# Patient Record
Sex: MALE | Race: WHITE | NOT HISPANIC OR LATINO | Employment: FULL TIME | URBAN - METROPOLITAN AREA
[De-identification: names, ages, dates, MRNs, and addresses within clinical notes are randomized per-mention and may not be internally consistent; named-entity substitution may affect disease eponyms.]

---

## 2017-09-10 ENCOUNTER — GENERIC CONVERSION - ENCOUNTER (OUTPATIENT)
Dept: OTHER | Facility: OTHER | Age: 57
End: 2017-09-10

## 2017-09-10 ENCOUNTER — LAB CONVERSION - ENCOUNTER (OUTPATIENT)
Dept: OTHER | Facility: OTHER | Age: 57
End: 2017-09-10

## 2017-09-10 LAB
A/G RATIO (HISTORICAL): 1.5 (CALC) (ref 1–2.5)
ALBUMIN SERPL BCP-MCNC: 4.1 G/DL (ref 3.6–5.1)
ALP SERPL-CCNC: 44 U/L (ref 40–115)
ALT SERPL W P-5'-P-CCNC: 17 U/L (ref 9–46)
AST SERPL W P-5'-P-CCNC: 18 U/L (ref 10–35)
BILIRUB SERPL-MCNC: 0.3 MG/DL (ref 0.2–1.2)
BUN SERPL-MCNC: 17 MG/DL (ref 7–25)
BUN/CREA RATIO (HISTORICAL): 11 (CALC) (ref 6–22)
CALCIUM (ADJUSTED FOR ALBUMIN) (HISTORICAL): 9.7 MG/DL (CALC) (ref 8.6–10.2)
CALCIUM SERPL-MCNC: 9.5 MG/DL (ref 8.6–10.3)
CHLORIDE SERPL-SCNC: 105 MMOL/L (ref 98–110)
CHOLEST SERPL-MCNC: 174 MG/DL
CHOLEST/HDLC SERPL: 3 (CALC)
CO2 SERPL-SCNC: 25 MMOL/L (ref 20–31)
CREAT SERPL-MCNC: 1.51 MG/DL (ref 0.7–1.33)
EGFR AFRICAN AMERICAN (HISTORICAL): 58 ML/MIN/1.73M2
EGFR-AMERICAN CALC (HISTORICAL): 50 ML/MIN/1.73M2
GAMMA GLOBULIN (HISTORICAL): 2.7 G/DL (CALC) (ref 1.9–3.7)
GLUCOSE (HISTORICAL): 98 MG/DL (ref 65–99)
HDLC SERPL-MCNC: 58 MG/DL
LDL CHOLESTEROL (HISTORICAL): 103 MG/DL (CALC)
NON-HDL-CHOL (CHOL-HDL) (HISTORICAL): 116 MG/DL (CALC)
POTASSIUM SERPL-SCNC: 4.3 MMOL/L (ref 3.5–5.3)
SODIUM SERPL-SCNC: 138 MMOL/L (ref 135–146)
TOTAL PROTEIN (HISTORICAL): 6.8 G/DL (ref 6.1–8.1)
TRIGL SERPL-MCNC: 50 MG/DL
TSH SERPL DL<=0.05 MIU/L-ACNC: 1.36 MIU/L (ref 0.4–4.5)

## 2017-09-26 ENCOUNTER — ALLSCRIPTS OFFICE VISIT (OUTPATIENT)
Dept: OTHER | Facility: OTHER | Age: 57
End: 2017-09-26

## 2017-10-08 ENCOUNTER — GENERIC CONVERSION - ENCOUNTER (OUTPATIENT)
Dept: OTHER | Facility: OTHER | Age: 57
End: 2017-10-08

## 2017-10-08 LAB
A/G RATIO (HISTORICAL): 1.7 (CALC) (ref 1–2.5)
ALBUMIN SERPL BCP-MCNC: 3.8 G/DL (ref 3.6–5.1)
ALP SERPL-CCNC: 44 U/L (ref 40–115)
ALT SERPL W P-5'-P-CCNC: 14 U/L (ref 9–46)
AST SERPL W P-5'-P-CCNC: 14 U/L (ref 10–35)
BILIRUB SERPL-MCNC: 0.6 MG/DL (ref 0.2–1.2)
BUN SERPL-MCNC: 14 MG/DL (ref 7–25)
BUN/CREA RATIO (HISTORICAL): NORMAL (CALC) (ref 6–22)
CALCIUM (ADJUSTED FOR ALBUMIN) (HISTORICAL): 9.5 MG/DL (CALC) (ref 8.6–10.2)
CALCIUM SERPL-MCNC: 9.1 MG/DL (ref 8.6–10.3)
CHLORIDE SERPL-SCNC: 107 MMOL/L (ref 98–110)
CO2 SERPL-SCNC: 28 MMOL/L (ref 20–31)
CREAT SERPL-MCNC: 1.31 MG/DL (ref 0.7–1.33)
EGFR AFRICAN AMERICAN (HISTORICAL): 69 ML/MIN/1.73M2
EGFR-AMERICAN CALC (HISTORICAL): 60 ML/MIN/1.73M2
GAMMA GLOBULIN (HISTORICAL): 2.3 G/DL (CALC) (ref 1.9–3.7)
GLUCOSE (HISTORICAL): 94 MG/DL (ref 65–99)
POTASSIUM SERPL-SCNC: 4.5 MMOL/L (ref 3.5–5.3)
SODIUM SERPL-SCNC: 140 MMOL/L (ref 135–146)
TOTAL PROTEIN (HISTORICAL): 6.1 G/DL (ref 6.1–8.1)

## 2018-01-09 NOTE — RESULT NOTES
Discussion/Summary   KIDNEY FUNCTION IS SHOWING DECREASE  AVOID ADVIL, ETC AND DRINK MORE WATER  WE WILL REPEAT THIS  WILL DISCUSS AT APPT  DR [facility]     Verified Results  (Q) LIPID PANEL WITH REFLEX TO DIRECT LDL [id number] 07:13AM Yes[facility] [employer]     Test Name Result Flag Reference   CHOLESTEROL, TOTAL 174 mg/dL  <200   HDL CHOLESTEROL 58 mg/dL  >51   TRIGLICERIDES 50 mg/dL  <479   LDL-CHOLESTEROL 103 mg/dL (calc) H    Reference range: <100     Desirable range <100 mg/dL for patients with CHD or  diabetes and <70 mg/dL for diabetic patients with  known heart disease  The [facility] calculation is a validated novel   method that provides better accuracy than the   Friedewald equation in the estimation of LDL-C,   particularly when TG levels are 150-400 mg/dL and   LDL-C levels are lower than 70 mg/dL  Reference:  Tiki Reveles al  Comparison of a Novel   Method vs the Friedewald Equation for Estimating   Low-Density Lipoprotein Cholesterol Levels From the   Standard Lipid Profile  JERAD  9220;225(10): 5763-9868  For additional information, please refer to  [email or web address]/LHW134  (This link is being provided for informational/  educational purposes only )   CHOL/HDLC RATIO 3 0 (calc)  <5 0   NON HDL CHOLESTEROL 116 mg/dL (calc)  <130   For patients with diabetes plus 1 major ASCVD risk   factor, treating to a non-HDL-C goal of <100 mg/dL   (LDL-C of <70 mg/dL) is considered a therapeutic   option  (Q) COMPREHENSIVE METABOLIC PNL W/ADJUSTED CALCIUM [id number] 07:13AM [employer]     Test Name Result Flag Reference   GLUCOSE 98 mg/dL  65-99   Fasting reference interval   UREA NITROGEN (BUN) 17 mg/dL  7-25   CREATININE 1 51 mg/dL H 0 70-1 33   For patients >52years of age, the reference limit  for Creatinine is approximately 13% higher for people  identified as -American  eGFR NON-AFR   AMERICAN 50 mL/min/1 73m2 L > OR = 60   eGFR  58 mL/min/1 73m2 L > OR = 60   BUN/CREATININE RATIO 11 (calc)  6-22   SODIUM 138 mmol/L  135-146   POTASSIUM 4 3 mmol/L  3 5-5 3   CHLORIDE 105 mmol/L     CARBON DIOXIDE 25 mmol/L  20-31   CALCIUM 9 5 mg/dL  8 6-10 3   CALCIUM (ADJUSTED FOR$ALBUMIN) 9 7 mg/dL (calc)  8 6-10 2   PROTEIN, TOTAL 6 8 g/dL  6 1-8 1   ALBUMIN 4 1 g/dL  3 6-5 1   GLOBULIN 2 7 g/dL (calc)  1 9-3 7   ALBUMIN/GLOBULIN RATIO 1 5 (calc)  1 0-2 5   BILIRUBIN, TOTAL 0 3 mg/dL  0 2-1 2   ALKALINE PHOSPHATASE 44 U/L     AST 18 U/L  10-35   ALT 17 U/L  9-46     (Q) TSH, 3RD GENERATION W/REFLEX TO FT4 [id number] 07:13AM Tashia Sifuentes   REPORT COMMENT:  FASTING:YES     Test Name Result Flag Reference   TSH W/REFLEX TO FT4 1 36 mIU/L  0 40-4 50

## 2018-01-10 NOTE — PROGRESS NOTES
Assessment    1  Encounter for preventive health examination (V70 0) (Z00 00)   2  Need for prophylactic vaccination and inoculation against influenza (V04 81) (Z23)   3  Mild renal insufficiency (593 9) (N28 9)   4  Special screening for malignant neoplasms, colon (V76 51) (Z12 11)   5  Family history of lung cancer (V16 1) (Z80 1) : Mother    Plan  Benign essential hypertension    · AmLODIPine Besylate 5 MG Oral Tablet; take 1 tablet by mouth once daily  Benign essential hypertension, Hyperlipidemia    · (Q) COMPREHENSIVE METABOLIC PNL W/ADJUSTED CALCIUM; Status:Active; Requested for:01Mar2018;    · (Q) LIPID PANEL WITH REFLEX TO DIRECT LDL; Status:Active; Requested  for:01Mar2018;    · (Q) TSH, 3RD GENERATION W/REFLEX TO FT4; Status:Active; Requested  IOE:[id number];   Erectile dysfunction of non-organic origin    · Viagra 50 MG Oral Tablet; TAKE 1 TABLET AS NEEDED  Health Maintenance    · Follow-up visit in 1 year Evaluation and Treatment  Follow-up  Status: Hold For -  Scheduling  Requested for: 26Sep2017   · Eat a low fat and low cholesterol diet ; Status:Complete;   Done: 07CJZ5643 05:39PM   · Call 911 if: You experience a new kind of chest pain (angina) or pressure ;  Status:Complete;   Done: 56VQQ7187 05:39PM  Mild renal insufficiency    · (Q) COMPREHENSIVE METABOLIC PNL W/ADJUSTED CALCIUM; Status:Active; Requested FTX:[id number];   Need for prophylactic vaccination and inoculation against influenza    · Fluzone Quadrivalent 0 5 ML Intramuscular Suspension Prefilled Syringe  Special screening for malignant neoplasms, colon    · (Q) FECAL GLOBIN BY IMMUNOCHEMISTRY; Status:Active; Requested LCP:[id number];     Discussion/Summary  health maintenance visit Currently, he eats a healthy diet and has an adequate exercise regimen  Testicular cancer screening: self testicular exam technique was taught  Colorectal cancer screening: colorectal cancer screening is current  Screening lab work includes lipid profile  The immunizations are up to date  He was advised to be evaluated by an ophthalmologist  Advice and education were given regarding aerobic exercise  Patient discussion: discussed with the patient  Chief Complaint  Patient here for annual wellness exam      History of Present Illness  HM, Adult Male: The patient is being seen for a health maintenance evaluation  General Health: The patient's health since the last visit is described as good  He has regular dental visits  He denies vision problems  He denies hearing loss  Immunizations status: not up to date  Lifestyle:  He consumes a diverse and healthy diet  He does not have any weight concerns  He exercises regularly  He does not use tobacco  He denies alcohol use  He denies drug use  Screening: cancer screening reviewed and updated  Prostate cancer screening includes prostate-specific antigen testing last year  Testicular cancer screening includes monthly self testicular examinations  Colorectal cancer screening includes no previous screening  Metabolic screening includes lipid profile performed within the past five years, glucose screening performed last year and thyroid function test performed last year  Review of Systems    Constitutional: No fever or chills, feels well, no tiredness, no recent weight gain or weight loss  Eyes: No complaints of eye pain, no red eyes, no discharge from eyes, no itchy eyes  ENT: no complaints of earache, no hearing loss, no nosebleeds, no nasal discharge, no sore throat, no hoarseness  Cardiovascular: No complaints of slow heart rate, no fast heart rate, no chest pain, no palpitations, no leg claudication, no lower extremity  Respiratory: No complaints of shortness of breath, no wheezing, no cough, no SOB on exertion, no orthopnea or PND  Gastrointestinal: No complaints of abdominal pain, no constipation, no nausea or vomiting, no diarrhea or bloody stools     Genitourinary: No complaints of dysuria, no incontinence, no hesitancy, no nocturia, no genital lesion, no testicular pain  Musculoskeletal: No complaints of arthralgia, no myalgias, no joint swelling or stiffness, no limb pain or swelling  Integumentary: No complaints of skin rash or skin lesions, no itching, no skin wound, no dry skin  Neurological: No compliants of headache, no confusion, no convulsions, no numbness or tingling, no dizziness or fainting, no limb weakness, no difficulty walking  Psychiatric: Is not suicidal, no sleep disturbances, no anxiety or depression, no change in personality, no emotional problems  Endocrine: No complaints of proptosis, no hot flashes, no muscle weakness, no erectile dysfunction, no deepening of the voice, no feelings of weakness  Hematologic/Lymphatic: No complaints of swollen glands, no swollen glands in the neck, does not bleed easily, no easy bruising  Active Problems    1  Benign essential hypertension (401 1) (I10)   2  Erectile dysfunction of non-organic origin (302 72) (F52 21)   3  Hyperlipidemia (272 4) (E78 5)   4  Need for prophylactic vaccination and inoculation against influenza (V04 81) (Z23)   5  Special screening examination for neoplasm of prostate (V76 44) (Z12 5)   6  Special screening for malignant neoplasms, colon (V76 51) (Z12 11)   7   Well adult on routine health check (V70 0) (Z00 00)    Surgical History    · History of Hernia Repair    Family History  Mother    · Family history of Family Health Status Of Mother - Alive   · Family history of Family Health Status Of Mother - Good   · Family history of lung cancer (V16 1) (Z80 1)  Father    · Family history of Acute Myocardial Infarction (V17 3)   · Family history of malignant neoplasm of prostate (V16 42) (Z80 45)   · Family history of Pure Hypercholesterolemia  Sister    · Family history of Family Health Status Of Sister - Alive   · Family history of Family Health Status Of Sister - Good  Maternal Aunt    · Family history of Acute Lymphoma    Social History    · Alcohol Use (History)   · 3-4 BEERS/WEEK   · Denied: History of Drug Use   · Never A Smoker    Current Meds   1  AmLODIPine Besylate 5 MG Oral Tablet; take 1 tablet by mouth once daily; Therapy: 46BJG9813 to (Evaluate:97Gtc5293)  Requested for: [birth year]YOB[birth year]07; Last   Rx:[id number] Ordered   2  Viagra 50 MG Oral Tablet; TAKE 1 TABLET AS NEEDED; Therapy: 42PHF7330 to (Evaluate:25Hnl9088)  Requested for: 11ZOZ9666; Last   COY:47MBD2887 Ordered    Allergies    1  Diovan TABS   2  Lisinopril TABS    3  No Known Environmental Allergies   4  No Known Food Allergies    Vitals   Recorded: 02QYK0218 05:13PM   Heart Rate 64   Respiration 18   Systolic 921   Diastolic 90   Height 5 ft 11 4 in   Weight 193 lb 8 oz   BMI Calculated 26 69   BSA Calculated 2 09     Physical Exam    Constitutional   General appearance: No acute distress, well appearing and well nourished  Head and Face   Head and face: Normal     Eyes   Conjunctiva and lids: No erythema, swelling or discharge  Pupils and irises: Equal, round, reactive to light  Ears, Nose, Mouth, and Throat   External inspection of ears and nose: Normal     Otoscopic examination: Tympanic membranes translucent with normal light reflex  Canals patent without erythema  Hearing: Normal     Nasal mucosa, septum, and turbinates: Normal without edema or erythema  Lips, teeth, and gums: Normal, good dentition  Oropharynx: Normal with no erythema, edema, exudate or lesions  Neck   Neck: Supple, symmetric, trachea midline, no masses  Thyroid: Normal, no thyromegaly  Pulmonary   Respiratory effort: No increased work of breathing or signs of respiratory distress  Auscultation of lungs: Clear to auscultation  Cardiovascular   Auscultation of heart: Normal rate and rhythm, normal S1 and S2, no murmurs  Examination of extremities for edema and/or varicosities: Normal     Abdomen   Abdomen: Non-tender, no masses      Liver and spleen: No hepatomegaly or splenomegaly  Lymphatic   Palpation of lymph nodes in neck: No lymphadenopathy  Palpation of lymph nodes in axillae: No lymphadenopathy  Musculoskeletal   Gait and station: Normal     Inspection/palpation of digits and nails: Normal without clubbing or cyanosis  Inspection/palpation of joints, bones, and muscles: Normal     Range of motion: Normal     Stability: Normal     Muscle strength/tone: Normal     Skin   Skin and subcutaneous tissue: Normal without rashes or lesions  Palpation of skin and subcutaneous tissue: Normal turgor  Neurologic   Cranial nerves: Cranial nerves 2-12 intact  Cortical function: Normal mental status  Reflexes: 2+ and symmetric  Sensation: No sensory loss  Coordination: Normal finger to nose and heel to shin  Psychiatric   Judgment and insight: Normal     Orientation to person, place and time: Normal     Recent and remote memory: Intact      Mood and affect: Normal        Signatures   Electronically signed by : Charan Woodard DO; Sep 26 2017  5:39PM EST                       (Author)

## 2018-01-11 ENCOUNTER — GENERIC CONVERSION - ENCOUNTER (OUTPATIENT)
Dept: OTHER | Facility: OTHER | Age: 58
End: 2018-01-11

## 2018-01-11 LAB — FECAL GLOBIN BY IMMUNOCHEM (HISTORICAL): NORMAL

## 2018-01-12 VITALS
HEIGHT: 71 IN | SYSTOLIC BLOOD PRESSURE: 140 MMHG | WEIGHT: 193.5 LBS | HEART RATE: 64 BPM | DIASTOLIC BLOOD PRESSURE: 90 MMHG | RESPIRATION RATE: 18 BRPM | BODY MASS INDEX: 27.09 KG/M2

## 2018-01-15 NOTE — RESULT NOTES
Discussion/Summary   COMPLETELY NORMAL KIDNEY FUNCTION     [facility]     Verified Results  (Q) COMPREHENSIVE METABOLIC PNL W/ADJUSTED CALCIUM 07Oct2017 07:40AM Thai Hard   REPORT COMMENT:  FASTING:YES     Test Name Result Flag Reference   GLUCOSE 94 mg/dL  65-99   Fasting reference interval   UREA NITROGEN (BUN) 14 mg/dL  7-25   CREATININE 1 31 mg/dL  0 70-1 33   For patients >52years of age, the reference limit  for Creatinine is approximately 13% higher for people  identified as -American  eGFR NON-AFR   AMERICAN 60 mL/min/1 73m2  > OR = 60   eGFR AFRICAN AMERICAN 69 mL/min/1 73m2  > OR = 60   BUN/CREATININE RATIO   1-68   NOT APPLICABLE (calc)   SODIUM 140 mmol/L  135-146   POTASSIUM 4 5 mmol/L  3 5-5 3   CHLORIDE 107 mmol/L     CARBON DIOXIDE 28 mmol/L  20-31   CALCIUM 9 1 mg/dL  8 6-10 3   CALCIUM (ADJUSTED FOR$ALBUMIN) 9 5 mg/dL (calc)  8 6-10 2   PROTEIN, TOTAL 6 1 g/dL  6 1-8 1   ALBUMIN 3 8 g/dL  3 6-5 1   GLOBULIN 2 3 g/dL (calc)  1 9-3 7   ALBUMIN/GLOBULIN RATIO 1 7 (calc)  1 0-2 5   BILIRUBIN, TOTAL 0 6 mg/dL  0 2-1 2   ALKALINE PHOSPHATASE 44 U/L     AST 14 U/L  10-35   ALT 14 U/L  9-46

## 2018-01-15 NOTE — RESULT NOTES
Verified Results  (Q) LIPID PANEL WITH REFLEX TO DIRECT LDL [id number] 07:57AM [facility] [address] [employer]     Test Name Result Flag Reference   CHOLESTEROL, TOTAL 224 mg/dL H 125-200   HDL CHOLESTEROL 51 mg/dL  > OR = 40   TRIGLICERIDES 78 mg/dL  <409   LDL-CHOLESTEROL 157 mg/dL (calc) H <130   Desirable range <100 mg/dL for patients with CHD or  diabetes and <70 mg/dL for diabetic patients with  known heart disease  CHOL/HDLC RATIO 4 4 (calc)  < OR = 5 0   NON HDL CHOLESTEROL 173 mg/dL (calc) H    Target for non-HDL cholesterol is 30 mg/dL higher than   LDL cholesterol target  (1) COMPREHENSIVE METABOLIC PANEL [id number] 63:06IQ [address] [address] [employer]     Test Name Result Flag Reference   GLUCOSE 96 mg/dL  65-99   Fasting reference interval   UREA NITROGEN (BUN) 22 mg/dL  7-25   CREATININE 1 25 mg/dL  0 70-1 33   For patients >52years of age, the reference limit  for Creatinine is approximately 13% higher for people  identified as -American  eGFR NON-AFR   AMERICAN 64 mL/min/1 73m2  > OR = 60   eGFR AFRICAN AMERICAN 74 mL/min/1 73m2  > OR = 60   BUN/CREATININE RATIO   1-33   NOT APPLICABLE (calc)   SODIUM 137 mmol/L  135-146   POTASSIUM 4 2 mmol/L  3 5-5 3   CHLORIDE 105 mmol/L     CARBON DIOXIDE 22 mmol/L  19-30   CALCIUM 9 2 mg/dL  8 6-10 3   PROTEIN, TOTAL 6 7 g/dL  6 1-8 1   ALBUMIN 4 1 g/dL  3 6-5 1   GLOBULIN 2 6 g/dL (calc)  1 9-3 7   ALBUMIN/GLOBULIN RATIO 1 6 (calc)  1 0-2 5   BILIRUBIN, TOTAL 0 5 mg/dL  0 2-1 2   ALKALINE PHOSPHATASE 49 U/L     AST 16 U/L  10-35   ALT 19 U/L  9-46     (1) URINALYSIS w URINE C/S REFLEX (will reflex a microscopy if leukocytes, occult blood, or nitrites are not within normal limits) [id number] 07:57AM [facility] [address] [facility]     Test Name Result Flag Reference   COLOR YELLOW  YELLOW   APPEARANCE CLEAR  CLEAR   SPECIFIC GRAVITY 1 026  1 001-1 035   PH 5 5  5 0-8 0   GLUCOSE NEGATIVE  NEGATIVE   BILIRUBIN NEGATIVE  NEGATIVE   KETONES NEGATIVE  NEGATIVE   OCCULT BLOOD NEGATIVE NEGATIVE   PROTEIN NEGATIVE  NEGATIVE   NITRITE NEGATIVE  NEGATIVE   LEUKOCYTE ESTERASE NEGATIVE  NEGATIVE   WBC NONE SEEN /HPF  < OR = 5   RBC NONE SEEN /HPF  < OR = 2   SQUAMOUS EPITHELIAL CELLS NONE SEEN /HPF  < OR = 5   BACTERIA NONE SEEN /HPF  NONE SEEN   HYALINE CAST NONE SEEN /LPF  NONE SEEN   REFLEXIVE URINE CULTURE NO CULTURE INDICATED       (Q) TSH, 3RD GENERATION W/REFLEX TO FT4 [id number] 07:57AM Kyra Méndez   REPORT COMMENT:  FASTING:YES     Test Name Result Flag Reference   TSH W/REFLEX TO FT4 1 80 mIU/L  0 40-4 50       Discussion/Summary   CHOLESTEROL IS MUCH HIGHER  WILL DISCUSS AT NEXT VISIT     Sommer Mistry

## 2018-02-26 NOTE — RESULT NOTES
Discussion/Summary   NORMAL STOOL TEST   DR Melinda Vyas     Verified Results  (Q) FECAL GLOBIN BY IMMUNOCHEMISTRY [id number] 12:00AM Rachel Nugent     Test Name Result Flag Reference   FECAL GLOBIN BY$IMMUNOCHEMISTRY      FECAL GLOBIN BY IMMUNOCHEMISTRY       MICRO NUMBER:   [id number]   TEST STATUS:    FINAL   SPECIMEN SOURCE:  INSURE (TM) FOBT TEST CARD   SPECIMEN QUALITY: ADEQUATE   RESULT:      Not Detected

## 2018-04-01 PROBLEM — N28.9 MILD RENAL INSUFFICIENCY: Status: ACTIVE | Noted: 2017-09-26

## 2018-04-01 RX ORDER — AMLODIPINE BESYLATE 5 MG/1
1 TABLET ORAL DAILY
COMMUNITY
Start: 2015-11-17 | End: 2018-06-26 | Stop reason: SDUPTHER

## 2018-04-01 RX ORDER — SILDENAFIL 50 MG/1
1 TABLET, FILM COATED ORAL
COMMUNITY
Start: 2012-07-31 | End: 2018-06-26 | Stop reason: SDUPTHER

## 2018-04-03 ENCOUNTER — OFFICE VISIT (OUTPATIENT)
Dept: FAMILY MEDICINE CLINIC | Facility: CLINIC | Age: 58
End: 2018-04-03
Payer: COMMERCIAL

## 2018-04-03 VITALS
BODY MASS INDEX: 27.29 KG/M2 | DIASTOLIC BLOOD PRESSURE: 80 MMHG | HEIGHT: 70 IN | SYSTOLIC BLOOD PRESSURE: 126 MMHG | RESPIRATION RATE: 16 BRPM | WEIGHT: 190.6 LBS | HEART RATE: 56 BPM

## 2018-04-03 DIAGNOSIS — I10 BENIGN ESSENTIAL HYPERTENSION: Primary | ICD-10-CM

## 2018-04-03 DIAGNOSIS — G44.219 EPISODIC TENSION-TYPE HEADACHE, NOT INTRACTABLE: ICD-10-CM

## 2018-04-03 DIAGNOSIS — E55.9 VITAMIN D DEFICIENCY: ICD-10-CM

## 2018-04-03 DIAGNOSIS — E78.5 HYPERLIPIDEMIA, UNSPECIFIED HYPERLIPIDEMIA TYPE: ICD-10-CM

## 2018-04-03 DIAGNOSIS — F52.21 ERECTILE DYSFUNCTION OF NON-ORGANIC ORIGIN: ICD-10-CM

## 2018-04-03 DIAGNOSIS — Z12.5 SCREENING FOR PROSTATE CANCER: ICD-10-CM

## 2018-04-03 DIAGNOSIS — N28.9 MILD RENAL INSUFFICIENCY: ICD-10-CM

## 2018-04-03 PROCEDURE — 3074F SYST BP LT 130 MM HG: CPT | Performed by: FAMILY MEDICINE

## 2018-04-03 PROCEDURE — 3008F BODY MASS INDEX DOCD: CPT | Performed by: FAMILY MEDICINE

## 2018-04-03 PROCEDURE — 99214 OFFICE O/P EST MOD 30 MIN: CPT | Performed by: FAMILY MEDICINE

## 2018-04-03 PROCEDURE — 3079F DIAST BP 80-89 MM HG: CPT | Performed by: FAMILY MEDICINE

## 2018-04-03 NOTE — PROGRESS NOTES
Assessment/Plan:    Erectile dysfunction of non-organic origin  Stable on viagra as needed    Benign essential hypertension  Stable on amlodipine    Hyperlipidemia  Diet controlled    Episodic tension-type headache, not intractable  Will check vitamin d  Possible eye strain since seems to be only at works    Vitamin D deficiency  Will check levels and see if contributing to headaches         Problem List Items Addressed This Visit     Benign essential hypertension - Primary     Stable on amlodipine         Relevant Medications    amLODIPine (NORVASC) 5 mg tablet    Erectile dysfunction of non-organic origin     Stable on viagra as needed         Hyperlipidemia     Diet controlled         Mild renal insufficiency    Episodic tension-type headache, not intractable     Will check vitamin d  Possible eye strain since seems to be only at works         Relevant Medications    amLODIPine (NORVASC) 5 mg tablet    Screening for prostate cancer            Subjective:      Patient ID: Gerardo Saucedo is a 61 y o  male  Here for follow up  Feeling pretty good  No cp with exercise      Hypertension   This is a chronic problem  The current episode started more than 1 year ago  The problem is unchanged  The problem is controlled  Associated symptoms include headaches (worse at work)  Pertinent negatives include no anxiety, blurred vision, chest pain, malaise/fatigue, neck pain, orthopnea, palpitations, peripheral edema, PND, shortness of breath or sweats  There are no associated agents to hypertension  Risk factors for coronary artery disease include dyslipidemia  Past treatments include angiotensin blockers  Hyperlipidemia   This is a chronic problem  The current episode started more than 1 year ago  The problem is controlled  Recent lipid tests were reviewed and are normal  There are no known factors aggravating his hyperlipidemia  Pertinent negatives include no chest pain or shortness of breath   He is currently on no antihyperlipidemic treatment  The current treatment provides significant improvement of lipids  There are no compliance problems  Headache    This is a new problem  The current episode started 1 to 4 weeks ago  The problem occurs intermittently  The problem has been unchanged  The pain is located in the retro-orbital region  The pain quality is not similar to prior headaches  The quality of the pain is described as squeezing  The pain is at a severity of 4/10  Pertinent negatives include no abdominal pain, abnormal behavior, anorexia, back pain, blurred vision, coughing, facial sweating, fever, hearing loss, insomnia, loss of balance, muscle aches, neck pain, scalp tenderness, seizures, sinus pressure, sore throat, swollen glands, tingling or visual change  The symptoms are aggravated by work  He has tried NSAIDs for the symptoms  The treatment provided mild relief  The following portions of the patient's history were reviewed and updated as appropriate: allergies, current medications, past family history, past medical history, past social history, past surgical history and problem list     Review of Systems   Constitutional: Negative  Negative for fever and malaise/fatigue  HENT: Negative  Negative for hearing loss, sinus pressure and sore throat  Eyes: Negative  Negative for blurred vision  Respiratory: Negative for cough and shortness of breath  Cardiovascular: Negative for chest pain, palpitations, orthopnea and PND  Gastrointestinal: Negative  Negative for abdominal pain and anorexia  Endocrine: Negative  Genitourinary: Negative  Musculoskeletal: Negative for back pain and neck pain  Allergic/Immunologic: Negative  Neurological: Positive for headaches (worse at work)  Negative for tingling, seizures and loss of balance  Hematological: Negative  Psychiatric/Behavioral: Negative  The patient does not have insomnia            Objective:      /80   Pulse 56   Resp 16   Ht 5' 10" (1 778 m)   Wt 86 5 kg (190 lb 9 6 oz)   BMI 27 35 kg/m²          Physical Exam   Constitutional: He appears well-developed and well-nourished  HENT:   Head: Normocephalic and atraumatic  Eyes: EOM are normal  Pupils are equal, round, and reactive to light  Neck: Normal range of motion  Neck supple  Cardiovascular: Normal rate, regular rhythm, normal heart sounds and intact distal pulses  Pulmonary/Chest: Effort normal and breath sounds normal    Abdominal: Soft  Bowel sounds are normal    Musculoskeletal: Normal range of motion  Neurological: He is alert  Skin: Skin is warm and dry  Psychiatric: He has a normal mood and affect  His behavior is normal  Judgment and thought content normal    Nursing note and vitals reviewed

## 2018-06-26 DIAGNOSIS — I10 ESSENTIAL HYPERTENSION: ICD-10-CM

## 2018-06-26 DIAGNOSIS — N52.01 ERECTILE DYSFUNCTION DUE TO ARTERIAL INSUFFICIENCY: ICD-10-CM

## 2018-06-26 DIAGNOSIS — I10 ESSENTIAL HYPERTENSION: Primary | ICD-10-CM

## 2018-06-26 DIAGNOSIS — N52.1 ERECTILE DISORDER DUE TO MEDICAL CONDITION IN MALE: Primary | ICD-10-CM

## 2018-06-26 RX ORDER — AMLODIPINE BESYLATE 5 MG/1
5 TABLET ORAL DAILY
Qty: 90 TABLET | Refills: 3 | Status: SHIPPED | OUTPATIENT
Start: 2018-06-26 | End: 2019-01-21 | Stop reason: ALTCHOICE

## 2018-06-26 RX ORDER — SILDENAFIL 50 MG/1
50 TABLET, FILM COATED ORAL AS NEEDED
Qty: 90 TABLET | Refills: 0 | Status: CANCELLED | OUTPATIENT
Start: 2018-06-26

## 2018-06-26 RX ORDER — AMLODIPINE BESYLATE 5 MG/1
5 TABLET ORAL DAILY
Qty: 90 TABLET | Refills: 3 | Status: CANCELLED | OUTPATIENT
Start: 2018-06-26

## 2018-06-26 RX ORDER — SILDENAFIL 50 MG/1
50 TABLET, FILM COATED ORAL AS NEEDED
Qty: 30 TABLET | Refills: 3 | Status: SHIPPED | OUTPATIENT
Start: 2018-06-26 | End: 2020-11-05 | Stop reason: CLARIF

## 2018-10-22 LAB
25(OH)D3 SERPL-MCNC: 25 NG/ML (ref 30–100)
ALBUMIN SERPL-MCNC: 4.1 G/DL (ref 3.6–5.1)
ALBUMIN/GLOB SERPL: 1.5 (CALC) (ref 1–2.5)
ALP SERPL-CCNC: 50 U/L (ref 40–115)
ALT SERPL-CCNC: 16 U/L (ref 9–46)
AST SERPL-CCNC: 16 U/L (ref 10–35)
BILIRUB SERPL-MCNC: 0.7 MG/DL (ref 0.2–1.2)
BUN SERPL-MCNC: 16 MG/DL (ref 7–25)
BUN/CREAT SERPL: 11 (CALC) (ref 6–22)
CALCIUM SERPL-MCNC: 9.4 MG/DL (ref 8.6–10.3)
CHLORIDE SERPL-SCNC: 103 MMOL/L (ref 98–110)
CHOLEST SERPL-MCNC: 221 MG/DL
CHOLEST/HDLC SERPL: 4.6 (CALC)
CO2 SERPL-SCNC: 28 MMOL/L (ref 20–32)
CREAT SERPL-MCNC: 1.47 MG/DL (ref 0.7–1.33)
ERYTHROCYTE [DISTWIDTH] IN BLOOD BY AUTOMATED COUNT: 12.6 % (ref 11–15)
GLOBULIN SER CALC-MCNC: 2.7 G/DL (CALC) (ref 1.9–3.7)
GLUCOSE SERPL-MCNC: 89 MG/DL (ref 65–99)
HCT VFR BLD AUTO: 44 % (ref 38.5–50)
HDLC SERPL-MCNC: 48 MG/DL
HGB BLD-MCNC: 14.7 G/DL (ref 13.2–17.1)
LDLC SERPL CALC-MCNC: 150 MG/DL (CALC)
MCH RBC QN AUTO: 31.3 PG (ref 27–33)
MCHC RBC AUTO-ENTMCNC: 33.4 G/DL (ref 32–36)
MCV RBC AUTO: 93.8 FL (ref 80–100)
NONHDLC SERPL-MCNC: 173 MG/DL (CALC)
PLATELET # BLD AUTO: 232 THOUSAND/UL (ref 140–400)
PMV BLD REES-ECKER: 11.4 FL (ref 7.5–12.5)
POTASSIUM SERPL-SCNC: 4.5 MMOL/L (ref 3.5–5.3)
PROT SERPL-MCNC: 6.8 G/DL (ref 6.1–8.1)
PSA SERPL-MCNC: 1.9 NG/ML
RBC # BLD AUTO: 4.69 MILLION/UL (ref 4.2–5.8)
SL AMB EGFR AFRICAN AMERICAN: 60 ML/MIN/1.73M2
SL AMB EGFR NON AFRICAN AMERICAN: 51 ML/MIN/1.73M2
SODIUM SERPL-SCNC: 138 MMOL/L (ref 135–146)
TRIGL SERPL-MCNC: 117 MG/DL
TSH SERPL-ACNC: 1.97 MIU/L (ref 0.4–4.5)
WBC # BLD AUTO: 5.4 THOUSAND/UL (ref 3.8–10.8)

## 2018-10-30 ENCOUNTER — OFFICE VISIT (OUTPATIENT)
Dept: FAMILY MEDICINE CLINIC | Facility: CLINIC | Age: 58
End: 2018-10-30
Payer: COMMERCIAL

## 2018-10-30 VITALS
HEIGHT: 71 IN | OXYGEN SATURATION: 99 % | TEMPERATURE: 97.1 F | RESPIRATION RATE: 16 BRPM | SYSTOLIC BLOOD PRESSURE: 140 MMHG | DIASTOLIC BLOOD PRESSURE: 90 MMHG | BODY MASS INDEX: 27.24 KG/M2 | WEIGHT: 194.6 LBS | HEART RATE: 71 BPM

## 2018-10-30 DIAGNOSIS — E78.5 HYPERLIPIDEMIA, UNSPECIFIED HYPERLIPIDEMIA TYPE: ICD-10-CM

## 2018-10-30 DIAGNOSIS — Z23 NEED FOR INFLUENZA VACCINATION: ICD-10-CM

## 2018-10-30 DIAGNOSIS — Z12.11 SCREENING FOR COLON CANCER: ICD-10-CM

## 2018-10-30 DIAGNOSIS — E55.9 VITAMIN D DEFICIENCY: ICD-10-CM

## 2018-10-30 DIAGNOSIS — N28.9 MILD RENAL INSUFFICIENCY: ICD-10-CM

## 2018-10-30 DIAGNOSIS — Z00.00 WELL ADULT EXAM: Primary | ICD-10-CM

## 2018-10-30 PROCEDURE — 90471 IMMUNIZATION ADMIN: CPT

## 2018-10-30 PROCEDURE — 99396 PREV VISIT EST AGE 40-64: CPT | Performed by: FAMILY MEDICINE

## 2018-10-30 PROCEDURE — 90682 RIV4 VACC RECOMBINANT DNA IM: CPT

## 2018-10-30 NOTE — PROGRESS NOTES
FAMILY PRACTICE HEALTH MAINTENANCE OFFICE VISIT  [facility] - Annabelle Haddad [facility]    NAME: Charleen Camacho  AGE: 60 y o  SEX: male  : 1960     DATE: 10/30/2018    Assessment and Plan     Problem List Items Addressed This Visit     Hyperlipidemia    Relevant Orders    Comprehensive metabolic panel    Lipid Panel with Direct LDL reflex    TSH, 3rd generation with Free T4 reflex    Mild renal insufficiency     Likely due to htn or dehydration  Will recheck in 6 month         Vitamin D deficiency    Relevant Orders    Vitamin D 25 hydroxy    Well adult exam - Primary     Flu shot today  Colonoscopy ordered         Need for influenza vaccination    Relevant Orders    influenza vaccine, [id number], quadrivalent, recombinant, PF, 0 5 mL, for patients 18 yr+ (FLUBLOK)    Screening for colon cancer    Relevant Orders    Ambulatory referral to Gastroenterology            · Patient Counseling:   · Nutrition: Stressed importance of a well balanced diet, moderation of sodium/saturated fat, caloric balance and sufficient intake of fiber  · Exercise: Stressed the importance of regular exercise with a goal of 150 minutes per week  · Dental Health: Discussed daily flossing and brushing and regular dental visits   · Alcohol Use:  Recommended moderation of alcohol intake  · Injury Prevention: Discussed Safety Belts, Safety Helmets, and Smoke Detectors    · Immunizations reviewed flu shot today  · Discussed benefits of screening up to date  · Discussed the patient's BMI with him  The BMI is in the acceptable range     Return in 6 months (on 2019) for Recheck          Chief Complaint     Chief Complaint   Patient presents with    Physical Exam     Patient here for annual wellness exam        History of Present Illness     Here for wellness   Labs reviewed          Well Adult Physical   Patient here for a comprehensive physical exam       Diet and Physical Activity  Diet: well balanced diet  Weight concerns: Patient is overweight (BMI 25 0-29  9)  Exercise: intermittently      Depression Screen  PHQ-9 Depression Screening    PHQ-9:    Frequency of the following problems over the past two weeks:               General Health  Hearing: Normal:  bilateral  Vision: no vision problems and most recent eye exam <1 year  Dental: regular dental visits and brushes teeth twice daily    Reproductive Health  Up to date      The following portions of the patient's history were reviewed and updated as appropriate: allergies, current medications, past family history, past medical history, past social history, past surgical history and problem list     Review of Systems     Review of Systems   Constitutional: Negative  HENT: Negative  Eyes: Negative  Respiratory: Negative  Cardiovascular: Negative  Gastrointestinal: Negative  Endocrine: Negative  Genitourinary: Negative  Musculoskeletal: Negative  Skin: Negative  Allergic/Immunologic: Negative  Neurological: Negative  Hematological: Negative  Psychiatric/Behavioral: Negative  Past Medical History     History reviewed  No pertinent past medical history      Past Surgical History     Past Surgical History:   Procedure Laterality Date    HIATAL HERNIA REPAIR         Social History     Social History     Social History    Marital status:      Spouse name: N/A    Number of children: N/A    Years of education: N/A     Social History Main Topics    Smoking status: Never Smoker    Smokeless tobacco: Never Used    Alcohol use Yes      Comment: 3-4 BEERS / WEEK     Drug use: No    Sexual activity: Yes     Partners: Female     Other Topics Concern    None     Social History Narrative    None       Family History     Family History   Problem Relation Age of Onset    Lung cancer Mother     Heart attack Father 76    Prostate cancer Father     Hyperlipidemia Father     No Known Problems Sister     Lymphoma Maternal Aunt Current Medications       Current Outpatient Prescriptions:     amLODIPine (NORVASC) 5 mg tablet, Take 1 tablet (5 mg total) by mouth daily, Disp: 90 tablet, Rfl: 3    sildenafil (VIAGRA) 50 MG tablet, Take 1 tablet (50 mg total) by mouth as needed for erectile dysfunction, Disp: 30 tablet, Rfl: 3     Allergies     Allergies   Allergen Reactions    Lisinopril Cough    Valsartan        Objective     /90   Pulse 71   Temp (!) 97 1 °F (36 2 °C)   Resp 16   Ht 5' 11 1" (1 806 m)   Wt 88 3 kg (194 lb 9 6 oz)   SpO2 99%   BMI 27 06 kg/m²      Physical Exam   Constitutional: He is oriented to person, place, and time  Vital signs are normal  He appears well-developed and well-nourished  HENT:   Head: Normocephalic and atraumatic  Right Ear: External ear normal    Left Ear: External ear normal    Nose: Nose normal    Mouth/Throat: Oropharynx is clear and moist    Eyes: Pupils are equal, round, and reactive to light  Conjunctivae, EOM and lids are normal    Neck: Normal range of motion  Neck supple  Cardiovascular: Normal rate, regular rhythm, S1 normal, S2 normal, normal heart sounds, intact distal pulses and normal pulses  Pulmonary/Chest: Effort normal and breath sounds normal    Abdominal: Soft  Normal appearance and bowel sounds are normal    Musculoskeletal: Normal range of motion  Neurological: He is alert and oriented to person, place, and time  He has normal strength and normal reflexes  Skin: Skin is warm and dry  Psychiatric: He has a normal mood and affect  His speech is normal and behavior is normal  Judgment and thought content normal  Cognition and memory are normal    Nursing note and vitals reviewed          No exam data present    Health Maintenance     Health Maintenance   Topic Date Due    CRC Screening: Colonoscopy  1960    INFLUENZA VACCINE  07/01/2018    Depression Screening PHQ  04/03/2019    DTaP,Tdap,and Td Vaccines (2 - Td) 07/17/2022     Immunization History   Administered Date(s) Administered    Influenza 11/17/2015, 09/26/2017    Influenza Quadrivalent Preservative Free 3 years and older IM 11/17/2015, 09/26/2017    Influenza Quadrivalent, 6-35 Months IM 10/28/2014    Tdap 07/17/2012       DO [facility]

## 2019-01-21 ENCOUNTER — OFFICE VISIT (OUTPATIENT)
Dept: FAMILY MEDICINE CLINIC | Facility: CLINIC | Age: 59
End: 2019-01-21
Payer: COMMERCIAL

## 2019-01-21 VITALS
WEIGHT: 192.6 LBS | DIASTOLIC BLOOD PRESSURE: 100 MMHG | HEART RATE: 65 BPM | BODY MASS INDEX: 26.96 KG/M2 | TEMPERATURE: 99 F | HEIGHT: 71 IN | RESPIRATION RATE: 16 BRPM | SYSTOLIC BLOOD PRESSURE: 180 MMHG | OXYGEN SATURATION: 99 %

## 2019-01-21 DIAGNOSIS — I10 BENIGN ESSENTIAL HYPERTENSION: Primary | Chronic | ICD-10-CM

## 2019-01-21 PROCEDURE — 99213 OFFICE O/P EST LOW 20 MIN: CPT | Performed by: FAMILY MEDICINE

## 2019-01-21 RX ORDER — HYDROCHLOROTHIAZIDE 25 MG/1
25 TABLET ORAL DAILY
Qty: 30 TABLET | Refills: 2 | Status: SHIPPED | OUTPATIENT
Start: 2019-01-21 | End: 2019-03-05 | Stop reason: HOSPADM

## 2019-01-21 NOTE — PROGRESS NOTES
Assessment/Plan:    No problem-specific Assessment & Plan notes found for this encounter  Diagnoses and all orders for this visit:    Benign essential hypertension  Comments:  Pt is stable on exam  Has not tolerated Lisinopril/Valsartan/Amlodipine  Will try HCTZ at this time  Continue a low carb/salt diet, & regular exercise  F/u  Orders:  -     hydrochlorothiazide (HYDRODIURIL) 25 mg tablet; Take 1 tablet (25 mg total) by mouth daily          Subjective:      Patient ID: Kalyn Carrillo is a 60 y o  male  Riddhi Valle had a possible reaction to Lisinopril / Valsartan in the past, and then noting dizziness, and elevated HR on Amlodipine -> resolved off the med  He has improved his diet since, and is trying to exercise more at home  States Bps at home in range of 130-140s/70s-80  The following portions of the patient's history were reviewed and updated as appropriate: allergies, current medications, past family history, past social history, past surgical history and problem list     No past medical history on file  Current Outpatient Prescriptions:     hydrochlorothiazide (HYDRODIURIL) 25 mg tablet, Take 1 tablet (25 mg total) by mouth daily, Disp: 30 tablet, Rfl: 2    sildenafil (VIAGRA) 50 MG tablet, Take 1 tablet (50 mg total) by mouth as needed for erectile dysfunction, Disp: 30 tablet, Rfl: 3    Allergies   Allergen Reactions    Lisinopril Cough    Valsartan          Review of Systems   Constitutional: Negative for activity change  Respiratory: Negative for shortness of breath  Cardiovascular: Negative for chest pain  Neurological: Negative for headaches  Objective:      BP (!) 180/100 (BP Location: Left arm, Patient Position: Sitting, Cuff Size: Standard)   Pulse 65   Temp 99 °F (37 2 °C) (Tympanic)   Resp 16   Ht 5' 11" (1 803 m)   Wt 87 4 kg (192 lb 9 6 oz)   SpO2 99%   BMI 26 86 kg/m²   Repeat BP = 154/94 LA seated         Physical Exam   Constitutional: He is oriented to person, place, and time  He appears well-developed and well-nourished  No distress  HENT:   Head: Normocephalic and atraumatic  Mouth/Throat: Oropharynx is clear and moist  No oropharyngeal exudate  Eyes: Conjunctivae are normal    Neck: Normal range of motion  Neck supple  No thyromegaly present  Cardiovascular: Normal rate, regular rhythm and normal heart sounds  Exam reveals no gallop and no friction rub  No murmur heard  Pulmonary/Chest: Effort normal and breath sounds normal  No respiratory distress  He has no wheezes  He has no rales  Lymphadenopathy:     He has no cervical adenopathy  Neurological: He is alert and oriented to person, place, and time  Skin: He is not diaphoretic  Psychiatric: His speech is normal and behavior is normal  Judgment and thought content normal  His mood appears anxious  Nursing note and vitals reviewed

## 2019-03-05 ENCOUNTER — OFFICE VISIT (OUTPATIENT)
Dept: FAMILY MEDICINE CLINIC | Facility: CLINIC | Age: 59
End: 2019-03-05
Payer: COMMERCIAL

## 2019-03-05 VITALS
BODY MASS INDEX: 27.02 KG/M2 | DIASTOLIC BLOOD PRESSURE: 100 MMHG | WEIGHT: 193 LBS | OXYGEN SATURATION: 98 % | RESPIRATION RATE: 14 BRPM | HEART RATE: 66 BPM | HEIGHT: 71 IN | SYSTOLIC BLOOD PRESSURE: 150 MMHG | TEMPERATURE: 98.8 F

## 2019-03-05 DIAGNOSIS — F52.21 ERECTILE DYSFUNCTION OF NON-ORGANIC ORIGIN: ICD-10-CM

## 2019-03-05 DIAGNOSIS — I10 BENIGN ESSENTIAL HYPERTENSION: Primary | ICD-10-CM

## 2019-03-05 DIAGNOSIS — E78.2 MIXED HYPERLIPIDEMIA: ICD-10-CM

## 2019-03-05 DIAGNOSIS — N28.9 MILD RENAL INSUFFICIENCY: ICD-10-CM

## 2019-03-05 PROCEDURE — 1036F TOBACCO NON-USER: CPT | Performed by: FAMILY MEDICINE

## 2019-03-05 PROCEDURE — 3008F BODY MASS INDEX DOCD: CPT | Performed by: FAMILY MEDICINE

## 2019-03-05 PROCEDURE — 99214 OFFICE O/P EST MOD 30 MIN: CPT | Performed by: FAMILY MEDICINE

## 2019-03-05 RX ORDER — NEBIVOLOL 10 MG/1
10 TABLET ORAL DAILY
Qty: 30 TABLET | Refills: 1 | Status: SHIPPED | OUTPATIENT
Start: 2019-03-05 | End: 2019-04-12

## 2019-03-05 NOTE — ASSESSMENT & PLAN NOTE
Failed several other medications  Lisinopril, valsartan, aml;odipine  Old blockers can cause sexual dysfunction

## 2019-03-05 NOTE — PROGRESS NOTES
Assessment/Plan:    Problem List Items Addressed This Visit        Cardiovascular and Mediastinum    Benign essential hypertension - Primary     Failed several other medications  Lisinopril, valsartan, aml;odipine  Old blockers can cause sexual dysfunction         Relevant Medications    nebivolol (BYSTOLIC) 10 mg tablet    Other Relevant Orders    Ambulatory referral to Cardiology       Genitourinary    Mild renal insufficiency     Check cmp            Other    Erectile dysfunction of non-organic origin     Avoid traditional bblockers         Hyperlipidemia     Check lipids               BMI Counseling: Body mass index is 26 92 kg/m²  Discussed the patient's BMI with him  The BMI is above average  BMI counseling and education was provided to the patient  Nutrition recommendations include reducing portion sizes, 3-5 servings of fruits/vegetables daily and moderation in carbohydrate intake  Exercise recommendations include exercising 3-5 times per week  There are no Patient Instructions on file for this visit  No follow-ups on file  Subjective:      Patient ID: Karsten Watson is a 60 y o  male  Chief Complaint   Patient presents with    Follow-up     Patient here for 2 month follow up on Hypertension        Here for blood pressure follow up  Has been watching diet  And exercising  Watching salt intake  Unable to tolerate hctz due to urinating    Hypertension   This is a chronic problem  The current episode started more than 1 year ago  The problem is unchanged  The problem is uncontrolled  Pertinent negatives include no blurred vision, chest pain or headaches  There are no associated agents to hypertension  Past treatments include diuretics  The current treatment provides moderate improvement  There are no compliance problems          The following portions of the patient's history were reviewed and updated as appropriate: allergies, current medications, past family history, past medical history, past social history, past surgical history and problem list     Review of Systems   Constitutional: Negative  HENT: Negative  Eyes: Negative  Negative for blurred vision  Respiratory: Negative  Cardiovascular: Negative for chest pain  Gastrointestinal: Negative  Endocrine: Negative  Genitourinary: Negative  Musculoskeletal: Negative  Allergic/Immunologic: Negative  Neurological: Negative for headaches  Hematological: Negative  Psychiatric/Behavioral: Negative  Current Outpatient Medications   Medication Sig Dispense Refill    sildenafil (VIAGRA) 50 MG tablet Take 1 tablet (50 mg total) by mouth as needed for erectile dysfunction 30 tablet 3    nebivolol (BYSTOLIC) 10 mg tablet Take 1 tablet (10 mg total) by mouth daily 30 tablet 1     No current facility-administered medications for this visit  Objective:    /100   Pulse 66   Temp 98 8 °F (37 1 °C)   Resp 14   Ht 5' 11" (1 803 m)   Wt 87 5 kg (193 lb)   SpO2 98%   BMI 26 92 kg/m²        Physical Exam   Constitutional: He appears well-developed and well-nourished  HENT:   Head: Normocephalic and atraumatic  Eyes: Pupils are equal, round, and reactive to light  EOM are normal    Neck: Normal range of motion  Neck supple  Cardiovascular: Normal rate, regular rhythm, normal heart sounds and intact distal pulses  Pulmonary/Chest: Effort normal and breath sounds normal    Abdominal: Soft  Bowel sounds are normal    Musculoskeletal: Normal range of motion  Neurological: He is alert  Skin: Skin is warm  Nursing note and vitals reviewed               Stormy Stairs, DO

## 2019-03-25 ENCOUNTER — TELEPHONE (OUTPATIENT)
Dept: FAMILY MEDICINE CLINIC | Facility: CLINIC | Age: 59
End: 2019-03-25

## 2019-03-25 DIAGNOSIS — I10 BENIGN ESSENTIAL HYPERTENSION: Primary | ICD-10-CM

## 2019-03-25 RX ORDER — ATENOLOL 25 MG/1
25 TABLET ORAL DAILY
Qty: 30 TABLET | Refills: 0 | Status: SHIPPED | OUTPATIENT
Start: 2019-03-25 | End: 2019-04-18 | Stop reason: ALTCHOICE

## 2019-03-29 ENCOUNTER — TELEPHONE (OUTPATIENT)
Dept: FAMILY MEDICINE CLINIC | Facility: CLINIC | Age: 59
End: 2019-03-29

## 2019-04-01 ENCOUNTER — TELEPHONE (OUTPATIENT)
Dept: FAMILY MEDICINE CLINIC | Facility: CLINIC | Age: 59
End: 2019-04-01

## 2019-04-02 ENCOUNTER — TELEPHONE (OUTPATIENT)
Dept: FAMILY MEDICINE CLINIC | Facility: CLINIC | Age: 59
End: 2019-04-02

## 2019-04-12 ENCOUNTER — CLINICAL SUPPORT (OUTPATIENT)
Dept: FAMILY MEDICINE CLINIC | Facility: CLINIC | Age: 59
End: 2019-04-12

## 2019-04-12 ENCOUNTER — TELEPHONE (OUTPATIENT)
Dept: FAMILY MEDICINE CLINIC | Facility: CLINIC | Age: 59
End: 2019-04-12

## 2019-04-12 VITALS — DIASTOLIC BLOOD PRESSURE: 82 MMHG | HEART RATE: 54 BPM | SYSTOLIC BLOOD PRESSURE: 140 MMHG | OXYGEN SATURATION: 99 %

## 2019-04-12 DIAGNOSIS — I10 HYPERTENSION, UNSPECIFIED TYPE: Primary | ICD-10-CM

## 2019-04-12 DIAGNOSIS — I10 BENIGN ESSENTIAL HYPERTENSION: Primary | ICD-10-CM

## 2019-04-12 PROCEDURE — RECHECK

## 2019-04-12 RX ORDER — NEBIVOLOL 5 MG/1
5 TABLET ORAL DAILY
Status: CANCELLED | OUTPATIENT
Start: 2019-04-12

## 2019-04-12 RX ORDER — NEBIVOLOL 10 MG/1
10 TABLET ORAL DAILY
Start: 2019-04-12 | End: 2019-04-18 | Stop reason: HOSPADM

## 2019-04-12 RX ORDER — NEBIVOLOL 5 MG/1
5 TABLET ORAL DAILY
Qty: 30 TABLET | Refills: 3 | Status: CANCELLED | OUTPATIENT
Start: 2019-04-12

## 2019-04-18 ENCOUNTER — OFFICE VISIT (OUTPATIENT)
Dept: FAMILY MEDICINE CLINIC | Facility: CLINIC | Age: 59
End: 2019-04-18
Payer: COMMERCIAL

## 2019-04-18 VITALS
DIASTOLIC BLOOD PRESSURE: 90 MMHG | WEIGHT: 191 LBS | HEART RATE: 62 BPM | OXYGEN SATURATION: 98 % | RESPIRATION RATE: 13 BRPM | SYSTOLIC BLOOD PRESSURE: 150 MMHG | HEIGHT: 71 IN | BODY MASS INDEX: 26.74 KG/M2 | TEMPERATURE: 97.4 F

## 2019-04-18 DIAGNOSIS — I10 BENIGN ESSENTIAL HYPERTENSION: Primary | ICD-10-CM

## 2019-04-18 PROCEDURE — 99213 OFFICE O/P EST LOW 20 MIN: CPT | Performed by: FAMILY MEDICINE

## 2019-04-18 PROCEDURE — 3008F BODY MASS INDEX DOCD: CPT | Performed by: FAMILY MEDICINE

## 2019-04-18 RX ORDER — HYDROCHLOROTHIAZIDE 25 MG/1
25 TABLET ORAL DAILY
COMMUNITY
End: 2019-04-18 | Stop reason: SDUPTHER

## 2019-04-18 RX ORDER — HYDROCHLOROTHIAZIDE 25 MG/1
25 TABLET ORAL DAILY
Qty: 30 TABLET | Refills: 5 | Status: SHIPPED | OUTPATIENT
Start: 2019-04-18 | End: 2019-08-06 | Stop reason: ALTCHOICE

## 2019-04-21 LAB
25(OH)D3 SERPL-MCNC: 24 NG/ML (ref 30–100)
ALBUMIN SERPL-MCNC: 4.1 G/DL (ref 3.6–5.1)
ALBUMIN/GLOB SERPL: 1.6 (CALC) (ref 1–2.5)
ALP SERPL-CCNC: 53 U/L (ref 40–115)
ALT SERPL-CCNC: 15 U/L (ref 9–46)
AST SERPL-CCNC: 18 U/L (ref 10–35)
BILIRUB SERPL-MCNC: 0.8 MG/DL (ref 0.2–1.2)
BUN SERPL-MCNC: 18 MG/DL (ref 7–25)
BUN/CREAT SERPL: 13 (CALC) (ref 6–22)
CALCIUM SERPL-MCNC: 9.2 MG/DL (ref 8.6–10.3)
CHLORIDE SERPL-SCNC: 100 MMOL/L (ref 98–110)
CHOLEST SERPL-MCNC: 198 MG/DL
CHOLEST/HDLC SERPL: 4.4 (CALC)
CO2 SERPL-SCNC: 29 MMOL/L (ref 20–32)
CREAT SERPL-MCNC: 1.39 MG/DL (ref 0.7–1.33)
GLOBULIN SER CALC-MCNC: 2.6 G/DL (CALC) (ref 1.9–3.7)
GLUCOSE SERPL-MCNC: 107 MG/DL (ref 65–99)
HDLC SERPL-MCNC: 45 MG/DL
LDLC SERPL CALC-MCNC: 135 MG/DL (CALC)
NONHDLC SERPL-MCNC: 153 MG/DL (CALC)
POTASSIUM SERPL-SCNC: 3.7 MMOL/L (ref 3.5–5.3)
PROT SERPL-MCNC: 6.7 G/DL (ref 6.1–8.1)
SL AMB EGFR AFRICAN AMERICAN: 64 ML/MIN/1.73M2
SL AMB EGFR NON AFRICAN AMERICAN: 55 ML/MIN/1.73M2
SODIUM SERPL-SCNC: 135 MMOL/L (ref 135–146)
TRIGL SERPL-MCNC: 85 MG/DL
TSH SERPL-ACNC: 1.27 MIU/L (ref 0.4–4.5)

## 2019-05-16 PROBLEM — E78.5 DYSLIPIDEMIA: Status: ACTIVE | Noted: 2019-05-16

## 2019-05-17 ENCOUNTER — CONSULT (OUTPATIENT)
Dept: CARDIOLOGY CLINIC | Facility: CLINIC | Age: 59
End: 2019-05-17
Payer: COMMERCIAL

## 2019-05-17 VITALS
HEART RATE: 68 BPM | HEIGHT: 71 IN | BODY MASS INDEX: 26.74 KG/M2 | DIASTOLIC BLOOD PRESSURE: 82 MMHG | WEIGHT: 191 LBS | SYSTOLIC BLOOD PRESSURE: 132 MMHG

## 2019-05-17 DIAGNOSIS — E78.5 DYSLIPIDEMIA: ICD-10-CM

## 2019-05-17 DIAGNOSIS — N18.30 STAGE 3 CHRONIC KIDNEY DISEASE (HCC): ICD-10-CM

## 2019-05-17 DIAGNOSIS — I10 BENIGN ESSENTIAL HYPERTENSION: Primary | ICD-10-CM

## 2019-05-17 PROCEDURE — 93000 ELECTROCARDIOGRAM COMPLETE: CPT | Performed by: INTERNAL MEDICINE

## 2019-05-17 PROCEDURE — 99244 OFF/OP CNSLTJ NEW/EST MOD 40: CPT | Performed by: INTERNAL MEDICINE

## 2019-05-17 RX ORDER — LISINOPRIL 10 MG/1
10 TABLET ORAL DAILY
Qty: 30 TABLET | Refills: 3 | Status: SHIPPED | OUTPATIENT
Start: 2019-05-17 | End: 2019-08-06 | Stop reason: SDUPTHER

## 2019-06-02 LAB
BUN SERPL-MCNC: 17 MG/DL (ref 7–25)
BUN/CREAT SERPL: 13 (CALC) (ref 6–22)
CALCIUM SERPL-MCNC: 9 MG/DL (ref 8.6–10.3)
CHLORIDE SERPL-SCNC: 106 MMOL/L (ref 98–110)
CO2 SERPL-SCNC: 29 MMOL/L (ref 20–32)
CREAT SERPL-MCNC: 1.35 MG/DL (ref 0.7–1.33)
GLUCOSE SERPL-MCNC: 91 MG/DL (ref 65–99)
POTASSIUM SERPL-SCNC: 4.3 MMOL/L (ref 3.5–5.3)
SL AMB EGFR AFRICAN AMERICAN: 66 ML/MIN/1.73M2
SL AMB EGFR NON AFRICAN AMERICAN: 57 ML/MIN/1.73M2
SODIUM SERPL-SCNC: 139 MMOL/L (ref 135–146)

## 2019-07-02 ENCOUNTER — HOSPITAL ENCOUNTER (OUTPATIENT)
Dept: NON INVASIVE DIAGNOSTICS | Facility: CLINIC | Age: 59
Discharge: HOME | End: 2019-07-02
Payer: COMMERCIAL

## 2019-07-02 ENCOUNTER — HOSPITAL ENCOUNTER (OUTPATIENT)
Dept: ULTRASOUND IMAGING | Facility: HOSPITAL | Age: 59
Discharge: HOME | End: 2019-07-02
Attending: INTERNAL MEDICINE
Payer: COMMERCIAL

## 2019-07-02 DIAGNOSIS — I10 BENIGN ESSENTIAL HYPERTENSION: ICD-10-CM

## 2019-07-02 DIAGNOSIS — N18.30 STAGE 3 CHRONIC KIDNEY DISEASE (HCC): ICD-10-CM

## 2019-07-02 PROCEDURE — 76770 US EXAM ABDO BACK WALL COMP: CPT

## 2019-07-02 PROCEDURE — 93306 TTE W/DOPPLER COMPLETE: CPT

## 2019-07-02 PROCEDURE — 93306 TTE W/DOPPLER COMPLETE: CPT | Performed by: INTERNAL MEDICINE

## 2019-08-06 ENCOUNTER — OFFICE VISIT (OUTPATIENT)
Dept: FAMILY MEDICINE CLINIC | Facility: CLINIC | Age: 59
End: 2019-08-06
Payer: COMMERCIAL

## 2019-08-06 VITALS
HEART RATE: 68 BPM | WEIGHT: 188 LBS | TEMPERATURE: 98.6 F | HEIGHT: 71 IN | OXYGEN SATURATION: 98 % | DIASTOLIC BLOOD PRESSURE: 80 MMHG | RESPIRATION RATE: 14 BRPM | SYSTOLIC BLOOD PRESSURE: 124 MMHG | BODY MASS INDEX: 26.32 KG/M2

## 2019-08-06 DIAGNOSIS — I10 BENIGN ESSENTIAL HYPERTENSION: ICD-10-CM

## 2019-08-06 DIAGNOSIS — I12.9 BENIGN HYPERTENSIVE KIDNEY DISEASE WITH CHRONIC KIDNEY DISEASE STAGE I THROUGH STAGE IV, OR UNSPECIFIED: Primary | ICD-10-CM

## 2019-08-06 DIAGNOSIS — E78.5 DYSLIPIDEMIA: ICD-10-CM

## 2019-08-06 DIAGNOSIS — E55.9 VITAMIN D DEFICIENCY: ICD-10-CM

## 2019-08-06 DIAGNOSIS — Z12.11 SCREENING FOR COLON CANCER: ICD-10-CM

## 2019-08-06 DIAGNOSIS — Z11.59 ENCOUNTER FOR HEPATITIS C SCREENING TEST FOR LOW RISK PATIENT: ICD-10-CM

## 2019-08-06 DIAGNOSIS — F52.21 ERECTILE DYSFUNCTION OF NON-ORGANIC ORIGIN: ICD-10-CM

## 2019-08-06 DIAGNOSIS — Z12.5 SCREENING FOR PROSTATE CANCER: ICD-10-CM

## 2019-08-06 PROBLEM — N28.9 MILD RENAL INSUFFICIENCY: Status: RESOLVED | Noted: 2017-09-26 | Resolved: 2019-08-06

## 2019-08-06 PROCEDURE — 99214 OFFICE O/P EST MOD 30 MIN: CPT | Performed by: FAMILY MEDICINE

## 2019-08-06 NOTE — PROGRESS NOTES
Assessment/Plan:    Problem List Items Addressed This Visit        Genitourinary    Benign hypertensive CKD - Primary     Referred to nephrology  US normal  Avoid nsaids         Relevant Orders    Ambulatory referral to Nephrology    Comprehensive metabolic panel       Other    Erectile dysfunction of non-organic origin     viagra prn         Screening for prostate cancer    Relevant Orders    PSA, Total Screen    Vitamin D deficiency     Vitamin d slightly low         Relevant Orders    TSH, 3rd generation with Free T4 reflex    Screening for colon cancer    Relevant Orders    Ambulatory referral to Gastroenterology    Dyslipidemia     Stable with diet control         Relevant Orders    Lipid Panel with Direct LDL reflex    TSH, 3rd generation with Free T4 reflex      Other Visit Diagnoses     Encounter for hepatitis C screening test for low risk patient        Relevant Orders    Hepatitis C antibody          BMI Counseling: Body mass index is 26 22 kg/m²  Discussed the patient's BMI with him  The BMI is above average  BMI counseling and education was provided to the patient  Nutrition recommendations include reducing portion sizes and 3-5 servings of fruits/vegetables daily  Exercise recommendations include exercising 3-5 times per week  There are no Patient Instructions on file for this visit  No follow-ups on file  Subjective:      Patient ID: Douglas Khalil is a 60 y o  male  Chief Complaint   Patient presents with    Follow-up     Patient here for follow up on Hypertension        Here for bp follow up  Average 130/78  Seen by cardiologist  US kidney and echo  Tolerating lisinopril- mild tickle, cough but feels the best on this meds    Hypertension   This is a chronic problem  The current episode started more than 1 year ago  The problem is unchanged  The problem is controlled  There are no associated agents to hypertension  Past treatments include ACE inhibitors   The current treatment provides significant improvement  There are no compliance problems  The following portions of the patient's history were reviewed and updated as appropriate: allergies, current medications, past family history, past medical history, past social history, past surgical history and problem list     Review of Systems      Current Outpatient Medications   Medication Sig Dispense Refill    lisinopril (ZESTRIL) 10 mg tablet Take 1 tablet (10 mg total) by mouth daily 30 tablet 3    sildenafil (VIAGRA) 50 MG tablet Take 1 tablet (50 mg total) by mouth as needed for erectile dysfunction 30 tablet 3     No current facility-administered medications for this visit  Objective:    /80   Pulse 68   Temp 98 6 °F (37 °C)   Resp 14   Ht 5' 11" (1 803 m)   Wt 85 3 kg (188 lb)   SpO2 98%   BMI 26 22 kg/m²        Physical Exam   Constitutional: He appears well-developed and well-nourished  HENT:   Head: Normocephalic and atraumatic  Eyes: Pupils are equal, round, and reactive to light  Neck: Normal range of motion  Neck supple  Cardiovascular: Normal rate, regular rhythm, normal heart sounds and intact distal pulses  Pulmonary/Chest: Effort normal and breath sounds normal    Abdominal: Soft  Bowel sounds are normal    Musculoskeletal: Normal range of motion  Neurological: He is alert  Skin: Skin is warm  Nursing note and vitals reviewed               Carmina Escobar DO

## 2019-08-07 RX ORDER — LISINOPRIL 10 MG/1
10 TABLET ORAL DAILY
Qty: 90 TABLET | Refills: 3 | Status: SHIPPED | OUTPATIENT
Start: 2019-08-07 | End: 2019-08-26 | Stop reason: SDUPTHER

## 2019-08-25 PROBLEM — N18.30 STAGE 3 CHRONIC KIDNEY DISEASE (HCC): Status: ACTIVE | Noted: 2019-08-25

## 2019-08-25 PROBLEM — I10 BENIGN ESSENTIAL HYPERTENSION: Status: ACTIVE | Noted: 2019-08-25

## 2019-08-25 NOTE — PROGRESS NOTES
Cardiology Follow-up    Gerardo Saucedo  [id number]  1960  [address]      1  Benign essential hypertension  lisinopril (ZESTRIL) 20 mg tablet   2  Dyslipidemia     3  Stage 3 chronic kidney disease ([address] )     4  Dilated aortic root (HCC)         Discussion/Summary:  Mr Flaquito Torres is a pleasant 70-year-old gentleman who presents to the office today for follow-up  Since his last visit he re-started lisinopril  He is tolerating this well with a cough which is tolerable  He reports blood pressure readings at home in the high 130s over high 80s  I have suggested he increase his lisinopril dose to 20 mg daily  He does have chronic kidney disease and is due to see a nephrologist in the near future  I had him undergo a renal ultrasound which was unrevealing  Otherwise he did undergo an echocardiogram revealing mild valvular heart disease and mild dilatation of his ascending aorta  He will undergo a full CT scan of his chest after his next visit  I will see him back in the office six months for re-evaluation  History of Present Illness:  Mr Flaquito Torres is a pleasant 70-year-old gentleman who presents to the office for routine follow-up  He is active  He walks a few nights a week for 2 to 3 miles on flat ground  On the weekends he goes for longer walks with his girlfriend  He also has a total gym which he utilizes a few days a week  He does so without any chest pain or shortness of breath  He denies any signs or symptoms of congestive heart failure including increasing lower extremity edema, paroxysmal nocturnal dyspnea, orthopnea, acute weight gain or increasing abdominal girth  He denies syncope or presyncope  He has not had any palpitations since his last visit      He does check his blood pressure at home regularly with a home monitoring device and reports readings in the 130s over [de-identified]    Patient Active Problem List   Diagnosis    Benign hypertensive CKD    Erectile dysfunction of non-organic origin    Episodic tension-type headache, not intractable    Screening for prostate cancer    Vitamin D deficiency    Well adult exam    Need for influenza vaccination    Screening for colon cancer    Dyslipidemia    Benign essential hypertension    Stage 3 chronic kidney disease ([facility] )    Dilated aortic root ([facility] )     Past Medical History:   Diagnosis Date    Hyperlipidemia 6/28/2016    Mild renal insufficiency 9/26/2017     Social History     Socioeconomic History    Marital status:      Spouse name: Not on file    Number of children: Not on file    Years of education: Not on file    Highest education level: Not on file   Occupational History    Not on file   Social Needs    Financial resource strain: Not on file    Food insecurity:     Worry: Not on file     Inability: Not on file    Transportation needs:     Medical: Not on file     Non-medical: Not on file   Tobacco Use    Smoking status: Never Smoker    Smokeless tobacco: Never Used   Substance and Sexual Activity    Alcohol use: Yes     Comment: 3-4 BEERS / WEEK     Drug use: No    Sexual activity: Yes     Partners: Female   Lifestyle    Physical activity:     Days per week: Not on file     Minutes per session: Not on file    Stress: Not on file   Relationships    Social connections:     Talks on phone: Not on file     Gets together: Not on file     Attends Yazidism service: Not on file     Active member of club or organization: Not on file     Attends meetings of clubs or organizations: Not on file     Relationship status: Not on file    Intimate partner violence:     Fear of current or ex partner: Not on file     Emotionally abused: Not on file     Physically abused: Not on file     Forced sexual activity: Not on file   Other Topics Concern    Not on file   Social History Narrative    Not on file Family History   Problem Relation Age of Onset    Lung cancer Mother     Heart attack Father 76    Prostate cancer Father     Hyperlipidemia Father     No Known Problems Sister     Lymphoma Maternal Aunt     Fainting Maternal Grandfather      Past Surgical History:   Procedure Laterality Date    HIATAL HERNIA REPAIR         Current Outpatient Medications:     lisinopril (ZESTRIL) 20 mg tablet, Take 1 tablet (20 mg total) by mouth daily, Disp: 90 tablet, Rfl: 3    sildenafil (VIAGRA) 50 MG tablet, Take 1 tablet (50 mg total) by mouth as needed for erectile dysfunction, Disp: 30 tablet, Rfl: 3  Allergies   Allergen Reactions    Valsartan            Review of Systems:  Review of Systems   Respiratory: Negative for apnea, chest tightness and shortness of breath  Cardiovascular: Negative for chest pain, palpitations and leg swelling  All other systems reviewed and are negative          Vitals:    08/26/19 1530   BP: 138/90   BP Location: Right arm   Patient Position: Sitting   Cuff Size: Standard   Pulse: 60   Resp: 19   Weight: 85 7 kg (189 lb)   Height: 5' 11" (1 803 m)     Vitals:    08/26/19 1530   Weight: 85 7 kg (189 lb)     Height: 5' 11" (180 3 cm)     Physical Exam:  General:  Alert and cooperative, appears stated age  HEENT:  PERRLA, EOMI, no scleral icterus, no conjunctival pallor  Neck:  No lymphadenopathy, no thyromegaly, no carotid bruits, no elevated JVP  Heart:  Regular rate and rhythm, normal S1/S2, no S3/S4, no murmur  Lungs:  Clear to auscultation bilaterally   Abdomen:  Soft, non-tender, positive bowel sounds, no rebound or guarding,   no organomegaly   Extremities:  No clubbing, cyanosis or edema   Vascular:  2+ pedal pulses  Skin:  No rashes or lesions on exposed skin  Neurologic:  Cranial nerves II-XII grossly intact without focal deficits

## 2019-08-26 ENCOUNTER — OFFICE VISIT (OUTPATIENT)
Dept: CARDIOLOGY CLINIC | Facility: CLINIC | Age: 59
End: 2019-08-26
Payer: COMMERCIAL

## 2019-08-26 VITALS
BODY MASS INDEX: 26.46 KG/M2 | SYSTOLIC BLOOD PRESSURE: 138 MMHG | RESPIRATION RATE: 19 BRPM | HEIGHT: 71 IN | WEIGHT: 189 LBS | DIASTOLIC BLOOD PRESSURE: 90 MMHG | HEART RATE: 60 BPM

## 2019-08-26 DIAGNOSIS — N18.30 STAGE 3 CHRONIC KIDNEY DISEASE (HCC): ICD-10-CM

## 2019-08-26 DIAGNOSIS — I77.810 DILATED AORTIC ROOT (HCC): ICD-10-CM

## 2019-08-26 DIAGNOSIS — I10 BENIGN ESSENTIAL HYPERTENSION: Primary | ICD-10-CM

## 2019-08-26 DIAGNOSIS — E78.5 DYSLIPIDEMIA: ICD-10-CM

## 2019-08-26 PROCEDURE — 99214 OFFICE O/P EST MOD 30 MIN: CPT | Performed by: INTERNAL MEDICINE

## 2019-08-26 RX ORDER — LISINOPRIL 20 MG/1
20 TABLET ORAL DAILY
Qty: 90 TABLET | Refills: 3 | Status: SHIPPED | OUTPATIENT
Start: 2019-08-26 | End: 2019-09-18 | Stop reason: SINTOL

## 2019-09-18 ENCOUNTER — CONSULT (OUTPATIENT)
Dept: NEPHROLOGY | Facility: CLINIC | Age: 59
End: 2019-09-18
Payer: COMMERCIAL

## 2019-09-18 VITALS
HEART RATE: 66 BPM | HEIGHT: 71 IN | WEIGHT: 189.9 LBS | SYSTOLIC BLOOD PRESSURE: 128 MMHG | DIASTOLIC BLOOD PRESSURE: 82 MMHG | BODY MASS INDEX: 26.59 KG/M2

## 2019-09-18 DIAGNOSIS — E78.5 DYSLIPIDEMIA: ICD-10-CM

## 2019-09-18 DIAGNOSIS — I12.9 BENIGN HYPERTENSIVE KIDNEY DISEASE WITH CHRONIC KIDNEY DISEASE STAGE I THROUGH STAGE IV, OR UNSPECIFIED: ICD-10-CM

## 2019-09-18 DIAGNOSIS — I10 BENIGN ESSENTIAL HYPERTENSION: ICD-10-CM

## 2019-09-18 DIAGNOSIS — N18.30 STAGE 3 CHRONIC KIDNEY DISEASE (HCC): Primary | ICD-10-CM

## 2019-09-18 PROCEDURE — 99244 OFF/OP CNSLTJ NEW/EST MOD 40: CPT | Performed by: INTERNAL MEDICINE

## 2019-09-18 RX ORDER — CANDESARTAN 16 MG/1
16 TABLET ORAL DAILY
Qty: 30 TABLET | Refills: 4 | Status: SHIPPED | OUTPATIENT
Start: 2019-09-18 | End: 2019-11-19 | Stop reason: SDUPTHER

## 2019-09-18 NOTE — PROGRESS NOTES
Nephrology Initial Consultation  Fay Lauren 60 y o  male MRN: [id number]            REASON FOR CONSULTATION:  Fay Lauren is a 60 y o male who was referred by Elan Stockton DO for evaluation of Consult and Chronic Kidney Disease    ASSESSMENT / PLAN:   60 y o   male with pmh of multiple co-morbidities including  hyperlipidemia, hypertension (x 3yrs) and CKD stage 3 presents to the office for evaluation and management of abnormal renal parameters  1  CKD stage IIIA:  - Patient has a baseline creatinine of 1 2-1 5 mg/dL  Most recent labs show a Creatinine of 1 35 mg/dL  Renal function remains stable  - likely has underlying CKD secondary to hypertensive nephrosclerosis plus age-related nephron loss plus NSAID nephropathy  Will still rule out paraproteinemia  - renal ultrasound from July 2, 2019 shows bilateral kidneys 11 cm  With no hydronephrosis no masses  - Proteinuria - Will check UA, spot urine protein and creatinine    - Acid base and lytes stable  - Clinically the patient appears to be euvolemic  - Recommend to avoid use of NSAIDs, nephrotoxins  Caution advised with regards to exposure to IV contrast dye    - Discussed with the patient in depth his renal status, including the possible etiologies for CKD  - Advised the patient that when his GFR is close to 20mL/min then will start discussing about RRT(renal replacement therapy) options such as renal transplant, peritoneal dialysis and hemodialysis  - Informed the patient about the various options for Renal Replacement therapy  - Discussed with the patient how we need to work together to delay the progression of CKD with optimal BP control based on their age and co-morbidities and trying to reduce proteinuria by the use of anti-proteinuric agents  2  Hypertension:  - Patient is on lisinopril 20mg po Q24    - DC lisinopril due to cough start candesartan 16 mg p o   Q day  - check blood pressures routinely and call with blood pressure updates in 2 weeks get blood work in 2 weeks  - advised patient about appropriate blood pressure management technique  - Goal BP of <  140/90 based on age and comorbidities  - Instructed to follow low sodium (2gm)diet   - Advised to hold ACEI/ARBs if patient suffers from dehydration due to gastrointestinal losses due to risk of ANDREW secondary to failure to autoregulate  3  Hemoglobin:  - Goal Hb of 10-12 g/dL  - Most recent labs suggestive of 14 7 grams/deciliter  - no role for IV iron at this time    4  CKD-MBD(Mineral Bone Disease): - Based on patients CKD stage following is the goal of therapy  - Maintain calcium phosphorus product of < 55   - Stage 3 CKD - Goal Ca 8 5-10 mg/dL , goal Phos 2 7-4 6 mg/dL  , goal iPTH 30-70 pg/mL  - Patient is currently at goal   - Continue patient on no vitamin-D  - check vitamin-D and intact PTH next visit    5  Lipids:  - goal LDL less than 70  - Management as per PCP    6  Nutrition:  - Encouraged patient to follow a renal diet comprising of moderate potassium, low phosphorus and protein restriction to 0 8gm/kg  - Will check serum albumin with next blood work  7  Followup:  - Patient is to follow-up in 3 months, with lab work to be performed in 2 weeks and then again in a few days prior to the next visit  Advised patient to call me in 10 days to review the results if they do not hear back from me, as I may have not received the results  Luiza Real MD, [facility], 9/18/2019, 8:51 AM             HISTORY OF PRESENT ILLNESS: 60 y o  male with past medical history of hyperlipidemia, hypertension (x 3yrs) and CKD stage 3 presents to the office for evaluation and management of abnormal renal parameters  Review of records shows that patient has a baseline creatinine of 1 2-1 5 mg/dL  No h/o kidney stones or ANDREW needing RRT  Never seen a nephrologist  Was on lisinopril then had cough so was switched to norvasc then was having issues with palpitations   Now back on the lisinopril for about 3 months  Cough still there  In the past tried HCTZ and bystolic also , never tried ARB  Uses NSAIDs once a week approximately  Home SBP is about 130-145 range  Happy with all the care information that he has gotten today  Review of Systems   Constitutional: Negative for appetite change, fatigue and fever  HENT: Negative for congestion, rhinorrhea and sore throat  Respiratory: Positive for cough  Negative for shortness of breath and wheezing  Cardiovascular: Negative for chest pain, palpitations and leg swelling  Gastrointestinal: Negative for abdominal pain, constipation, diarrhea, nausea and vomiting  Genitourinary: Negative for difficulty urinating, dysuria and hematuria  Musculoskeletal: Negative for back pain  Skin: Negative for rash and wound  Neurological: Negative for dizziness, light-headedness and headaches  Psychiatric/Behavioral: Negative for confusion  All other systems reviewed and are negative  PAST MEDICAL HISTORY:  Past Medical History:   Diagnosis Date    Hyperlipidemia 6/28/2016    Hypertensive CKD (chronic kidney disease)     Mild renal insufficiency 9/26/2017       PROBLEM LIST    Patient Active Problem List   Diagnosis    Benign hypertensive CKD    Erectile dysfunction of non-organic origin    Episodic tension-type headache, not intractable    Screening for prostate cancer    Vitamin D deficiency    Well adult exam    Need for influenza vaccination    Screening for colon cancer    Dyslipidemia    Benign essential hypertension    Stage 3 chronic kidney disease ([address]    Dilated aortic root ([address] )       PAST SURGICAL HISTORY:  Past Surgical History:   Procedure Laterality Date    HIATAL HERNIA REPAIR         SOCIAL HISTORY :   reports that he has never smoked  He has never used smokeless tobacco  He reports that he drinks alcohol  He reports that he does not use drugs      FAMILY HISTORY:  Family History   Problem Relation Age of Onset    Lung cancer Mother     Heart attack Father 76    Prostate cancer Father     Hyperlipidemia Father     No Known Problems Sister     Lymphoma Maternal Aunt     Fainting Maternal Grandfather        ALLERGIES:  Allergies   Allergen Reactions    Valsartan            PHYSICAL EXAM:  Vitals:    09/18/19 0758 09/18/19 0821   BP: 146/82 128/82   BP Location: Left arm    Patient Position: Sitting    Cuff Size: Adult    Pulse: 66    Weight: 86 1 kg (189 lb 14 4 oz)    Height: 5' 11" (1 803 m)      Body mass index is 26 49 kg/m²  Physical Exam   Constitutional: He is oriented to person, place, and time  He appears well-developed and well-nourished  No distress  HENT:   Head: Normocephalic and atraumatic  Mouth/Throat: Oropharynx is clear and moist  No oropharyngeal exudate  Eyes: Conjunctivae are normal  No scleral icterus  Neck: Neck supple  No JVD present  No tracheal deviation present  Cardiovascular: Normal heart sounds  Exam reveals no friction rub  Pulmonary/Chest: Effort normal  He has no wheezes  He has no rales  Abdominal: Soft  He exhibits no mass  There is no tenderness  Musculoskeletal: He exhibits no edema  Neurological: He is alert and oriented to person, place, and time  Skin: Skin is warm  He is not diaphoretic  No pallor  Psychiatric: He has a normal mood and affect  His behavior is normal    Vitals reviewed          LABORATORY DATA:     Results from last 6 Months   Lab Units 06/01/19  0925 04/20/19  0855   POTASSIUM mmol/L 4 3 3 7   CHLORIDE mmol/L 106 100   CO2 mmol/L 29 29   BUN mg/dL 17 18   CREATININE mg/dL 1 35* 1 39*   SL AMB CALCIUM mg/dL 9 0 9 2        rest all reviewed    RADIOLOGY:  No orders to display     Rest all reviewed        MEDICATIONS:    Current Outpatient Medications:     candesartan (ATACAND) 16 mg tablet, Take 1 tablet (16 mg total) by mouth daily, Disp: 30 tablet, Rfl: 4    sildenafil (VIAGRA) 50 MG tablet, Take 1 tablet (50 mg total) by mouth as needed for erectile dysfunction (Patient not taking: Reported on 9/18/2019), Disp: 30 tablet, Rfl: 3        Portions of the record may have been created with voice recognition software  Occasional wrong word or "sound a like" substitutions may have occurred due to the inherent limitations of voice recognition software  Read the chart carefully and recognize, using context, where substitutions have occurred  If you have any questions, please contact the dictating provider

## 2019-09-18 NOTE — PATIENT INSTRUCTIONS
- stop the lisinopril  - start candesartan 16mg once a day  - check blood pressure routinely and call with blood pressure updates in 2 weeks  - get blood work done in 2 weeks    - Please call me in 10 days after having your blood work done to review the results if you do not hear back from me or my office, as I may have not received the results  - please remember to perform blood work prior to the next visit  - Please call if the blood pressure top number is greater than 150 or less than 110 consistently  - Please call if you are gaining more than 2lbs in 2 days for adjustment of water pills   ~ Please AVOID the following pain medications  LIST OF NSAIDS (NONSTEROIDAL ANTI-INFLAMMATORY DRUGS) AND CHAMBERS-2 INHIBITORS    DIFLUNISAL (DOLOBID)  IBUPROFEN (MOTRIN, ADVIL)  FLURBIPROFEN (ANSAID)  KETOPROFEN (ORUDIS, ORUVAIL)  FENOPROFEN (NALFON)  NABUMETONE (RELAFEN)  PIROXICAM (FELDENE)  NAPROXEN (ALEVE, NAPROSYN, NAPRELAN, ANAPROX)  DICLOFENAC (VOLTAREN, CATAFLAM)  INDOMETHACIN (INDOCIN)  SULINDAC (CLINORIL)  TOLMETIN (TOLETIN)  ETODOLAC (LODINE)  MELOXICAM (MOBIC)  KETOROLAC (TORADOL)  OXAPROZIN (DAYPRO)  CELECOXIB (CELEBREX)    Phosphorus diet  Follow a low phosphorus diet      Avoid these higher phosphorus foods: Choose these lower phosphorus foods:   Milk, pudding or yogurt (from animals and from many soy varieties) Rice milk (unfortified), nondairy creamer (if it doesn't have terms in the ingredients list that contain the letters "phos")   Hard cheeses, ricotta or cottage cheese, fat-free cream cheese Regular and low-fat cream cheese   Ice cream or frozen yogurt Sherbet or frozen fruit pops   Soups made with higher phosphorus ingredients (milk, dried peas, beans, lentils) Soups made with lower phosphorus ingredients (broth- or water-based with other lower phosphorus ingredients)   Whole grains, including whole-grain breads, crackers, cereal, rice and pasta Refined grains, including white bread, crackers, cereals, rice and pasta   Quick breads, biscuits, cornbread, muffins, pancakes or waffles Homemade refined (white) dinner rolls, bagels or English muffins   Dried peas (split, black-eyed), beans (black, garbanzo, lima, kidney, navy, romano) or lentils Green peas (canned, frozen), green beans or wax beans   Organ meats, walleye, pollock or sardines Lean beef, pork, lamb, poultry or other fish   Nuts and seeds Popcorn   Peanut butter and other nut butters Jam, jelly or honey   Chocolate, including chocolate drinks Carob (chocolate-flavored) candy, hard candy or gumdrops   Pippa and pepper-type sodas, flavored coronado, bottled teas (if a term in the ingredients list contains the letters "phos") Lemon-lime soda, ginger ale or root beer, plain water     Follow a moderate potassium diet

## 2019-11-05 ENCOUNTER — TELEPHONE (OUTPATIENT)
Dept: NEPHROLOGY | Facility: CLINIC | Age: 59
End: 2019-11-05

## 2019-11-19 DIAGNOSIS — N18.30 STAGE 3 CHRONIC KIDNEY DISEASE (HCC): ICD-10-CM

## 2019-11-19 DIAGNOSIS — I10 BENIGN ESSENTIAL HYPERTENSION: ICD-10-CM

## 2019-11-19 RX ORDER — CANDESARTAN 16 MG/1
16 TABLET ORAL DAILY
Qty: 30 TABLET | Refills: 4 | Status: SHIPPED | OUTPATIENT
Start: 2019-11-19 | End: 2020-01-07 | Stop reason: SDUPTHER

## 2019-12-15 LAB
ALBUMIN SERPL-MCNC: 3.7 G/DL (ref 3.6–5.1)
ALBUMIN/GLOB SERPL: 1.6 (CALC) (ref 1–2.5)
ALP SERPL-CCNC: 46 U/L (ref 40–115)
ALT SERPL-CCNC: 15 U/L (ref 9–46)
AST SERPL-CCNC: 13 U/L (ref 10–35)
BILIRUB SERPL-MCNC: 0.4 MG/DL (ref 0.2–1.2)
BUN SERPL-MCNC: 17 MG/DL (ref 7–25)
BUN/CREAT SERPL: ABNORMAL (CALC) (ref 6–22)
CALCIUM SERPL-MCNC: 8.9 MG/DL (ref 8.6–10.3)
CHLORIDE SERPL-SCNC: 104 MMOL/L (ref 98–110)
CHOLEST SERPL-MCNC: 175 MG/DL
CHOLEST/HDLC SERPL: 3.9 (CALC)
CO2 SERPL-SCNC: 28 MMOL/L (ref 20–32)
CREAT SERPL-MCNC: 1.23 MG/DL (ref 0.7–1.25)
GLOBULIN SER CALC-MCNC: 2.3 G/DL (CALC) (ref 1.9–3.7)
GLUCOSE SERPL-MCNC: 105 MG/DL (ref 65–99)
HDLC SERPL-MCNC: 45 MG/DL
LDLC SERPL CALC-MCNC: 114 MG/DL (CALC)
NONHDLC SERPL-MCNC: 130 MG/DL (CALC)
POTASSIUM SERPL-SCNC: 4.2 MMOL/L (ref 3.5–5.3)
PROT SERPL-MCNC: 6 G/DL (ref 6.1–8.1)
PSA SERPL-MCNC: 1.1 NG/ML
SL AMB EGFR AFRICAN AMERICAN: 73 ML/MIN/1.73M2
SL AMB EGFR NON AFRICAN AMERICAN: 63 ML/MIN/1.73M2
SODIUM SERPL-SCNC: 138 MMOL/L (ref 135–146)
TRIGL SERPL-MCNC: 65 MG/DL
TSH SERPL-ACNC: 1.77 MIU/L (ref 0.4–4.5)

## 2019-12-16 LAB
HCV AB S/CO SERPL IA: 0.11
HCV AB SERPL QL IA: NORMAL

## 2019-12-17 ENCOUNTER — TELEPHONE (OUTPATIENT)
Dept: NEPHROLOGY | Facility: CLINIC | Age: 59
End: 2019-12-17

## 2019-12-17 NOTE — TELEPHONE ENCOUNTER
----- Message from Ulysses Harpin, MD sent at 12/16/2019  1:55 PM EST -----  Please let the patient know that most recent lab work results are stable  Please advised him to start taking vitamin-D 2000 units p o  Q day  Rest of the blood work is still pending  Please advised him to make sure he comes to his next appointment  Will discuss further at the upcoming visit, let me know if they have any questions or concerns      Thanks

## 2019-12-17 NOTE — TELEPHONE ENCOUNTER
Called and spoke with the pt- informed him that his results are stable and to start on Vit D OTC 2000u  He does not have any questions/concerns at this time

## 2019-12-18 LAB
25(OH)D3 SERPL-MCNC: 27 NG/ML (ref 30–100)
ALBUMIN SERPL ELPH-MCNC: 3.9 G/DL (ref 3.8–4.8)
ALBUMIN/CREAT UR: 3 MCG/MG CREAT
ALPHA1 GLOB SERPL ELPH-MCNC: 0.3 G/DL (ref 0.2–0.3)
ALPHA2 GLOB SERPL ELPH-MCNC: 0.7 G/DL (ref 0.5–0.9)
APPEARANCE UR: CLEAR
BACTERIA UR QL AUTO: NORMAL /HPF
BETA1 GLOB SERPL ELPH-MCNC: 0.4 G/DL (ref 0.4–0.6)
BETA2 GLOB SERPL ELPH-MCNC: 0.3 G/DL (ref 0.2–0.5)
BILIRUB UR QL STRIP: NEGATIVE
CALCIUM SERPL-MCNC: 8.7 MG/DL (ref 8.6–10.3)
COLOR UR: YELLOW
CREAT UR-MCNC: 59 MG/DL (ref 20–320)
GAMMA GLOB SERPL ELPH-MCNC: 0.9 G/DL (ref 0.8–1.7)
GLUCOSE UR QL STRIP: NEGATIVE
HGB UR QL STRIP: NEGATIVE
HYALINE CASTS #/AREA URNS LPF: NORMAL /LPF
IGA SERPL-MCNC: 246 MG/DL (ref 47–310)
IGG SERPL-MCNC: 908 MG/DL (ref 600–1640)
IGM SERPL-MCNC: 146 MG/DL (ref 50–300)
KAPPA LC FREE SER-MCNC: 17.3 MG/L (ref 3.3–19.4)
KETONES UR QL STRIP: NEGATIVE
LEUKOCYTE ESTERASE UR QL STRIP: NEGATIVE
MAGNESIUM SERPL-MCNC: 2.2 MG/DL (ref 1.5–2.5)
MICROALBUMIN UR-MCNC: 0.2 MG/DL
NITRITE UR QL STRIP: NEGATIVE
PH UR STRIP: 7 [PH] (ref 5–8)
PROT SERPL-MCNC: 6.5 G/DL (ref 6.1–8.1)
PROT UR QL STRIP: NEGATIVE
PTH-INTACT SERPL-MCNC: 37 PG/ML (ref 14–64)
RBC #/AREA URNS HPF: NORMAL /HPF
SP GR UR STRIP: 1.01 (ref 1–1.03)
SQUAMOUS #/AREA URNS HPF: NORMAL /HPF
WBC #/AREA URNS HPF: NORMAL /HPF

## 2020-01-07 ENCOUNTER — OFFICE VISIT (OUTPATIENT)
Dept: FAMILY MEDICINE CLINIC | Facility: CLINIC | Age: 60
End: 2020-01-07
Payer: COMMERCIAL

## 2020-01-07 VITALS
HEART RATE: 57 BPM | SYSTOLIC BLOOD PRESSURE: 150 MMHG | WEIGHT: 181.4 LBS | TEMPERATURE: 98.5 F | BODY MASS INDEX: 25.4 KG/M2 | DIASTOLIC BLOOD PRESSURE: 90 MMHG | HEIGHT: 71 IN | OXYGEN SATURATION: 97 % | RESPIRATION RATE: 14 BRPM

## 2020-01-07 DIAGNOSIS — Z23 NEED FOR VACCINATION: ICD-10-CM

## 2020-01-07 DIAGNOSIS — I10 BENIGN ESSENTIAL HYPERTENSION: ICD-10-CM

## 2020-01-07 DIAGNOSIS — N18.30 STAGE 3 CHRONIC KIDNEY DISEASE (HCC): ICD-10-CM

## 2020-01-07 DIAGNOSIS — Z23 NEED FOR INFLUENZA VACCINATION: ICD-10-CM

## 2020-01-07 DIAGNOSIS — E55.9 VITAMIN D DEFICIENCY: ICD-10-CM

## 2020-01-07 DIAGNOSIS — I12.9 BENIGN HYPERTENSIVE KIDNEY DISEASE WITH CHRONIC KIDNEY DISEASE STAGE I THROUGH STAGE IV, OR UNSPECIFIED: Primary | ICD-10-CM

## 2020-01-07 PROCEDURE — 99213 OFFICE O/P EST LOW 20 MIN: CPT | Performed by: FAMILY MEDICINE

## 2020-01-07 PROCEDURE — 3008F BODY MASS INDEX DOCD: CPT | Performed by: FAMILY MEDICINE

## 2020-01-07 PROCEDURE — 90682 RIV4 VACC RECOMBINANT DNA IM: CPT

## 2020-01-07 PROCEDURE — 90471 IMMUNIZATION ADMIN: CPT

## 2020-01-07 RX ORDER — CANDESARTAN 32 MG/1
32 TABLET ORAL DAILY
Qty: 30 TABLET | Refills: 3 | Status: SHIPPED | OUTPATIENT
Start: 2020-01-07 | End: 2020-02-06

## 2020-01-07 NOTE — PROGRESS NOTES
Assessment/Plan:    1  Benign hypertensive kidney disease with chronic kidney disease stage I through stage IV, or unspecified  Assessment & Plan:  bp's variable- some still higher  No side effects      2  Benign essential hypertension  -     candesartan (ATACAND) 32 MG tablet; Take 1 tablet (32 mg total) by mouth daily    3  Stage 3 chronic kidney disease (HCC)  -     candesartan (ATACAND) 32 MG tablet; Take 1 tablet (32 mg total) by mouth daily    4  Need for influenza vaccination    5  Need for vaccination  -     influenza vaccine, 2266-3313, quadrivalent, recombinant, PF, 0 5 mL, for patients 18 yr+ (FLUBLOK)    6  Vitamin D deficiency  Assessment & Plan:  Taking vitamin d 2000iu            There are no Patient Instructions on file for this visit  No follow-ups on file  Subjective:      Patient ID: Carlie Epley is a 60 y o  male  Chief Complaint   Patient presents with    Follow-up     Patient here for 4 month follow up on Hypertension        Here for follow up  Overall tolerating meds  Seeing nephrologist  US and labs done    Hypertension   This is a chronic problem  The current episode started more than 1 year ago  The problem is unchanged  The problem is uncontrolled  Associated symptoms include palpitations  There are no associated agents to hypertension  There are no known risk factors for coronary artery disease  Past treatments include angiotensin blockers  The current treatment provides significant improvement  There are no compliance problems  The following portions of the patient's history were reviewed and updated as appropriate: allergies, current medications, past family history, past medical history, past social history, past surgical history and problem list     Review of Systems   Constitutional: Negative  HENT: Negative  Eyes: Negative  Respiratory: Negative  Cardiovascular: Positive for palpitations  Gastrointestinal: Negative  Endocrine: Negative  Genitourinary: Negative  Musculoskeletal: Negative  Skin: Negative  Allergic/Immunologic: Negative  Neurological: Negative  Hematological: Negative  Psychiatric/Behavioral: Negative  Current Outpatient Medications   Medication Sig Dispense Refill    candesartan (ATACAND) 32 MG tablet Take 1 tablet (32 mg total) by mouth daily 30 tablet 3    sildenafil (VIAGRA) 50 MG tablet Take 1 tablet (50 mg total) by mouth as needed for erectile dysfunction (Patient not taking: Reported on 9/18/2019) 30 tablet 3     No current facility-administered medications for this visit  Objective:    /90   Pulse 57   Temp 98 5 °F (36 9 °C)   Resp 14   Ht 5' 11" (1 803 m)   Wt 82 3 kg (181 lb 6 4 oz)   SpO2 97%   BMI 25 30 kg/m²        Physical Exam   Constitutional: He appears well-developed and well-nourished  HENT:   Head: Normocephalic and atraumatic  Eyes: Pupils are equal, round, and reactive to light  Neck: Normal range of motion  Neck supple  Cardiovascular: Normal rate, regular rhythm, normal heart sounds and intact distal pulses  Pulmonary/Chest: Effort normal and breath sounds normal    Abdominal: Soft  Bowel sounds are normal    Musculoskeletal: Normal range of motion  Neurological: He is alert  Skin: Skin is warm  Capillary refill takes less than 2 seconds  Psychiatric: He has a normal mood and affect  Nursing note and vitals reviewed               Karli Bucio DO

## 2020-01-26 LAB
ALBUMIN SERPL-MCNC: 4.1 G/DL (ref 3.6–5.1)
BUN SERPL-MCNC: 20 MG/DL (ref 7–25)
BUN/CREAT SERPL: NORMAL (CALC) (ref 6–22)
CALCIUM SERPL-MCNC: 9.1 MG/DL (ref 8.6–10.3)
CHLORIDE SERPL-SCNC: 106 MMOL/L (ref 98–110)
CO2 SERPL-SCNC: 30 MMOL/L (ref 20–32)
CREAT SERPL-MCNC: 1.14 MG/DL (ref 0.7–1.25)
GLUCOSE SERPL-MCNC: 88 MG/DL (ref 65–99)
MAGNESIUM SERPL-MCNC: 2.2 MG/DL (ref 1.5–2.5)
PHOSPHATE SERPL-MCNC: 2.8 MG/DL (ref 2.5–4.5)
POTASSIUM SERPL-SCNC: 4.2 MMOL/L (ref 3.5–5.3)
SL AMB EGFR AFRICAN AMERICAN: 81 ML/MIN/1.73M2
SL AMB EGFR NON AFRICAN AMERICAN: 70 ML/MIN/1.73M2
SODIUM SERPL-SCNC: 140 MMOL/L (ref 135–146)

## 2020-02-06 ENCOUNTER — OFFICE VISIT (OUTPATIENT)
Dept: NEPHROLOGY | Facility: CLINIC | Age: 60
End: 2020-02-06
Payer: COMMERCIAL

## 2020-02-06 VITALS
HEART RATE: 60 BPM | BODY MASS INDEX: 26.12 KG/M2 | DIASTOLIC BLOOD PRESSURE: 90 MMHG | WEIGHT: 186.6 LBS | HEIGHT: 71 IN | SYSTOLIC BLOOD PRESSURE: 160 MMHG

## 2020-02-06 DIAGNOSIS — E55.9 VITAMIN D DEFICIENCY: ICD-10-CM

## 2020-02-06 DIAGNOSIS — N18.30 STAGE 3 CHRONIC KIDNEY DISEASE (HCC): Primary | ICD-10-CM

## 2020-02-06 DIAGNOSIS — I12.9 BENIGN HYPERTENSIVE KIDNEY DISEASE WITH CHRONIC KIDNEY DISEASE STAGE I THROUGH STAGE IV, OR UNSPECIFIED: ICD-10-CM

## 2020-02-06 DIAGNOSIS — I10 BENIGN ESSENTIAL HYPERTENSION: ICD-10-CM

## 2020-02-06 PROCEDURE — 99213 OFFICE O/P EST LOW 20 MIN: CPT | Performed by: INTERNAL MEDICINE

## 2020-02-06 PROCEDURE — 3077F SYST BP >= 140 MM HG: CPT | Performed by: INTERNAL MEDICINE

## 2020-02-06 PROCEDURE — 1036F TOBACCO NON-USER: CPT | Performed by: INTERNAL MEDICINE

## 2020-02-06 PROCEDURE — 3080F DIAST BP >= 90 MM HG: CPT | Performed by: INTERNAL MEDICINE

## 2020-02-06 PROCEDURE — 3008F BODY MASS INDEX DOCD: CPT | Performed by: INTERNAL MEDICINE

## 2020-02-06 RX ORDER — AMLODIPINE BESYLATE 2.5 MG/1
2.5 TABLET ORAL
Qty: 30 TABLET | Refills: 4 | Status: SHIPPED | OUTPATIENT
Start: 2020-02-06 | End: 2020-08-04 | Stop reason: ALTCHOICE

## 2020-02-06 NOTE — PATIENT INSTRUCTIONS
- get blood work this week  - be seen by cardiology  - start norvasc 2 5mg at bedtime  - call with blood pressure in 2 weeks    - Please call me in 10 days after having your blood work done to review the results if you do not hear back from me or my office, as I may have not received the results  - please remember to perform blood work prior to the next visit  - Please call if the blood pressure top number is greater than 150 or less than 110 consistently  - Please call if you are gaining more than 2lbs in 2 days for adjustment of water pills   ~ Please AVOID the following pain medications  LIST OF NSAIDS (NONSTEROIDAL ANTI-INFLAMMATORY DRUGS) AND CHAMBERS-2 INHIBITORS    DIFLUNISAL (DOLOBID)  IBUPROFEN (MOTRIN, ADVIL)  FLURBIPROFEN (ANSAID)  KETOPROFEN (ORUDIS, ORUVAIL)  FENOPROFEN (NALFON)  NABUMETONE (RELAFEN)  PIROXICAM (FELDENE)  NAPROXEN (ALEVE, NAPROSYN, NAPRELAN, ANAPROX)  DICLOFENAC (VOLTAREN, CATAFLAM)  INDOMETHACIN (INDOCIN)  SULINDAC (CLINORIL)  TOLMETIN (TOLETIN)  ETODOLAC (LODINE)  MELOXICAM (MOBIC)  KETOROLAC (TORADOL)  OXAPROZIN (DAYPRO)  CELECOXIB (CELEBREX)    Things to do to reduce your blood pressure include working with all your physician to do the following:  ~ stop smoking if you smoke  ~ increase cardiovascular exercise like walking and swimming    ~ modify your diet to decrease fat and salt intake  ~ reduce your weight if you are overweight or obese   ~ increase the consumption of fruits, vegetables and whole grains  ~ decrease alcohol consumption if you consume alcohol    ~ try to minimize stress in your life with lifestyle modifications  ~ be compliant with your anti-hypertensive medications  ~ adjust your medications to help improve your vascular stiffness and decrease risks for heart attacks and strokes

## 2020-02-06 NOTE — PROGRESS NOTES
Nephrology Follow up Consultation  Courtenay Soulier 60 y o  male MRN: [id number]            BACKGROUND:  Courtenay Soulier is a 60 y o male who was referred by John Suazo DO for evaluation of Follow-up; Hypertension; and Chronic Kidney Disease    ASSESSMENT / PLAN:   60 y o   male with pmh of multiple co-morbidities including hyperlipidemia, hypertension (x 3yrs) and CKD stage 3 presents to the office for routine follow-up  1  CKD stage II/IIIA:  - Patient has a baseline creatinine of 1 2-1 5 mg/dL  Most recent labs show a Creatinine of 1 14 mg/dL  Renal function remains stable  - likely has underlying CKD secondary to hypertensive nephrosclerosis plus age-related nephron loss plus NSAID nephropathy  SPEP within normal limits  - renal ultrasound from July 2, 2019 shows bilateral kidneys 11 cm  With no hydronephrosis no masses  - Proteinuria - most recent microalbumin to creatinine ratio of 3 milligrams/deciliter, UA with no blood  - Acid base and lytes stable  - Clinically the patient appears to be euvolemic  - Recommend to avoid use of NSAIDs, nephrotoxins  Caution advised with regards to exposure to IV contrast dye    - Discussed with the patient in depth his renal status, including the possible etiologies for CKD  - Advised the patient that when his GFR is close to 20mL/min then will start discussing about RRT(renal replacement therapy) options such as renal transplant, peritoneal dialysis and hemodialysis  - Informed the patient about the various options for Renal Replacement therapy  - Discussed with the patient how we need to work together to delay the progression of CKD with optimal BP control based on their age and co-morbidities and trying to reduce proteinuria by the use of anti-proteinuric agents  2  Hypertension:  - Patient was on lisinopril 20mg po Q24 and was switched over to candesartan due to cough    Patient has had intolerability to multiple prior antihypertensive medications such as Bystolic  - patient himself had stopped candesartan 32 milligrams p o  Q day for the last few days due to worsening symptoms  - at this time I am concerned for AFib  Cardiology consult placed  Patient would benefit from Holter monitoring   - for now will place on Norvasc 2 5 milligram p o  Q h s  Have patient call me back with blood pressure updates in 2 weeks  - will rule out pheochromocytoma  Check plasma free metanephrines and renin aldosterone levels  - advised patient about appropriate blood pressure management technique  - Goal BP of <  140/90 based on age and comorbidities  - Instructed to follow low sodium (2gm)diet  3  Hemoglobin:  - Goal Hb of 10-12 g/dL  - Most recent labs suggestive of 14 7 grams/deciliter  - no role for IV iron at this time    4  CKD-MBD(Mineral Bone Disease): - Based on patients CKD stage following is the goal of therapy  - Maintain calcium phosphorus product of < 55   - Stage 3 CKD - Goal Ca 8 5-10 mg/dL , goal Phos 2 7-4 6 mg/dL  , goal iPTH 30-70 pg/mL  - Patient is currently not at goal   - check vitamin-D and intact PTH next visit  - most recent intact PTH of 37 and vitamin-D level of 27    - continue vitamin-D 2000 units p o  Q day    5  Lipids:  - goal LDL less than 70  - Management as per PCP    6  Concern for regular heart rhythm:  - refer to cardiology for possible Holter monitoring    7  Nutrition:  - Encouraged patient to follow a renal diet comprising of moderate potassium, low phosphorus and protein restriction to 0 8gm/kg  - Will check serum albumin with next blood work  8  Followup:  - Patient is to follow-up in 3 months, with lab work to be performed after the visit and then again in a few days prior to the next visit  Advised patient to call me in 10 days to review the results if they do not hear back from me, as I may have not received the results      Mary Aguirre MD, FASN, 2/6/2020, 3:02 PM             SUBJECTIVE: 60 y o  male presents to the office for routine follow-up  Not taking candesartan describes anxiety symptoms  Home sbp is about 130-140  Describes symptoms of fluttering in the heart intermittent no triggering events no chest pains no shortness of breath  Used to see Cardiology in the past but not seen anyone recently  Agreeable to go get Cardiology evaluation  Happy that his renal parameters are stable and improved  Is not regular with his vitamin-D supplementation  Happy with all the care information that he has gotten today  No NSAID use  Review of Systems   Constitutional: Negative for fatigue and fever  HENT: Negative for congestion, postnasal drip and rhinorrhea  Respiratory: Negative for cough, shortness of breath and wheezing  Cardiovascular: Positive for palpitations  Negative for chest pain and leg swelling  Gastrointestinal: Negative for abdominal pain, constipation, diarrhea, nausea and vomiting  Genitourinary: Negative for difficulty urinating and dysuria  Musculoskeletal: Negative for back pain  Skin: Negative for rash and wound  Neurological: Negative for dizziness and headaches  Psychiatric/Behavioral: Negative for confusion  All other systems reviewed and are negative        PAST MEDICAL HISTORY:  Past Medical History:   Diagnosis Date    CKD (chronic kidney disease)     [facility]     Erectile dysfunction     Hyperlipidemia 6/28/2016    Hypertensive CKD (chronic kidney disease)     Mild renal insufficiency 9/26/2017    Vitamin D deficiency        PROBLEM LIST    Patient Active Problem List   Diagnosis    Benign hypertensive CKD    Erectile dysfunction of non-organic origin    Episodic tension-type headache, not intractable    Screening for prostate cancer    Vitamin D deficiency    Well adult exam    Need for influenza vaccination    Screening for colon cancer    Dyslipidemia    Benign essential hypertension    Stage 3 chronic kidney disease [facility]    Dilated aortic root ([address] )       PAST SURGICAL HISTORY:  Past Surgical History:   Procedure Laterality Date    HIATAL HERNIA REPAIR         SOCIAL HISTORY :   reports that he has never smoked  He has never used smokeless tobacco  He reports that he drinks alcohol  He reports that he does not use drugs  FAMILY HISTORY:  Family History   Problem Relation Age of Onset    Lung cancer Mother     Heart attack Father 76    Prostate cancer Father     Hyperlipidemia Father     No Known Problems Sister     Lymphoma Maternal Aunt     Fainting Maternal Grandfather        ALLERGIES:  Allergies   Allergen Reactions    Valsartan            PHYSICAL EXAM:  Vitals:    02/06/20 1431   BP: 160/90   BP Location: Left arm   Patient Position: Sitting   Cuff Size: Adult   Pulse: 60   Weight: 84 6 kg (186 lb 9 6 oz)   Height: 5' 11" (1 803 m)     Body mass index is 26 03 kg/m²  Physical Exam   Constitutional: He is oriented to person, place, and time  He appears well-developed and well-nourished  No distress  HENT:   Head: Normocephalic and atraumatic  Mouth/Throat: Oropharynx is clear and moist  No oropharyngeal exudate  Eyes: Conjunctivae are normal  No scleral icterus  Neck: Neck supple  No JVD present  No tracheal deviation present  Cardiovascular: Exam reveals no friction rub  Irregular rhythm   Pulmonary/Chest: Effort normal  He has no wheezes  He has no rales  Abdominal: Soft  He exhibits no distension and no mass  There is no tenderness  Musculoskeletal: He exhibits no edema  Neurological: He is alert and oriented to person, place, and time  Skin: Skin is warm  He is not diaphoretic  No pallor  Vitals reviewed        LABORATORY DATA:     Results from last 6 Months   Lab Units 01/25/20  1103 12/14/19  0754 12/14/19  0748   POTASSIUM mmol/L 4 2  --  4 2   CHLORIDE mmol/L 106  --  104   CO2 mmol/L 30  --  28   BUN mg/dL 20  --  17   CREATININE mg/dL 1 14  --  1 23   SL AMB CALCIUM mg/dL 9 1 8 7 8 9 MAGNESIUM mg/dL 2 2 2 2  --         rest all reviewed    RADIOLOGY:  No orders to display     Rest all reviewed        MEDICATIONS:    Current Outpatient Medications:     sildenafil (VIAGRA) 50 MG tablet, Take 1 tablet (50 mg total) by mouth as needed for erectile dysfunction, Disp: 30 tablet, Rfl: 3    amLODIPine (NORVASC) 2 5 mg tablet, Take 1 tablet (2 5 mg total) by mouth daily at bedtime, Disp: 30 tablet, Rfl: 4          Portions of the record may have been created with voice recognition software  Occasional wrong word or "sound a like" substitutions may have occurred due to the inherent limitations of voice recognition software  Read the chart carefully and recognize, using context, where substitutions have occurred  If you have any questions, please contact the dictating provider

## 2020-02-27 LAB
ALDOST SERPL-MCNC: 8 NG/DL
ALDOST/RENIN PLAS-RTO: 11.9 RATIO (ref 0.9–28.9)
METANEPH FREE SERPL-MCNC: 36 PG/ML
METANEPHS SERPL-MCNC: 113 PG/ML
NORMETANEPH FREE SERPL-MCNC: 77 PG/ML
RENIN PLAS-CCNC: 0.67 NG/ML/H (ref 0.25–5.82)

## 2020-03-26 ENCOUNTER — TELEMEDICINE (OUTPATIENT)
Dept: CARDIOLOGY CLINIC | Facility: CLINIC | Age: 60
End: 2020-03-26
Payer: COMMERCIAL

## 2020-03-26 DIAGNOSIS — I10 BENIGN ESSENTIAL HYPERTENSION: Primary | ICD-10-CM

## 2020-03-26 DIAGNOSIS — I12.9 BENIGN HYPERTENSIVE KIDNEY DISEASE WITH CHRONIC KIDNEY DISEASE STAGE I THROUGH STAGE IV, OR UNSPECIFIED: ICD-10-CM

## 2020-03-26 DIAGNOSIS — I77.810 DILATED AORTIC ROOT (HCC): ICD-10-CM

## 2020-03-26 DIAGNOSIS — N18.30 STAGE 3 CHRONIC KIDNEY DISEASE (HCC): ICD-10-CM

## 2020-03-26 PROCEDURE — 1036F TOBACCO NON-USER: CPT | Performed by: INTERNAL MEDICINE

## 2020-03-26 PROCEDURE — 3080F DIAST BP >= 90 MM HG: CPT | Performed by: INTERNAL MEDICINE

## 2020-03-26 PROCEDURE — 3077F SYST BP >= 140 MM HG: CPT | Performed by: INTERNAL MEDICINE

## 2020-03-26 PROCEDURE — 99213 OFFICE O/P EST LOW 20 MIN: CPT | Performed by: INTERNAL MEDICINE

## 2020-03-26 RX ORDER — CHLORTHALIDONE 25 MG/1
25 TABLET ORAL DAILY
Qty: 30 TABLET | Refills: 5 | Status: SHIPPED | OUTPATIENT
Start: 2020-03-26 | End: 2020-08-08

## 2020-03-26 NOTE — PROGRESS NOTES
Virtual Regular Visit    Problem List Items Addressed This Visit        Cardiovascular and Mediastinum    Benign essential hypertension - Primary    Relevant Medications    chlorthalidone 25 mg tablet    Other Relevant Orders    Home Study    Basic metabolic panel    Mychart bp flowsheet    Dilated aortic root (HCC)       Genitourinary    Benign hypertensive CKD    Relevant Medications    chlorthalidone 25 mg tablet    Stage 3 chronic kidney disease ([facility] )    Relevant Medications    chlorthalidone 25 mg tablet        Reason for visit is routine follow-up via telephone due to COVID-19  Encounter provider Paul Moran DO    Provider located at [address]      Recent Visits  No visits were found meeting these conditions  Showing recent visits within past 7 days and meeting all other requirements     Today's Visits  Date Type Provider Dept   03/26/20 Telemedicine Paul SeatDO [facility] [address] today's visits and meeting all other requirements     Future Appointments  No visits were found meeting these conditions  Showing future appointments within next 150 days and meeting all other requirements        After connecting through telephone, the patient was identified by name and date of birth  Courtenay Soulier was informed that this is a telemedicine visit and that the visit is being conducted through telephone which may not be secure and therefore, might not be HIPAA-compliant  My office door was closed  No one else was in the room  He acknowledged consent and understanding of privacy and security of the telephone platform  The patient has agreed to participate and understands they can discontinue the visit at any time  Discussion/Summary:  Mr Stanley Lomas is a pleasant 70-year-old gentleman who was contacted via the telephone due to routine follow-up      Since his last visit with me his lisinopril was changed to candesartan which made him feel unwell  This was then transitioned to amlodipine which he stopped about two weeks ago again due to side effects  He has been intolerant of multiple medications  I one point he was on hydrochlorothiazide  He states this did not make him feel unwell although he was bothered by frequent urination  He is willing to re-attempt a diuretic  A prescription for chlorthalidone was given  I will reassess his renal function and electrolytes in a week or so  I also suggested that he undergo screening for sleep apnea with a home sleep study to which he is agreeable  Regarding his palpitations I am concerned that this may represent some form of atrial arrhythmia  It has not been occurring as frequently since he stopped the amlodipine  I did recommend that he undergo a Zio patch  He wishes to hold off on monitoring for now  I have asked that he keep a diary of his symptoms so that the length of monitoring can be determined based on how frequently he is symptomatic  Otherwise he was noted to have a dilated ascending aorta on echocardiogram   I would like him to have a CT of his chest which will be performed at a later date due to the current coronavirus outbreak  Follow-up will be scheduled in few months or sooner if deemed necessary  History of Present Illness:  Mr Wally Ramey is a pleasant 58-year-old gentleman who was contacted via telephone for routine follow-up  Since his last visit with me he saw a nephrologist due to his chronic kidney disease  He had become intolerant to lisinopril due to the cough  Therefore he was transitioned to candesartan  This made him feel unwell  Therefore he was transitioned to amlodipine by his nephrologist   He stopped this two weeks ago due to side effects  He states all of the blood pressure medications gave  him a similar side effect  He feels very fatigued and dizzy  He feels as if he may lose his balance    He has been on the following medications:    Amlodipine  Atenolol  Bystolic  Lisinopril   Candesartan    He reports he was on hydrochlorothiazide at one point  This was discontinued as he was bothered by frequent urination although this was tolerable  He continues to check his blood pressure at home with systolic readings in the high 130s to 990 mmHg and diastolic readings in the 84E to 80s  In the recent past he has also been having palpitations  He notes the sensation of his heart racing  When he feels this way he will check his pulse on his home blood pressure monitoring device with pulse readings in the 130s  This was happening more frequently when he was on the amlodipine  Since it was discontinued about two weeks ago he had one episode  He felt like he was having an "adrenaline attack"  His symptoms lasted 1/2 hour and went away gradually  He has been told he snores at night  There are no witnessed apneas      He is active  He walks a few nights a week for 2 to 3 miles on flat ground  On the weekends he goes for longer walks with his girlfriend  He also has a total gym which he utilizes a few days a week  He does so without any chest pain or shortness of breath  He denies any signs or symptoms of congestive heart failure including increasing lower extremity edema, paroxysmal nocturnal dyspnea, orthopnea, acute weight gain or increasing abdominal girth          Past Medical History:   Diagnosis Date    CKD (chronic kidney disease)     Dilated aortic root (HCC)     Erectile dysfunction     Hyperlipidemia 6/28/2016    Hypertensive CKD (chronic kidney disease)     Mild renal insufficiency 9/26/2017    Vitamin D deficiency        Past Surgical History:   Procedure Laterality Date    HIATAL HERNIA REPAIR         Current Outpatient Medications   Medication Sig Dispense Refill    amLODIPine (NORVASC) 2 5 mg tablet Take 1 tablet (2 5 mg total) by mouth daily at bedtime 30 tablet 4    chlorthalidone 25 mg tablet Take 1 tablet (25 mg total) by mouth daily 30 tablet 5    sildenafil (VIAGRA) 50 MG tablet Take 1 tablet (50 mg total) by mouth as needed for erectile dysfunction 30 tablet 3     No current facility-administered medications for this visit  Allergies   Allergen Reactions    Valsartan        Review of Systems   Constitutional: Positive for fatigue  Cardiovascular: Positive for palpitations  Neurological: Positive for dizziness  All other systems reviewed and are negative  I spent 20 minutes with the patient during this visit

## 2020-04-07 ENCOUNTER — TELEPHONE (OUTPATIENT)
Dept: SLEEP CENTER | Facility: CLINIC | Age: 60
End: 2020-04-07

## 2020-06-04 ENCOUNTER — HOSPITAL ENCOUNTER (OUTPATIENT)
Dept: SLEEP CENTER | Facility: CLINIC | Age: 60
Discharge: HOME | End: 2020-06-04
Payer: COMMERCIAL

## 2020-06-04 DIAGNOSIS — I10 BENIGN ESSENTIAL HYPERTENSION: ICD-10-CM

## 2020-06-04 PROCEDURE — G0399 HOME SLEEP TEST/TYPE 3 PORTA: HCPCS

## 2020-06-05 ENCOUNTER — TELEPHONE (OUTPATIENT)
Dept: SLEEP CENTER | Facility: CLINIC | Age: 60
End: 2020-06-05

## 2020-06-11 ENCOUNTER — TELEPHONE (OUTPATIENT)
Dept: SLEEP CENTER | Facility: CLINIC | Age: 60
End: 2020-06-11

## 2020-06-24 ENCOUNTER — OFFICE VISIT (OUTPATIENT)
Dept: SLEEP CENTER | Facility: CLINIC | Age: 60
End: 2020-06-24
Payer: COMMERCIAL

## 2020-06-24 VITALS
BODY MASS INDEX: 26.6 KG/M2 | WEIGHT: 190 LBS | DIASTOLIC BLOOD PRESSURE: 80 MMHG | HEART RATE: 78 BPM | SYSTOLIC BLOOD PRESSURE: 136 MMHG | HEIGHT: 71 IN

## 2020-06-24 DIAGNOSIS — G47.33 OSA (OBSTRUCTIVE SLEEP APNEA): Primary | ICD-10-CM

## 2020-06-24 PROCEDURE — 3079F DIAST BP 80-89 MM HG: CPT | Performed by: INTERNAL MEDICINE

## 2020-06-24 PROCEDURE — 99203 OFFICE O/P NEW LOW 30 MIN: CPT | Performed by: INTERNAL MEDICINE

## 2020-06-24 PROCEDURE — 3075F SYST BP GE 130 - 139MM HG: CPT | Performed by: INTERNAL MEDICINE

## 2020-06-25 ENCOUNTER — TELEPHONE (OUTPATIENT)
Dept: SLEEP CENTER | Facility: CLINIC | Age: 60
End: 2020-06-25

## 2020-08-04 ENCOUNTER — OFFICE VISIT (OUTPATIENT)
Dept: FAMILY MEDICINE CLINIC | Facility: CLINIC | Age: 60
End: 2020-08-04
Payer: COMMERCIAL

## 2020-08-04 VITALS
HEIGHT: 71 IN | BODY MASS INDEX: 26.49 KG/M2 | HEART RATE: 61 BPM | RESPIRATION RATE: 14 BRPM | OXYGEN SATURATION: 99 % | SYSTOLIC BLOOD PRESSURE: 140 MMHG | WEIGHT: 189.2 LBS | DIASTOLIC BLOOD PRESSURE: 90 MMHG | TEMPERATURE: 98.7 F

## 2020-08-04 DIAGNOSIS — N18.30 STAGE 3 CHRONIC KIDNEY DISEASE (HCC): ICD-10-CM

## 2020-08-04 DIAGNOSIS — Z00.00 ANNUAL PHYSICAL EXAM: Primary | ICD-10-CM

## 2020-08-04 DIAGNOSIS — I12.9 BENIGN HYPERTENSIVE KIDNEY DISEASE WITH CHRONIC KIDNEY DISEASE STAGE I THROUGH STAGE IV, OR UNSPECIFIED: ICD-10-CM

## 2020-08-04 DIAGNOSIS — Z12.11 SCREENING FOR COLON CANCER: ICD-10-CM

## 2020-08-04 DIAGNOSIS — G47.33 OSA (OBSTRUCTIVE SLEEP APNEA): ICD-10-CM

## 2020-08-04 DIAGNOSIS — E78.5 DYSLIPIDEMIA: ICD-10-CM

## 2020-08-04 PROCEDURE — 99396 PREV VISIT EST AGE 40-64: CPT | Performed by: FAMILY MEDICINE

## 2020-08-04 PROCEDURE — 3725F SCREEN DEPRESSION PERFORMED: CPT | Performed by: FAMILY MEDICINE

## 2020-08-04 PROCEDURE — 3008F BODY MASS INDEX DOCD: CPT | Performed by: FAMILY MEDICINE

## 2020-08-04 PROCEDURE — 1036F TOBACCO NON-USER: CPT | Performed by: FAMILY MEDICINE

## 2020-08-04 NOTE — PROGRESS NOTES
[address]    NAME: Fletcher Last  AGE: 60 y o  SEX: male  : 1960     DATE: 2020     Assessment and Plan:     Problem List Items Addressed This Visit        Respiratory    JODY (obstructive sleep apnea)     Mild, getting used to machine  Has had for 1 month            Genitourinary    Benign hypertensive CKD     Better bp's at home         Stage 3 chronic kidney disease [facility])     Seeing nephrology            Other    Screening for colon cancer    Relevant Orders    Ambulatory referral to Colorectal Surgery    Dyslipidemia    Relevant Orders    Lipid Panel with Direct LDL reflex    Annual physical exam - Primary          Immunizations and preventive care screenings were discussed with patient today  Appropriate education was printed on patient's after visit summary  Counseling:  Dental Health: discussed importance of regular tooth brushing, flossing, and dental visits  · Exercise: the importance of regular exercise/physical activity was discussed  Recommend exercise 3-5 times per week for at least 30 minutes  BMI Counseling: Body mass index is 26 4 kg/m²  The BMI is above normal  Nutrition recommendations include encouraging healthy choices of fruits and vegetables  Exercise recommendations include exercising 3-5 times per week  No pharmacotherapy was ordered  Return in 1 year (on 2021)  Chief Complaint:     Chief Complaint   Patient presents with    Annual Exam     Patient here for annual wellness exam       History of Present Illness:     Adult Annual Physical   Patient here for a comprehensive physical exam  The patient reports no problems  Diet and Physical Activity  · Diet/Nutrition: well balanced diet  · Exercise: walking        Depression Screening  PHQ-9 Depression Screening    PHQ-9:    Frequency of the following problems over the past two weeks:       Little interest or pleasure in doing things:  0 - not at all  Feeling down, depressed, or hopeless:  0 - not at all  PHQ-2 Score:  0       General Health  · Sleep: sleeps well  · Hearing: normal - bilateral   · Vision: no vision problems and most recent eye exam <1 year ago  · Dental: regular dental visits and brushes teeth twice daily          Health  · Symptoms include: erectile dysfunction     Review of Systems:     Review of Systems   Past Medical History:     Past Medical History:   Diagnosis Date    CKD (chronic kidney disease)     Dilated aortic root ([address] )     Erectile dysfunction     Hyperlipidemia 6/28/2016    Hypertensive CKD (chronic kidney disease)     Mild renal insufficiency 9/26/2017    Vitamin D deficiency       Past Surgical History:     Past Surgical History:   Procedure Laterality Date    HIATAL HERNIA REPAIR        Family History:     Family History   Problem Relation Age of Onset    Lung cancer Mother     Heart attack Father 76    Prostate cancer Father     Hyperlipidemia Father     No Known Problems Sister     Lymphoma Maternal Aunt     Fainting Maternal Grandfather       Social History:        Social History     Socioeconomic History    Marital status:      Spouse name: None    Number of children: None    Years of education: None    Highest education level: None   Occupational History    None   Social Needs    Financial resource strain: None    Food insecurity     Worry: None     Inability: None    Transportation needs     Medical: None     Non-medical: None   Tobacco Use    Smoking status: Never Smoker    Smokeless tobacco: Never Used   Substance and Sexual Activity    Alcohol use: Yes     Comment: 3-4 BEERS / WEEK     Drug use: No    Sexual activity: Yes     Partners: Female   Lifestyle    Physical activity     Days per week: None     Minutes per session: None    Stress: None   Relationships    Social connections     Talks on phone: None     Gets together: None     Attends [employer] service: None     Active member of club or organization: None     Attends meetings of clubs or organizations: None     Relationship status: None    Intimate partner violence     Fear of current or ex partner: None     Emotionally abused: None     Physically abused: None     Forced sexual activity: None   Other Topics Concern    None   Social History Narrative    None      Current Medications:     Current Outpatient Medications   Medication Sig Dispense Refill    chlorthalidone 25 mg tablet Take 1 tablet (25 mg total) by mouth daily 30 tablet 5    sildenafil (VIAGRA) 50 MG tablet Take 1 tablet (50 mg total) by mouth as needed for erectile dysfunction 30 tablet 3     No current facility-administered medications for this visit  Allergies: Allergies   Allergen Reactions    Valsartan       Physical Exam:     /90   Pulse 61   Temp 98 7 °F (37 1 °C)   Resp 14   Ht 5' 10 98"   Wt 85 8 kg (189 lb 3 2 oz)   SpO2 99%   BMI 26 40 kg/m²     Physical Exam   Constitutional: He is oriented to person, place, and time  He appears well-developed  HENT:   Head: Normocephalic and atraumatic  Right Ear: External ear normal    Left Ear: External ear normal    Nose: Nose normal    Eyes: Pupils are equal, round, and reactive to light  Conjunctivae and lids are normal    Neck: Normal range of motion  Neck supple  Cardiovascular: Normal rate, regular rhythm, S1 normal, S2 normal, normal heart sounds and normal pulses  Pulmonary/Chest: Effort normal and breath sounds normal    Abdominal: Soft  Normal appearance and bowel sounds are normal    Musculoskeletal: Normal range of motion  Neurological: He is alert and oriented to person, place, and time  He has normal reflexes  Skin: Skin is warm and dry  Psychiatric: His speech is normal and behavior is normal  Judgment and thought content normal    Nursing note and vitals reviewed        Stormy Márquez, DO  [address] [facility]

## 2020-08-04 NOTE — PATIENT INSTRUCTIONS

## 2020-08-07 DIAGNOSIS — I10 BENIGN ESSENTIAL HYPERTENSION: ICD-10-CM

## 2020-08-08 RX ORDER — CHLORTHALIDONE 25 MG/1
TABLET ORAL
Qty: 30 TABLET | Refills: 5 | Status: SHIPPED | OUTPATIENT
Start: 2020-08-08 | End: 2021-02-02

## 2020-09-09 ENCOUNTER — OFFICE VISIT (OUTPATIENT)
Dept: SLEEP CENTER | Facility: CLINIC | Age: 60
End: 2020-09-09
Payer: COMMERCIAL

## 2020-09-09 VITALS
WEIGHT: 189 LBS | SYSTOLIC BLOOD PRESSURE: 132 MMHG | HEIGHT: 70 IN | DIASTOLIC BLOOD PRESSURE: 90 MMHG | BODY MASS INDEX: 27.06 KG/M2 | HEART RATE: 61 BPM

## 2020-09-09 DIAGNOSIS — G47.33 OSA (OBSTRUCTIVE SLEEP APNEA): Primary | ICD-10-CM

## 2020-09-09 PROCEDURE — 99214 OFFICE O/P EST MOD 30 MIN: CPT | Performed by: INTERNAL MEDICINE

## 2020-09-09 PROCEDURE — 3080F DIAST BP >= 90 MM HG: CPT | Performed by: INTERNAL MEDICINE

## 2020-09-09 PROCEDURE — 1036F TOBACCO NON-USER: CPT | Performed by: INTERNAL MEDICINE

## 2020-09-09 NOTE — PROGRESS NOTES
Progress Note - [address] [facility] ESTRELLA:[birth year] MRN: [id number]      Reason for Visit:  63 y o male here for PAP compliance check    Assessment:  Doing well with new PAP device, except he continues to struggle with the masks  He will receive a new one today  Sleep quality is improved and the patient feels less drowsy  Insufficient compliance, but successful treatment when he uses the device  Compliance data show utilization for greater than or equal to 70% of nights, for greater than or equal to 4 hours per night  Plan:  Continue efforts to improve compliance  Follow up: One year    History of Present Illness:  History of JODY on PAP therapy  Does not meet adequate compliance  His machine was purchased for him by his insurance company        Review of Systems      Genitourinary need to urinate more than twice a night   Cardiology none   Gastrointestinal none   Neurology none   Constitutional none   Integumentary none   Psychiatry none   Musculoskeletal none   Pulmonary snoring   ENT none   Endocrine frequent urination   Hematological none           I have reviewed and updated the review of systems as necessary    Historical Information    Past Medical History:   Diagnosis Date    CKD (chronic kidney disease)     Dilated aortic root ([address] )     Erectile dysfunction     Hyperlipidemia 6/28/2016    Hypertensive CKD (chronic kidney disease)     Mild renal insufficiency 9/26/2017    Vitamin D deficiency          Past Surgical History:   Procedure Laterality Date    HIATAL HERNIA REPAIR           Social History     Socioeconomic History    Marital status:      Spouse name: None    Number of children: None    Years of education: None    Highest education level: None   Occupational History    None   Social Needs    Financial resource strain: None    Food insecurity     Worry: None     Inability: None    Transportation needs     Medical: None     Non-medical: None   Tobacco Use    Smoking status: Never Smoker    Smokeless tobacco: Never Used   Substance and Sexual Activity    Alcohol use: Yes     Comment: 3-4 BEERS / WEEK     Drug use: No    Sexual activity: Yes     Partners: Female   Lifestyle    Physical activity     Days per week: None     Minutes per session: None    Stress: None   Relationships    Social connections     Talks on phone: None     Gets together: None     Attends [employer] service: None     Active member of club or organization: None     Attends meetings of clubs or organizations: None     Relationship status: None    Intimate partner violence     Fear of current or ex partner: None     Emotionally abused: None     Physically abused: None     Forced sexual activity: None   Other Topics Concern    None   Social History Narrative    None         Family History   Problem Relation Age of Onset    Lung cancer Mother     Heart attack Father 76    Prostate cancer Father     Hyperlipidemia Father     No Known Problems Sister     Lymphoma Maternal Aunt     Fainting Maternal Grandfather        Medications/Allergies:      Current Outpatient Medications:     chlorthalidone 25 mg tablet, TAKE 1 TABLET BY MOUTH EVERY DAY, Disp: 30 tablet, Rfl: 5    sildenafil (VIAGRA) 50 MG tablet, Take 1 tablet (50 mg total) by mouth as needed for erectile dysfunction, Disp: 30 tablet, Rfl: 3      Objective    Vital Signs:   Vitals:    09/09/20 1438   BP: 132/90   Pulse: 61     Bynum Sleepiness Scale: Total score: 0        Physical Exam:    General: Alert, appropriate, cooperative, overweight    Head: NC/AT    Skin: Warm, dry    Neuro: No motor abnormalities, cranial nerves appear intact    Extremity: No clubbing, cyanosis    PAP setting:   APAP 4 to 20 cm  DME Provider: [employer]  Test results:  KATHRINE = 11 5    Counseling / Coordination of Care  Total clinic time spent today 15 minutes  A description of the counseling / coordination of care: Maintain compliance  Discussed equipment  JENNYFER Spring    Board Certified Sleep Specialist

## 2020-11-05 ENCOUNTER — OFFICE VISIT (OUTPATIENT)
Dept: CARDIOLOGY CLINIC | Facility: CLINIC | Age: 60
End: 2020-11-05
Payer: COMMERCIAL

## 2020-11-05 VITALS
BODY MASS INDEX: 27.7 KG/M2 | WEIGHT: 193.5 LBS | SYSTOLIC BLOOD PRESSURE: 128 MMHG | TEMPERATURE: 97 F | OXYGEN SATURATION: 97 % | HEART RATE: 159 BPM | HEIGHT: 70 IN | DIASTOLIC BLOOD PRESSURE: 70 MMHG

## 2020-11-05 DIAGNOSIS — E78.5 DYSLIPIDEMIA: ICD-10-CM

## 2020-11-05 DIAGNOSIS — G47.33 OSA (OBSTRUCTIVE SLEEP APNEA): ICD-10-CM

## 2020-11-05 DIAGNOSIS — I77.810 DILATED AORTIC ROOT (HCC): ICD-10-CM

## 2020-11-05 DIAGNOSIS — I10 BENIGN ESSENTIAL HYPERTENSION: ICD-10-CM

## 2020-11-05 DIAGNOSIS — I48.0 PAROXYSMAL ATRIAL FIBRILLATION (HCC): ICD-10-CM

## 2020-11-05 PROCEDURE — 1036F TOBACCO NON-USER: CPT | Performed by: INTERNAL MEDICINE

## 2020-11-05 PROCEDURE — 93000 ELECTROCARDIOGRAM COMPLETE: CPT | Performed by: INTERNAL MEDICINE

## 2020-11-05 PROCEDURE — 3074F SYST BP LT 130 MM HG: CPT | Performed by: INTERNAL MEDICINE

## 2020-11-05 PROCEDURE — 99214 OFFICE O/P EST MOD 30 MIN: CPT | Performed by: INTERNAL MEDICINE

## 2020-11-05 PROCEDURE — 3008F BODY MASS INDEX DOCD: CPT | Performed by: INTERNAL MEDICINE

## 2020-11-05 PROCEDURE — 3078F DIAST BP <80 MM HG: CPT | Performed by: INTERNAL MEDICINE

## 2020-11-05 RX ORDER — MELATONIN
1000 DAILY
COMMUNITY

## 2020-11-05 RX ORDER — DILTIAZEM HYDROCHLORIDE 120 MG/1
120 CAPSULE, COATED, EXTENDED RELEASE ORAL DAILY
Qty: 30 CAPSULE | Refills: 11 | Status: SHIPPED | OUTPATIENT
Start: 2020-11-05 | End: 2021-02-25 | Stop reason: SDUPTHER

## 2020-11-05 RX ORDER — ASCORBIC ACID 500 MG
500 TABLET ORAL DAILY
COMMUNITY
End: 2021-02-05 | Stop reason: ALTCHOICE

## 2020-11-05 RX ORDER — DILTIAZEM HYDROCHLORIDE 120 MG/1
120 CAPSULE, EXTENDED RELEASE ORAL DAILY
Qty: 30 CAPSULE | Refills: 0 | Status: SHIPPED | OUTPATIENT
Start: 2020-11-05 | End: 2020-11-27 | Stop reason: SDUPTHER

## 2020-11-10 ENCOUNTER — TELEPHONE (OUTPATIENT)
Dept: CARDIOLOGY CLINIC | Facility: CLINIC | Age: 60
End: 2020-11-10

## 2020-11-14 LAB
CHOLEST SERPL-MCNC: 198 MG/DL
CHOLEST/HDLC SERPL: 3.8 (CALC)
HDLC SERPL-MCNC: 52 MG/DL
LDLC SERPL CALC-MCNC: 121 MG/DL (CALC)
NONHDLC SERPL-MCNC: 146 MG/DL (CALC)
TRIGL SERPL-MCNC: 133 MG/DL

## 2020-11-15 LAB — TSH SERPL-ACNC: 2.28 MIU/L (ref 0.4–4.5)

## 2020-11-27 DIAGNOSIS — I48.0 PAROXYSMAL ATRIAL FIBRILLATION (HCC): ICD-10-CM

## 2020-11-27 RX ORDER — DILTIAZEM HYDROCHLORIDE 120 MG/1
CAPSULE, COATED, EXTENDED RELEASE ORAL
Qty: 30 CAPSULE | Refills: 11 | Status: SHIPPED | OUTPATIENT
Start: 2020-11-27 | End: 2021-02-05 | Stop reason: SDUPTHER

## 2020-11-30 ENCOUNTER — TELEPHONE (OUTPATIENT)
Dept: CARDIOLOGY CLINIC | Facility: CLINIC | Age: 60
End: 2020-11-30

## 2020-11-30 DIAGNOSIS — I48.0 PAROXYSMAL ATRIAL FIBRILLATION (HCC): Primary | ICD-10-CM

## 2020-12-09 ENCOUNTER — VBI (OUTPATIENT)
Dept: ADMINISTRATIVE | Facility: OTHER | Age: 60
End: 2020-12-09

## 2021-02-02 ENCOUNTER — TELEMEDICINE (OUTPATIENT)
Dept: FAMILY MEDICINE CLINIC | Facility: CLINIC | Age: 61
End: 2021-02-02
Payer: COMMERCIAL

## 2021-02-02 VITALS — DIASTOLIC BLOOD PRESSURE: 87 MMHG | SYSTOLIC BLOOD PRESSURE: 140 MMHG

## 2021-02-02 DIAGNOSIS — I12.9 BENIGN HYPERTENSIVE KIDNEY DISEASE WITH CHRONIC KIDNEY DISEASE STAGE I THROUGH STAGE IV, OR UNSPECIFIED: Primary | ICD-10-CM

## 2021-02-02 DIAGNOSIS — I77.810 DILATED AORTIC ROOT (HCC): ICD-10-CM

## 2021-02-02 DIAGNOSIS — E78.5 DYSLIPIDEMIA: ICD-10-CM

## 2021-02-02 PROCEDURE — 99214 OFFICE O/P EST MOD 30 MIN: CPT | Performed by: FAMILY MEDICINE

## 2021-02-02 RX ORDER — HYDROCHLOROTHIAZIDE 12.5 MG/1
12.5 TABLET ORAL DAILY
Qty: 30 TABLET | Refills: 5 | Status: SHIPPED | OUTPATIENT
Start: 2021-02-02 | End: 2021-06-25

## 2021-02-02 NOTE — PROGRESS NOTES
Virtual Regular Visit      Assessment/Plan:    Problem List Items Addressed This Visit        Cardiovascular and Mediastinum    Dilated aortic root ([address] )     Followed by cardiology            Genitourinary    Benign hypertensive CKD - Primary     Lab Results   Component Value Date    CREATININE 1 14 01/25/2020    CREATININE 1 23 12/14/2019    CREATININE 1 35 (H) 06/01/2019   stable on current meds, but intolerant of water pills due to gerd symptoms  Will switch to hctz  checm cmp  Lipids reviewed  Follow up after         Relevant Medications    hydrochlorothiazide (HYDRODIURIL) 12 5 mg tablet    Other Relevant Orders    Comprehensive metabolic panel       Other    Dyslipidemia     Stable on current lipids                    Reason for visit is   Chief Complaint   Patient presents with    Virtual Regular Visit        Encounter provider Kristofer García DO    Provider located at [address] [address]      Recent Visits  No visits were found meeting these conditions  Showing recent visits within past 7 days and meeting all other requirements     Today's Visits  Date Type Provider Dept   02/02/21 Telemedicine Kristofer García DO [facility]   Showing today's visits and meeting all other requirements     Future Appointments  No visits were found meeting these conditions  Showing future appointments within next 150 days and meeting all other requirements        The patient was identified by name and date of birth  Nani Marshall was informed that this is a telemedicine visit and that the visit is being conducted through [facility] and patient was informed that this is a secure, HIPAA-compliant platform  He agrees to proceed     My office door was closed  No one else was in the room  He acknowledged consent and understanding of privacy and security of the video platform   The patient has agreed to participate and understands they can discontinue the visit at any time  Patient is aware this is a billable service  Subjective  Alva Almonte is a 63 y o  male virtual visit for hypertension follow up   Virtual visit fr follow up  Had episode around thanksgiving with arrthymia  Started eliiquis and diltiazem  chlothiadiaone back down to 1/2 due to side effects of gerd with higher dose    Hypertension  This is a chronic problem  The current episode started more than 1 year ago  The problem is unchanged  The problem is controlled  There are no associated agents to hypertension  Past treatments include calcium channel blockers and diuretics  The current treatment provides moderate improvement  There are no compliance problems  Past Medical History:   Diagnosis Date    CKD (chronic kidney disease)     Dilated aortic root (HCC)     Erectile dysfunction     Hyperlipidemia 6/28/2016    Hypertensive CKD (chronic kidney disease)     Mild renal insufficiency 9/26/2017    Vitamin D deficiency        Past Surgical History:   Procedure Laterality Date    HIATAL HERNIA REPAIR         Current Outpatient Medications   Medication Sig Dispense Refill    apixaban (ELIQUIS) 5 mg Take 1 tablet (5 mg total) by mouth 2 (two) times a day 60 tablet 11    ascorbic acid (VITAMIN C) 500 mg tablet Take 500 mg by mouth daily      cholecalciferol (VITAMIN D3) 1,000 units tablet Take 1,000 Units by mouth daily      diltiazem (CARDIZEM CD) 120 mg 24 hr capsule Take 1 capsule (120 mg total) by mouth daily 30 capsule 11    diltiazem (CARDIZEM CD) 120 mg 24 hr capsule TAKE 1 CAPSULE BY MOUTH EVERY DAY 30 capsule 11    hydrochlorothiazide (HYDRODIURIL) 12 5 mg tablet Take 1 tablet (12 5 mg total) by mouth daily 30 tablet 5     No current facility-administered medications for this visit  Allergies   Allergen Reactions    Valsartan        Review of Systems   Constitutional: Negative  HENT: Negative  Eyes: Negative  Respiratory: Negative  Cardiovascular: Negative  Gastrointestinal: Negative  Endocrine: Negative  Genitourinary: Negative  Musculoskeletal: Negative  Skin: Negative  Allergic/Immunologic: Negative  Neurological: Negative  Hematological: Negative  Psychiatric/Behavioral: Negative  Video Exam    Vitals:    02/02/21 1705   BP: 140/87       Physical Exam  Vitals signs reviewed  Constitutional:       Appearance: Normal appearance  HENT:      Head: Normocephalic and atraumatic  Eyes:      Extraocular Movements: Extraocular movements intact  Pupils: Pupils are equal, round, and reactive to light  Pulmonary:      Effort: Pulmonary effort is normal  No respiratory distress  Breath sounds: No wheezing  Neurological:      General: No focal deficit present  Mental Status: He is alert and oriented to person, place, and time  Psychiatric:         Mood and Affect: Mood normal          Behavior: Behavior normal          Thought Content: Thought content normal          Judgment: Judgment normal           I spent 15 minutes directly with the patient during this visit      VIRTUAL VISIT DISCLAIMER    Yao Hitchcock acknowledges that he has consented to an online visit or consultation  He understands that the online visit is based solely on information provided by him, and that, in the absence of a face-to-face physical evaluation by the physician, the diagnosis he receives is both limited and provisional in terms of accuracy and completeness  This is not intended to replace a full medical face-to-face evaluation by the physician  Yao Hitchcock understands and accepts these terms

## 2021-02-05 ENCOUNTER — ANESTHESIA (OUTPATIENT)
Dept: GASTROENTEROLOGY | Facility: HOSPITAL | Age: 61
End: 2021-02-05

## 2021-02-05 ENCOUNTER — HOSPITAL ENCOUNTER (OUTPATIENT)
Dept: GASTROENTEROLOGY | Facility: HOSPITAL | Age: 61
Setting detail: OUTPATIENT SURGERY
Discharge: HOME | End: 2021-02-05
Attending: COLON & RECTAL SURGERY | Admitting: COLON & RECTAL SURGERY
Payer: COMMERCIAL

## 2021-02-05 ENCOUNTER — ANESTHESIA EVENT (OUTPATIENT)
Dept: GASTROENTEROLOGY | Facility: HOSPITAL | Age: 61
End: 2021-02-05

## 2021-02-05 VITALS
DIASTOLIC BLOOD PRESSURE: 82 MMHG | HEIGHT: 71 IN | TEMPERATURE: 97.5 F | SYSTOLIC BLOOD PRESSURE: 127 MMHG | HEART RATE: 58 BPM | RESPIRATION RATE: 18 BRPM | WEIGHT: 188 LBS | BODY MASS INDEX: 26.32 KG/M2 | OXYGEN SATURATION: 98 %

## 2021-02-05 VITALS — HEART RATE: 55 BPM

## 2021-02-05 DIAGNOSIS — Z80.0 FAMILY HISTORY OF COLON CANCER: ICD-10-CM

## 2021-02-05 DIAGNOSIS — Z12.11 SCREENING FOR COLON CANCER: ICD-10-CM

## 2021-02-05 PROCEDURE — 45380 COLONOSCOPY AND BIOPSY: CPT | Performed by: COLON & RECTAL SURGERY

## 2021-02-05 PROCEDURE — 88364 INSITU HYBRIDIZATION (FISH): CPT | Performed by: PATHOLOGY

## 2021-02-05 PROCEDURE — 45385 COLONOSCOPY W/LESION REMOVAL: CPT | Performed by: COLON & RECTAL SURGERY

## 2021-02-05 PROCEDURE — 88365 INSITU HYBRIDIZATION (FISH): CPT | Performed by: PATHOLOGY

## 2021-02-05 PROCEDURE — 88341 IMHCHEM/IMCYTCHM EA ADD ANTB: CPT | Performed by: PATHOLOGY

## 2021-02-05 PROCEDURE — 88342 IMHCHEM/IMCYTCHM 1ST ANTB: CPT | Performed by: PATHOLOGY

## 2021-02-05 PROCEDURE — 88305 TISSUE EXAM BY PATHOLOGIST: CPT | Performed by: PATHOLOGY

## 2021-02-05 PROCEDURE — 99243 OFF/OP CNSLTJ NEW/EST LOW 30: CPT | Performed by: COLON & RECTAL SURGERY

## 2021-02-05 PROCEDURE — 81305 MYD88 GENE P.LEU265PRO VRNT: CPT

## 2021-02-05 RX ORDER — SODIUM CHLORIDE 9 MG/ML
INJECTION, SOLUTION INTRAVENOUS CONTINUOUS PRN
Status: DISCONTINUED | OUTPATIENT
Start: 2021-02-05 | End: 2021-02-05

## 2021-02-05 RX ORDER — PROPOFOL 10 MG/ML
INJECTION, EMULSION INTRAVENOUS AS NEEDED
Status: DISCONTINUED | OUTPATIENT
Start: 2021-02-05 | End: 2021-02-05

## 2021-02-05 RX ADMIN — PROPOFOL 50 MG: 10 INJECTION, EMULSION INTRAVENOUS at 10:10

## 2021-02-05 RX ADMIN — PROPOFOL 50 MG: 10 INJECTION, EMULSION INTRAVENOUS at 10:07

## 2021-02-05 RX ADMIN — SODIUM CHLORIDE: 0.9 INJECTION, SOLUTION INTRAVENOUS at 10:16

## 2021-02-05 RX ADMIN — PROPOFOL 50 MG: 10 INJECTION, EMULSION INTRAVENOUS at 10:06

## 2021-02-05 RX ADMIN — SODIUM CHLORIDE: 0.9 INJECTION, SOLUTION INTRAVENOUS at 09:45

## 2021-02-05 RX ADMIN — PROPOFOL 100 MG: 10 INJECTION, EMULSION INTRAVENOUS at 09:59

## 2021-02-05 RX ADMIN — PROPOFOL 20 MG: 10 INJECTION, EMULSION INTRAVENOUS at 10:20

## 2021-02-05 RX ADMIN — PROPOFOL 50 MG: 10 INJECTION, EMULSION INTRAVENOUS at 10:04

## 2021-02-05 RX ADMIN — PROPOFOL 50 MG: 10 INJECTION, EMULSION INTRAVENOUS at 10:02

## 2021-02-05 NOTE — ANESTHESIA POSTPROCEDURE EVALUATION
Post-Op Assessment Note    CV Status:  Stable  Pain Score: 0    Pain management: adequate     Mental Status:  Alert and awake   Hydration Status:  Euvolemic and stable   PONV Controlled:  None   Airway Patency:  Patent      Post Op Vitals Reviewed: Yes      Staff: CRNA         No complications documented      BP (!) 113/6 (02/05/21 1027)    Temp 97 5 °F (36 4 °C) (02/05/21 1026)    Pulse 58 (02/05/21 1027)   Resp 12 (02/05/21 1027)    SpO2 95 % (02/05/21 1027)

## 2021-02-05 NOTE — ANESTHESIA PREPROCEDURE EVALUATION
Procedure:  COLONOSCOPY    Relevant Problems   ANESTHESIA (within normal limits)      CARDIO   (+) Benign essential hypertension   (+) Dilated aortic root (HCC)      ENDO (within normal limits)      GI/HEPATIC (within normal limits)      /RENAL   (+) Benign hypertensive CKD   (+) Stage 3 chronic kidney disease      HEMATOLOGY (within normal limits)      MUSCULOSKELETAL (within normal limits)      NEURO/PSYCH   (+) Episodic tension-type headache, not intractable      PULMONARY   (+) JODY (obstructive sleep apnea)      Other   (+) Dyslipidemia        Physical Exam    Airway    Mallampati score: II  TM Distance: >3 FB  Neck ROM: full     Dental   No notable dental hx     Cardiovascular  Rhythm: regular, Rate: normal, Cardiovascular exam normal    Pulmonary  Pulmonary exam normal Breath sounds clear to auscultation,     Other Findings        Anesthesia Plan  ASA Score- 2     Anesthesia Type- IV sedation with anesthesia with ASA Monitors  Additional Monitors:   Airway Plan:           Plan Factors-    Chart reviewed  EKG reviewed  Existing labs reviewed  Patient summary reviewed  Patient is not a current smoker  Induction- intravenous  Postoperative Plan-     Informed Consent- Anesthetic plan and risks discussed with patient  I personally reviewed this patient with the CRNA  Discussed and agreed on the Anesthesia Plan with the CRNA  Christina Sesay Recent labs personally reviewed:  Lab Results   Component Value Date    WBC 5 4 10/20/2018    HGB 14 7 10/20/2018     10/20/2018     Lab Results   Component Value Date     10/07/2017    K 4 2 01/25/2020    BUN 20 01/25/2020    CREATININE 1 14 01/25/2020       I, Gloria Pulido MD, have personally seen and evaluated the patient prior to anesthetic care  I have reviewed the pre-anesthetic record, and other medical records if appropriate to the anesthetic care    If a CRNA is involved in the case, I have reviewed the CRNA assessment, if present, and agree  Risks/benefits and alternatives discussed with patient including possible PONV, sore throat, and possibility of rare anesthetic and surgical emergencies

## 2021-02-05 NOTE — H&P
History and Physical   Colon and Rectal Surgery   Alva Almonte 63 y o  male MRN: [id number]  Unit/Bed#:  Encounter: [id number]  02/05/21   9:29 AM      No chief complaint on file  ASSESSMENT:  Alva Almonte is a 63 y o  male who presents for screening colonoscopy, his 4st, family history colon cancer in father  Screening colonoscopy,discussed in a face-to-face, personal, informed consent process, the benefits, alternatives, risks including not limited to bleeding, missed lesion, perforation requiring emergent surgery discussed/understood  PLAN:  Colonoscopy    History of Present Illness   HPI:  Alva Almonte is a 63 y o  male who presents screening colonoscopy, his 1st   History atrial fibrillation/dilated aortic root, CKD  Denies nausea/vomiting/abdominal pain, change in bowel habits, rectal bleeding, or other constitutional symptoms     Colon cancer in father    Historical Information   Past Medical History:   Diagnosis Date    Atrial fibrillation ([facility] )     CKD (chronic kidney disease)     Dilated aortic root ([facility] )     Erectile dysfunction     Hyperlipidemia 6/28/2016    Hypertension     Hypertensive CKD (chronic kidney disease)     Mild renal insufficiency 9/26/2017    Vitamin D deficiency      Past Surgical History:   Procedure Laterality Date    HIATAL HERNIA REPAIR         Meds/Allergies     (Not in a hospital admission)        Current Outpatient Medications:     cholecalciferol (VITAMIN D3) 1,000 units tablet, Take 1,000 Units by mouth daily, Disp: , Rfl:     diltiazem (CARDIZEM CD) 120 mg 24 hr capsule, Take 1 capsule (120 mg total) by mouth daily, Disp: 30 capsule, Rfl: 11    hydrochlorothiazide (HYDRODIURIL) 12 5 mg tablet, Take 1 tablet (12 5 mg total) by mouth daily, Disp: 30 tablet, Rfl: 5    apixaban (ELIQUIS) 5 mg, Take 1 tablet (5 mg total) by mouth 2 (two) times a day, Disp: 60 tablet, Rfl: 11    Allergies   Allergen Reactions    Valsartan          Social History   Social History     Substance and Sexual Activity   Alcohol Use Yes    Frequency: 2-4 times a month    Comment: 3-4 BEERS / WEEK      Social History     Substance and Sexual Activity   Drug Use No     Social History     Tobacco Use   Smoking Status Never Smoker   Smokeless Tobacco Never Used         Family History:   Family History   Problem Relation Age of Onset    Lung cancer Mother     Heart attack Father 76    Prostate cancer Father     Hyperlipidemia Father     No Known Problems Sister     Lymphoma Maternal Aunt     Fainting Maternal Grandfather    Colon cancer in father      Objective     Current Vitals:   Blood Pressure: 140/85 (02/05/21 0824)  Pulse: 75 (02/05/21 0824)  Temperature: 98 6 °F (37 °C) (02/05/21 0824)  Temp Source: Tympanic (02/05/21 0824)  Respirations: 18 (02/05/21 0824)  Height: 5' 11" (180 3 cm) (02/05/21 0824)  Weight - Scale: 85 3 kg (188 lb) (02/05/21 0824)  SpO2: 97 % (02/05/21 0824)  No intake or output data in the 24 hours ending 02/05/21 0929    Physical Exam:  General:no distress  Eyes:perrla/eomi  ENT:moist mucus membranes  Neck:supple  Pulm:no increased work of breathing  CV:sinus  Abdomen:soft,nontender

## 2021-02-11 DIAGNOSIS — C83.09 SMALL B-CELL LYMPHOMA OF SOLID ORGAN EXCLUDING SPLEEN (HCC): Primary | ICD-10-CM

## 2021-02-12 ENCOUNTER — TELEPHONE (OUTPATIENT)
Dept: SURGICAL ONCOLOGY | Facility: CLINIC | Age: 61
End: 2021-02-12

## 2021-02-12 NOTE — TELEPHONE ENCOUNTER
New Patient Intake Heme Malignancy    Patient Details:    Sunita Espino    1960    [id number]     Background Information:    [address] starts by opening a telephone encounter and gathering the following information   Who is calling to schedule?  self   Referring Provider Estrellita Sherman   To Which Speciality? Medical Oncology   Reason for Visit? New Diagnosis   Tumor Type/Diagnosis? 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 yes   Date of Tissue Diagnosis  If done outside of [facility] obtain report and slides 2/5   Is the patient aware of diagnosis? yes   Has Imaging been done? yes   If so, when and where? If outside of [facility], obtain records and disk SL   Has Bloodwork been done? yes   If so, when and where? If outside of [facility], obtain records  [facility]   Is there a personal history of cancer? If yes, what kind? no   Is there a family history of cancer? If so, what kind? Lung and prostate   Scheduling Information:    Preferred [place]  Any   Are there any days the patient cannot be seen? na   Miscellaneous: Pt was scheduled with Dr Minoo Calderon for 2/19   After completing the above information, please route to finance, nurse navigation and clinical trials for review

## 2021-02-15 ENCOUNTER — PATIENT OUTREACH (OUTPATIENT)
Dept: HEMATOLOGY ONCOLOGY | Facility: CLINIC | Age: 61
End: 2021-02-15

## 2021-02-15 DIAGNOSIS — D47.Z9 LOW GRADE B CELL LYMPHOPROLIFERATIVE DISORDER (HCC): Primary | ICD-10-CM

## 2021-02-15 NOTE — TELEPHONE ENCOUNTER
Patient was prescreened for potential Clinical Trials  The BeiGene B [id number] trial is open for various B-cell lymphomas (Chronic Lymphocytic Leukemia/Small Lymphocytic Lymphoma, Waldenström Macroglobulinemia, Mantle Cell Lymphoma, and Marginal Zone Lymphoma) for which patients who experience toxicities to Acalabrutinib warranting change of treatment to Zanubrutinib  Will follow patient for treatment recommendations

## 2021-02-15 NOTE — PROGRESS NOTES
Attempted outreach to patient today  Left a voice message on his phone requesting a call back at his soonest convenience

## 2021-02-15 NOTE — PROGRESS NOTES
Mami Mary returned my call  Introduced myself and my role  Reviewed with him his upcoming appointment with Dr Yaima Ramsey on 2/19/21 at 8 AM in [facility] address with him  Also informed him that Dr Yaima Ramsey has ordered blood work and a PET/CT  I've scheduled the PET/CT for 2/26/21 at 7 AM at [facility]  Provided the address and reviewed the prep  Informed him that this test would undergo an insurance authorization and if there is any trouble someone from finance and/or the medical oncology office would reach out to him  He usually uses [facility] for his labs and with the impending weather this week and his work schedule he will likely not be able to go until Saturday morning  He had some concerns about the weather for Friday morning and I advised that the office will likely call him Thursday afternoon if they are planning to convert appointments to virtual or need to reschedule his consult  He understood and had his questions answered on the call today  Provided my contact information should he need it

## 2021-02-17 ENCOUNTER — PATIENT OUTREACH (OUTPATIENT)
Dept: HEMATOLOGY ONCOLOGY | Facility: CLINIC | Age: 61
End: 2021-02-17

## 2021-02-17 NOTE — PROGRESS NOTES
Received voicemail this morning from patient asking if I could email his orders for blood work as he has an appointment at [facility] on Saturday 2/20/21  He also was asking about his appointment for Friday 2/19 with Dr Efe Garland with concerns about snow  Called him back but got his voicemail  Left a detailed message stating I will email his orders to the email on file  Also stated the medical oncology offices are addressing one day at a time with the pending weather  I would expect he would get a call from their office tomorrow afternoon if there will be changes to Friday's schedule  Likely they would offer a virtual visit or rescheduled his consult if he does not want a virtual visit  All lab orders dated 2/15/21 from Dr Efe Garland printed and emailed to [email or web address]  [email or web address]

## 2021-02-18 PROBLEM — D47.Z9 LOW GRADE B CELL LYMPHOPROLIFERATIVE DISORDER (HCC): Status: ACTIVE | Noted: 2021-02-18

## 2021-02-18 NOTE — PROGRESS NOTES
[facility] - Hematology & Medical Oncology  Outpatient Visit Encounter Note    Dann Carvalho 63 y o  male [id number] Date:  2/19/2021    ONCOLOGY HISTORY          Final Diagnosis   A  Colon, descending polyp, biopsy:  -  Tubular adenoma, negative for high-grade dysplasia  B  Colon, sigmoid polyp, biopsy:  -  Small B cell neoplasm with kappa light chain restricted plasma cell proliferation, compatible with lymphoma (see note)      Electronically signed by Linus Snyder MD on 2/9/2021 at 12:35 PM   Note    The sigmoid polyp specimen (part B) shows reactive colonic mucosa with a dense underlying monotonous small lymphoid population with increased plasma cells  Immunohistochemical stain performed with appropriate controls show the majority of lymphocytes to be CD20 positive B-cells which coexpress BCL2 without significant CD5 or CD43 expression; occasional B-cell germinal centers are present expressing BCL6 and CD10 without BCL2; background CD3 positive T-cells are present expressing CD5  A prominent population of  positive plasma cells is present showing coexpression of CD43 and possible partial expression of CD20  In situ hybridization analysis shows plasma cells to have kappa light chain restriction with rare scattered background polytypic lambda expression  Ki-67 shows low proliferative activity in plasma cells and lymphocytes with normal elevation in germinal centers      Overall, the combination of findings is compatible with a CD20 positive abnormal B-cell proliferation with kappa light chain restricted plasma cells  The differential diagnosis includes marginal zone lymphoma (MALT lymphoma) with prominent plasma cell differentiation versus lymphoplasmacytic lymphoma    Correlation with clinical impression is recommended with consideration for evaluation of lymphadenopathy and bone marrow disease as clinically indicated      Additional genetic evaluation for MYD88 will be attempted and reported as an addendum  SUBJECTIVE      ECOG Initial Visit 0   ECOG This Visit 0   Pain Score Initial Visit 0   Pain Score This Visit 0   Personal Cancer History None   Family Cancer History Mother (Lung), Dad (Prostate), Maternal Aunt (Gyn)   Tobacco Use History Denies   Alcohol Use History Denies   Thrombotic History Denies   Occupation Heather Steve is a 63 y o  here for new consultation with me today  The patient is referred by colorectal surgery and the reason for consultation is b-cell lymphoma diagnosed during screening colonoscopy  In review of his chart and talking with him, he underwent routine colonoscopy on 2/5/21 with Dr Leticia Mora and was found to have two sessile polyps - in the descending and sigmoid colon that were removed and send for biopsy  While the descending polyp resulted as tubular adenoma, the sigmoid polyp resulted as low-grade CD20+ B-cell lymphoma  He is here with his wife today  He offers no acute complaints  He says the results of his biopsy from the colonoscopy were very surprising  He denies any fevers chills nausea vomiting chest pain abdominal pain shortness of breath cough weight loss night sweats fevers chills  Denies any diarrhea constipation or bloody stools  He denies any genitourinary ambulatory dysfunction symptoms as well  He has been living his routine active life  He said that the colonoscopy he underwent was for routine screening  I have reviewed the relevant past medical, surgical, social and family history  I have also reviewed allergies and medications for this patient  Review of Systems  Review of Systems   All other systems reviewed and are negative          OBJECTIVE     Physical Exam  Vitals:    02/19/21 0805   BP: 162/80   BP Location: Left arm   Patient Position: Sitting   Cuff Size: Adult   Pulse: 63   Resp: 18   Temp: 98 6 °F (37 °C)   TempSrc: Tympanic   SpO2: 98% Weight: 88 3 kg (194 lb 9 6 oz)   Height: 5' 11" (1 803 m)       Physical Exam  Constitutional:       General: He is not in acute distress  Appearance: He is not ill-appearing, toxic-appearing or diaphoretic  HENT:      Head: Normocephalic and atraumatic  Eyes:      Extraocular Movements: Extraocular movements intact  Pupils: Pupils are equal, round, and reactive to light  Neck:      Musculoskeletal: Normal range of motion and neck supple  Cardiovascular:      Rate and Rhythm: Normal rate  Pulmonary:      Effort: Pulmonary effort is normal  No respiratory distress  Abdominal:      General: Bowel sounds are normal  There is no distension  Palpations: Abdomen is soft  Tenderness: There is no abdominal tenderness  There is no guarding  Musculoskeletal: Normal range of motion  General: No tenderness  Right lower leg: No edema  Left lower leg: No edema  Lymphadenopathy:      Head:      Right side of head: No submental, submandibular, tonsillar, preauricular, posterior auricular or occipital adenopathy  Left side of head: No submental, submandibular, tonsillar, preauricular, posterior auricular or occipital adenopathy  Cervical: No cervical adenopathy  Upper Body:      Right upper body: No supraclavicular or epitrochlear adenopathy  Left upper body: No supraclavicular or epitrochlear adenopathy  Skin:     General: Skin is dry  Neurological:      General: No focal deficit present  Mental Status: He is alert and oriented to person, place, and time  Gait: Gait normal           Imaging  Relevant imaging reviewed in chart    Labs  Relevant labs reviewed in chart   ASSESSMENT & PLAN      Diagnosis ICD-10-CM Associated Orders   1  Low grade B cell lymphoproliferative disorder (HCC)  D47  Z9 Immunofixation IgA,IgG,IgM Qt  Immunofixation Serum Immunoglobulin     Protein electrophoresis, serum     Immunoglobulin free LT chains blood   2   Small B-cell lymphoma of solid organ excluding spleen (HCC)  C83 09 Ambulatory referral to Hematology / Oncology     · There is no diagnosis as of this office visit  I spoke with the reading pathologist and he informed me that he expects the results to be finalized within 2 weeks as a send out test for the MYD88 is in process, which will finalize the diagnosis between marginal zone lymphoma or lymphoplasmacytic lymphoma  · I have ordered extensive blood work for the patient both today and a few days ago when I was discussing his case with our nurse navigator who was helping him with this upcoming appointment  · I also await the PET-CT scan for staging purposes that is scheduled for next week  · In my discussion with Essence Howe and his wife, I told him that so far evidence points to him having a low-grade B-cell lymphoma after giving him the overall description of non-Hodgkin's lymphoma and the different subsets of disease states  · I told him that I would recommend a treatment plan once I have all the diagnostic information finalized, so that a diagnosis is made for him and hence management can be recommended for him  Follow Up    3 weeks from now but will bring him back earlier if everything is resulted      All questions were answered to the patient's satisfaction during this encounter  They appreciated and thanked me for spending time with them  The patient knows the contact information for our office and know to reach out for any relevant concerns related to this encounter  For all other listed problems and medical diagnosis in his chart - they are managed by PCP and/or other specialists, which patient acknowledges  Nataliya Hathaway MD  Hematology & Medical Oncology

## 2021-02-19 ENCOUNTER — CONSULT (OUTPATIENT)
Dept: HEMATOLOGY ONCOLOGY | Facility: CLINIC | Age: 61
End: 2021-02-19
Payer: COMMERCIAL

## 2021-02-19 VITALS
WEIGHT: 194.6 LBS | SYSTOLIC BLOOD PRESSURE: 162 MMHG | HEART RATE: 63 BPM | OXYGEN SATURATION: 98 % | TEMPERATURE: 98.6 F | HEIGHT: 71 IN | RESPIRATION RATE: 18 BRPM | BODY MASS INDEX: 27.24 KG/M2 | DIASTOLIC BLOOD PRESSURE: 80 MMHG

## 2021-02-19 DIAGNOSIS — D47.Z9 LOW GRADE B CELL LYMPHOPROLIFERATIVE DISORDER (HCC): Primary | ICD-10-CM

## 2021-02-19 DIAGNOSIS — C83.09 SMALL B-CELL LYMPHOMA OF SOLID ORGAN EXCLUDING SPLEEN (HCC): ICD-10-CM

## 2021-02-19 PROCEDURE — 99244 OFF/OP CNSLTJ NEW/EST MOD 40: CPT | Performed by: INTERNAL MEDICINE

## 2021-02-24 PROBLEM — I48.0 PAROXYSMAL ATRIAL FIBRILLATION (HCC): Status: ACTIVE | Noted: 2021-02-24

## 2021-02-24 LAB
ALBUMIN SERPL ELPH-MCNC: 3.9 G/DL (ref 3.8–4.8)
ALBUMIN SERPL-MCNC: 4.2 G/DL (ref 3.6–5.1)
ALBUMIN/GLOB SERPL: 1.6 (CALC) (ref 1–2.5)
ALP SERPL-CCNC: 46 U/L (ref 35–144)
ALPHA1 GLOB SERPL ELPH-MCNC: 0.3 G/DL (ref 0.2–0.3)
ALPHA2 GLOB SERPL ELPH-MCNC: 0.7 G/DL (ref 0.5–0.9)
ALT SERPL-CCNC: 21 U/L (ref 9–46)
AST SERPL-CCNC: 18 U/L (ref 10–35)
BASOPHILS # BLD AUTO: 43 CELLS/UL (ref 0–200)
BASOPHILS NFR BLD AUTO: 0.6 %
BETA1 GLOB SERPL ELPH-MCNC: 0.5 G/DL (ref 0.4–0.6)
BETA2 GLOB SERPL ELPH-MCNC: 0.4 G/DL (ref 0.2–0.5)
BILIRUB SERPL-MCNC: 0.6 MG/DL (ref 0.2–1.2)
BUN SERPL-MCNC: 22 MG/DL (ref 7–25)
BUN/CREAT SERPL: 17 (CALC) (ref 6–22)
CALCIUM SERPL-MCNC: 9.5 MG/DL (ref 8.6–10.3)
CHLORIDE SERPL-SCNC: 102 MMOL/L (ref 98–110)
CO2 SERPL-SCNC: 28 MMOL/L (ref 20–32)
CREAT SERPL-MCNC: 1.32 MG/DL (ref 0.7–1.25)
EOSINOPHIL # BLD AUTO: 72 CELLS/UL (ref 15–500)
EOSINOPHIL NFR BLD AUTO: 1 %
ERYTHROCYTE [DISTWIDTH] IN BLOOD BY AUTOMATED COUNT: 12.6 % (ref 11–15)
GAMMA GLOB SERPL ELPH-MCNC: 1 G/DL (ref 0.8–1.7)
GLOBULIN SER CALC-MCNC: 2.6 G/DL (CALC) (ref 1.9–3.7)
GLUCOSE SERPL-MCNC: 118 MG/DL (ref 65–99)
HAV IGM SERPL QL IA: NORMAL
HBV CORE IGM SERPL QL IA: NORMAL
HBV SURFACE AG SERPL QL IA: NORMAL
HCT VFR BLD AUTO: 48.5 % (ref 38.5–50)
HCV AB S/CO SERPL IA: 0.08
HCV AB SERPL QL IA: NORMAL
HGB BLD-MCNC: 15.9 G/DL (ref 13.2–17.1)
HIV 1+2 AB+HIV1 P24 AG SERPL QL IA: NORMAL
IGA SERPL-MCNC: 287 MG/DL (ref 70–320)
IGG SERPL-MCNC: 1071 MG/DL (ref 600–1540)
IGM SERPL-MCNC: 160 MG/DL (ref 50–300)
INTERPRETATION SERPL IFE-IMP: NORMAL
KAPPA LC FREE SER-MCNC: 19.9 MG/L (ref 3.3–19.4)
KAPPA LC FREE/LAMBDA FREE SER: 1.36 {RATIO} (ref 0.26–1.65)
LAMBDA LC FREE SERPL-MCNC: 14.6 MG/L (ref 5.7–26.3)
LDH SERPL-CCNC: 132 U/L (ref 120–250)
LYMPHOCYTES # BLD AUTO: 1418 CELLS/UL (ref 850–3900)
LYMPHOCYTES NFR BLD AUTO: 19.7 %
MAGNESIUM SERPL-MCNC: 2.4 MG/DL (ref 1.5–2.5)
MCH RBC QN AUTO: 31.4 PG (ref 27–33)
MCHC RBC AUTO-ENTMCNC: 32.8 G/DL (ref 32–36)
MCV RBC AUTO: 95.7 FL (ref 80–100)
MONOCYTES # BLD AUTO: 713 CELLS/UL (ref 200–950)
MONOCYTES NFR BLD AUTO: 9.9 %
NEUTROPHILS # BLD AUTO: 4954 CELLS/UL (ref 1500–7800)
NEUTROPHILS NFR BLD AUTO: 68.8 %
PLATELET # BLD AUTO: 246 THOUSAND/UL (ref 140–400)
PMV BLD REES-ECKER: 11.7 FL (ref 7.5–12.5)
POTASSIUM SERPL-SCNC: 3.7 MMOL/L (ref 3.5–5.3)
PROT PATTERN SERPL ELPH-IMP: NORMAL
PROT SERPL-MCNC: 6.8 G/DL (ref 6.1–8.1)
PROT SERPL-MCNC: 6.8 G/DL (ref 6.1–8.1)
RBC # BLD AUTO: 5.07 MILLION/UL (ref 4.2–5.8)
SL AMB EGFR AFRICAN AMERICAN: 67 ML/MIN/1.73M2
SL AMB EGFR NON AFRICAN AMERICAN: 58 ML/MIN/1.73M2
SODIUM SERPL-SCNC: 138 MMOL/L (ref 135–146)
URATE SERPL-MCNC: 6.4 MG/DL (ref 4–8)
WBC # BLD AUTO: 7.2 THOUSAND/UL (ref 3.8–10.8)

## 2021-02-25 ENCOUNTER — OFFICE VISIT (OUTPATIENT)
Dept: CARDIOLOGY CLINIC | Facility: CLINIC | Age: 61
End: 2021-02-25
Payer: COMMERCIAL

## 2021-02-25 ENCOUNTER — TELEPHONE (OUTPATIENT)
Dept: HEMATOLOGY ONCOLOGY | Facility: MEDICAL CENTER | Age: 61
End: 2021-02-25

## 2021-02-25 VITALS
DIASTOLIC BLOOD PRESSURE: 88 MMHG | HEIGHT: 71 IN | BODY MASS INDEX: 26.82 KG/M2 | HEART RATE: 58 BPM | SYSTOLIC BLOOD PRESSURE: 132 MMHG | WEIGHT: 191.6 LBS | OXYGEN SATURATION: 97 %

## 2021-02-25 DIAGNOSIS — E78.5 DYSLIPIDEMIA: ICD-10-CM

## 2021-02-25 DIAGNOSIS — I10 BENIGN ESSENTIAL HYPERTENSION: ICD-10-CM

## 2021-02-25 DIAGNOSIS — G47.33 OSA (OBSTRUCTIVE SLEEP APNEA): ICD-10-CM

## 2021-02-25 DIAGNOSIS — I77.810 DILATED AORTIC ROOT (HCC): ICD-10-CM

## 2021-02-25 DIAGNOSIS — I48.0 PAROXYSMAL ATRIAL FIBRILLATION (HCC): Primary | ICD-10-CM

## 2021-02-25 PROCEDURE — 99214 OFFICE O/P EST MOD 30 MIN: CPT | Performed by: INTERNAL MEDICINE

## 2021-02-25 RX ORDER — DILTIAZEM HYDROCHLORIDE 120 MG/1
120 CAPSULE, COATED, EXTENDED RELEASE ORAL DAILY
Qty: 90 CAPSULE | Refills: 3 | Status: SHIPPED | OUTPATIENT
Start: 2021-02-25

## 2021-02-25 RX ORDER — CHLORTHALIDONE 25 MG/1
25 TABLET ORAL DAILY
COMMUNITY
End: 2021-02-25 | Stop reason: SDUPTHER

## 2021-02-25 RX ORDER — CHLORTHALIDONE 25 MG/1
25 TABLET ORAL DAILY
Qty: 90 TABLET | Refills: 3 | Status: SHIPPED | OUTPATIENT
Start: 2021-02-25

## 2021-02-25 NOTE — PROGRESS NOTES
Cardiology Follow Up    Argentina Guevara  1960  [id number]  [facility]  [facility] [place]  [address]  [phone]  [phone]    1  Paroxysmal atrial fibrillation (HCC)  apixaban (ELIQUIS) 5 mg    diltiazem (CARDIZEM CD) 120 mg 24 hr capsule   2  Benign essential hypertension  chlorthalidone 25 mg tablet   3  Dyslipidemia     4  JODY (obstructive sleep apnea)     5  Dilated aortic root (HCC)  CT chest without contrast       Discussion/Summary:  Mr Gerald Knight is a pleasant 63year-old gentleman who presents to the office today for routine follow-up  Since his last visit he has had no signs or symptoms of recurrent atrial fibrillation  He is maintaining normal sinus rhythm today  He will remain on his current AV rina blocking regimen  He has a [id number] score of one  We again discussed risks and benefits of systemic anticoagulation  He prefers to remain on Eliquis     Compliance with CPAP was reinforced  His blood pressure control is acceptable in the office today  A low-salt diet was reinforced      Otherwise he was noted to have a dilated ascending aorta on echocardiogram    I have requested a CT scan of his aorta for further evaluation  His lipids were reviewed  Based on his 10-year risk statin therapy is indicated  I will discuss this with him at his next visit      He will follow-up in the office in six months or sooner if deemed necessary  History of Present Illness:  Mr Gerald Knight is a pleasant 69-year-old gentleman who presents to the office today for routine follow-up  Since his last visit with me he underwent a screening colonoscopy  This revealed polyps which were removed  Pathology revealed changes compatible with lymphoma    He is currently under the care of a oncologist    It appears the final diagnosis has yet to be ascertained and he is awaiting final pathology to determine whether this is marginal zone lymphoma or lymphoplasmacytic lymphoma  Since his last visit he has had no recurrent atrial fibrillation  He is tolerating diltiazem without side effects  He is maintained on systemic anticoagulation without side effects  He is compliant with CPAP  He denies any exertional chest pain or shortness of breath with activity  He denies any signs or symptoms of heart failure  He denies lightheadedness, syncope or presyncope  He denies symptoms of claudication  He started to experience heartburn symptoms with chlorthalidone  He takes a full tablet alternating with a half a tablet daily      He has been on the following medications and intolerant due to various side effects:     Amlodipine  Atenolol  Bystolic  Lisinopril   Candesartan      Problem List     Benign hypertensive CKD    Lab Results   Component Value Date    CREATININE 1 32 (H) 02/20/2021    CREATININE 1 14 01/25/2020    CREATININE 1 23 12/14/2019         Erectile dysfunction of non-organic origin    Episodic tension-type headache, not intractable    Screening for prostate cancer    Vitamin D deficiency    Well adult exam    Need for influenza vaccination    Screening for colon cancer    Dyslipidemia    Benign essential hypertension    Stage 3 chronic kidney disease    Lab Results   Component Value Date    CREATININE 1 32 (H) 02/20/2021    CREATININE 1 14 01/25/2020    CREATININE 1 23 12/14/2019         Dilated aortic root (HCC)    JODY (obstructive sleep apnea)    Annual physical exam        Past Medical History:   Diagnosis Date    Atrial fibrillation ([address] )     CKD (chronic kidney disease)     Dilated aortic root ([facility] [address] )     Erectile dysfunction     Hyperlipidemia 6/28/2016    Hypertension     Hypertensive CKD (chronic kidney disease)     Mild renal insufficiency 9/26/2017    Vitamin D deficiency      Social History     Socioeconomic History    Marital status: /Civil Union     Spouse name: Not on file    Number of children: Not on file    Years of education: Not on file    Highest education level: Not on file   Occupational History    Not on file   Social Needs    Financial resource strain: Not on file    Food insecurity     Worry: Not on file     Inability: Not on file    Transportation needs     Medical: Not on file     Non-medical: Not on file   Tobacco Use    Smoking status: Never Smoker    Smokeless tobacco: Never Used   Substance and Sexual Activity    Alcohol use: Yes     Frequency: 2-4 times a month     Comment: 3-4 BEERS / WEEK     Drug use: No    Sexual activity: Yes     Partners: Female   Lifestyle    Physical activity     Days per week: Not on file     Minutes per session: Not on file    Stress: Not on file   Relationships    Social connections     Talks on phone: Not on file     Gets together: Not on file     Attends Protestant service: Not on file     Active member of club or organization: Not on file     Attends meetings of clubs or organizations: Not on file     Relationship status: Not on file    Intimate partner violence     Fear of current or ex partner: Not on file     Emotionally abused: Not on file     Physically abused: Not on file     Forced sexual activity: Not on file   Other Topics Concern    Not on file   Social History Narrative    Not on file      Family History   Problem Relation Age of Onset    Lung cancer Mother     Heart attack Father 76    Prostate cancer Father     Hyperlipidemia Father     No Known Problems Sister     Lymphoma Maternal Aunt     Fainting Maternal Grandfather      Past Surgical History:   Procedure Laterality Date    HIATAL HERNIA REPAIR         Current Outpatient Medications:     apixaban (ELIQUIS) 5 mg, Take 1 tablet (5 mg total) by mouth 2 (two) times a day, Disp: 180 tablet, Rfl: 3    chlorthalidone 25 mg tablet, Take 1 tablet (25 mg total) by mouth daily, Disp: 90 tablet, Rfl: 3    cholecalciferol (VITAMIN D3) 1,000 units tablet, Take 1,000 Units by mouth daily, Disp: , Rfl:     diltiazem (CARDIZEM CD) 120 mg 24 hr capsule, Take 1 capsule (120 mg total) by mouth daily, Disp: 90 capsule, Rfl: 3    hydrochlorothiazide (HYDRODIURIL) 12 5 mg tablet, Take 1 tablet (12 5 mg total) by mouth daily (Patient not taking: Reported on 2/25/2021), Disp: 30 tablet, Rfl: 5  Allergies   Allergen Reactions    Valsartan        Labs:     Chemistry        Component Value Date/Time     10/07/2017 0740    K 3 7 02/20/2021 0747     02/20/2021 0747    CO2 28 02/20/2021 0747    BUN 22 02/20/2021 0747    CREATININE 1 32 (H) 02/20/2021 0747    CREATININE 1 31 10/07/2017 0740        Component Value Date/Time    CALCIUM 9 5 02/20/2021 0747    ALKPHOS 46 02/20/2021 0747    AST 18 02/20/2021 0747    ALT 21 02/20/2021 0747    BILITOT 0 6 10/07/2017 0740            Lab Results   Component Value Date    CHOL 174 09/09/2017    CHOL 224 (H) 06/18/2016    CHOL 186 03/21/2015     Lab Results   Component Value Date    HDL 52 11/14/2020    HDL 45 12/14/2019    HDL 45 04/20/2019     Lab Results   Component Value Date    LDLCALC 121 (H) 11/14/2020    LDLCALC 114 (H) 12/14/2019    LDLCALC 135 (H) 04/20/2019     Lab Results   Component Value Date    TRIG 133 11/14/2020    TRIG 65 12/14/2019    TRIG 85 04/20/2019     No results found for: CHOLHDL    Imaging: No results found  Review of Systems   Cardiovascular: Negative for claudication, cyanosis and dyspnea on exertion  All other systems reviewed and are negative  Vitals:    02/25/21 1751   BP: 132/88   Pulse: 58   SpO2: 97%     Vitals:    02/25/21 1751   Weight: 86 9 kg (191 lb 9 6 oz)     Height: 5' 11" (180 3 cm)   Body mass index is 26 72 kg/m²      Physical Exam:  General:  Alert and cooperative, appears stated age  HEENT:  PERRLA, EOMI, no scleral icterus, no conjunctival pallor  Neck:  No lymphadenopathy, no thyromegaly, no carotid bruits, no elevated JVP  Heart:  Regular rate and rhythm, normal S1/S2, no S3/S4, no murmur  Lungs:  Clear to auscultation bilaterally   Abdomen:  Soft, non-tender, positive bowel sounds, no rebound or guarding,   no organomegaly   Extremities:  No clubbing, cyanosis or edema   Vascular:  2+ pedal pulses  Skin:  No rashes or lesions on exposed skin  Neurologic:  Cranial nerves II-XII grossly intact without focal deficits

## 2021-02-25 NOTE — TELEPHONE ENCOUNTER
Patient called in stated that he would like to reschedule 03/12/2021 to 03/25/2021 due to reschedule his PT scan

## 2021-03-19 ENCOUNTER — HOSPITAL ENCOUNTER (OUTPATIENT)
Dept: NUCLEAR MEDICINE | Facility: HOSPITAL | Age: 61
Discharge: HOME | End: 2021-03-19
Attending: INTERNAL MEDICINE
Payer: COMMERCIAL

## 2021-03-19 DIAGNOSIS — D47.Z9 LOW GRADE B CELL LYMPHOPROLIFERATIVE DISORDER (HCC): ICD-10-CM

## 2021-03-19 LAB — GLUCOSE SERPL-MCNC: 98 MG/DL (ref 65–140)

## 2021-03-19 PROCEDURE — G1004 CDSM NDSC: HCPCS

## 2021-03-19 PROCEDURE — A9552 F18 FDG: HCPCS

## 2021-03-19 PROCEDURE — 82948 REAGENT STRIP/BLOOD GLUCOSE: CPT

## 2021-03-19 PROCEDURE — 78815 PET IMAGE W/CT SKULL-THIGH: CPT

## 2021-03-24 PROBLEM — C83.00 MALIGNANT LYMPHOMA, LYMPHOPLASMACYTIC (HCC): Status: ACTIVE | Noted: 2021-02-18

## 2021-03-24 NOTE — PROGRESS NOTES
[facility] - Hematology & Medical Oncology  Outpatient Visit Encounter Note    Lisa Santana 63 y o  male [id number] Date:  3/25/2021    ONCOLOGY HISTORY      Oncology History   Malignant lymphoma, lymphoplasmacytic ([facility] [address] )   2/18/2021 Initial Diagnosis    Malignant lymphoma, lymphoplasmacytic ([facility] [address] )    This addendum is included to report the results of MYD88 testing which is positive, favoring a diagnosis of lymphoplasmacytic lymphoma, though is not definitively specific  Correlation with clinical impression is recommended  - MYD88 Mutation Analysis performed on the Colorectal Polyp @ [facility] (Specimen ID: [id number], evaluated by Jose E Lazo MD, PhD) as follows:   Results: MYD88 Mutation(s):     Detected  Mutation(s) Detected:   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)   Addendum electronically signed by Franco Dance, MD on 3/1/2021 at 11:56 AM   Final Diagnosis   A  Colon, descending polyp, biopsy:  -  Tubular adenoma, negative for high-grade dysplasia  B  Colon, sigmoid polyp, biopsy:  -  Small B cell neoplasm with kappa light chain restricted plasma cell proliferation, compatible with lymphoma (see note)     PETCT 3/19/21  1  Small focus of FDG uptake at the gastric antrum for which underlying lesion should be excluded  Correlate with endoscopy  2   Single FDG avid left axillary lymph node  This may be related to recent Covid vaccination but given the history of lymphoma, malignancy cannot be entirely excluded  This can be reassessed on short-term follow-up CT or PET/CT in 3 months  3  Small focus of FDG uptake at the inferior aspect of the prostate, underlying lesion not excluded  Correlate with PSA levels and urologic evaluation  4  Mild FDG uptake in a left upper cervical chain lymph node may be reactive but can be reassessed on follow-up  5   Mildly aneurysmal ascending thoracic aorta at 4 1 cm in diameter      BASELINE DISEASE CHARACTERISTICS 2/20/21  SPEP - normal  Quant Ig - normal including normal IgM  SLFC Ratio - normal  CBC - normal             SUBJECTIVE      ECOG Initial Visit 0   ECOG This Visit 0   Pain Score Initial Visit 0   Pain Score This Visit 0   Personal Cancer History None   Family Cancer History Mother (Lung), Dad (Prostate), Maternal Aunt (Gyn)   Tobacco Use History Denies   Alcohol Use History Denies   Thrombotic History Denies   Occupation Purchasing     INITIAL CONSULT    Yao Hitchcock is a 63 y o  here for new consultation with me today  The patient is referred by colorectal surgery and the reason for consultation is b-cell lymphoma diagnosed during screening colonoscopy  In review of his chart and talking with him, he underwent routine colonoscopy on 2/5/21 with Dr Michael Gauthier and was found to have two sessile polyps - in the descending and sigmoid colon that were removed and send for biopsy  While the descending polyp resulted as tubular adenoma, the sigmoid polyp resulted as low-grade CD20+ B-cell lymphoma  He is here with his wife today  He offers no acute complaints  He says the results of his biopsy from the colonoscopy were very surprising  He denies any fevers chills nausea vomiting chest pain abdominal pain shortness of breath cough weight loss night sweats fevers chills  Denies any diarrhea constipation or bloody stools  He denies any genitourinary ambulatory dysfunction symptoms as well  He has been living his routine active life  He said that the colonoscopy he underwent was for routine screening  THIS VISIT    He comes for scheduled follow-up after he underwent extensive diagnostic workup including a PET-CT scan as well  As of this office visit, his pathology report is also finalized as during his last office visit, additional testing was in process  He is newly diagnosed with lymphoplasmacytic lymphoma  He is here with his wife  He offers no acute complaints    He says that he has been doing well since he saw me last time with no unintentional weight loss fevers chills night sweats swollen lymph nodes  He does have occasional night sweats but they are mostly once in week      I have reviewed the relevant past medical, surgical, social and family history  I have also reviewed allergies and medications for this patient  Review of Systems  Review of Systems   All other systems reviewed and are negative  OBJECTIVE     Physical Exam  Vitals:    03/25/21 0816   BP: 134/82   BP Location: Left arm   Patient Position: Sitting   Cuff Size: Adult   Pulse: 61   Resp: 18   Temp: 97 7 °F (36 5 °C)   TempSrc: Tympanic   SpO2: 97%   Weight: 87 2 kg (192 lb 3 2 oz)   Height: 5' 11" (1 803 m)       Physical Exam  Constitutional:       General: He is not in acute distress  Appearance: He is not ill-appearing, toxic-appearing or diaphoretic  HENT:      Head: Normocephalic and atraumatic  Eyes:      General: No scleral icterus  Extraocular Movements: Extraocular movements intact  Neck:      Musculoskeletal: Normal range of motion and neck supple  Cardiovascular:      Rate and Rhythm: Normal rate  Pulmonary:      Effort: Pulmonary effort is normal  No respiratory distress  Abdominal:      General: Bowel sounds are normal  There is no distension  Palpations: Abdomen is soft  Tenderness: There is no abdominal tenderness  There is no guarding  Musculoskeletal: Normal range of motion  General: No swelling or tenderness  Right lower leg: No edema  Left lower leg: No edema  Lymphadenopathy:      Head:      Right side of head: No submental, submandibular, tonsillar, preauricular, posterior auricular or occipital adenopathy  Left side of head: No submental, submandibular, tonsillar, preauricular, posterior auricular or occipital adenopathy  Cervical: No cervical adenopathy  Upper Body:      Right upper body: No supraclavicular or epitrochlear adenopathy  Left upper body: No supraclavicular or epitrochlear adenopathy  Skin:     General: Skin is dry  Neurological:      General: No focal deficit present  Mental Status: He is alert and oriented to person, place, and time  Mental status is at baseline  Gait: Gait normal           Imaging  Relevant imaging reviewed in chart    Labs  Relevant labs reviewed in chart   ASSESSMENT & PLAN      Diagnosis ICD-10-CM Associated Orders   1  Malignant lymphoma, lymphoplasmacytic (HCC)  C83 00 CBC and differential     Comprehensive metabolic panel     LD,Blood     IgG, IgA, IgM     Protein electrophoresis, serum     Immunoglobulin free LT chains blood     CBC and differential     Comprehensive metabolic panel     LD,Blood     IgG, IgA, IgM     Protein electrophoresis, serum     Immunoglobulin free LT chains blood   2  Screening for prostate cancer  Z12 5 Ambulatory referral to Urology   3  Abnormal gastrointestinal PET scan  R94 8 Ambulatory referral to Gastroenterology        41-year-old male diagnosed with sigmoid polyp upon a screening colonoscopy that is biopsy proven to be lymphoplasmacytic lymphoma  Additional testing proved that he was  MYD88 positive and given his normal SPEP and quantitative immunoglobulin, his diagnosis of LPL stands   Oncology history updated accordingly this visit   Goals of care/patient communication  o I reviewed his clinical case in detail with him and explained to him the results of his final pathology and what the diagnosis of lymphoplasmacytic lymphoma actually means  I told him that this is an indolent low-grade B-cell lymphoproliferative cancer that needs to have specific indications for any kind of systemic therapy   o I told him that because he is not exhibiting any B symptoms does not have any clinical cytopenias, he does not meet any indication for systemic therapy and the standard of care is for surveillance    -  I told him other indications for treatment include symptoms consistent with neuropathy, hyperviscosity, painful organomegaly, cold agglutinin disease, cryoglobulinemia, etc   And hence this the reason why there is close follow-up so that upon question and answer a clinical decision can be made upon whether he has any of the symptoms  o I reviewed his PET-CT scan in detail and told him that I do not anticipate repeating any diagnostic imaging unless and until there is clinical indication upon future examination visits  o I encouraged him to have a visit with Urology and with Gastroenterology because of the abnormal findings on his PET-CT scan that recommended assessment based on the abnormal FDG avidity in his stomach and his prostate  I explained to him that often, these findings are not clinically significant but because it has been stated, I recommend he discuss in detail with these 2 specialists for any further steps in management  - Referrals in placed  o I explained to him the natural course of LPL and told him that there is a possibility for his disease to become clinically relevant where he starts to produce the most common immunoglobulin, IgM protein that can lead to several complications and at that time would have a disease designation call Waldenstrm's macroglobulinemia  I told him that because of this, we recommend close monitoring with interval blood work   Disease Features/Tumor Markers/Genetics  o [id number]   Treatment  o   Surveillance as detailed above   Labs  o  CBC CMP LDH SPEP SFLC IG - q3m   Diagnostics  o No indication for imaging from a lymphoma perspective      Follow Up    3 months      All questions were answered to the patient's satisfaction during this encounter  They appreciated and thanked me for spending time with them  The patient knows the contact information for our office and know to reach out for any relevant concerns related to this encounter   For all other listed problems and medical diagnosis in his chart - they are managed by PCP and/or other specialists, which patient acknowledges  Nataliya Whelan MD  Hematology & Medical Oncology

## 2021-03-25 ENCOUNTER — OFFICE VISIT (OUTPATIENT)
Dept: HEMATOLOGY ONCOLOGY | Facility: CLINIC | Age: 61
End: 2021-03-25
Payer: COMMERCIAL

## 2021-03-25 VITALS
WEIGHT: 192.2 LBS | OXYGEN SATURATION: 97 % | TEMPERATURE: 97.7 F | BODY MASS INDEX: 26.91 KG/M2 | RESPIRATION RATE: 18 BRPM | SYSTOLIC BLOOD PRESSURE: 134 MMHG | DIASTOLIC BLOOD PRESSURE: 82 MMHG | HEART RATE: 61 BPM | HEIGHT: 71 IN

## 2021-03-25 DIAGNOSIS — R94.8 ABNORMAL GASTROINTESTINAL PET SCAN: ICD-10-CM

## 2021-03-25 DIAGNOSIS — C83.00 MALIGNANT LYMPHOMA, LYMPHOPLASMACYTIC (HCC): Primary | ICD-10-CM

## 2021-03-25 DIAGNOSIS — Z12.5 SCREENING FOR PROSTATE CANCER: ICD-10-CM

## 2021-03-25 PROCEDURE — 99214 OFFICE O/P EST MOD 30 MIN: CPT | Performed by: INTERNAL MEDICINE

## 2021-03-26 ENCOUNTER — TELEPHONE (OUTPATIENT)
Dept: GASTROENTEROLOGY | Facility: CLINIC | Age: 61
End: 2021-03-26

## 2021-03-26 ENCOUNTER — OFFICE VISIT (OUTPATIENT)
Dept: GASTROENTEROLOGY | Facility: CLINIC | Age: 61
End: 2021-03-26
Payer: COMMERCIAL

## 2021-03-26 VITALS
DIASTOLIC BLOOD PRESSURE: 80 MMHG | WEIGHT: 190 LBS | SYSTOLIC BLOOD PRESSURE: 138 MMHG | TEMPERATURE: 98 F | BODY MASS INDEX: 26.6 KG/M2 | HEIGHT: 71 IN

## 2021-03-26 DIAGNOSIS — R94.8 ABNORMAL POSITRON EMISSION TOMOGRAPHY (PET) SCAN: ICD-10-CM

## 2021-03-26 DIAGNOSIS — R94.8 ABNORMAL GASTROINTESTINAL PET SCAN: ICD-10-CM

## 2021-03-26 DIAGNOSIS — Z12.11 SCREENING FOR COLON CANCER: Primary | ICD-10-CM

## 2021-03-26 PROCEDURE — 3075F SYST BP GE 130 - 139MM HG: CPT | Performed by: INTERNAL MEDICINE

## 2021-03-26 PROCEDURE — 99244 OFF/OP CNSLTJ NEW/EST MOD 40: CPT | Performed by: INTERNAL MEDICINE

## 2021-03-26 PROCEDURE — 3008F BODY MASS INDEX DOCD: CPT | Performed by: INTERNAL MEDICINE

## 2021-03-26 PROCEDURE — 3079F DIAST BP 80-89 MM HG: CPT | Performed by: INTERNAL MEDICINE

## 2021-03-26 PROCEDURE — 1036F TOBACCO NON-USER: CPT | Performed by: INTERNAL MEDICINE

## 2021-03-26 NOTE — PATIENT INSTRUCTIONS
EGD scheduled for 4/20/21 with Dr Werner Scott at [facility]  Medication clearance sent for 2 day hold of Eliquis

## 2021-03-26 NOTE — H&P (VIEW-ONLY)
[facility] - Outpatient Consultation  Tae Mendoza 63 y o  male MRN: [id number]  Encounter: [id number]      ASSESSMENT AND PLAN:    70-year-old male referred by Hematology for new diagnosis of malignant lymphoplasmacytic lymphoma  He has no lymphoma symptoms  He had uptake in the PET scan in the stomach and the prostate  1  Abnormal gastrointestinal PET scan  -  I will do EGD to assess for a lesion in the stomach, if there will remove,  Will [facility] this [facility] the [facility]    2  Screening for colon cancer  - he is up-to-date    3  Abnormal positron emission tomography (PET) scan  - referral placed to my colleague in Urology as he said it would take a long time for him to get and he is obviously concerned about his new diagnosis, reassurance given and I am glad that he has no current symptoms oncology's current plan is watchful waiting  - Ambulatory referral to Urology; Future     he can follow up with me in 4 months time    I will look into how to monitor him long-term from EGD and colon surveillance standpoint     ______________________________________________________________________    HPI:   70-year-old male referred by his oncologist   He has a new diagnosis of B-cell lymphoma which was found on colon polyp biopsy  This was his 1st screening colonoscopy  This was not a diagnosis that was suspected at all  He has no other symptoms and this was a screening exam   He saw all oncology which ordered a PET scan which showed a foci of uptake in the stomach as well as the prostate  He has some intermittent GERD symptoms  He takes Pepcid as needed  He is a nonsmoker nondrinker  He denies family history of GI tract cancers  REVIEW OF SYSTEMS:    CONSTITUTIONAL: Denies any fever, chills, rigors, and weight loss  HEENT: No earache or tinnitus  Denies hearing loss or visual disturbances  CARDIOVASCULAR: No chest pain or palpitations     RESPIRATORY: Denies any cough, hemoptysis, shortness of breath or dyspnea on exertion  GASTROINTESTINAL: As noted in the History of Present Illness  GENITOURINARY: No problems with urination  Denies any hematuria or dysuria  NEUROLOGIC: No dizziness or vertigo, denies headaches  MUSCULOSKELETAL: Denies any muscle or joint pain  SKIN: Denies skin rashes or itching  ENDOCRINE: Denies excessive thirst  Denies intolerance to heat or cold  PSYCHOSOCIAL: Denies depression or anxiety  Denies any recent memory loss  Historical Information   Past Medical History:   Diagnosis Date    Atrial fibrillation ([address] )     CKD (chronic kidney disease)     Dilated aortic root (HCC)     Erectile dysfunction     Hyperlipidemia 6/28/2016    Hypertension     Hypertensive CKD (chronic kidney disease)     Mild renal insufficiency 9/26/2017    Vitamin D deficiency      Past Surgical History:   Procedure Laterality Date    HIATAL HERNIA REPAIR       Social History   Social History     Substance and Sexual Activity   Alcohol Use Yes    Frequency: 2-4 times a month    Comment: 3-4 BEERS / WEEK      Social History     Substance and Sexual Activity   Drug Use No     Social History     Tobacco Use   Smoking Status Never Smoker   Smokeless Tobacco Never Used     Family History   Problem Relation Age of Onset    Lung cancer Mother     Heart attack Father 76    Prostate cancer Father     Hyperlipidemia Father     No Known Problems Sister     Lymphoma Maternal Aunt     Fainting Maternal Grandfather        Meds/Allergies       Current Outpatient Medications:     apixaban (ELIQUIS) 5 mg    chlorthalidone 25 mg tablet    cholecalciferol (VITAMIN D3) 1,000 units tablet    diltiazem (CARDIZEM CD) 120 mg 24 hr capsule    hydrochlorothiazide (HYDRODIURIL) 12 5 mg tablet    No Known Allergies        Objective     Blood pressure 138/80, temperature 98 °F (36 7 °C), temperature source Tympanic, height 5' 11" (1 803 m), weight 86 2 kg (190 lb)   Body mass index is 26 5 kg/m²  PHYSICAL EXAM:      General Appearance:   Alert, cooperative, no distress   HEENT:   Normocephalic, atraumatic, anicteric      Neck:  Supple, symmetrical, trachea midline   Lungs:   Clear to auscultation bilaterally; no rales, rhonchi or wheezing; respirations unlabored    Heart[de-identified]   Regular rate and rhythm; no murmur, rub, or gallop  Abdomen:   Soft, non-tender, non-distended; normal bowel sounds; no masses, no organomegaly    Genitalia:   Deferred    Rectal:   Deferred    Extremities:  No cyanosis, clubbing or edema    Pulses:  2+ and symmetric    Skin:  No jaundice, rashes, or lesions    Lymph nodes:  No palpable cervical lymphadenopathy        Lab Results:   No visits with results within 1 Day(s) from this visit  Latest known visit with results is:   Hospital Outpatient Visit on 03/19/2021   Component Date Value    POC Glucose 03/19/2021 98          Radiology Results:   Nm Pet Ct Skull Base To Mid Thigh    Result Date: 3/19/2021  Narrative: PET/CT SCAN INDICATION: B-cell lymphoma found in a colonic sigmoid polyp, 2/5/2021  Staging exam   History of COVID vaccine left arm 2/22/2021  D47  Z9: Other specified neoplasms of uncertain behavior of lymphoid, hematopoietic and related tissue MODIFIER: PI COMPARISON: None CELL TYPE:  B-cell lymphoma TECHNIQUE:   8 3 mCi F-18-FDG administered IV  Multiplanar attenuation corrected and non attenuation corrected PET images were acquired 60 minutes post injection  Contiguous, low dose, axial CT sections were obtained from the skull base through the femurs   Intravenous contrast material was not utilized  This examination, like all CT scans performed in the [facility], was performed utilizing techniques to minimize radiation dose exposure, including the use of iterative reconstruction and automated exposure control  Fasting serum glucose: 98 mg/dl FINDINGS: VISUALIZED BRAIN:   No acute abnormalities are seen   HEAD/NECK:   Mild FDG uptake in a left upper cervical chain lymph node, SUV max of 3 1  This measures 7 x 5 mm image 32 series 4  No FDG avid lymph nodes in the right neck  CT images: Otherwise unremarkable  CHEST:   FDG uptake in a left axillary lymph node, SUV max of 4 0  This measures 2 0 x 1 0 cm image 61 series 4  Minimal FDG uptake in a small right axillary lymph node, SUV max of 1 5, likely reactive  CT images: Mildly aneurysmal ascending thoracic aorta at 4 1 cm in diameter  ABDOMEN:   Small focus of FDG uptake at the level of the gastric antrum superiorly, SUV max of 4 2  No obvious findings on limited CT  See image 87 series 603 on axial fusion images and image 29 series 604 of the coronal fusion images  On fusion this is close to  the liver likely related to misregistration but on the PET images this appears gastric in location  No FDG avid lymph nodes  CT images: Patchy regional low-attenuation in the right lobe of liver posteriorly suggests regional fatty infiltration  No abnormal FDG uptake in this region  Suggestion of a small fat-containing ventral wall hernia at midline above the umbilicus on the left  Small fat-containing umbilical hernia  PELVIS: Small focus of FDG uptake at the inferior aspect of the prostate, SUV max of 4 2  No obvious findings on limited CT  See image 161 series 603  Heterogeneous segmental activity in the mid sigmoid colon likely physiologic  CT images: Small fat-containing left inguinal hernia  OSSEOUS STRUCTURES: No FDG avid lesions are seen  CT images: No significant findings  For reference: SUV max of the mediastinal blood pool: 2 4 SUV max of the liver: 3 5     Impression: 1  Small focus of FDG uptake at the gastric antrum for which underlying lesion should be excluded  Correlate with endoscopy  2   Single FDG avid left axillary lymph node  This may be related to recent Covid vaccination but given the history of lymphoma, malignancy cannot be entirely excluded    This can be reassessed on short-term follow-up CT or PET/CT in 3 months  3  Small focus of FDG uptake at the inferior aspect of the prostate, underlying lesion not excluded  Correlate with PSA levels and urologic evaluation  4  Mild FDG uptake in a left upper cervical chain lymph node may be reactive but can be reassessed on follow-up  5   Mildly aneurysmal ascending thoracic aorta at 4 1 cm in diameter  The study was marked in EPIC for significant notification   Workstation performed: [id number]

## 2021-03-26 NOTE — PROGRESS NOTES
[facility] - Outpatient Consultation  Juan Mittal 63 y o  male MRN: [id number]  Encounter: [id number]      ASSESSMENT AND PLAN:    60-year-old male referred by Hematology for new diagnosis of malignant lymphoplasmacytic lymphoma  He has no lymphoma symptoms  He had uptake in the PET scan in the stomach and the prostate  1  Abnormal gastrointestinal PET scan  -  I will do EGD to assess for a lesion in the stomach, if there will remove,  Will do this at the [address] [place] [address]    2  Screening for colon cancer  - he is up-to-date    3  Abnormal positron emission tomography (PET) scan  - referral placed to my colleague in Urology as he said it would take a long time for him to get and he is obviously concerned about his new diagnosis, reassurance given and I am glad that he has no current symptoms oncology's current plan is watchful waiting  - Ambulatory referral to Urology; Future     he can follow up with me in 4 months time    I will look into how to monitor him long-term from EGD and colon surveillance standpoint     ______________________________________________________________________    HPI:   60-year-old male referred by his oncologist   He has a new diagnosis of B-cell lymphoma which was found on colon polyp biopsy  This was his 1st screening colonoscopy  This was not a diagnosis that was suspected at all  He has no other symptoms and this was a screening exam   He saw all oncology which ordered a PET scan which showed a foci of uptake in the stomach as well as the prostate  He has some intermittent GERD symptoms  He takes Pepcid as needed  He is a nonsmoker nondrinker  He denies family history of GI tract cancers  REVIEW OF SYSTEMS:    CONSTITUTIONAL: Denies any fever, chills, rigors, and weight loss  HEENT: No earache or tinnitus  Denies hearing loss or visual disturbances  CARDIOVASCULAR: No chest pain or palpitations     RESPIRATORY: Denies any cough, hemoptysis, shortness of breath or dyspnea on exertion  GASTROINTESTINAL: As noted in the History of Present Illness  GENITOURINARY: No problems with urination  Denies any hematuria or dysuria  NEUROLOGIC: No dizziness or vertigo, denies headaches  MUSCULOSKELETAL: Denies any muscle or joint pain  SKIN: Denies skin rashes or itching  ENDOCRINE: Denies excessive thirst  Denies intolerance to heat or cold  PSYCHOSOCIAL: Denies depression or anxiety  Denies any recent memory loss  Historical Information   Past Medical History:   Diagnosis Date    Atrial fibrillation ([address] )     CKD (chronic kidney disease)     Dilated aortic root (HCC)     Erectile dysfunction     Hyperlipidemia 6/28/2016    Hypertension     Hypertensive CKD (chronic kidney disease)     Mild renal insufficiency 9/26/2017    Vitamin D deficiency      Past Surgical History:   Procedure Laterality Date    HIATAL HERNIA REPAIR       Social History   Social History     Substance and Sexual Activity   Alcohol Use Yes    Frequency: 2-4 times a month    Comment: 3-4 BEERS / WEEK      Social History     Substance and Sexual Activity   Drug Use No     Social History     Tobacco Use   Smoking Status Never Smoker   Smokeless Tobacco Never Used     Family History   Problem Relation Age of Onset    Lung cancer Mother     Heart attack Father 76    Prostate cancer Father     Hyperlipidemia Father     No Known Problems Sister     Lymphoma Maternal Aunt     Fainting Maternal Grandfather        Meds/Allergies       Current Outpatient Medications:     apixaban (ELIQUIS) 5 mg    chlorthalidone 25 mg tablet    cholecalciferol (VITAMIN D3) 1,000 units tablet    diltiazem (CARDIZEM CD) 120 mg 24 hr capsule    hydrochlorothiazide (HYDRODIURIL) 12 5 mg tablet    No Known Allergies        Objective     Blood pressure 138/80, temperature 98 °F (36 7 °C), temperature source Tympanic, height 5' 11" (1 803 m), weight 86 2 kg (190 lb)   Body mass index is 26 5 kg/m²  PHYSICAL EXAM:      General Appearance:   Alert, cooperative, no distress   HEENT:   Normocephalic, atraumatic, anicteric      Neck:  Supple, symmetrical, trachea midline   Lungs:   Clear to auscultation bilaterally; no rales, rhonchi or wheezing; respirations unlabored    Heart[de-identified]   Regular rate and rhythm; no murmur, rub, or gallop  Abdomen:   Soft, non-tender, non-distended; normal bowel sounds; no masses, no organomegaly    Genitalia:   Deferred    Rectal:   Deferred    Extremities:  No cyanosis, clubbing or edema    Pulses:  2+ and symmetric    Skin:  No jaundice, rashes, or lesions    Lymph nodes:  No palpable cervical lymphadenopathy        Lab Results:   No visits with results within 1 Day(s) from this visit  Latest known visit with results is:   Hospital Outpatient Visit on 03/19/2021   Component Date Value    POC Glucose 03/19/2021 98          Radiology Results:   Nm Pet Ct Skull Base To Mid Thigh    Result Date: 3/19/2021  Narrative: PET/CT SCAN INDICATION: B-cell lymphoma found in a colonic sigmoid polyp, 2/5/2021  Staging exam   History of COVID vaccine left arm 2/22/2021  D47  Z9: Other specified neoplasms of uncertain behavior of lymphoid, hematopoietic and related tissue MODIFIER: PI COMPARISON: None CELL TYPE:  B-cell lymphoma TECHNIQUE:   8 3 mCi F-18-FDG administered IV  Multiplanar attenuation corrected and non attenuation corrected PET images were acquired 60 minutes post injection  Contiguous, low dose, axial CT sections were obtained from the skull base through the femurs   Intravenous contrast material was not utilized  This examination, like all CT scans performed in the [facility], was performed utilizing techniques to minimize radiation dose exposure, including the use of iterative reconstruction and automated exposure control  Fasting serum glucose: 98 mg/dl FINDINGS: VISUALIZED BRAIN:   No acute abnormalities are seen   HEAD/NECK:   Mild FDG uptake in a left upper cervical chain lymph node, SUV max of 3 1  This measures 7 x 5 mm image 32 series 4  No FDG avid lymph nodes in the right neck  CT images: Otherwise unremarkable  CHEST:   FDG uptake in a left axillary lymph node, SUV max of 4 0  This measures 2 0 x 1 0 cm image 61 series 4  Minimal FDG uptake in a small right axillary lymph node, SUV max of 1 5, likely reactive  CT images: Mildly aneurysmal ascending thoracic aorta at 4 1 cm in diameter  ABDOMEN:   Small focus of FDG uptake at the level of the gastric antrum superiorly, SUV max of 4 2  No obvious findings on limited CT  See image 87 series 603 on axial fusion images and image 29 series 604 of the coronal fusion images  On fusion this is close to  the liver likely related to misregistration but on the PET images this appears gastric in location  No FDG avid lymph nodes  CT images: Patchy regional low-attenuation in the right lobe of liver posteriorly suggests regional fatty infiltration  No abnormal FDG uptake in this region  Suggestion of a small fat-containing ventral wall hernia at midline above the umbilicus on the left  Small fat-containing umbilical hernia  PELVIS: Small focus of FDG uptake at the inferior aspect of the prostate, SUV max of 4 2  No obvious findings on limited CT  See image 161 series 603  Heterogeneous segmental activity in the mid sigmoid colon likely physiologic  CT images: Small fat-containing left inguinal hernia  OSSEOUS STRUCTURES: No FDG avid lesions are seen  CT images: No significant findings  For reference: SUV max of the mediastinal blood pool: 2 4 SUV max of the liver: 3 5     Impression: 1  Small focus of FDG uptake at the gastric antrum for which underlying lesion should be excluded  Correlate with endoscopy  2   Single FDG avid left axillary lymph node  This may be related to recent Covid vaccination but given the history of lymphoma, malignancy cannot be entirely excluded    This can be reassessed on short-term follow-up CT or PET/CT in 3 months  3  Small focus of FDG uptake at the inferior aspect of the prostate, underlying lesion not excluded  Correlate with PSA levels and urologic evaluation  4  Mild FDG uptake in a left upper cervical chain lymph node may be reactive but can be reassessed on follow-up  5   Mildly aneurysmal ascending thoracic aorta at 4 1 cm in diameter  The study was marked in EPIC for significant notification   Workstation performed: [id number]

## 2021-03-26 NOTE — TELEPHONE ENCOUNTER
Our mutual patient is scheduled for procedure: On: __4__/_20   _/_ 24   _      With: Dr Willett________    He/She is taking the following blood thinner:    Eliquis    Can this be stopped __2____ days prior to the procedure?       Physician Approving clearance: ________________________

## 2021-03-30 ENCOUNTER — TELEPHONE (OUTPATIENT)
Dept: UROLOGY | Facility: MEDICAL CENTER | Age: 61
End: 2021-03-30

## 2021-03-30 DIAGNOSIS — Z12.5 SCREENING FOR MALIGNANT NEOPLASM OF PROSTATE: ICD-10-CM

## 2021-03-30 DIAGNOSIS — R94.8 ABNORMAL POSITRON EMISSION TOMOGRAPHY (PET) SCAN: Primary | ICD-10-CM

## 2021-03-30 NOTE — TELEPHONE ENCOUNTER
Contacted and spoke with patient about scheduling an appointment  Discussed with patient about obtaining a PSA prior to scheduling an appointment and he is agreeable to that plan  Patient prefers to go to [facility] so will mail script to patient today  Await results

## 2021-03-30 NOTE — TELEPHONE ENCOUNTER
Agree with repeat psa prior then MD visit and LALO   Fortunately last few PSAs have been good, 1 1 in December 2019

## 2021-03-30 NOTE — TELEPHONE ENCOUNTER
Npt previously scheduled 5/18 with Lanney Mohs routine prostate screening,pt's wife calling for sooner appointment as pt had recent abnormal findings NM PET CT,please review and contact her or pt

## 2021-03-30 NOTE — TELEPHONE ENCOUNTER
Miguel Adkins  Could you please review patient's PET scan and advise about urgency of appointment  Should a PSA be obtained prior to scheduling to assist with decision  ?   Thanks

## 2021-04-03 ENCOUNTER — APPOINTMENT (OUTPATIENT)
Dept: LAB | Facility: MEDICAL CENTER | Age: 61
End: 2021-04-03
Payer: COMMERCIAL

## 2021-04-03 DIAGNOSIS — R94.8 ABNORMAL POSITRON EMISSION TOMOGRAPHY (PET) SCAN: ICD-10-CM

## 2021-04-03 DIAGNOSIS — Z12.5 SCREENING FOR MALIGNANT NEOPLASM OF PROSTATE: ICD-10-CM

## 2021-04-03 LAB — PSA SERPL-MCNC: 2.1 NG/ML (ref 0–4)

## 2021-04-03 PROCEDURE — 36415 COLL VENOUS BLD VENIPUNCTURE: CPT

## 2021-04-03 PROCEDURE — G0103 PSA SCREENING: HCPCS

## 2021-04-05 NOTE — TELEPHONE ENCOUNTER
PSA 2 1, on par with previous range of 1 1-1 9  Please keep may appointment with Dr Enedelia Oakes    Will not need additional testing prior to that appointment

## 2021-04-20 ENCOUNTER — HOSPITAL ENCOUNTER (OUTPATIENT)
Dept: GASTROENTEROLOGY | Facility: HOSPITAL | Age: 61
Setting detail: OUTPATIENT SURGERY
Discharge: HOME | End: 2021-04-20
Attending: INTERNAL MEDICINE | Admitting: INTERNAL MEDICINE
Payer: COMMERCIAL

## 2021-04-20 ENCOUNTER — ANESTHESIA (OUTPATIENT)
Dept: GASTROENTEROLOGY | Facility: HOSPITAL | Age: 61
End: 2021-04-20

## 2021-04-20 ENCOUNTER — ANESTHESIA EVENT (OUTPATIENT)
Dept: GASTROENTEROLOGY | Facility: HOSPITAL | Age: 61
End: 2021-04-20

## 2021-04-20 VITALS
WEIGHT: 192 LBS | OXYGEN SATURATION: 99 % | SYSTOLIC BLOOD PRESSURE: 125 MMHG | BODY MASS INDEX: 26.88 KG/M2 | HEIGHT: 71 IN | RESPIRATION RATE: 18 BRPM | HEART RATE: 51 BPM | DIASTOLIC BLOOD PRESSURE: 80 MMHG | TEMPERATURE: 98.2 F

## 2021-04-20 DIAGNOSIS — R94.8 ABNORMAL GASTROINTESTINAL PET SCAN: ICD-10-CM

## 2021-04-20 PROCEDURE — 43239 EGD BIOPSY SINGLE/MULTIPLE: CPT | Performed by: INTERNAL MEDICINE

## 2021-04-20 PROCEDURE — 88305 TISSUE EXAM BY PATHOLOGIST: CPT | Performed by: PATHOLOGY

## 2021-04-20 RX ORDER — SODIUM CHLORIDE 9 MG/ML
INJECTION, SOLUTION INTRAVENOUS CONTINUOUS PRN
Status: DISCONTINUED | OUTPATIENT
Start: 2021-04-20 | End: 2021-04-20

## 2021-04-20 RX ORDER — PROPOFOL 10 MG/ML
INJECTION, EMULSION INTRAVENOUS AS NEEDED
Status: DISCONTINUED | OUTPATIENT
Start: 2021-04-20 | End: 2021-04-20

## 2021-04-20 RX ORDER — LIDOCAINE HYDROCHLORIDE 10 MG/ML
INJECTION, SOLUTION EPIDURAL; INFILTRATION; INTRACAUDAL; PERINEURAL AS NEEDED
Status: DISCONTINUED | OUTPATIENT
Start: 2021-04-20 | End: 2021-04-20

## 2021-04-20 RX ADMIN — PROPOFOL 150 MG: 10 INJECTION, EMULSION INTRAVENOUS at 08:05

## 2021-04-20 RX ADMIN — PROPOFOL 20 MG: 10 INJECTION, EMULSION INTRAVENOUS at 08:13

## 2021-04-20 RX ADMIN — PROPOFOL 50 MG: 10 INJECTION, EMULSION INTRAVENOUS at 08:08

## 2021-04-20 RX ADMIN — SODIUM CHLORIDE: 0.9 INJECTION, SOLUTION INTRAVENOUS at 08:00

## 2021-04-20 RX ADMIN — PROPOFOL 30 MG: 10 INJECTION, EMULSION INTRAVENOUS at 08:11

## 2021-04-20 RX ADMIN — LIDOCAINE HYDROCHLORIDE 50 MG: 10 INJECTION, SOLUTION EPIDURAL; INFILTRATION; INTRACAUDAL; PERINEURAL at 08:05

## 2021-04-20 RX ADMIN — PROPOFOL 20 MG: 10 INJECTION, EMULSION INTRAVENOUS at 08:16

## 2021-04-20 NOTE — INTERVAL H&P NOTE
H&P reviewed  After examining the patient I find no changes in the patients condition since the H&P had been written      Vitals:    04/20/21 0657   BP: 139/79   Pulse: 55   Resp: 18   Temp: 98 2 °F (36 8 °C)   SpO2: 99%

## 2021-04-20 NOTE — ANESTHESIA POSTPROCEDURE EVALUATION
Post-Op Assessment Note    CV Status:  Stable  Pain Score: 0    Pain management: adequate     Mental Status:  Awake   Hydration Status:  Euvolemic   PONV Controlled:  None   Airway Patency:  Patent      Post Op Vitals Reviewed: Yes      Staff: Anesthesiologist, CRNA   Comments: report given to RN; GILMA;' RA        No complications documented      BP   123/70   Temp      Pulse  56   Resp   20   SpO2   98

## 2021-04-20 NOTE — ANESTHESIA PREPROCEDURE EVALUATION
Procedure:  EGD    Relevant Problems   CARDIO   (+) Benign essential hypertension   (+) Dilated aortic root (HCC)   (+) Paroxysmal atrial fibrillation (HCC)      /RENAL   (+) Benign hypertensive CKD   (+) Stage 3 chronic kidney disease      HEMATOLOGY   (+) Malignant lymphoma, lymphoplasmacytic (HCC)      NEURO/PSYCH   (+) Episodic tension-type headache, not intractable      PULMONARY   (+) JODY (obstructive sleep apnea)        Physical Exam    Airway    Mallampati score: II         Dental   No notable dental hx     Cardiovascular  Cardiovascular exam normal    Pulmonary  Pulmonary exam normal     Other Findings        Anesthesia Plan  ASA Score- 2     Anesthesia Type- IV sedation with anesthesia with ASA Monitors  Additional Monitors:   Airway Plan:           Plan Factors-Exercise tolerance (METS): >4 METS  Patient is not a current smoker  Patient not instructed to abstain from smoking on day of procedure  Patient did not smoke on day of surgery  Induction- intravenous  Postoperative Plan-     Informed Consent- Anesthetic plan and risks discussed with patient  I personally reviewed this patient with the CRNA  Discussed and agreed on the Anesthesia Plan with the CRNA  Earl Mansfield

## 2021-05-18 ENCOUNTER — CONSULT (OUTPATIENT)
Dept: UROLOGY | Facility: CLINIC | Age: 61
End: 2021-05-18
Payer: COMMERCIAL

## 2021-05-18 VITALS
RESPIRATION RATE: 20 BRPM | WEIGHT: 196 LBS | HEIGHT: 71 IN | DIASTOLIC BLOOD PRESSURE: 100 MMHG | BODY MASS INDEX: 27.44 KG/M2 | SYSTOLIC BLOOD PRESSURE: 150 MMHG | HEART RATE: 64 BPM

## 2021-05-18 DIAGNOSIS — Z80.42 FAMILY HISTORY OF PROSTATE CANCER IN FATHER: Primary | ICD-10-CM

## 2021-05-18 DIAGNOSIS — Z12.5 SCREENING FOR PROSTATE CANCER: ICD-10-CM

## 2021-05-18 DIAGNOSIS — R94.8 ABNORMAL POSITRON EMISSION TOMOGRAPHY (PET) SCAN: ICD-10-CM

## 2021-05-18 PROCEDURE — 3077F SYST BP >= 140 MM HG: CPT | Performed by: UROLOGY

## 2021-05-18 PROCEDURE — 1036F TOBACCO NON-USER: CPT | Performed by: UROLOGY

## 2021-05-18 PROCEDURE — 3008F BODY MASS INDEX DOCD: CPT | Performed by: UROLOGY

## 2021-05-18 PROCEDURE — 3080F DIAST BP >= 90 MM HG: CPT | Performed by: UROLOGY

## 2021-05-18 PROCEDURE — 99244 OFF/OP CNSLTJ NEW/EST MOD 40: CPT | Performed by: UROLOGY

## 2021-05-18 NOTE — PROGRESS NOTES
UROLOGY NEW CONSULT NOTE     CHIEF COMPLAINT   Min Healy is a 63 y o  male with a complaint of No chief complaint on file  History of Present Illness:     63 y o  male Who presents for evaluation  Patient has been diagnosed B-cell lymphoma and recent PET scan demonstrated a small amount of activity in the prostate  Patient also had activity in the GI system and underwent negative endoscopy  He does report that he has a family history of prostate cancer in his father who was treated with radiation  Patient has had regular PSA testing but has not had a diagnostic rectal exam   He denies urinary symptoms of frequency, urgency, burning, dysuria, hematuria  He is extremely anxious about this finding and presents with his wife for discussion      Lab Results   Component Value Date    PSA 2 1 04/03/2021    PSA 1 1 12/14/2019    PSA 1 9 10/20/2018     Past Medical History:     Past Medical History:   Diagnosis Date    [address] [address]     Cancer [address] [address]     lymphoma    CKD (chronic kidney disease)     Dilated aortic root (HCC)     Erectile dysfunction     Hyperlipidemia 6/28/2016    Hypertension     Hypertensive CKD (chronic kidney disease)     Mild renal insufficiency 9/26/2017    Vitamin D deficiency        PAST SURGICAL HISTORY:     Past Surgical History:   Procedure Laterality Date    COLONOSCOPY      HIATAL HERNIA REPAIR         CURRENT MEDICATIONS:     Current Outpatient Medications   Medication Sig Dispense Refill    apixaban (ELIQUIS) 5 mg Take 1 tablet (5 mg total) by mouth 2 (two) times a day 180 tablet 3    chlorthalidone 25 mg tablet Take 1 tablet (25 mg total) by mouth daily 90 tablet 3    cholecalciferol (VITAMIN D3) 1,000 units tablet Take 1,000 Units by mouth daily      diltiazem (CARDIZEM CD) 120 mg 24 hr capsule Take 1 capsule (120 mg total) by mouth daily 90 capsule 3    hydrochlorothiazide (HYDRODIURIL) 12 5 mg tablet Take 1 tablet (12 5 mg total) by mouth daily (Patient not taking: Reported on 2/25/2021) 30 tablet 5     No current facility-administered medications for this visit          ALLERGIES:   No Known Allergies    SOCIAL HISTORY:     Social History     Socioeconomic History    Marital status: /Civil Union     Spouse name: Not on file    Number of children: Not on file    Years of education: Not on file    Highest education level: Not on file   Occupational History    Not on file   Social Needs    Financial resource strain: Not on file    Food insecurity     Worry: Not on file     Inability: Not on file   [employer] needs     Medical: Not on file     Non-medical: Not on file   Tobacco Use    Smoking status: Never Smoker    Smokeless tobacco: Never Used   Substance and Sexual Activity    Alcohol use: Yes     Frequency: 2-4 times a month     Comment: 3-4 BEERS / WEEK     Drug use: No    Sexual activity: Yes     Partners: Female   Lifestyle    Physical activity     Days per week: Not on file     Minutes per session: Not on file    Stress: Not on file   Relationships    Social connections     Talks on phone: Not on file     Gets together: Not on file     Attends Protestant service: Not on file     Active member of club or organization: Not on file     Attends meetings of clubs or organizations: Not on file     Relationship status: Not on file    Intimate partner violence     Fear of current or ex partner: Not on file     Emotionally abused: Not on file     Physically abused: Not on file     Forced sexual activity: Not on file   Other Topics Concern    Not on file   Social History Narrative    Not on file       SOCIAL HISTORY:     Family History   Problem Relation Age of Onset    Lung cancer Mother     Heart attack Father 76    Prostate cancer Father     Hyperlipidemia Father     No Known Problems Sister     Lymphoma Maternal Aunt     Fainting Maternal Grandfather        REVIEW OF SYSTEMS:     Review of Systems   Constitutional: Negative  Respiratory: Negative  Cardiovascular: Negative  Gastrointestinal: Negative  Genitourinary: Negative  Musculoskeletal: Negative  Skin: Negative  Psychiatric/Behavioral: The patient is nervous/anxious  PHYSICAL EXAM:     There were no vitals taken for this visit  General:  Healthy appearing male in no acute distress  They have a normal  But anxious affect  There is not appear to be any gross neurologic defects or abnormalities  HEENT:  Normocephalic, atraumatic  Neck is supple without any palpable lymphadenopathy  Cardiovascular:  Patient has normal palpable distal radial pulses  There is no significant peripheral edema  No JVD is noted  Respiratory:  Patient has unlabored respirations  There is no audible wheeze or rhonchi  Abdomen:  Abdomen is  without surgical scars  Abdomen is soft and nontender  There is no tympany  Inguinal and umbilical hernia are not appreciated  Genitourinary:  Circumcised phallus, orthotopic meatus, testicles are smooth and descended, rectal exam demonstrates a small but somewhat indurated prostate more on the right side    Musculoskeletal:  Patient does not have significant CVA tenderness in the  flank with palpation or percussion  They full range of motion in all 4 extremities  Strength in all 4 extremities appears congruent  Patient is able to ambulate without assistance or difficulty  Dermatologic:  Patient has no skin abnormalities or rashes        LABS:     CBC:   Lab Results   Component Value Date    WBC 7 2 02/20/2021    HGB 15 9 02/20/2021    HCT 48 5 02/20/2021    MCV 95 7 02/20/2021     02/20/2021       BMP:   Lab Results   Component Value Date    CALCIUM 9 5 02/20/2021     10/07/2017    K 3 7 02/20/2021    CO2 28 02/20/2021     02/20/2021    BUN 22 02/20/2021    CREATININE 1 32 (H) 02/20/2021     Lab Results   Component Value Date    PSA 2 1 04/03/2021    PSA 1 1 12/14/2019    PSA 1 9 10/20/2018 IMAGING:     3/19/21  PET/CT SCAN     INDICATION: B-cell lymphoma found in a colonic sigmoid polyp, 2/5/2021  Staging exam   History of COVID vaccine left arm 2/22/2021  D47  Z9: Other specified neoplasms of uncertain behavior of lymphoid, hematopoietic and related tissue     MODIFIER: PI     COMPARISON: None     CELL TYPE:  B-cell lymphoma     TECHNIQUE:   8 3 mCi F-18-FDG administered IV  Multiplanar attenuation corrected and non attenuation corrected PET images were acquired 60 minutes post injection  Contiguous, low dose, axial CT sections were obtained from the skull base through the   femurs   Intravenous contrast material was not utilized  This examination, like all CT scans performed in the [facility], was performed utilizing techniques to minimize radiation dose exposure, including the use of iterative   reconstruction and automated exposure control       Fasting serum glucose: 98 mg/dl     FINDINGS:      VISUALIZED BRAIN:     No acute abnormalities are seen      HEAD/NECK:     Mild FDG uptake in a left upper cervical chain lymph node, SUV max of 3 1  This measures 7 x 5 mm image 32 series 4      No FDG avid lymph nodes in the right neck  CT images: Otherwise unremarkable      CHEST:     FDG uptake in a left axillary lymph node, SUV max of 4 0  This measures 2 0 x 1 0 cm image 61 series 4      Minimal FDG uptake in a small right axillary lymph node, SUV max of 1 5, likely reactive  CT images: Mildly aneurysmal ascending thoracic aorta at 4 1 cm in diameter      ABDOMEN:     Small focus of FDG uptake at the level of the gastric antrum superiorly, SUV max of 4 2  No obvious findings on limited CT  See image 87 series 603 on axial fusion images and image 29 series 604 of the coronal fusion images  On fusion this is close to   the liver likely related to misregistration but on the PET images this appears gastric in location      No FDG avid lymph nodes    CT images: Patchy regional low-attenuation in the right lobe of liver posteriorly suggests regional fatty infiltration  No abnormal FDG uptake in this region  Suggestion of a small fat-containing ventral wall hernia at midline above the umbilicus on   the left  Small fat-containing umbilical hernia      PELVIS:   Small focus of FDG uptake at the inferior aspect of the prostate, SUV max of 4 2  No obvious findings on limited CT  See image 161 series 603      Heterogeneous segmental activity in the mid sigmoid colon likely physiologic  CT images: Small fat-containing left inguinal hernia      OSSEOUS STRUCTURES:  No FDG avid lesions are seen  CT images: No significant findings      For reference:  SUV max of the mediastinal blood pool: 2 4  SUV max of the liver: 3 5      IMPRESSION:     1  Small focus of FDG uptake at the gastric antrum for which underlying lesion should be excluded  Correlate with endoscopy  2   Single FDG avid left axillary lymph node  This may be related to recent Covid vaccination but given the history of lymphoma, malignancy cannot be entirely excluded  This can be reassessed on short-term follow-up CT or PET/CT in 3 months  3  Small focus of FDG uptake at the inferior aspect of the prostate, underlying lesion not excluded  Correlate with PSA levels and urologic evaluation  4  Mild FDG uptake in a left upper cervical chain lymph node may be reactive but can be reassessed on follow-up  5   Mildly aneurysmal ascending thoracic aorta at 4 1 cm in diameter  ASSESSMENT:     63 y o  male with B-cell lymphoma, mild activity in the prostate on recent PET scan    PLAN:      I think the patient would benefit from a multiparametric MRI  We discussed that should this be positive with level 3 4 or 5 lesion, fusion biopsy of the prostate would be recommended  Assuming that the MRI is negative, I would recommend surveillance with PSA in 6 months    The patient's examination is not overwhelmingly impressive in his PSA trend is normal   We are ordering the upfront MRI given the patient's family history of prostate cancer in his father and his anxiety about the diagnosis and finding of the PET scan  I think the MRI would be extremely reassuring to the patient if negative

## 2021-05-18 NOTE — PATIENT INSTRUCTIONS
Decision Aid for Clinically Localized Prostate Cancer   AMBULATORY CARE:   What you need to know about decisions for clinically localized prostate cancer: You can work with your healthcare provider to make decisions about being screened or treated for prostate cancer  Screening is a test done to find prostate cancer early  Screening is different from diagnosis because screening is used before you first start to have signs or symptoms  This means management or treatment can start early  You can also help plan treatment if cancer is found with screening, or you develop it later on  Your healthcare provider may send you to other prostate cancer specialists such as a urologist or radiation oncologist  Speaking with these providers can help you make an informed decision  What you need to know about clinically localized prostate cancer:   · The prostate is a small gland that is part of the reproductive system  It sits around your urethra (tube that carries urine out of your bladder)  Cancer cells start to grow inside the prostate gland  The cells form a mass that continues to grow if left untreated  Clinically localized means that the cancer has not moved beyond the prostate gland  · Prostate cancer is the second most common form of cancer in men (skin cancer is most common)  About 17% of men in the [place] develop prostate cancer  About 3% of men in the [place] die from prostate cancer  · Prostate cancer often grows slowly  Sometimes the tumor does not grow at all  The cancer may not grow quickly enough to be life-threatening in your lifetime  · The risk for prostate cancer increases with age  Your risk is highest if you are older than 65 years  Your risk is lowest if you are younger than 45 years  Your risk is also increased if you have a history of prostate cancer in your father, brother, or son  · You may have no signs or symptoms of prostate cancer until the tumor grows large   You may have trouble urinating or keeping a strong urine stream because of the tumor  At later stages, prostate cancer can spread to your bones or lymph nodes  You may have bone pain or fractures  How to know if you are a good candidate for prostate cancer screening:  Screening may be helpful for you if any of the following is true:  · You are 72 years or older  · You have a family history of prostate cancer or other prostate problems  · You do not have another health condition that may prevent you from living longer than another 10 years  · You want to have treatment as early as possible if needed  · You are willing to have screening as often as recommended by your healthcare provider  How screening is done:   · A digital rectal exam  is used to check your prostate  Your healthcare provider will insert a gloved finger into your rectum  The provider will be able to feel your prostate for lumps or hard areas that could be tumors  The exam may be repeated over time to check for new or worsening problems  · A PSA test  is used to measure the amount of a protein made by your prostate gland  A blood sample is taken for this test  A high PSA level may be a sign of prostate cancer  Benefits and risks of screening:  Talk with your healthcare provider about the risks and benefits of screening:  · Benefits  include finding prostate cancer early  Cancer treatment is often more successful when it starts early  This means you can make more decisions about treatment  · Risks  include the following:     ? You may have a false belief that you will not develop prostate cancer if your screening result is negative  You can still develop prostate cancer later on     ? A PSA test can give a false-negative result  This means the result looks like you do not have cancer even though you do  Treatment or monitoring may be delayed because the tests suggested you do not have cancer  ?  The PSA test can also give a false-positive result  This means you do not actually have cancer but the test shows you do  You may get more tests, a biopsy, or even treatments that are not needed  It can also be stressful to think you have prostate cancer when you do not  Questions to ask your healthcare provider to help you make decisions about screening:   · How high is my risk for prostate cancer? · How often do I need to have screening? · Where is the screening done? · Do I need to do anything to get ready to have screening? What happens after you have screening:   · You will meet with your healthcare provider to go over the results of your screening  · You may need more tests to diagnose anything that showed up on the screening test  Only a biopsy (tissue sample) can show for sure that you have prostate cancer  · After cancer is found, your healthcare provider will assign a number called a Paige score  The number can help you understand how quickly the cancer is likely to grow, and if it may spread:     ? A number of 6 or lower means the cancer is likely to grow more slowly  ? A score of 7 means it is likely to grow faster, but it may not spread to other areas  ? A score of 8 to 10 means it is likely to grow more quickly and also spread  · Your healthcare provider will also assign a T stage to the tumor  This number shows the growth of the tumor and if it is likely to spread to other areas  · You and your healthcare provider will use the [place] score, T stage, and PSA results to talk about your treatment options  Your provider will tell you if your prostate cancer is at low, medium, or high risk for growing and spreading  The need for more tests and the range of treatment options depend on the risk level  Together you and your provider can create a treatment plan that is right for you      How clinically localized prostate cancer is treated, and the benefits of treatment:   · Watchful waiting  means you do not receive treatment right away  If the cancer does not grow or spread, you may never need treatment  Watchful waiting may be used if your tumor risk is low  The benefit of this option is that you will not have invasive or difficult treatment  You can choose a different treatment option if tests show the tumor is growing or spreading over time  · Active surveillance  also means you do not receive treatment right away  You will need to have tests over time to continue to check the tumor  A benefit of this option is that follow-up tests can show a change early  Treatment options may be less invasive than if the tumor is found at a later stage  · Cryoablation  is used to kill cancer cells by freezing them  This is a less invasive treatment  You may have little or no pain after this procedure  · Radiation  may be used to destroy the cancer cells  Radiation is about as effective as surgery to remove the prostate gland  This treatment leaves the prostate in place and only targets the cancer cells  · Surgery  is used to remove the prostate gland  The tumor is in the gland, so it will be removed along with the gland  · Hormone therapy  may be added to surgery or radiation treatment  Your testosterone level is lowered, or it is blocked  This can stop the cancer cells from growing, or slow the growth  Hormone therapy may be given as an injection every 1 to 6 months  Another way to lower your testosterone level is to have surgery to remove your testicles  Your body will no longer make testosterone if your testicles are removed  Risks of treatment:   · Watchful waiting and active surveillance  can cause you to worry that the cancer is growing or spreading  · Surgery  can damage tissue around your prostate  Surgery can increase your risk for trouble urinating, incontinence (leaking), or urinary retention  Surgery can also cause problems with your ability to have or keep an erection   You may bleed more than expected during surgery or develop an infection  You may also need to be treated again if the surgery you have does not relieve your symptoms  Surgery may not be able to remove all the tumor cells  · Radiation  may not kill all the cancer cells  Radiation can cause urinary problems such as pain, retention, loss of control, or leakage  Your skin can become dry or irritated  You may develop problems with having bowel movements, such as pain, diarrhea, or constipation  Radiation can also cause problems with your ability to have or keep an erection  · Cryoablation  may not kill all the cancer cells  You may develop scar tissue  You can also have swelling, problems urinating, or pain in your pelvis or scrotum  Tissue outside the prostate may be damaged during cryoablation  You may have permanent erection problems  Questions to ask your healthcare provider to help you make decisions about treatment:   · What is my Bartow score, T score, and risk number? · How often will I need follow-up tests if I choose active surveillance first?    · How will each treatment option affect my daily activities? · What is the risk for erectile dysfunction or impotence with each treatment? · Am I a good candidate for surgery? · Which surgery may work best to treat my symptoms? · Where is the surgery done? · How long is recovery after surgery? · Will my insurance cover treatment? Questions to consider to help you make decisions about treatment:   · If my cancer risk is low, will I be comfortable with watchful waiting or active surveillance instead of immediate treatment? How will I feel if the cancer grows or spreads? · Will I go in for follow-up tests over time if I do not want treatment right away? · If I have treatment and lose my ability to have an erection, how will I feel? · Am I willing to accept problems with urinating or having a bowel movement that might happen with treatment?     · How will my family or others in my life be affected by my treatment decisions? © Copyright [address] Information is for End User's use only and may not be sold, redistributed or otherwise used for commercial purposes  All illustrations and images included in CareNotes® are the copyrighted property of A D A M [employer]  or Yosef Faustin  The above information is an  only  It is not intended as medical advice for individual conditions or treatments  Talk to your doctor, nurse or pharmacist before following any medical regimen to see if it is safe and effective for you

## 2021-06-05 ENCOUNTER — APPOINTMENT (OUTPATIENT)
Dept: LAB | Facility: MEDICAL CENTER | Age: 61
End: 2021-06-05
Payer: COMMERCIAL

## 2021-06-05 DIAGNOSIS — R94.8 ABNORMAL POSITRON EMISSION TOMOGRAPHY (PET) SCAN: ICD-10-CM

## 2021-06-05 DIAGNOSIS — Z12.5 SCREENING FOR PROSTATE CANCER: ICD-10-CM

## 2021-06-05 DIAGNOSIS — Z80.42 FAMILY HISTORY OF PROSTATE CANCER IN FATHER: ICD-10-CM

## 2021-06-05 LAB
ANION GAP SERPL CALCULATED.3IONS-SCNC: 3 MMOL/L (ref 4–13)
BUN SERPL-MCNC: 20 MG/DL (ref 5–25)
CALCIUM SERPL-MCNC: 9.2 MG/DL (ref 8.3–10.1)
CHLORIDE SERPL-SCNC: 109 MMOL/L (ref 100–108)
CO2 SERPL-SCNC: 27 MMOL/L (ref 21–32)
CREAT SERPL-MCNC: 1.25 MG/DL (ref 0.6–1.3)
GFR SERPL CREATININE-BSD FRML MDRD: 62 ML/MIN/1.73SQ M
GLUCOSE P FAST SERPL-MCNC: 104 MG/DL (ref 65–99)
POTASSIUM SERPL-SCNC: 3.7 MMOL/L (ref 3.5–5.3)
SODIUM SERPL-SCNC: 139 MMOL/L (ref 136–145)

## 2021-06-05 PROCEDURE — 80048 BASIC METABOLIC PNL TOTAL CA: CPT

## 2021-06-05 PROCEDURE — 36415 COLL VENOUS BLD VENIPUNCTURE: CPT

## 2021-06-09 ENCOUNTER — HOSPITAL ENCOUNTER (OUTPATIENT)
Dept: RADIOLOGY | Age: 61
Discharge: HOME | End: 2021-06-09
Payer: COMMERCIAL

## 2021-06-09 DIAGNOSIS — Z12.5 SCREENING FOR PROSTATE CANCER: ICD-10-CM

## 2021-06-09 DIAGNOSIS — R94.8 ABNORMAL POSITRON EMISSION TOMOGRAPHY (PET) SCAN: ICD-10-CM

## 2021-06-09 DIAGNOSIS — Z80.42 FAMILY HISTORY OF PROSTATE CANCER IN FATHER: ICD-10-CM

## 2021-06-09 PROCEDURE — 76377 3D RENDER W/INTRP POSTPROCES: CPT

## 2021-06-09 PROCEDURE — A9585 GADOBUTROL INJECTION: HCPCS | Performed by: UROLOGY

## 2021-06-09 PROCEDURE — 72197 MRI PELVIS W/O & W/DYE: CPT

## 2021-06-09 PROCEDURE — G1004 CDSM NDSC: HCPCS

## 2021-06-09 RX ADMIN — GADOBUTROL 9 ML: 604.72 INJECTION INTRAVENOUS at 08:13

## 2021-06-12 ENCOUNTER — APPOINTMENT (OUTPATIENT)
Dept: LAB | Facility: MEDICAL CENTER | Age: 61
End: 2021-06-12
Payer: COMMERCIAL

## 2021-06-12 LAB
ALBUMIN SERPL BCP-MCNC: 3.6 G/DL (ref 3.5–5)
ALP SERPL-CCNC: 44 U/L (ref 46–116)
ALT SERPL W P-5'-P-CCNC: 24 U/L (ref 12–78)
ANION GAP SERPL CALCULATED.3IONS-SCNC: 2 MMOL/L (ref 4–13)
AST SERPL W P-5'-P-CCNC: 14 U/L (ref 5–45)
BASOPHILS # BLD AUTO: 0.04 THOUSANDS/ΜL (ref 0–0.1)
BASOPHILS NFR BLD AUTO: 1 % (ref 0–1)
BILIRUB SERPL-MCNC: 0.54 MG/DL (ref 0.2–1)
BUN SERPL-MCNC: 25 MG/DL (ref 5–25)
CALCIUM SERPL-MCNC: 9 MG/DL (ref 8.3–10.1)
CHLORIDE SERPL-SCNC: 108 MMOL/L (ref 100–108)
CO2 SERPL-SCNC: 29 MMOL/L (ref 21–32)
CREAT SERPL-MCNC: 1.35 MG/DL (ref 0.6–1.3)
EOSINOPHIL # BLD AUTO: 0.1 THOUSAND/ΜL (ref 0–0.61)
EOSINOPHIL NFR BLD AUTO: 2 % (ref 0–6)
ERYTHROCYTE [DISTWIDTH] IN BLOOD BY AUTOMATED COUNT: 12.8 % (ref 11.6–15.1)
GFR SERPL CREATININE-BSD FRML MDRD: 56 ML/MIN/1.73SQ M
GLUCOSE P FAST SERPL-MCNC: 100 MG/DL (ref 65–99)
HCT VFR BLD AUTO: 48 % (ref 36.5–49.3)
HGB BLD-MCNC: 15.1 G/DL (ref 12–17)
IGA SERPL-MCNC: 250 MG/DL (ref 70–400)
IGG SERPL-MCNC: 928 MG/DL (ref 700–1600)
IGM SERPL-MCNC: 140 MG/DL (ref 40–230)
IMM GRANULOCYTES # BLD AUTO: 0.02 THOUSAND/UL (ref 0–0.2)
IMM GRANULOCYTES NFR BLD AUTO: 0 % (ref 0–2)
LDH SERPL-CCNC: 162 U/L (ref 81–234)
LYMPHOCYTES # BLD AUTO: 1.25 THOUSANDS/ΜL (ref 0.6–4.47)
LYMPHOCYTES NFR BLD AUTO: 22 % (ref 14–44)
MCH RBC QN AUTO: 31.3 PG (ref 26.8–34.3)
MCHC RBC AUTO-ENTMCNC: 31.5 G/DL (ref 31.4–37.4)
MCV RBC AUTO: 99 FL (ref 82–98)
MONOCYTES # BLD AUTO: 0.55 THOUSAND/ΜL (ref 0.17–1.22)
MONOCYTES NFR BLD AUTO: 10 % (ref 4–12)
NEUTROPHILS # BLD AUTO: 3.74 THOUSANDS/ΜL (ref 1.85–7.62)
NEUTS SEG NFR BLD AUTO: 65 % (ref 43–75)
NRBC BLD AUTO-RTO: 0 /100 WBCS
PLATELET # BLD AUTO: 226 THOUSANDS/UL (ref 149–390)
PMV BLD AUTO: 11.7 FL (ref 8.9–12.7)
POTASSIUM SERPL-SCNC: 3.9 MMOL/L (ref 3.5–5.3)
PROT SERPL-MCNC: 6.9 G/DL (ref 6.4–8.2)
RBC # BLD AUTO: 4.83 MILLION/UL (ref 3.88–5.62)
SODIUM SERPL-SCNC: 139 MMOL/L (ref 136–145)
TSH SERPL DL<=0.05 MIU/L-ACNC: 2.35 UIU/ML (ref 0.36–3.74)
WBC # BLD AUTO: 5.7 THOUSAND/UL (ref 4.31–10.16)

## 2021-06-12 PROCEDURE — 82784 ASSAY IGA/IGD/IGG/IGM EACH: CPT | Performed by: INTERNAL MEDICINE

## 2021-06-12 PROCEDURE — 84165 PROTEIN E-PHORESIS SERUM: CPT | Performed by: INTERNAL MEDICINE

## 2021-06-12 PROCEDURE — 83883 ASSAY NEPHELOMETRY NOT SPEC: CPT | Performed by: INTERNAL MEDICINE

## 2021-06-12 PROCEDURE — 84443 ASSAY THYROID STIM HORMONE: CPT | Performed by: INTERNAL MEDICINE

## 2021-06-12 PROCEDURE — 80053 COMPREHEN METABOLIC PANEL: CPT | Performed by: INTERNAL MEDICINE

## 2021-06-12 PROCEDURE — 83615 LACTATE (LD) (LDH) ENZYME: CPT | Performed by: INTERNAL MEDICINE

## 2021-06-12 PROCEDURE — 85025 COMPLETE CBC W/AUTO DIFF WBC: CPT | Performed by: INTERNAL MEDICINE

## 2021-06-12 PROCEDURE — 84165 PROTEIN E-PHORESIS SERUM: CPT | Performed by: PATHOLOGY

## 2021-06-12 PROCEDURE — 36415 COLL VENOUS BLD VENIPUNCTURE: CPT | Performed by: INTERNAL MEDICINE

## 2021-06-14 LAB
KAPPA LC FREE SER-MCNC: 19.9 MG/L (ref 3.3–19.4)
KAPPA LC FREE/LAMBDA FREE SER: 1.4 {RATIO} (ref 0.26–1.65)
LAMBDA LC FREE SERPL-MCNC: 14.2 MG/L (ref 5.7–26.3)

## 2021-06-15 LAB
ALBUMIN SERPL ELPH-MCNC: 3.92 G/DL (ref 3.5–5)
ALBUMIN SERPL ELPH-MCNC: 59.4 % (ref 52–65)
ALPHA1 GLOB SERPL ELPH-MCNC: 0.25 G/DL (ref 0.1–0.4)
ALPHA1 GLOB SERPL ELPH-MCNC: 3.8 % (ref 2.5–5)
ALPHA2 GLOB SERPL ELPH-MCNC: 0.67 G/DL (ref 0.4–1.2)
ALPHA2 GLOB SERPL ELPH-MCNC: 10.2 % (ref 7–13)
BETA GLOB ABNORMAL SERPL ELPH-MCNC: 0.45 G/DL (ref 0.4–0.8)
BETA1 GLOB SERPL ELPH-MCNC: 6.8 % (ref 5–13)
BETA2 GLOB SERPL ELPH-MCNC: 5.8 % (ref 2–8)
BETA2+GAMMA GLOB SERPL ELPH-MCNC: 0.38 G/DL (ref 0.2–0.5)
GAMMA GLOB ABNORMAL SERPL ELPH-MCNC: 0.92 G/DL (ref 0.5–1.6)
GAMMA GLOB SERPL ELPH-MCNC: 14 % (ref 12–22)
IGG/ALB SER: 1.46 {RATIO} (ref 1.1–1.8)
PROT PATTERN SERPL ELPH-IMP: NORMAL
PROT SERPL-MCNC: 6.6 G/DL (ref 6.4–8.2)

## 2021-06-17 ENCOUNTER — TELEPHONE (OUTPATIENT)
Dept: UROLOGY | Facility: CLINIC | Age: 61
End: 2021-06-17

## 2021-06-17 DIAGNOSIS — Z12.5 SCREENING FOR PROSTATE CANCER: Primary | ICD-10-CM

## 2021-06-17 NOTE — TELEPHONE ENCOUNTER
Vm left for pt advising negative MRI results  Office number left for pt     Please contact pt to schedule 6 month follow up with PSA      ----- Message from Daniel Rivera MD sent at 6/14/2021  1:55 PM EDT -----    Please let the patient know that this is a negative multiparametric MRI with low PI-RADS score  Has discussed, would recommend follow-up in 6 months with additional PSA     From a scheduling standpoint, this can be with advanced practitioner

## 2021-06-25 ENCOUNTER — OFFICE VISIT (OUTPATIENT)
Dept: HEMATOLOGY ONCOLOGY | Facility: CLINIC | Age: 61
End: 2021-06-25
Payer: COMMERCIAL

## 2021-06-25 VITALS
TEMPERATURE: 98.1 F | SYSTOLIC BLOOD PRESSURE: 148 MMHG | HEIGHT: 71 IN | OXYGEN SATURATION: 98 % | HEART RATE: 63 BPM | DIASTOLIC BLOOD PRESSURE: 82 MMHG | RESPIRATION RATE: 18 BRPM | WEIGHT: 195.2 LBS | BODY MASS INDEX: 27.33 KG/M2

## 2021-06-25 DIAGNOSIS — C83.00 MALIGNANT LYMPHOMA, LYMPHOPLASMACYTIC (HCC): Primary | ICD-10-CM

## 2021-06-25 PROCEDURE — 3008F BODY MASS INDEX DOCD: CPT | Performed by: INTERNAL MEDICINE

## 2021-06-25 PROCEDURE — 3079F DIAST BP 80-89 MM HG: CPT | Performed by: INTERNAL MEDICINE

## 2021-06-25 PROCEDURE — 3077F SYST BP >= 140 MM HG: CPT | Performed by: INTERNAL MEDICINE

## 2021-06-25 PROCEDURE — 1036F TOBACCO NON-USER: CPT | Performed by: INTERNAL MEDICINE

## 2021-06-25 PROCEDURE — 99214 OFFICE O/P EST MOD 30 MIN: CPT | Performed by: INTERNAL MEDICINE

## 2021-06-25 NOTE — PROGRESS NOTES
[facility] - Hematology & Medical Oncology  Outpatient Visit Encounter Note    Mima Guadalupes 63 y o  male [id number] Date:  6/25/2021    ONCOLOGY HISTORY      Oncology History   Malignant lymphoma, lymphoplasmacytic ([facility] [address] )   2/18/2021 Initial Diagnosis    Malignant lymphoma, lymphoplasmacytic ([facility] [address] )    This addendum is included to report the results of MYD88 testing which is positive, favoring a diagnosis of lymphoplasmacytic lymphoma, though is not definitively specific  Correlation with clinical impression is recommended  - MYD88 Mutation Analysis performed on the Colorectal Polyp @ [employer] (Specimen ID: [id number], evaluated by Nichole Martins MD, PhD) as follows:   Results: MYD88 Mutation(s):     Detected  Mutation(s) Detected:   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)   Addendum electronically signed by Aurora Irvin MD on 3/1/2021 at 11:56 AM   Final Diagnosis   A  Colon, descending polyp, biopsy:  -  Tubular adenoma, negative for high-grade dysplasia  B  Colon, sigmoid polyp, biopsy:  -  Small B cell neoplasm with kappa light chain restricted plasma cell proliferation, compatible with lymphoma (see note)     PETCT 3/19/21  1  Small focus of FDG uptake at the gastric antrum for which underlying lesion should be excluded  Correlate with endoscopy  2   Single FDG avid left axillary lymph node  This may be related to recent Covid vaccination but given the history of lymphoma, malignancy cannot be entirely excluded  This can be reassessed on short-term follow-up CT or PET/CT in 3 months  3  Small focus of FDG uptake at the inferior aspect of the prostate, underlying lesion not excluded  Correlate with PSA levels and urologic evaluation  4  Mild FDG uptake in a left upper cervical chain lymph node may be reactive but can be reassessed on follow-up  5   Mildly aneurysmal ascending thoracic aorta at 4 1 cm in diameter      BASELINE DISEASE CHARACTERISTICS 2/20/21  SPEP - normal  Quant Ig - normal including normal IgM  SLFC Ratio - normal  CBC - normal             SUBJECTIVE      ECOG Initial Visit 0   ECOG This Visit 0   Pain Score Initial Visit 0   Pain Score This Visit 0   Personal Cancer History None   Family Cancer History Mother (Lung), Dad (Prostate), Maternal Aunt (Gyn)   Tobacco Use History Denies   Alcohol Use History Denies   Thrombotic History Denies   Occupation Purchasing     INITIAL CONSULT    Tae Mendoza is a 63 y o  here for new consultation with me today  The patient is referred by colorectal surgery and the reason for consultation is b-cell lymphoma diagnosed during screening colonoscopy  In review of his chart and talking with him, he underwent routine colonoscopy on 2/5/21 with Dr Dav Tirado and was found to have two sessile polyps - in the descending and sigmoid colon that were removed and send for biopsy  While the descending polyp resulted as tubular adenoma, the sigmoid polyp resulted as low-grade 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+ B-cell lymphoma  He is here with his wife today  He offers no acute complaints  He says the results of his biopsy from the colonoscopy were very surprising  He denies any fevers chills nausea vomiting chest pain abdominal pain shortness of breath cough weight loss night sweats fevers chills  Denies any diarrhea constipation or bloody stools  He denies any genitourinary ambulatory dysfunction symptoms as well  He has been living his routine active life  He said that the colonoscopy he underwent was for routine screening  THIS VISIT    He is here with his wife today  He offers no acute complaints  Denies any fevers chills nausea vomiting chest pain shortness of breath abdominal pain diarrhea no genitourinary complaints  Living his routine life  No swollen lymph nodes  I have reviewed the relevant past medical, surgical, social and family history   I have also reviewed allergies and medications for this patient  Review of Systems  Review of Systems   All other systems reviewed and are negative  OBJECTIVE     Physical Exam  Vitals:    06/25/21 0806   BP: 148/82   BP Location: Left arm   Patient Position: Sitting   Cuff Size: Adult   Pulse: 63   Resp: 18   Temp: 98 1 °F (36 7 °C)   SpO2: 98%   Weight: 88 5 kg (195 lb 3 2 oz)   Height: 5' 11" (1 803 m)       Physical Exam  Constitutional:       General: He is not in acute distress  Appearance: He is not ill-appearing, toxic-appearing or diaphoretic  HENT:      Head: Normocephalic and atraumatic  Eyes:      Conjunctiva/sclera: Conjunctivae normal    Cardiovascular:      Rate and Rhythm: Normal rate  Pulmonary:      Effort: Pulmonary effort is normal  No respiratory distress  Abdominal:      General: There is no distension  Musculoskeletal:         General: No swelling  Normal range of motion  Cervical back: Normal range of motion and neck supple  Lymphadenopathy:      Head:      Right side of head: No submental, submandibular, tonsillar, preauricular, posterior auricular or occipital adenopathy  Left side of head: No submental, submandibular, tonsillar, preauricular, posterior auricular or occipital adenopathy  Cervical: No cervical adenopathy  Upper Body:      Right upper body: No supraclavicular or epitrochlear adenopathy  Left upper body: No supraclavicular or epitrochlear adenopathy  Skin:     General: Skin is dry  Neurological:      General: No focal deficit present  Mental Status: He is alert and oriented to person, place, and time  Mental status is at baseline  Imaging  Relevant imaging reviewed in chart    Labs  Relevant labs reviewed in chart   ASSESSMENT & PLAN      Diagnosis ICD-10-CM Associated Orders   1   Malignant lymphoma, lymphoplasmacytic (HCC)  C83 00         79-year-old male diagnosed with sigmoid polyp upon a screening colonoscopy that is biopsy proven to be lymphoplasmacytic lymphoma  Additional testing proved that he was  MYD88 positive and given his normal SPEP and quantitative immunoglobulin, his diagnosis of LPL stands   Oncology history updated accordingly this visit   Goals of care/patient communication  o At this time, plan is to continue with surveillance by doing physical examination, self reporting of any concerning symptoms the patient and also doing diagnostic blood work     o He will come back in 4 months and get his blood work done 1 week prior to the return office visit  He knows to call us if anything changes in the interim from now until the scheduled office visit in 4 months   Disease Features/Tumor Markers/Genetics  o [id number]   Treatment  o   Surveillance as detailed above   Labs  o  CBC CMP LDH SPEP SFLC IG    Diagnostics  o No indication for imaging from a lymphoma perspective      Follow Up    4 months      All questions were answered to the patient's satisfaction during this encounter  They appreciated and thanked me for spending time with them  The patient knows the contact information for our office and know to reach out for any relevant concerns related to this encounter  For all other listed problems and medical diagnosis in his chart - they are managed by PCP and/or other specialists, which patient acknowledges  Nataliya Holland MD  Hematology & Medical Oncology

## 2021-07-02 ENCOUNTER — APPOINTMENT (OUTPATIENT)
Dept: URGENT CARE | Age: 61
End: 2021-07-02

## 2021-07-02 ENCOUNTER — APPOINTMENT (OUTPATIENT)
Dept: LAB | Age: 61
End: 2021-07-02

## 2021-07-02 DIAGNOSIS — Z00.8 OTHER SPECIFIED GENERAL MEDICAL EXAMINATION: ICD-10-CM

## 2021-07-02 LAB — RUBV IGG SERPL IA-ACNC: <0.2 IU/ML

## 2021-07-02 PROCEDURE — 86480 TB TEST CELL IMMUN MEASURE: CPT

## 2021-07-02 PROCEDURE — 86735 MUMPS ANTIBODY: CPT

## 2021-07-02 PROCEDURE — 86787 VARICELLA-ZOSTER ANTIBODY: CPT

## 2021-07-02 PROCEDURE — 86762 RUBELLA ANTIBODY: CPT

## 2021-07-02 PROCEDURE — 86765 RUBEOLA ANTIBODY: CPT

## 2021-07-02 PROCEDURE — 36415 COLL VENOUS BLD VENIPUNCTURE: CPT

## 2021-07-06 LAB
GAMMA INTERFERON BACKGROUND BLD IA-ACNC: 0.03 IU/ML
M TB IFN-G BLD-IMP: NEGATIVE
M TB IFN-G CD4+ BCKGRND COR BLD-ACNC: 0 IU/ML
M TB IFN-G CD4+ BCKGRND COR BLD-ACNC: 0.01 IU/ML
MEV IGG SER QL: NORMAL
MITOGEN IGNF BCKGRD COR BLD-ACNC: >10 IU/ML
MUV IGG SER QL: NORMAL
VZV IGG SER IA-ACNC: NORMAL

## 2021-09-07 ENCOUNTER — TELEPHONE (OUTPATIENT)
Dept: SLEEP CENTER | Facility: CLINIC | Age: 61
End: 2021-09-07

## 2021-09-24 ENCOUNTER — OFFICE VISIT (OUTPATIENT)
Dept: CARDIOLOGY CLINIC | Facility: CLINIC | Age: 61
End: 2021-09-24
Payer: COMMERCIAL

## 2021-09-24 VITALS
SYSTOLIC BLOOD PRESSURE: 150 MMHG | HEIGHT: 71 IN | HEART RATE: 60 BPM | DIASTOLIC BLOOD PRESSURE: 90 MMHG | WEIGHT: 191.7 LBS | BODY MASS INDEX: 26.84 KG/M2

## 2021-09-24 DIAGNOSIS — I10 BENIGN ESSENTIAL HYPERTENSION: ICD-10-CM

## 2021-09-24 DIAGNOSIS — I77.810 DILATED AORTIC ROOT (HCC): ICD-10-CM

## 2021-09-24 DIAGNOSIS — G47.33 OSA (OBSTRUCTIVE SLEEP APNEA): ICD-10-CM

## 2021-09-24 DIAGNOSIS — I48.0 PAROXYSMAL ATRIAL FIBRILLATION (HCC): Primary | ICD-10-CM

## 2021-09-24 DIAGNOSIS — E78.5 DYSLIPIDEMIA: ICD-10-CM

## 2021-09-24 PROCEDURE — 93000 ELECTROCARDIOGRAM COMPLETE: CPT | Performed by: INTERNAL MEDICINE

## 2021-09-24 PROCEDURE — 99214 OFFICE O/P EST MOD 30 MIN: CPT | Performed by: INTERNAL MEDICINE

## 2021-09-24 RX ORDER — SPIRONOLACTONE 25 MG/1
12.5 TABLET ORAL DAILY
Qty: 45 TABLET | Refills: 1 | Status: SHIPPED | OUTPATIENT
Start: 2021-09-24 | End: 2021-11-25

## 2021-09-24 NOTE — PROGRESS NOTES
Cardiology Follow Up    Loy Ahumada  1960  [id number]  [facility] [place]  [address]  [address]  [phone]    1  Paroxysmal atrial fibrillation (HCC)  POCT ECG   2  Benign essential hypertension  POCT ECG    spironolactone (ALDACTONE) 25 mg tablet    Basic metabolic panel   3  Dyslipidemia  POCT ECG   4  Dilated aortic root (HCC)  POCT ECG   5  JODY (obstructive sleep apnea)         Discussion/Summary:  Mr Marv Nava is a pleasant 57year-old gentleman who presents to the office today for routine follow-up  Since his last visit he has had no symptoms suggestive of recurrent atrial fibrillation  However he remains off all AV rina blocking agents as he feels they were making him feel unwell  He has also been noncompliant with CPAP and he is at risk for recurrent atrial fibrillation  For now we have agreed to leave him off all AV rina blockers  If he has recurrent symptoms I suggested he see one of our electrophysiologists regarding the evaluation for ablation  He does have a [id number] score of one and for now will remain off of systemic anticoagulation  His blood pressure is not well controlled in the office today  He checks it at home with high readings  We discussed initiation of another medication although he has been intolerant of multiple  I will attempt spironolactone 12 5 mg daily with reassessment of his renal function and electrolytes in a week      Otherwise he was noted to have a dilated ascending aorta on echocardiogram   He underwent a PET-CT scan for unrelated cardiac reasons which revealed stable mild aneurysmal dilatation of his ascending aorta at 4 1 cm  His lipids were reviewed  Based on his 10-year risk statin therapy is indicated  We did discuss this  Given his multiple drug intolerances I will not initiate more than one medication at once    We will re-discuss this at his next visit      I will see him back in the office in a few months for further evaluation  History of Present Illness:  Mr Erik Pereira is a pleasant 59-year-old gentleman who presents to the office today for routine follow-up  Since last visit with me he discontinued all his medication including diltiazem, chlorthalidone and Eliquis as they were making him feel unwell  He reports that he felt weak and lightheaded  He had no energy  He discontinued the medication with improvement  He has been checking his blood pressure at home with elevated readings  He also expresses concern as his pulse is in the 40s and predominantly 50s off of the diltiazem  Since his last visit he has had no recurrent atrial fibrillation  As noted above he discontinued systemic anticoagulation  He remains active and denies any chest pain or shortness of breath with the activity he performs  He denies any signs or symptoms of congestive heart failure including increasing lower extremity edema, paroxysmal nocturnal dyspnea, orthopnea, acute weight gain or increasing abdominal girth  He denies syncope or presyncope  He denies symptoms of claudication  He has been on the following medications and intolerant due to various side effects:     Amlodipine  Atenolol  Bystolic  Lisinopril   Candesartan  Diltiazem   Chlorthalidone    He has been intolerant of CPAP as well      Problem List     Benign hypertensive CKD    Lab Results   Component Value Date    EGFR 56 06/12/2021    EGFR 62 06/05/2021    CREATININE 1 35 (H) 06/12/2021    CREATININE 1 25 06/05/2021    CREATININE 1 32 (H) 02/20/2021         Erectile dysfunction of non-organic origin    Episodic tension-type headache, not intractable    Screening for prostate cancer    Vitamin D deficiency    Well adult exam    Need for influenza vaccination    Screening for colon cancer    Dyslipidemia    Benign essential hypertension    Stage 3 chronic kidney disease    Lab Results Component Value Date    EGFR 56 06/12/2021    EGFR 62 06/05/2021    CREATININE 1 35 (H) 06/12/2021    CREATININE 1 25 06/05/2021    CREATININE 1 32 (H) 02/20/2021         Dilated aortic root (HCC)    JODY (obstructive sleep apnea)    Annual physical exam        Past Medical History:   Diagnosis Date    At[facility] fibrillation [facility] [address] )     [facility]     lymphoma    CKD (chronic kidney disease)     Dilated aortic root (HCC)     Erectile dysfunction     Hyperlipidemia 6/28/2016    Hypertension     Hypertensive CKD (chronic kidney disease)     Mild renal insufficiency 9/26/2017    Vitamin D deficiency      Social History     Socioeconomic History    Marital status: /Civil Union     Spouse name: Not on file    Number of children: Not on file    Years of education: Not on file    Highest education level: Not on file   Occupational History    Not on file   Tobacco Use    Smoking status: Never Smoker    Smokeless tobacco: Never Used   Vaping Use    Vaping Use: Never used   Substance and Sexual Activity    Alcohol use: Yes     Comment: 3-4 BEERS / WEEK     Drug use: No    Sexual activity: Yes     Partners: Female   Other Topics Concern    Not on file   Social History Narrative    Not on file     Social Determinants of Health     Financial Resource Strain:     Difficulty of Paying Living Expenses:    Food Insecurity:     Worried About Running Out of Food in the Last Year:     [address] in the Last Year:    Transportation Needs:     Lack of Transportation (Medical):      Lack of Transportation (Non-Medical):    Physical Activity:     Days of Exercise per Week:     Minutes of Exercise per Session:    Stress:     Feeling of Stress :    Social Connections:     Frequency of Communication with Friends and Family:     Frequency of Social Gatherings with Friends and Family:     Attends Christianity Services:     Active Member of Clubs or Organizations:     Attends Club or Organization Meetings:     Marital Status:    Intimate Partner Violence:     Fear of Current or Ex-Partner:     Emotionally Abused:     Physically Abused:     Sexually Abused:       Family History   Problem Relation Age of Onset    Lung cancer Mother     Heart attack Father 76    Prostate cancer Father     Hyperlipidemia Father     No Known Problems Sister     Lymphoma Maternal Aunt     Fainting Maternal Grandfather      Past Surgical History:   Procedure Laterality Date    COLONOSCOPY      HIATAL HERNIA REPAIR         Current Outpatient Medications:     cholecalciferol (VITAMIN D3) 1,000 units tablet, Take 1,000 Units by mouth daily, Disp: , Rfl:     apixaban (ELIQUIS) 5 mg, Take 1 tablet (5 mg total) by mouth 2 (two) times a day (Patient not taking: Reported on 9/24/2021), Disp: 180 tablet, Rfl: 3    chlorthalidone 25 mg tablet, Take 1 tablet (25 mg total) by mouth daily (Patient not taking: Reported on 9/24/2021), Disp: 90 tablet, Rfl: 3    diltiazem (CARDIZEM CD) 120 mg 24 hr capsule, Take 1 capsule (120 mg total) by mouth daily (Patient not taking: Reported on 9/24/2021), Disp: 90 capsule, Rfl: 3    spironolactone (ALDACTONE) 25 mg tablet, Take 0 5 tablets (12 5 mg total) by mouth daily, Disp: 45 tablet, Rfl: 1  No Known Allergies    Labs:     Chemistry        Component Value Date/Time     10/07/2017 0740    K 3 9 06/12/2021 0711    K 3 7 02/20/2021 0747     06/12/2021 0711     02/20/2021 0747    CO2 29 06/12/2021 0711    CO2 28 02/20/2021 0747    BUN 25 06/12/2021 0711    BUN 22 02/20/2021 0747    CREATININE 1 35 (H) 06/12/2021 0711    CREATININE 1 31 10/07/2017 0740        Component Value Date/Time    CALCIUM 9 0 06/12/2021 0711    CALCIUM 9 5 02/20/2021 0747    ALKPHOS 44 (L) 06/12/2021 0711    ALKPHOS 46 02/20/2021 0747    AST 14 06/12/2021 0711    AST 18 02/20/2021 0747    ALT 24 06/12/2021 0711    ALT 21 02/20/2021 0747    BILITOT 0 6 10/07/2017 0740            Lab Results Component Value Date    CHOL 174 09/09/2017    CHOL 224 (H) 06/18/2016    CHOL 186 03/21/2015     Lab Results   Component Value Date    HDL 52 11/14/2020    HDL 45 12/14/2019    HDL 45 04/20/2019     Lab Results   Component Value Date    LDLCALC 121 (H) 11/14/2020    LDLCALC 114 (H) 12/14/2019    LDLCALC 135 (H) 04/20/2019     Lab Results   Component Value Date    TRIG 133 11/14/2020    TRIG 65 12/14/2019    TRIG 85 04/20/2019     No results found for: CHOLHDL    Imaging: No results found  Review of Systems   Cardiovascular: Negative for chest pain, claudication and dyspnea on exertion  All other systems reviewed and are negative  Vitals:    09/24/21 1615   BP: 150/90   Pulse:      Vitals:    09/24/21 1545   Weight: 87 kg (191 lb 11 2 oz)     Height: 5' 11" (180 3 cm)   Body mass index is 26 74 kg/m²       Physical Exam:  General:  Alert and cooperative, appears stated age  HEENT:  PERRLA, EOMI, no scleral icterus, no conjunctival pallor  Neck:  No lymphadenopathy, no thyromegaly, no carotid bruits, no elevated JVP  Heart:  Regular rate and rhythm, normal S1/S2, no S3/S4, no murmur  Lungs:  Clear to auscultation bilaterally   Abdomen:  Soft, non-tender, positive bowel sounds, no rebound or guarding,   no organomegaly   Extremities:  No clubbing, cyanosis or edema   Vascular:  2+ pedal pulses  Skin:  No rashes or lesions on exposed skin  Neurologic:  Cranial nerves II-XII grossly intact without focal deficits

## 2021-09-27 ENCOUNTER — IMMUNIZATIONS (OUTPATIENT)
Dept: FAMILY MEDICINE CLINIC | Facility: CLINIC | Age: 61
End: 2021-09-27

## 2021-09-27 DIAGNOSIS — Z23 ENCOUNTER FOR IMMUNIZATION: Primary | ICD-10-CM

## 2021-09-27 PROCEDURE — 91301 SARSCOV2 VAC 100MCG/0.5ML IM: CPT

## 2021-10-02 ENCOUNTER — APPOINTMENT (OUTPATIENT)
Dept: LAB | Facility: MEDICAL CENTER | Age: 61
End: 2021-10-02
Payer: COMMERCIAL

## 2021-10-02 DIAGNOSIS — I10 BENIGN ESSENTIAL HYPERTENSION: ICD-10-CM

## 2021-10-02 LAB
ANION GAP SERPL CALCULATED.3IONS-SCNC: 4 MMOL/L (ref 4–13)
BUN SERPL-MCNC: 17 MG/DL (ref 5–25)
CALCIUM SERPL-MCNC: 9.2 MG/DL (ref 8.3–10.1)
CHLORIDE SERPL-SCNC: 107 MMOL/L (ref 100–108)
CO2 SERPL-SCNC: 26 MMOL/L (ref 21–32)
CREAT SERPL-MCNC: 1.14 MG/DL (ref 0.6–1.3)
GFR SERPL CREATININE-BSD FRML MDRD: 69 ML/MIN/1.73SQ M
GLUCOSE P FAST SERPL-MCNC: 107 MG/DL (ref 65–99)
POTASSIUM SERPL-SCNC: 4.2 MMOL/L (ref 3.5–5.3)
SODIUM SERPL-SCNC: 137 MMOL/L (ref 136–145)

## 2021-10-02 PROCEDURE — 36415 COLL VENOUS BLD VENIPUNCTURE: CPT

## 2021-10-02 PROCEDURE — 80048 BASIC METABOLIC PNL TOTAL CA: CPT

## 2021-10-13 ENCOUNTER — TELEPHONE (OUTPATIENT)
Dept: HEMATOLOGY ONCOLOGY | Facility: CLINIC | Age: 61
End: 2021-10-13

## 2021-10-29 ENCOUNTER — APPOINTMENT (OUTPATIENT)
Dept: LAB | Facility: MEDICAL CENTER | Age: 61
End: 2021-10-29
Payer: COMMERCIAL

## 2021-10-29 PROCEDURE — 84165 PROTEIN E-PHORESIS SERUM: CPT | Performed by: PATHOLOGY

## 2021-11-12 ENCOUNTER — OFFICE VISIT (OUTPATIENT)
Dept: HEMATOLOGY ONCOLOGY | Facility: CLINIC | Age: 61
End: 2021-11-12
Payer: COMMERCIAL

## 2021-11-12 ENCOUNTER — TELEPHONE (OUTPATIENT)
Dept: HEMATOLOGY ONCOLOGY | Facility: HOSPITAL | Age: 61
End: 2021-11-12

## 2021-11-12 VITALS
BODY MASS INDEX: 26.88 KG/M2 | HEART RATE: 58 BPM | HEIGHT: 71 IN | OXYGEN SATURATION: 99 % | TEMPERATURE: 98.3 F | SYSTOLIC BLOOD PRESSURE: 148 MMHG | RESPIRATION RATE: 18 BRPM | DIASTOLIC BLOOD PRESSURE: 82 MMHG | WEIGHT: 192 LBS

## 2021-11-12 DIAGNOSIS — C83.00 MALIGNANT LYMPHOMA, LYMPHOPLASMACYTIC (HCC): Primary | ICD-10-CM

## 2021-11-12 PROCEDURE — 99214 OFFICE O/P EST MOD 30 MIN: CPT | Performed by: INTERNAL MEDICINE

## 2021-11-25 DIAGNOSIS — I10 BENIGN ESSENTIAL HYPERTENSION: ICD-10-CM

## 2021-11-25 RX ORDER — SPIRONOLACTONE 25 MG/1
TABLET ORAL
Qty: 45 TABLET | Refills: 1 | Status: SHIPPED | OUTPATIENT
Start: 2021-11-25 | End: 2021-11-30

## 2021-11-29 DIAGNOSIS — I10 BENIGN ESSENTIAL HYPERTENSION: ICD-10-CM

## 2021-11-30 RX ORDER — SPIRONOLACTONE 25 MG/1
TABLET ORAL
Qty: 45 TABLET | Refills: 1 | Status: SHIPPED | OUTPATIENT
Start: 2021-11-30 | End: 2021-12-01 | Stop reason: SDUPTHER

## 2021-12-01 ENCOUNTER — OFFICE VISIT (OUTPATIENT)
Dept: CARDIOLOGY CLINIC | Facility: CLINIC | Age: 61
End: 2021-12-01
Payer: COMMERCIAL

## 2021-12-01 VITALS
DIASTOLIC BLOOD PRESSURE: 80 MMHG | WEIGHT: 191.1 LBS | BODY MASS INDEX: 26.75 KG/M2 | HEART RATE: 69 BPM | HEIGHT: 71 IN | SYSTOLIC BLOOD PRESSURE: 170 MMHG

## 2021-12-01 DIAGNOSIS — G47.33 OSA (OBSTRUCTIVE SLEEP APNEA): ICD-10-CM

## 2021-12-01 DIAGNOSIS — E78.5 DYSLIPIDEMIA: ICD-10-CM

## 2021-12-01 DIAGNOSIS — I48.0 PAROXYSMAL ATRIAL FIBRILLATION (HCC): Primary | ICD-10-CM

## 2021-12-01 DIAGNOSIS — I77.810 DILATED AORTIC ROOT (HCC): ICD-10-CM

## 2021-12-01 DIAGNOSIS — I10 BENIGN ESSENTIAL HYPERTENSION: ICD-10-CM

## 2021-12-01 PROCEDURE — 99214 OFFICE O/P EST MOD 30 MIN: CPT | Performed by: INTERNAL MEDICINE

## 2021-12-01 RX ORDER — SPIRONOLACTONE 25 MG/1
25 TABLET ORAL DAILY
Qty: 90 TABLET | Refills: 1 | Status: SHIPPED | OUTPATIENT
Start: 2021-12-01

## 2021-12-16 ENCOUNTER — APPOINTMENT (OUTPATIENT)
Dept: LAB | Facility: MEDICAL CENTER | Age: 61
End: 2021-12-16
Payer: COMMERCIAL

## 2021-12-16 DIAGNOSIS — I10 BENIGN ESSENTIAL HYPERTENSION: ICD-10-CM

## 2021-12-16 DIAGNOSIS — Z12.5 SCREENING FOR PROSTATE CANCER: ICD-10-CM

## 2021-12-16 LAB
ANION GAP SERPL CALCULATED.3IONS-SCNC: 5 MMOL/L (ref 4–13)
BUN SERPL-MCNC: 18 MG/DL (ref 5–25)
CALCIUM SERPL-MCNC: 9.1 MG/DL (ref 8.3–10.1)
CHLORIDE SERPL-SCNC: 109 MMOL/L (ref 100–108)
CO2 SERPL-SCNC: 24 MMOL/L (ref 21–32)
CREAT SERPL-MCNC: 1.19 MG/DL (ref 0.6–1.3)
GFR SERPL CREATININE-BSD FRML MDRD: 65 ML/MIN/1.73SQ M
GLUCOSE P FAST SERPL-MCNC: 106 MG/DL (ref 65–99)
POTASSIUM SERPL-SCNC: 4.1 MMOL/L (ref 3.5–5.3)
PSA SERPL-MCNC: 2 NG/ML (ref 0–4)
SODIUM SERPL-SCNC: 138 MMOL/L (ref 136–145)

## 2021-12-16 PROCEDURE — 80048 BASIC METABOLIC PNL TOTAL CA: CPT

## 2021-12-16 PROCEDURE — G0103 PSA SCREENING: HCPCS

## 2021-12-16 PROCEDURE — 36415 COLL VENOUS BLD VENIPUNCTURE: CPT

## 2021-12-17 ENCOUNTER — HOSPITAL ENCOUNTER (OUTPATIENT)
Dept: NON INVASIVE DIAGNOSTICS | Facility: CLINIC | Age: 61
Discharge: HOME | End: 2021-12-17
Payer: COMMERCIAL

## 2021-12-17 DIAGNOSIS — I10 BENIGN ESSENTIAL HYPERTENSION: ICD-10-CM

## 2021-12-17 PROCEDURE — 93975 VASCULAR STUDY: CPT | Performed by: SURGERY

## 2021-12-17 PROCEDURE — 93975 VASCULAR STUDY: CPT

## 2021-12-20 ENCOUNTER — TELEPHONE (OUTPATIENT)
Dept: CARDIOLOGY CLINIC | Facility: CLINIC | Age: 61
End: 2021-12-20

## 2021-12-21 ENCOUNTER — OFFICE VISIT (OUTPATIENT)
Dept: UROLOGY | Facility: CLINIC | Age: 61
End: 2021-12-21
Payer: COMMERCIAL

## 2021-12-21 VITALS
HEIGHT: 71 IN | BODY MASS INDEX: 27.3 KG/M2 | HEART RATE: 70 BPM | WEIGHT: 195 LBS | DIASTOLIC BLOOD PRESSURE: 80 MMHG | SYSTOLIC BLOOD PRESSURE: 140 MMHG

## 2021-12-21 DIAGNOSIS — Z80.42 FAMILY HISTORY OF PROSTATE CANCER IN FATHER: ICD-10-CM

## 2021-12-21 DIAGNOSIS — R94.8 ABNORMAL POSITRON EMISSION TOMOGRAPHY (PET) SCAN: Primary | ICD-10-CM

## 2021-12-21 PROCEDURE — 99213 OFFICE O/P EST LOW 20 MIN: CPT | Performed by: NURSE PRACTITIONER

## 2022-05-05 ENCOUNTER — APPOINTMENT (OUTPATIENT)
Dept: LAB | Facility: MEDICAL CENTER | Age: 62
End: 2022-05-05
Payer: COMMERCIAL

## 2022-05-05 DIAGNOSIS — C83.00 MALIGNANT LYMPHOMA, LYMPHOPLASMACYTIC (HCC): ICD-10-CM

## 2022-05-05 LAB
BASOPHILS # BLD AUTO: 0.03 THOUSANDS/ΜL (ref 0–0.1)
BASOPHILS NFR BLD AUTO: 1 % (ref 0–1)
EOSINOPHIL # BLD AUTO: 0.1 THOUSAND/ΜL (ref 0–0.61)
EOSINOPHIL NFR BLD AUTO: 2 % (ref 0–6)
ERYTHROCYTE [DISTWIDTH] IN BLOOD BY AUTOMATED COUNT: 13.2 % (ref 11.6–15.1)
HCT VFR BLD AUTO: 45 % (ref 36.5–49.3)
HGB BLD-MCNC: 14.7 G/DL (ref 12–17)
IGA SERPL-MCNC: 239 MG/DL (ref 70–400)
IGG SERPL-MCNC: 1000 MG/DL (ref 700–1600)
IGM SERPL-MCNC: 126 MG/DL (ref 40–230)
IMM GRANULOCYTES # BLD AUTO: 0.02 THOUSAND/UL (ref 0–0.2)
IMM GRANULOCYTES NFR BLD AUTO: 0 % (ref 0–2)
LDH SERPL-CCNC: 185 U/L (ref 81–234)
LYMPHOCYTES # BLD AUTO: 1.15 THOUSANDS/ΜL (ref 0.6–4.47)
LYMPHOCYTES NFR BLD AUTO: 20 % (ref 14–44)
MCH RBC QN AUTO: 31.5 PG (ref 26.8–34.3)
MCHC RBC AUTO-ENTMCNC: 32.7 G/DL (ref 31.4–37.4)
MCV RBC AUTO: 96 FL (ref 82–98)
MONOCYTES # BLD AUTO: 0.64 THOUSAND/ΜL (ref 0.17–1.22)
MONOCYTES NFR BLD AUTO: 11 % (ref 4–12)
NEUTROPHILS # BLD AUTO: 3.97 THOUSANDS/ΜL (ref 1.85–7.62)
NEUTS SEG NFR BLD AUTO: 66 % (ref 43–75)
NRBC BLD AUTO-RTO: 0 /100 WBCS
PLATELET # BLD AUTO: 215 THOUSANDS/UL (ref 149–390)
PMV BLD AUTO: 11.9 FL (ref 8.9–12.7)
RBC # BLD AUTO: 4.67 MILLION/UL (ref 3.88–5.62)
WBC # BLD AUTO: 5.91 THOUSAND/UL (ref 4.31–10.16)

## 2022-05-05 PROCEDURE — 84165 PROTEIN E-PHORESIS SERUM: CPT | Performed by: PATHOLOGY

## 2022-05-05 PROCEDURE — 83615 LACTATE (LD) (LDH) ENZYME: CPT

## 2022-05-05 PROCEDURE — 36415 COLL VENOUS BLD VENIPUNCTURE: CPT

## 2022-05-05 PROCEDURE — 84165 PROTEIN E-PHORESIS SERUM: CPT

## 2022-05-05 PROCEDURE — 85025 COMPLETE CBC W/AUTO DIFF WBC: CPT

## 2022-05-05 PROCEDURE — 83521 IG LIGHT CHAINS FREE EACH: CPT

## 2022-05-05 PROCEDURE — 82784 ASSAY IGA/IGD/IGG/IGM EACH: CPT

## 2022-05-06 LAB
KAPPA LC FREE SER-MCNC: 22.9 MG/L (ref 3.3–19.4)
KAPPA LC FREE/LAMBDA FREE SER: 1.83 {RATIO} (ref 0.26–1.65)
LAMBDA LC FREE SERPL-MCNC: 12.5 MG/L (ref 5.7–26.3)

## 2022-05-07 LAB
ALBUMIN SERPL ELPH-MCNC: 4.09 G/DL (ref 3.5–5)
ALBUMIN SERPL ELPH-MCNC: 59.3 % (ref 52–65)
ALPHA1 GLOB SERPL ELPH-MCNC: 0.26 G/DL (ref 0.1–0.4)
ALPHA1 GLOB SERPL ELPH-MCNC: 3.7 % (ref 2.5–5)
ALPHA2 GLOB SERPL ELPH-MCNC: 0.7 G/DL (ref 0.4–1.2)
ALPHA2 GLOB SERPL ELPH-MCNC: 10.2 % (ref 7–13)
BETA GLOB ABNORMAL SERPL ELPH-MCNC: 0.43 G/DL (ref 0.4–0.8)
BETA1 GLOB SERPL ELPH-MCNC: 6.2 % (ref 5–13)
BETA2 GLOB SERPL ELPH-MCNC: 5.5 % (ref 2–8)
BETA2+GAMMA GLOB SERPL ELPH-MCNC: 0.38 G/DL (ref 0.2–0.5)
GAMMA GLOB ABNORMAL SERPL ELPH-MCNC: 1.04 G/DL (ref 0.5–1.6)
GAMMA GLOB SERPL ELPH-MCNC: 15.1 % (ref 12–22)
IGG/ALB SER: 1.46 {RATIO} (ref 1.1–1.8)
PROT PATTERN SERPL ELPH-IMP: NORMAL
PROT SERPL-MCNC: 6.9 G/DL (ref 6.4–8.2)

## 2022-05-12 ENCOUNTER — OFFICE VISIT (OUTPATIENT)
Dept: HEMATOLOGY ONCOLOGY | Facility: CLINIC | Age: 62
End: 2022-05-12
Payer: COMMERCIAL

## 2022-05-12 VITALS
HEIGHT: 71 IN | TEMPERATURE: 96.8 F | SYSTOLIC BLOOD PRESSURE: 138 MMHG | HEART RATE: 76 BPM | BODY MASS INDEX: 27.72 KG/M2 | OXYGEN SATURATION: 98 % | RESPIRATION RATE: 18 BRPM | DIASTOLIC BLOOD PRESSURE: 78 MMHG | WEIGHT: 198 LBS

## 2022-05-12 DIAGNOSIS — R53.83 FATIGUE, UNSPECIFIED TYPE: ICD-10-CM

## 2022-05-12 DIAGNOSIS — C83.00 MALIGNANT LYMPHOMA, LYMPHOPLASMACYTIC (HCC): Primary | ICD-10-CM

## 2022-05-12 PROCEDURE — 99215 OFFICE O/P EST HI 40 MIN: CPT | Performed by: INTERNAL MEDICINE

## 2022-05-12 NOTE — PROGRESS NOTES
[facility] - Hematology & Medical Oncology  Outpatient Visit Encounter Note    Pk Conley 57 y o  male [id number] Date:  5/12/2022    ONCOLOGY HISTORY      Oncology History   Malignant lymphoma, lymphoplasmacytic ([facility] [address] )   2/18/2021 Initial Diagnosis    Malignant lymphoma, lymphoplasmacytic ([facility] [address] )    This addendum is included to report the results of MYD88 testing which is positive, favoring a diagnosis of lymphoplasmacytic lymphoma, though is not definitively specific  Correlation with clinical impression is recommended  - MYD88 Mutation Analysis performed on the Colorectal Polyp @ [facility] (Specimen ID: [id number], evaluated by Ahmet Stoner MD, PhD) as follows:   Results: MYD88 Mutation(s):     Detected  Mutation(s) Detected:   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)   Addendum electronically signed by Luisa Walker MD on 3/1/2021 at 11:56 AM   Final Diagnosis   A  Colon, descending polyp, biopsy:  -  Tubular adenoma, negative for high-grade dysplasia  B  Colon, sigmoid polyp, biopsy:  -  Small B cell neoplasm with kappa light chain restricted plasma cell proliferation, compatible with lymphoma (see note)     PETCT 3/19/21  1  Small focus of FDG uptake at the gastric antrum for which underlying lesion should be excluded  Correlate with endoscopy  2   Single FDG avid left axillary lymph node  This may be related to recent Covid vaccination but given the history of lymphoma, malignancy cannot be entirely excluded  This can be reassessed on short-term follow-up CT or PET/CT in 3 months  3  Small focus of FDG uptake at the inferior aspect of the prostate, underlying lesion not excluded  Correlate with PSA levels and urologic evaluation  4  Mild FDG uptake in a left upper cervical chain lymph node may be reactive but can be reassessed on follow-up  5   Mildly aneurysmal ascending thoracic aorta at 4 1 cm in diameter      BASELINE DISEASE CHARACTERISTICS 2/20/21  SPEP - normal  Quant Ig - normal including normal IgM  SLFC Ratio - normal  CBC - normal             SUBJECTIVE      ECOG Initial Visit 0   ECOG This Visit 0   Pain Score Initial Visit 0   Pain Score This Visit 0   Personal Cancer History None   Family Cancer History Mother (Lung), Dad (Prostate), Maternal Aunt (Gyn)   Tobacco Use History Denies   Alcohol Use History Denies   Thrombotic History Denies   Occupation Purchasing     INITIAL CONSULT    Pk Conley is a 57 y o  here for new consultation with me today  The patient is referred by colorectal surgery and the reason for consultation is b-cell lymphoma diagnosed during screening colonoscopy  In review of his chart and talking with him, he underwent routine colonoscopy on 2/5/21 with Dr Harvey Ramos and was found to have two sessile polyps - in the descending and sigmoid colon that were removed and send for biopsy  While the descending polyp resulted as tubular adenoma, the sigmoid polyp resulted as low-grade CD20+ B-cell lymphoma  He is here with his wife today  He offers no acute complaints  He says the results of his biopsy from the colonoscopy were very surprising  He denies any fevers chills nausea vomiting chest pain abdominal pain shortness of breath cough weight loss night sweats fevers chills  Denies any diarrhea constipation or bloody stools  He denies any genitourinary ambulatory dysfunction symptoms as well  He has been living his routine active life  He said that the colonoscopy he underwent was for routine screening  THIS VISIT    Doing well overall  Has noticed fatigue over last few months  Endorses reactive depression to cancer diagnosis  Denies f/c/n/v/cp/ap/sob/cough  Has episodes of joint aches  Denies night sweats, weight loss  Denies LAD  I have reviewed the relevant past medical, surgical, social and family history  I have also reviewed allergies and medications for this patient      Review of Systems  Review of Systems   All other systems reviewed and are negative  OBJECTIVE     Physical Exam  Vitals:    05/12/22 0756   BP: 138/78   BP Location: Left arm   Pulse: 76   Resp: 18   Temp: (!) 96 8 °F (36 °C)   TempSrc: Tympanic   SpO2: 98%   Weight: 89 8 kg (198 lb)   Height: 5' 11" (1 803 m)       Physical Exam  Constitutional:       General: He is not in acute distress  Appearance: He is not toxic-appearing  HENT:      Head: Normocephalic and atraumatic  Eyes:      General: No scleral icterus  Cardiovascular:      Rate and Rhythm: Normal rate  Pulmonary:      Effort: Pulmonary effort is normal  No respiratory distress  Abdominal:      General: There is no distension  Musculoskeletal:         General: Normal range of motion  Cervical back: Normal range of motion and neck supple  Neurological:      General: No focal deficit present  Mental Status: He is alert and oriented to person, place, and time  Mental status is at baseline  Imaging  Relevant imaging reviewed in chart    Labs  Relevant labs reviewed in chart   ASSESSMENT & PLAN      Diagnosis ICD-10-CM Associated Orders   1  Malignant lymphoma, lymphoplasmacytic (HCC)  C83 00 Vitamin D 25 hydroxy     Comprehensive metabolic panel   2  Fatigue, unspecified type  R53 83 Vitamin D 25 hydroxy     Hemoglobin A3        25-year-old male diagnosed with sigmoid polyp upon a screening colonoscopy that is biopsy proven to be lymphoplasmacytic lymphoma  Additional testing proved that he was  MYD88 positive and given his normal SPEP and quantitative immunoglobulin, his diagnosis of LPL stands   Oncology history updated accordingly this visit   Goals of care/patient communication  o At this time, it is unclear if the fatigue being experienced is related to LPL or not   Overall, it appears that it is likely unlikely related but for closer monitoring, recommend 6 week follow-up with me   o In the interim, recommend checking CMP, A1c and Vitamin D   Disease Features/Tumor Markers/Genetics  o [id number]   Treatment  o   Surveillance as detailed above   Labs  o As ordered  [facility]  o No indication for imaging from a lymphoma perspective      Follow Up    6 wks      All questions were answered to the patient's satisfaction during this encounter  They appreciated and thanked me for spending time with them  The patient knows the contact information for our office and know to reach out for any relevant concerns related to this encounter  For all other listed problems and medical diagnosis in his chart - they are managed by PCP and/or other specialists, which patient acknowledges  Nataliya Keller MD  Hematology & Medical Oncology

## 2022-06-02 ENCOUNTER — APPOINTMENT (OUTPATIENT)
Dept: LAB | Facility: MEDICAL CENTER | Age: 62
End: 2022-06-02
Payer: COMMERCIAL

## 2022-06-02 DIAGNOSIS — Z80.42 FAMILY HISTORY OF PROSTATE CANCER IN FATHER: ICD-10-CM

## 2022-06-02 DIAGNOSIS — R94.8 ABNORMAL POSITRON EMISSION TOMOGRAPHY (PET) SCAN: ICD-10-CM

## 2022-06-02 DIAGNOSIS — R53.83 FATIGUE, UNSPECIFIED TYPE: ICD-10-CM

## 2022-06-02 DIAGNOSIS — C83.00 MALIGNANT LYMPHOMA, LYMPHOPLASMACYTIC (HCC): ICD-10-CM

## 2022-06-02 LAB
25(OH)D3 SERPL-MCNC: 34 NG/ML (ref 30–100)
ALBUMIN SERPL BCP-MCNC: 3.7 G/DL (ref 3.5–5)
ALP SERPL-CCNC: 48 U/L (ref 46–116)
ALT SERPL W P-5'-P-CCNC: 25 U/L (ref 12–78)
ANION GAP SERPL CALCULATED.3IONS-SCNC: 8 MMOL/L (ref 4–13)
AST SERPL W P-5'-P-CCNC: 16 U/L (ref 5–45)
BILIRUB SERPL-MCNC: 0.47 MG/DL (ref 0.2–1)
BUN SERPL-MCNC: 19 MG/DL (ref 5–25)
CALCIUM SERPL-MCNC: 9.3 MG/DL (ref 8.3–10.1)
CHLORIDE SERPL-SCNC: 106 MMOL/L (ref 100–108)
CO2 SERPL-SCNC: 24 MMOL/L (ref 21–32)
CREAT SERPL-MCNC: 1.16 MG/DL (ref 0.6–1.3)
GFR SERPL CREATININE-BSD FRML MDRD: 67 ML/MIN/1.73SQ M
GLUCOSE P FAST SERPL-MCNC: 100 MG/DL (ref 65–99)
POTASSIUM SERPL-SCNC: 3.7 MMOL/L (ref 3.5–5.3)
PROT SERPL-MCNC: 7.2 G/DL (ref 6.4–8.2)
PSA SERPL-MCNC: 2.1 NG/ML (ref 0–4)
SODIUM SERPL-SCNC: 138 MMOL/L (ref 136–145)

## 2022-06-02 PROCEDURE — 84153 ASSAY OF PSA TOTAL: CPT

## 2022-06-02 PROCEDURE — 36415 COLL VENOUS BLD VENIPUNCTURE: CPT

## 2022-06-02 PROCEDURE — 82306 VITAMIN D 25 HYDROXY: CPT

## 2022-06-02 PROCEDURE — 83036 HEMOGLOBIN GLYCOSYLATED A1C: CPT

## 2022-06-02 PROCEDURE — 80053 COMPREHEN METABOLIC PANEL: CPT

## 2022-06-03 LAB
EST. AVERAGE GLUCOSE BLD GHB EST-MCNC: 117 MG/DL
HBA1C MFR BLD: 5.7 %

## 2022-07-06 ENCOUNTER — TELEPHONE (OUTPATIENT)
Dept: HEMATOLOGY ONCOLOGY | Facility: CLINIC | Age: 62
End: 2022-07-06

## 2022-07-06 NOTE — TELEPHONE ENCOUNTER
Scheduling Appointment     Who Is Calling to Schedule Patient    Doctor Dr Malika Foley   Date and Time 08/25 at 8:20am    Reason for scheduling appointment Follow up    Patient verbalized understanding   Yes

## 2022-07-09 ENCOUNTER — APPOINTMENT (OUTPATIENT)
Dept: LAB | Facility: MEDICAL CENTER | Age: 62
End: 2022-07-09
Payer: COMMERCIAL

## 2022-07-09 DIAGNOSIS — Z00.8 HEALTH EXAMINATION IN POPULATION SURVEY: ICD-10-CM

## 2022-07-09 LAB
CHOLEST SERPL-MCNC: 193 MG/DL
EST. AVERAGE GLUCOSE BLD GHB EST-MCNC: 120 MG/DL
HBA1C MFR BLD: 5.8 %
HDLC SERPL-MCNC: 55 MG/DL
LDLC SERPL CALC-MCNC: 121 MG/DL (ref 0–100)
NONHDLC SERPL-MCNC: 138 MG/DL
TRIGL SERPL-MCNC: 84 MG/DL

## 2022-07-09 PROCEDURE — 36415 COLL VENOUS BLD VENIPUNCTURE: CPT

## 2022-07-09 PROCEDURE — 80061 LIPID PANEL: CPT

## 2022-07-09 PROCEDURE — 83036 HEMOGLOBIN GLYCOSYLATED A1C: CPT

## 2022-07-21 ENCOUNTER — OFFICE VISIT (OUTPATIENT)
Dept: UROLOGY | Facility: CLINIC | Age: 62
End: 2022-07-21
Payer: COMMERCIAL

## 2022-07-21 VITALS
SYSTOLIC BLOOD PRESSURE: 120 MMHG | WEIGHT: 193 LBS | HEIGHT: 71 IN | DIASTOLIC BLOOD PRESSURE: 80 MMHG | BODY MASS INDEX: 27.02 KG/M2 | HEART RATE: 72 BPM

## 2022-07-21 DIAGNOSIS — R82.90 URINE FINDING: ICD-10-CM

## 2022-07-21 DIAGNOSIS — Z80.42 FAMILY HISTORY OF PROSTATE CANCER IN FATHER: Primary | ICD-10-CM

## 2022-07-21 DIAGNOSIS — R94.8 ABNORMAL POSITRON EMISSION TOMOGRAPHY (PET) SCAN: ICD-10-CM

## 2022-07-21 LAB
CREAT UR-MCNC: 24.6 MG/DL
PROT UR-MCNC: <6 MG/DL
PROT/CREAT UR: <0.24 MG/G{CREAT} (ref 0–0.1)

## 2022-07-21 PROCEDURE — 84156 ASSAY OF PROTEIN URINE: CPT | Performed by: NURSE PRACTITIONER

## 2022-07-21 PROCEDURE — 82570 ASSAY OF URINE CREATININE: CPT | Performed by: NURSE PRACTITIONER

## 2022-07-21 PROCEDURE — 99213 OFFICE O/P EST LOW 20 MIN: CPT | Performed by: NURSE PRACTITIONER

## 2022-07-21 NOTE — PATIENT INSTRUCTIONS
BLADDER HEALTH    WHAT IS CONSIDERED NORMAL? The average bladder can hold about 2 cups of urine before it needs to be emptied  The normal range of voiding urine is 6 to 8 times during a 24 hour period  As we get older, our bladder capacity can get smaller and we may need to pass urine more frequently but usually not more than every 2 hours  Urine should flow easily without discomfort in a good, steady stream until the bladder is empty  No pushing or straining is necessary to empty the bladder  An urge is a signal that you feel as the bladder stretches to fill with urine  Urges can be felt even if the bladder is not full  Urges are not commands to go to the toilet, merely a signal and can be controlled  WHAT ARE GOOD BLADDER HABITS? Take your time when emptying your bladder  Dont strain or push to empty your bladder  Make sure you empty your bladder completely each time you pass urine  Do not rush the process  Consistently ignoring the urge to go (waiting more than 4 hours between toileting) or urinating too infrequently may be convenient but not healthy for your bladder  Avoid going to the toilet just in case or more often than every 2 hours  It is usually not necessary to go when you feel the first urge  Try to go only when your bladder is full  Urgency and frequency of urination can be improved by retraining the bladder and spacing your fluid intake throughout the day  Practice good toilet habits  Dont let your bladder control your life  TIPS TO MAINTAIN GOOD BLADDER HABITS  Maintain a good fluid intake  Depending on your body size and environment, drink 6 -8 cups (8 oz each) of fluid per day unless otherwise advised by your doctor  Not enough fluid creates a foul odor and dark color of the urine  Limit the amount of caffeine (coffee, cola, chocolate or tea) and citrus foods that you consume as these foods can be associated with increased sensation of urinary urgency and frequency  Limit the amount of alcohol you drink  Alcohol increases urine production and makes it difficult for the brain to coordinate bladder control  Avoid constipation by maintaining a balanced diet of dietary fiber  Cigarette smoking is also irritating to the bladder surface and is associated with bladder cancer  In addition, the coughing associated with smoking may lead to increased incontinent episodes because of the increased pressure  HOW DIET CAN AFFECT YOUR BLADDER  Although there is no particular "diet" that can cure bladder control, there are certain dietary suggestions you can use to help control the problem  There are 2 points to consider when evaluating how your habits and diet may affect your bladder:    Foods and fluids can irritate the bladder  Some foods and beverages are thought to contribute to bladder leakage and irritability  However their effect on the bladder is not completely understood and you may want to see if eliminating one or all of these items improves your bladder control  If you are unable to give them up completely, it is recommended that you use the following items in moderation:  Acidic beverages and foods (orange juice, grapefruit juice, lemonade etc)  Alcoholic beverages  Vinegar  Coffee (regular and decaf)  Tea (regular and decaf)  Caffeinated beverages  Carbonated beverages          Drinking enough and the right kinds of fluids  Many people with bladder control issues decrease their intake of liquids in hope that they will need to urinate less frequently or have less urinary leakage  You should not restrict fluids to control your bladder  While a decrease in liquid intake does result in a decrease in the volume of urine, the smaller amount of urine may be more highly concentrated  Highly concentrated, dark yellow urine is irritating to the bladder surface and may actually cause you to go to the bathroom more frequently        It also encourages the growth of bacteria, which may lead to infections resulting in incontinence  Substitutions for Bladder Irritants: water is always the best beverage choice  Grape and apple juice are thirst quenchers are good selections and are not as irritating to the bladder    Low acid fruits:  Pears, apricots, papaya, watermelon  For coffee drinkers: KAVA®, Postum®, Ventia®, Kaffree Ban®  For tea drinkers:  non-citrus or herbal and sun brewed tea

## 2022-07-21 NOTE — ASSESSMENT & PLAN NOTE
· Increased bubbles in the urine  · Recent imaging with normal bladder  · Send urine protein creatinine ratio  · Follow-up 1 year

## 2022-07-21 NOTE — PROGRESS NOTES
Assessment and plan:     Family history of prostate cancer in father  · PSA stable at 2 1  · PSA LALO 1 year  · PSA/LALO remained normal will follow-up 1 year    Abnormal positron emission tomography (PET) scan  · PET scan with small amount of activity in the prostate  · MRI PI-RADS 2  · PSA stable  · PSA/LALO  yearly    Urine finding  · Increased bubbles in the urine  · Recent imaging with normal bladder  · Send urine protein creatinine ratio  · Follow-up 1 year      RICK Arias    History of Present Illness     Evangelist Bills is a 57 y o  male Who presents for six-month follow-up  Patient has been diagnosed B-cell lymphoma and PET scan March 2021 demonstrated a small amount of activity in the prostate   Patient also had activity in the GI system and underwent negative endoscopy  Alexa Anand does report that he has a family history of prostate cancer in his father who was treated with radiation  [employer] has had regular PSA testing but has not had a diagnostic rectal exam  Christophmarline Anand denies urinary symptoms of frequency, urgency, burning, dysuria, hematuria   He has nocturia x 2 but takes his water pill at bed time  This is better for him because he has more difficulty using the bathroom frequently during work      He presents today for a six-month follow-up for LALO and recheck of PSA  His PSA remains stable at 2 1 and his rectal exam is unremarkable    Can resume yearly prostate cancer screening at this point  Component PSA, Total   Latest Ref Rng & Units 0 0 - 4 0 ng/mL   10/20/2018 1 9   12/14/2019 1 1   4/3/2021 2 1   12/16/2021 2 0   6/2/2022 2 1     Laboratory     Lab Results   Component Value Date    BUN 19 06/02/2022    CREATININE 1 16 06/02/2022       No components found for: GFR    Lab Results   Component Value Date    CALCIUM 9 3 06/02/2022     10/07/2017    K 3 7 06/02/2022    CO2 24 06/02/2022     06/02/2022       Lab Results   Component Value Date    WBC 5 91 05/05/2022    HGB 14 7 05/05/2022    HCT 45 0 05/05/2022    MCV 96 05/05/2022     05/05/2022       Lab Results   Component Value Date    PSA 2 1 06/02/2022    PSA 2 0 12/16/2021    PSA 2 1 04/03/2021       No results found for this or any previous visit (from the past 1 hour(s))  @RESULT(URINEMICROSCOPIC)@    @RESULT(URINECULTURE)@    Radiology     MULTIPARAMETRIC MRI OF THE PROSTATE WITH AND WITHOUT CONTRAST-WITH 3-D POSTPROCESSING   06/09/2021     INDICATION:   Z12 5: Encounter for screening for malignant neoplasm of prostate  Z80 42: Family history of malignant neoplasm of prostate  R94 8: Abnormal results of function studies of other organs and systems  69-year-old male patient who has been diagnosed B-cell lymphoma and recent PET scan demonstrated a small amount of activity in the prostate  Patient also had activity in the GI   system and underwent negative endoscopy  He does report that he has a family history of prostate cancer in his father who was treated with radiation      COMPARISON: PET/CT dated 3/19/2021      PSA LEVEL: 2 3 ng/ml  on 4/3/2021  PRIOR BIOPSY : None      TECHNIQUE: The following pulse sequences were obtained:  Small field-of-view axial T1-weighted and multiplanar T2-weighted images; DWI axial and ADC map; large field of view axial T2 weighted images; T1w in-phase and opposed-phase axials of entire pelvis   and dynamic 3D T1w axial before and during IV contrast injection  Imaging performed on 3 0T MRI      CONTRAST:  Gadobutrol (Gadavist) 9 mL of Gadobutrol injection (SINGLE-DOSE)     TECHNICAL LIMITATIONS: None      FINDINGS:     PROSTATE:     Size: 4 2 x 3 6 x 3 4 cm = 27 cc  Post-biopsy hemorrhage:  None  Central gland enlargement (BPH): None      Focal lesions - No dominant lesion  Heterogeneous peripheral zone   PI-RADSv2 1 Category 2 - Low (clinically significant cancer unlikely)      SEMINAL VESICLES: Unremarkable      Note: Clinically significant cancer is defined on pathology/histology as Paige score greater than or equal to 7, and/or volume of greater than or equal to 0 5 mL, and/or extraprostatic extension      URINARY BLADDER: Unremarkable      LYMPH NODES: No pelvic lymphadenopathy      BONES: No suspicious osseous lesion      ADDITIONAL FINDINGS: Small fat-containing left inguinal hernia  Scattered colonic diverticula      IMPRESSION:     1  Heterogeneous peripheral zone without focal prostatic lesion  PI-RADSv2 1 Category 2 - Low (clinically significant cancer is unlikely to be present)      2  Calculated prostate volume of 27 cc            Workstation performed: [id number]     Review of Systems     Review of Systems   Constitutional: Negative for activity change, appetite change, chills, fatigue, fever and unexpected weight change  HENT: Negative for facial swelling  Eyes: Negative for discharge  Respiratory: Negative  Negative for cough and shortness of breath  Cardiovascular: Negative for chest pain and leg swelling  Gastrointestinal: Negative  Negative for abdominal distention, abdominal pain, constipation, diarrhea, nausea and vomiting  Endocrine: Negative  Genitourinary: Negative  Negative for decreased urine volume, difficulty urinating, dysuria, enuresis, flank pain, frequency, genital sores, hematuria and urgency  Reports increased bubbles in the urine   Musculoskeletal: Negative for back pain and myalgias  Skin: Negative for pallor and rash  Allergic/Immunologic: Negative  Negative for immunocompromised state  Neurological: Negative for facial asymmetry and speech difficulty  Psychiatric/Behavioral: Negative for agitation and confusion  AUA SYMPTOM SCORE    Flowsheet Row Most Recent Value   AUA SYMPTOM SCORE    How often have you had a sensation of not emptying your bladder completely after you finished urinating? 0   How often have you had to urinate again less than two hours after you finished urinating?  2   How often have you found you stopped and started again several times when you urinate? 0   How often have you found it difficult to postpone urination? 0   How often have you had a weak urinary stream? 1   How often have you had to push or strain to begin urination? 0   How many times did you most typically get up to urinate from the time you went to bed at night until the time you got up in the morning? 2   Quality of Life: If you were to spend the rest of your life with your urinary condition just the way it is now, how would you feel about that? 1   AUA SYMPTOM SCORE 5                Allergies     No Known Allergies    Physical Exam     Physical Exam  Vitals reviewed  Constitutional:       General: He is not in acute distress  Appearance: Normal appearance  He is normal weight  He is not ill-appearing, toxic-appearing or diaphoretic  HENT:      Head: Normocephalic and atraumatic  Eyes:      General: No scleral icterus  Cardiovascular:      Rate and Rhythm: Normal rate  Pulmonary:      Effort: Pulmonary effort is normal  No respiratory distress  Abdominal:      General: Abdomen is flat  There is no distension  Genitourinary:     Comments: Prostate smooth nontender without nodules or indurations  Musculoskeletal:         General: No swelling  Cervical back: Normal range of motion  Right lower leg: No edema  Left lower leg: No edema  Skin:     General: Skin is warm and dry  Coloration: Skin is not jaundiced or pale  Findings: No rash  Neurological:      General: No focal deficit present  Mental Status: He is alert and oriented to person, place, and time  Gait: Gait normal    Psychiatric:         Mood and Affect: Mood normal          Behavior: Behavior normal          Thought Content:  Thought content normal          Judgment: Judgment normal          Vital Signs     Vitals:    07/21/22 0807   BP: 120/80   Pulse: 72   Weight: 87 5 kg (193 lb)   Height: 5' 11" (1 803 m)       Current Medications       Current Outpatient Medications:     cholecalciferol (VITAMIN D3) 1,000 units tablet, Take 1,000 Units by mouth daily, Disp: , Rfl:     apixaban (ELIQUIS) 5 mg, Take 1 tablet (5 mg total) by mouth 2 (two) times a day, Disp: 180 tablet, Rfl: 3    chlorthalidone 25 mg tablet, Take 1 tablet (25 mg total) by mouth daily, Disp: 90 tablet, Rfl: 3    diltiazem (CARDIZEM CD) 120 mg 24 hr capsule, Take 1 capsule (120 mg total) by mouth daily (Patient not taking: No sig reported), Disp: 90 capsule, Rfl: 3    spironolactone (ALDACTONE) 25 mg tablet, Take 1 tablet (25 mg total) by mouth daily, Disp: 90 tablet, Rfl: 1    Active Problems     Patient Active Problem List   Diagnosis    Benign hypertensive CKD    Erectile dysfunction of non-organic origin    Episodic tension-type headache, not intractable    Screening for prostate cancer    Vitamin D deficiency    Well adult exam    Need for influenza vaccination    Screening for colon cancer    Dyslipidemia    Benign essential hypertension    Stage 3 chronic kidney disease ([facility] )    Dilated aortic root (HCC)    JODY (obstructive sleep apnea)    Annual physical exam    Malignant lymphoma, lymphoplasmacytic (HCC)    Paroxysmal atrial fibrillation (HCC)    Abnormal gastrointestinal PET scan    Abnormal positron emission tomography (PET) scan    Family history of prostate cancer in father    Urine finding       Past Medical History     Past Medical History:   Diagnosis Date    Atrial fibrillation ([address]     Cancer ([address] )     lymphoma    CKD (chronic kidney disease)     Dilated aortic root ([facility] [address] )     Erectile dysfunction     Hyperlipidemia 6/28/2016    Hypertension     Hypertensive CKD (chronic kidney disease)     Mild renal insufficiency 9/26/2017    Vitamin D deficiency        Surgical History     Past Surgical History:   Procedure Laterality Date    COLONOSCOPY      HIATAL HERNIA REPAIR         Family History     Family History Problem Relation Age of Onset    Lung cancer Mother     Heart attack Father 76    Prostate cancer Father     Hyperlipidemia Father     No Known Problems Sister     Lymphoma Maternal Aunt     Fainting Maternal Grandfather        Social History     Social History     Social History     Tobacco Use   Smoking Status Never Smoker   Smokeless Tobacco Never Used       Past Surgical History:   Procedure Laterality Date    COLONOSCOPY      HIATAL HERNIA REPAIR           The following portions of the patient's history were reviewed and updated as appropriate: allergies, current medications, past family history, past medical history, past social history, past surgical history and problem list    Please note :  Voice dictation software has been used to create this document  There may be inadvertent transcription errors      [address] [address] [place]

## 2022-07-21 NOTE — ASSESSMENT & PLAN NOTE
· PET scan with small amount of activity in the prostate  · MRI PI-RADS 2  · PSA stable  · PSA/LALO  yearly

## 2022-07-22 ENCOUNTER — TELEPHONE (OUTPATIENT)
Dept: UROLOGY | Facility: CLINIC | Age: 62
End: 2022-07-22

## 2022-07-22 DIAGNOSIS — R82.90 URINE FINDING: Primary | ICD-10-CM

## 2022-07-22 NOTE — TELEPHONE ENCOUNTER
----- Message from Sapphire Rapp sent at 7/22/2022 10:50 AM EDT -----  Regarding: Protein / Creatine ratio  Good morning   My urine test results, the Protein/ creatine ratio came back high  What does that indicate and what should the next steps be to follow up?   Thank you   Dom Correa

## 2022-07-22 NOTE — TELEPHONE ENCOUNTER
Called and left message for patient informing him that he was referred to nephrology for next steps  Office number left in message for a patient

## 2022-07-25 ENCOUNTER — TELEPHONE (OUTPATIENT)
Dept: NEPHROLOGY | Facility: CLINIC | Age: 62
End: 2022-07-25

## 2022-07-25 NOTE — TELEPHONE ENCOUNTER
Received a referral for patient to be seen  Patient is already established with Dr Saba Solorzano  I called to schedule an appt but patient will have to give a call back with his work schedule  Patient was scheduled with Dr Stephanie Rivera but appt is cancelled

## 2022-07-26 NOTE — TELEPHONE ENCOUNTER
Patient called back to r/s his appointment  Patient was offered first available with Dr Roger Banuelos which was in November but he did not want to wait that long due to recent labs  Was given visit with Tamela in [address] [place] [address] for 10/6

## 2022-08-01 ENCOUNTER — TELEPHONE (OUTPATIENT)
Dept: HEMATOLOGY ONCOLOGY | Facility: CLINIC | Age: 62
End: 2022-08-01

## 2022-08-01 NOTE — TELEPHONE ENCOUNTER
Mayuri Modi was due to see Dr Kaycee Chi in the [facility] office on 8/25  Dr Kaycee Chi will not be in the office that day  I called and left a message explaining that to Jose Alberto Wood and I let him know that due to Dr Melissa ortega of [facility] he was automatically rescheduled for his next available opening  His new appointment is for 9/9 at 8am  I left the Dave Meek line number and will also send a message through his My Chart

## 2022-08-19 ENCOUNTER — TELEPHONE (OUTPATIENT)
Dept: HEMATOLOGY ONCOLOGY | Facility: CLINIC | Age: 62
End: 2022-08-19

## 2022-08-19 NOTE — TELEPHONE ENCOUNTER
Dr Ismael Braga will not be in the office on Sept 9th the day that Thompson's rescheduled appointment was moved to  Since this is the 2nd time that his appointment was changed I apologized for the inconvenience and left the [facility] for him  I will also send an updated  message through his My Chart

## 2022-08-22 ENCOUNTER — TELEPHONE (OUTPATIENT)
Dept: HEMATOLOGY ONCOLOGY | Facility: CLINIC | Age: 62
End: 2022-08-22

## 2022-08-22 NOTE — TELEPHONE ENCOUNTER
CALL RETURN FORM   Reason for patient call? Good morning I have Jenni Cushing dob 58 on my line, in regards to r/s his appt  Sept 2 isn't good for him  He wants to know what time in the afternoon on   the later the better       Patient's primary oncologist? Dr Fischer Led   Name of person the patient was calling for? Frieda    Any additional information to add, if applicable? [phone]   Informed patient that the message will be forwarded to the team and someone will get back to them as soon as possible    Did you relay this information to the patient?  yes

## 2022-08-31 ENCOUNTER — OFFICE VISIT (OUTPATIENT)
Dept: FAMILY MEDICINE CLINIC | Facility: CLINIC | Age: 62
End: 2022-08-31
Payer: COMMERCIAL

## 2022-08-31 VITALS
RESPIRATION RATE: 16 BRPM | SYSTOLIC BLOOD PRESSURE: 145 MMHG | HEIGHT: 71 IN | WEIGHT: 190.6 LBS | OXYGEN SATURATION: 98 % | TEMPERATURE: 97.6 F | BODY MASS INDEX: 26.68 KG/M2 | HEART RATE: 64 BPM | DIASTOLIC BLOOD PRESSURE: 90 MMHG

## 2022-08-31 DIAGNOSIS — I10 BENIGN ESSENTIAL HYPERTENSION: ICD-10-CM

## 2022-08-31 DIAGNOSIS — I48.0 PAROXYSMAL ATRIAL FIBRILLATION (HCC): ICD-10-CM

## 2022-08-31 DIAGNOSIS — C83.00 MALIGNANT LYMPHOMA, LYMPHOPLASMACYTIC (HCC): ICD-10-CM

## 2022-08-31 DIAGNOSIS — N18.31 STAGE 3A CHRONIC KIDNEY DISEASE (HCC): ICD-10-CM

## 2022-08-31 DIAGNOSIS — Z23 ENCOUNTER FOR IMMUNIZATION: ICD-10-CM

## 2022-08-31 DIAGNOSIS — Z00.00 ANNUAL PHYSICAL EXAM: Primary | ICD-10-CM

## 2022-08-31 PROCEDURE — 99396 PREV VISIT EST AGE 40-64: CPT | Performed by: FAMILY MEDICINE

## 2022-08-31 PROCEDURE — 90471 IMMUNIZATION ADMIN: CPT

## 2022-08-31 PROCEDURE — 90715 TDAP VACCINE 7 YRS/> IM: CPT

## 2022-08-31 RX ORDER — LOSARTAN POTASSIUM 25 MG/1
25 TABLET ORAL DAILY
Qty: 30 TABLET | Refills: 0 | Status: SHIPPED | OUTPATIENT
Start: 2022-08-31 | End: 2022-09-22

## 2022-08-31 NOTE — PROGRESS NOTES
[address]    NAME: Samuel Powell  AGE: 60 y o  SEX: male  : 1960     DATE: 2022     Assessment and Plan:     Problem List Items Addressed This Visit        Cardiovascular and Mediastinum    Benign essential hypertension     Trial losartan at a low dose  Had cough with lisinopril in past           Relevant Medications    losartan (COZAAR) 25 mg tablet    Paroxysmal atrial fibrillation (HCC)     No further issues, off eliquis            Genitourinary    Stage 3 chronic kidney disease ([facility] [address] )     Lab Results   Component Value Date    EGFR 67 2022    EGFR 65 2021    EGFR 62 10/29/2021    CREATININE 1 16 2022    CREATININE 1 19 2021    CREATININE 1 24 10/29/2021   improved  Will see nephrologist            Other    Annual physical exam - Primary     tdap today         Malignant lymphoma, lymphoplasmacytic ([facility] )     Seeing heme/onc         Encounter for immunization    Relevant Orders    TDAP VACCINE GREATER THAN OR EQUAL TO 6YO IM (Completed)          Immunizations and preventive care screenings were discussed with patient today  Appropriate education was printed on patient's after visit summary  Counseling:  Dental Health: discussed importance of regular tooth brushing, flossing, and dental visits  BMI Counseling: Body mass index is 26 51 kg/m²  The BMI is above normal  Nutrition recommendations include encouraging healthy choices of fruits and vegetables  No pharmacotherapy was ordered  Rationale for BMI follow-up plan is due to patient being overweight or obese  Depression Screening and Follow-up Plan: Patient was screened for depression during today's encounter  They screened negative with a PHQ-2 score of 0  Return in 1 year (on 2023)       Chief Complaint:     Chief Complaint   Patient presents with    Physical Exam     Patient is here today for an annual exam       History of Present Illness:     Adult Annual Physical   Patient here for a comprehensive physical exam  The patient reports problems - seeing heme for lymphoma, watching sugar, bp meds were causing fatigue  Diet and Physical Activity  Diet/Nutrition: well balanced diet  Exercise: walking  Depression Screening  PHQ-2/9 Depression Screening    Little interest or pleasure in doing things: 0 - not at all  Feeling down, depressed, or hopeless: 0 - not at all  PHQ-2 Score: 0  PHQ-2 Interpretation: Negative depression screen       General Health  Sleep: sleeps well  Hearing: normal - bilateral   Vision: wears glasses  Dental: regular dental visits   Health  Symptoms include: none     Review of Systems:     Review of Systems   Constitutional: Negative  HENT: Negative  Eyes: Negative  Respiratory: Negative  Cardiovascular: Negative  Gastrointestinal: Negative  Endocrine: Negative  Genitourinary: Negative  Musculoskeletal: Negative  Skin: Negative  Allergic/Immunologic: Negative  Neurological: Negative  Hematological: Negative  Psychiatric/Behavioral: Negative         Past Medical History:     Past Medical History:   Diagnosis Date    Atrial fibrillation ([facility]     Cancer ([facility] [facility] )     lymphoma    CKD (chronic kidney disease)     Dilated aortic root (HCC)     Erectile dysfunction     Hyperlipidemia 6/28/2016    Hypertension     Hypertensive CKD (chronic kidney disease)     Mild renal insufficiency 9/26/2017    Vitamin D deficiency       Past Surgical History:     Past Surgical History:   Procedure Laterality Date    COLONOSCOPY      HIATAL HERNIA REPAIR        Family History:     Family History   Problem Relation Age of Onset    Lung cancer Mother     Heart attack Father 76    Prostate cancer Father     Hyperlipidemia Father     No Known Problems Sister     Lymphoma Maternal Aunt     Fainting Maternal Grandfather       Social History:     Social History Socioeconomic History    Marital status: /Civil Union     Spouse name: None    Number of children: None    Years of education: None    Highest education level: None   Occupational History    None   Tobacco Use    Smoking status: Never Smoker    Smokeless tobacco: Never Used   Vaping Use    Vaping Use: Never used   Substance and Sexual Activity    Alcohol use: Yes     Comment: social    Drug use: No    Sexual activity: Yes     Partners: Female   Other Topics Concern    None   Social History Narrative    None     Social Determinants of Health     Financial Resource Strain: Not on file   Food Insecurity: Not on file   Transportation Needs: Not on file   Physical Activity: Not on file   Stress: Not on file   Social Connections: Not on file   Intimate Partner Violence: Not on file   Housing Stability: Not on file      Current Medications:     Current Outpatient Medications   Medication Sig Dispense Refill    cholecalciferol (VITAMIN D3) 1,000 units tablet Take 1,000 Units by mouth daily      losartan (COZAAR) 25 mg tablet Take 1 tablet (25 mg total) by mouth daily 30 tablet 0     No current facility-administered medications for this visit  Allergies:     No Known Allergies   Physical Exam:     /90   Pulse 64   Temp 97 6 °F (36 4 °C) (Tympanic)   Resp 16   Ht 5' 11 1" (1 806 m)   Wt 86 5 kg (190 lb 9 6 oz)   SpO2 98%   BMI 26 51 kg/m²     Physical Exam  Vitals and nursing note reviewed  Constitutional:       Appearance: He is well-developed  HENT:      Head: Normocephalic and atraumatic  Right Ear: External ear normal       Left Ear: External ear normal       Nose: Nose normal    Eyes:      Conjunctiva/sclera: Conjunctivae normal       Pupils: Pupils are equal, round, and reactive to light  Cardiovascular:      Rate and Rhythm: Normal rate and regular rhythm  Heart sounds: Normal heart sounds     Pulmonary:      Effort: Pulmonary effort is normal       Breath sounds: Normal breath sounds  Abdominal:      General: Bowel sounds are normal       Palpations: Abdomen is soft  Musculoskeletal:         General: Normal range of motion  Cervical back: Normal range of motion and neck supple  Skin:     General: Skin is warm and dry  Capillary Refill: Capillary refill takes less than 2 seconds  Neurological:      Mental Status: He is alert and oriented to person, place, and time  Psychiatric:         Behavior: Behavior normal          Thought Content:  Thought content normal          Judgment: Judgment normal           Mattie Burgos DO  [address] [facility]

## 2022-08-31 NOTE — ASSESSMENT & PLAN NOTE
Lab Results   Component Value Date    EGFR 67 06/02/2022    EGFR 65 12/16/2021    EGFR 62 10/29/2021    CREATININE 1 16 06/02/2022    CREATININE 1 19 12/16/2021    CREATININE 1 24 10/29/2021   improved  Will see nephrologist

## 2022-08-31 NOTE — PATIENT INSTRUCTIONS

## 2022-09-01 ENCOUNTER — OFFICE VISIT (OUTPATIENT)
Dept: HEMATOLOGY ONCOLOGY | Facility: CLINIC | Age: 62
End: 2022-09-01
Payer: COMMERCIAL

## 2022-09-01 VITALS
HEART RATE: 61 BPM | SYSTOLIC BLOOD PRESSURE: 124 MMHG | BODY MASS INDEX: 26.74 KG/M2 | RESPIRATION RATE: 16 BRPM | OXYGEN SATURATION: 98 % | HEIGHT: 71 IN | WEIGHT: 191 LBS | TEMPERATURE: 98.7 F | DIASTOLIC BLOOD PRESSURE: 60 MMHG

## 2022-09-01 DIAGNOSIS — C83.00 MALIGNANT LYMPHOMA, LYMPHOPLASMACYTIC (HCC): Primary | ICD-10-CM

## 2022-09-01 PROCEDURE — 99214 OFFICE O/P EST MOD 30 MIN: CPT | Performed by: INTERNAL MEDICINE

## 2022-09-01 NOTE — PROGRESS NOTES
[facility] - Hematology & Medical Oncology  Outpatient Visit Encounter Note    Waynetta Meckel 60 y o  male [id number] Date:  9/1/2022    ONCOLOGY HISTORY      Oncology History   Malignant lymphoma, lymphoplasmacytic ([facility] [address] )   2/18/2021 Initial Diagnosis    Malignant lymphoma, lymphoplasmacytic ([facility] [address] )    This addendum is included to report the results of MYD88 testing which is positive, favoring a diagnosis of lymphoplasmacytic lymphoma, though is not definitively specific  Correlation with clinical impression is recommended  - MYD88 Mutation Analysis performed on the Colorectal Polyp @ [employer] (Specimen ID: [id number], evaluated by Jessica Haile MD, PhD) as follows:   Results: MYD88 Mutation(s):     Detected  Mutation(s) Detected:   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)   Addendum electronically signed by Angelica Blackwood MD on 3/1/2021 at 11:56 AM   Final Diagnosis   A  Colon, descending polyp, biopsy:  -  Tubular adenoma, negative for high-grade dysplasia  B  Colon, sigmoid polyp, biopsy:  -  Small B cell neoplasm with kappa light chain restricted plasma cell proliferation, compatible with lymphoma (see note)     PETCT 3/19/21  1  Small focus of FDG uptake at the gastric antrum for which underlying lesion should be excluded  Correlate with endoscopy  2   Single FDG avid left axillary lymph node  This may be related to recent Covid vaccination but given the history of lymphoma, malignancy cannot be entirely excluded  This can be reassessed on short-term follow-up CT or PET/CT in 3 months  3  Small focus of FDG uptake at the inferior aspect of the prostate, underlying lesion not excluded  Correlate with PSA levels and urologic evaluation  4  Mild FDG uptake in a left upper cervical chain lymph node may be reactive but can be reassessed on follow-up  5   Mildly aneurysmal ascending thoracic aorta at 4 1 cm in diameter      BASELINE DISEASE CHARACTERISTICS 2/20/21  SPEP - normal  Quant Ig - normal including normal IgM  SLFC Ratio - normal  CBC - normal             SUBJECTIVE      ECOG Initial Visit 0   ECOG This Visit 0   Pain Score Initial Visit 0   Pain Score This Visit 0   Personal Cancer History None   Family Cancer History Mother (Lung), Dad (Prostate), Maternal Aunt (Gyn)   Tobacco Use History Denies   Alcohol Use History Denies   Thrombotic History Denies   Occupation Purchasing     INITIAL CONSULT    Sophie Santana is a 60 y o  here for new consultation with me today  The patient is referred by colorectal surgery and the reason for consultation is b-cell lymphoma diagnosed during screening colonoscopy  In review of his chart and talking with him, he underwent routine colonoscopy on 2/5/21 with Dr Kina Bingham and was found to have two sessile polyps - in the descending and sigmoid colon that were removed and send for biopsy  While the descending polyp resulted as tubular adenoma, the sigmoid polyp resulted as low-grade CD20+ B-cell lymphoma  He is here with his wife today  He offers no acute complaints  He says the results of his biopsy from the colonoscopy were very surprising  He denies any fevers chills nausea vomiting chest pain abdominal pain shortness of breath cough weight loss night sweats fevers chills  Denies any diarrhea constipation or bloody stools  He denies any genitourinary ambulatory dysfunction symptoms as well  He has been living his routine active life  He said that the colonoscopy he underwent was for routine screening  THIS VISIT    Doing well overall  Denies any f/c/n/v/cp/ap/sob/cough  Denies diarrhea or skin rash  After cutting sugars, doing a lot better  I have reviewed the relevant past medical, surgical, social and family history  I have also reviewed allergies and medications for this patient  Review of Systems  Review of Systems   All other systems reviewed and are negative          OBJECTIVE     Physical Exam  Vitals:    09/01/22 1438   BP: 124/60   BP Location: Left arm   Patient Position: Sitting   Cuff Size: Adult   Pulse: 61   Resp: 16   Temp: 98 7 °F (37 1 °C)   TempSrc: Temporal   SpO2: 98%   Weight: 86 6 kg (191 lb)   Height: 5' 11 1" (1 806 m)       Physical Exam  Constitutional:       General: He is not in acute distress  Appearance: He is not toxic-appearing  HENT:      Head: Normocephalic and atraumatic  Eyes:      General: No scleral icterus  Cardiovascular:      Rate and Rhythm: Normal rate  Pulmonary:      Effort: Pulmonary effort is normal  No respiratory distress  Abdominal:      General: There is no distension  Musculoskeletal:         General: Normal range of motion  Cervical back: Normal range of motion and neck supple  Neurological:      General: No focal deficit present  Mental Status: He is alert and oriented to person, place, and time  Mental status is at baseline  Imaging  Relevant imaging reviewed in chart    Labs  Relevant labs reviewed in chart   ASSESSMENT & PLAN      Diagnosis ICD-10-CM Associated Orders   1  Malignant lymphoma, lymphoplasmacytic (HCC)  C83 00 CBC and differential     Comprehensive metabolic panel     Protein electrophoresis, serum     Immunoglobulin free LT chains blood     IgG, IgA, IgM     LD,Blood          60 y o  male diagnosed with sigmoid polyp upon a screening colonoscopy that is biopsy proven to be lymphoplasmacytic lymphoma  Additional testing proved that he was  MYD88 positive and given his normal SPEP and quantitative immunoglobulin, his diagnosis of LPL stands   Oncology history updated accordingly this visit   Goals of care/patient communication  o Doing better and hence recommend surveillance with labs as ordered     Disease Features/Tumor Markers/Genetics  o [id number]   Treatment  o   Surveillance as detailed above   Labs  o As ordered  [facility]  o No indication for imaging from a lymphoma perspective      Follow Up   Early December 2022      All questions were answered to the patient's satisfaction during this encounter  They appreciated and thanked me for spending time with them  The patient knows the contact information for our office and know to reach out for any relevant concerns related to this encounter  For all other listed problems and medical diagnosis in his chart - they are managed by PCP and/or other specialists, which patient acknowledges  Nataliya Bhatia MD  Hematology & Medical Oncology

## 2022-09-22 DIAGNOSIS — I10 BENIGN ESSENTIAL HYPERTENSION: ICD-10-CM

## 2022-09-22 RX ORDER — LOSARTAN POTASSIUM 25 MG/1
25 TABLET ORAL DAILY
Qty: 90 TABLET | Refills: 1 | Status: SHIPPED | OUTPATIENT
Start: 2022-09-22

## 2022-09-30 ENCOUNTER — TELEPHONE (OUTPATIENT)
Dept: NEPHROLOGY | Facility: CLINIC | Age: 62
End: 2022-09-30

## 2022-09-30 DIAGNOSIS — N18.31 STAGE 3A CHRONIC KIDNEY DISEASE (HCC): Primary | ICD-10-CM

## 2022-09-30 DIAGNOSIS — I12.9 BENIGN HYPERTENSIVE KIDNEY DISEASE WITH CHRONIC KIDNEY DISEASE STAGE I THROUGH STAGE IV, OR UNSPECIFIED: ICD-10-CM

## 2022-09-30 NOTE — TELEPHONE ENCOUNTER
DORIAN for patient letting him know there is lab work for him to have done before his appt on 10/6  Asked him to call back if he has any questions

## 2022-10-01 ENCOUNTER — APPOINTMENT (OUTPATIENT)
Dept: LAB | Facility: MEDICAL CENTER | Age: 62
End: 2022-10-01
Payer: COMMERCIAL

## 2022-10-01 DIAGNOSIS — I12.9 BENIGN HYPERTENSIVE KIDNEY DISEASE WITH CHRONIC KIDNEY DISEASE STAGE I THROUGH STAGE IV, OR UNSPECIFIED: ICD-10-CM

## 2022-10-01 DIAGNOSIS — N18.31 STAGE 3A CHRONIC KIDNEY DISEASE (HCC): ICD-10-CM

## 2022-10-01 LAB
25(OH)D3 SERPL-MCNC: 33.8 NG/ML (ref 30–100)
ALBUMIN SERPL BCP-MCNC: 3.2 G/DL (ref 3.5–5)
ANION GAP SERPL CALCULATED.3IONS-SCNC: 4 MMOL/L (ref 4–13)
BASOPHILS # BLD AUTO: 0.03 THOUSANDS/ΜL (ref 0–0.1)
BASOPHILS NFR BLD AUTO: 1 % (ref 0–1)
BUN SERPL-MCNC: 19 MG/DL (ref 5–25)
CALCIUM ALBUM COR SERPL-MCNC: 9.5 MG/DL (ref 8.3–10.1)
CALCIUM SERPL-MCNC: 8.9 MG/DL (ref 8.3–10.1)
CHLORIDE SERPL-SCNC: 107 MMOL/L (ref 96–108)
CO2 SERPL-SCNC: 25 MMOL/L (ref 21–32)
CREAT SERPL-MCNC: 1.2 MG/DL (ref 0.6–1.3)
CREAT UR-MCNC: 67.7 MG/DL
CREAT UR-MCNC: 67.7 MG/DL
EOSINOPHIL # BLD AUTO: 0.08 THOUSAND/ΜL (ref 0–0.61)
EOSINOPHIL NFR BLD AUTO: 2 % (ref 0–6)
ERYTHROCYTE [DISTWIDTH] IN BLOOD BY AUTOMATED COUNT: 13 % (ref 11.6–15.1)
GFR SERPL CREATININE-BSD FRML MDRD: 63 ML/MIN/1.73SQ M
GLUCOSE P FAST SERPL-MCNC: 97 MG/DL (ref 65–99)
HCT VFR BLD AUTO: 46.3 % (ref 36.5–49.3)
HGB BLD-MCNC: 14.3 G/DL (ref 12–17)
IMM GRANULOCYTES # BLD AUTO: 0.01 THOUSAND/UL (ref 0–0.2)
IMM GRANULOCYTES NFR BLD AUTO: 0 % (ref 0–2)
LYMPHOCYTES # BLD AUTO: 1.11 THOUSANDS/ΜL (ref 0.6–4.47)
LYMPHOCYTES NFR BLD AUTO: 21 % (ref 14–44)
MAGNESIUM SERPL-MCNC: 2.3 MG/DL (ref 1.6–2.6)
MCH RBC QN AUTO: 30.4 PG (ref 26.8–34.3)
MCHC RBC AUTO-ENTMCNC: 30.9 G/DL (ref 31.4–37.4)
MCV RBC AUTO: 98 FL (ref 82–98)
MICROALBUMIN UR-MCNC: <5 MG/L (ref 0–20)
MICROALBUMIN/CREAT 24H UR: <7 MG/G CREATININE (ref 0–30)
MONOCYTES # BLD AUTO: 0.56 THOUSAND/ΜL (ref 0.17–1.22)
MONOCYTES NFR BLD AUTO: 11 % (ref 4–12)
NEUTROPHILS # BLD AUTO: 3.43 THOUSANDS/ΜL (ref 1.85–7.62)
NEUTS SEG NFR BLD AUTO: 65 % (ref 43–75)
NRBC BLD AUTO-RTO: 0 /100 WBCS
PHOSPHATE SERPL-MCNC: 2.3 MG/DL (ref 2.3–4.1)
PLATELET # BLD AUTO: 225 THOUSANDS/UL (ref 149–390)
PMV BLD AUTO: 11.6 FL (ref 8.9–12.7)
POTASSIUM SERPL-SCNC: 4 MMOL/L (ref 3.5–5.3)
PROT UR-MCNC: 11 MG/DL
PROT/CREAT UR: 0.16 MG/G{CREAT} (ref 0–0.1)
PTH-INTACT SERPL-MCNC: 44 PG/ML (ref 18.4–80.1)
RBC # BLD AUTO: 4.71 MILLION/UL (ref 3.88–5.62)
SODIUM SERPL-SCNC: 136 MMOL/L (ref 135–147)
WBC # BLD AUTO: 5.22 THOUSAND/UL (ref 4.31–10.16)

## 2022-10-01 PROCEDURE — 36415 COLL VENOUS BLD VENIPUNCTURE: CPT

## 2022-10-01 PROCEDURE — 82043 UR ALBUMIN QUANTITATIVE: CPT

## 2022-10-01 PROCEDURE — 85025 COMPLETE CBC W/AUTO DIFF WBC: CPT

## 2022-10-01 PROCEDURE — 80069 RENAL FUNCTION PANEL: CPT

## 2022-10-01 PROCEDURE — 83735 ASSAY OF MAGNESIUM: CPT

## 2022-10-01 PROCEDURE — 84156 ASSAY OF PROTEIN URINE: CPT

## 2022-10-01 PROCEDURE — 82306 VITAMIN D 25 HYDROXY: CPT

## 2022-10-01 PROCEDURE — 82570 ASSAY OF URINE CREATININE: CPT

## 2022-10-01 PROCEDURE — 83970 ASSAY OF PARATHORMONE: CPT

## 2022-10-05 ENCOUNTER — TELEPHONE (OUTPATIENT)
Dept: NEPHROLOGY | Facility: CLINIC | Age: 62
End: 2022-10-05

## 2022-10-05 NOTE — PATIENT INSTRUCTIONS
Your kidney function remains stable  Most recent creatinine 1 20, at baseline  Minimal, in significant amounts of protein in urine     We will continue routine surveillance as outlined below  Avoid all NSAIDs to include ibuprofen, Motrin, Aleve, Advil, Naproxen, Celebrex, Indomethacin, Toradol  Stay well hydrated  Continue all prescribed medications  Call if blood pressure consistently more than 140/90 or less than 110/50s  High and low blood pressures may affect your kidney function  Recommend low salt diet (2 gm sodium diet)  Please let us know if you are scheduled for any studies with IV contrast (ex: CT scan, arteriogram or cardiac catheterization)   Follow up in 6 months with Dr Elina Horvath with repeat labs prior to appointment  Please contact the office with new symptoms or concerns

## 2022-10-05 NOTE — TELEPHONE ENCOUNTER
Appointment Confirmation   Person confirmed appointment with  If not patient, name of the person lm    Date and time of appointment 10/6   8:30    Patient acknowledged and will be at appointment? no    Did you advise the patient that they will need a urine sample if they are a new patient?  N/A    Did you advise the patient to bring their current medications for verification? (including any OTC) Yes    Additional Information

## 2022-10-05 NOTE — PROGRESS NOTES
Assessment and Plan:  Chronic kidney disease II/IIIA:    -Etiology:  Suspect hypertensive nephrosclerosis, age-related nephron loss plus NSAID nephropathy  -SPEP normal  -renal ultrasound 7/2/2019 with no hydronephrosis, bilateral kidneys measuring 11 cm  -Baseline creatinine 1 2-1 5  -Follows with Dr Selene Reyna  -recent creatinine 1 20, GFR 63  -UPCr 0 16, microalbumin/creatinine ratio <7  Minimal   Stable since 2020  Continue ARB  Started 3 weeks ago  -electrolytes and acid-base stable  -Hgb normal at 14 5  -avoid nephrotoxins, NSAIDs, hypotension and IV contrast if possible  -stay well hydrated  -follow-up with Dr Selene Reyna in 6 months with repeat blood and urine studies  Hypertension/Volume status:  -BP well controlled and at goal  -renal artery duplex 12/17/2021 with no significant arterial occlusive disease  No renal atrophy  RVRI 0 6/0 7  -volume status euvolemic  -continue current medications: losartan 25 mg daily  -history of intolerance to multiple prior antihypertensive medications including lisinopril, Bystolic and candesartan  Currently tolerating losartan x 3 weeks   -Avoid hypotension or fluctuations in blood pressure    -low sodium (2 gm) diet  Encourage regular exercise  -continue to monitor BP at home    Bone Mineral Disease of CKD:  -phosphorus 2 3, at goal  -PTH 44, at goal  -vitamin-D deficiency:  Vitamin D 25 33 8, at goal   On cholecalciferol 1000 units daily  -renal diet  -continue to monitor    Dyslipidemia:  -stable  -lipid panel: , unchanged and above goal  Goal <70  -not on statin  -low-cholesterol and low-fat diet, aerobic exercise  -management per PCP    B-cell lymphoma:  -diagnosed on colonoscopy with removal of sigmoid polyp  -follow-up by Hematology  Management per Hematology    Age related screening: Your primary caregiver may do yearly screening for colorectal cancer  It is recommended in all men and women over 48years old   You may have screening earlier if you have colon disease or a family history of colorectal cancer      Corinne Dawson was seen today for follow-up, hypertension and chronic kidney disease  Diagnoses and all orders for this visit:    Stage 3a chronic kidney disease ([address] )  -     Urinalysis with microscopic; Future  -     Protein / creatinine ratio, urine; Future  -     Magnesium; Future  -     PTH, intact; Future  -     Renal function panel; Future  -     CBC; Future  -     Microalbumin / creatinine urine ratio; Future    Benign essential hypertension    Benign hypertensive kidney disease with chronic kidney disease stage I through stage IV, or unspecified    Vitamin D deficiency    Malignant lymphoma, lymphoplasmacytic (HCC)    Dyslipidemia    Urine finding  -     Ambulatory Referral to Nephrology        Follow up with Dr Kait Garcia in 6 months with repeat blood and urine studies  Please call the office with any questions or concerns  Reason for Visit: Follow-up, Hypertension, and Chronic Kidney Disease    HPI: Juan M Martins is a 60 y o  male with CKD 2/3a, HLD, HTN (x5yrs), Vitamin D deficiency, atrial fibrillation and B-cell lymphoma diagnosed during colonoscopy who presents for follow up of CKD  Patient followed by Dr Kait Garcia, last seen 2/6/2020 but lost to follow-up  Pt referred back by Urology due to symptoms of bubbles in urine and urine protein creatinine ratio 0 26  Most recent creatinine 1 20, stable and at baseline  Recent urine protein creatinine ratio improved at 0 16  Marybel Rogers Patient denies recent hospitalizations or ER visits  Patient denies NSAID use  Patient denies nausea, vomiting, diarrhea, dyspnea, orthopnea, edema, hematuria or foamy urine       ROS: A complete review of systems was performed and was negative unless otherwise noted in the history of present illness  Allergies:   Patient has no known allergies      Medications:     Current Outpatient Medications:     cholecalciferol (VITAMIN D3) 1,000 units tablet, Take 1,000 Units by mouth daily, Disp: , Rfl:     losartan (COZAAR) 25 mg tablet, TAKE 1 TABLET (25 MG TOTAL) BY MOUTH DAILY  , Disp: 90 tablet, Rfl: 1    Past Medical History:   Diagnosis Date    Atrial fibrillation ([address] )     Cancer (HCC)     lymphoma    CKD (chronic kidney disease)     Dilated aortic root (HCC)     Erectile dysfunction     Hyperlipidemia 6/28/2016    Hypertension     Hypertensive CKD (chronic kidney disease)     Mild renal insufficiency 9/26/2017    Vitamin D deficiency      Past Surgical History:   Procedure Laterality Date    COLONOSCOPY      HIATAL HERNIA REPAIR       Family History   Problem Relation Age of Onset    Lung cancer Mother     Heart attack Father 76    Prostate cancer Father     Hyperlipidemia Father     No Known Problems Sister     Lymphoma Maternal Aunt     Fainting Maternal Grandfather       reports that he has never smoked  He has never used smokeless tobacco  He reports current alcohol use  He reports that he does not use drugs  Physical Exam:   Vitals:    10/06/22 0829 10/06/22 0855   BP: 150/90 138/88   BP Location: Left arm Left arm   Patient Position: Sitting Sitting   Cuff Size: Adult Standard   Pulse: 61    SpO2: 98%    Weight: 87 5 kg (193 lb)    Height: 5' 11" (1 803 m)      Body mass index is 26 92 kg/m²  General:  Awake, alert, appears comfortable and in no acute distress  Nontoxic  Skin:  No rash, warm, good skin turgor   Eyes:  PERRL, EOMI, sclerae nonicteric   no periorbital edema   ENT:  Moist mucous membranes  Neck:  Trachea midline, symmetric  No JVD  No carotid bruits  Chest:  Clear to auscultation bilaterally without wheezes, crackles or rhonchi  CVS:  Regular rate and rhythm without murmur, gallop or rub  S1 and S2 identified and normal   No S3, S4    Abdomen:  Soft, nontender, nondistended without masses  Normal bowel sounds x 4 quadrants  No bruit  Extremities:  Warm, pink, motor and sensory intact and well perfused  No cyanosis, pallor    No BLE edema  Neuro:  Awake, alert, oriented x3  Grossly intact  Psych:  Appropriate affect  Mentating appropriately  Normal mental status exam      Procedure:  No results found for this or any previous visit  Lab Results   Component Value Date    CALCIUM 8 9 10/01/2022     10/07/2017    K 4 0 10/01/2022    CO2 25 10/01/2022     10/01/2022    BUN 19 10/01/2022    CREATININE 1 20 10/01/2022       Results from last 7 days   Lab Units 10/01/22  0841   WBC Thousand/uL 5 22   HEMOGLOBIN g/dL 14 3   HEMATOCRIT % 46 3   PLATELETS Thousands/uL 225   POTASSIUM mmol/L 4 0   CHLORIDE mmol/L 107   CO2 mmol/L 25   BUN mg/dL 19   CREATININE mg/dL 1 20   CALCIUM mg/dL 8 9   MAGNESIUM mg/dL 2 3   PHOSPHORUS mg/dL 2 3       EMR, including Epic, [facility] Everywhere and outside scanned documents reviewed  I have personally reviewed the blood work as stated above and in my note  I have personally reviewed PCP, consultants and prior nephrology notes

## 2022-10-06 ENCOUNTER — OFFICE VISIT (OUTPATIENT)
Dept: NEPHROLOGY | Facility: CLINIC | Age: 62
End: 2022-10-06
Payer: COMMERCIAL

## 2022-10-06 VITALS
BODY MASS INDEX: 27.02 KG/M2 | HEIGHT: 71 IN | WEIGHT: 193 LBS | HEART RATE: 61 BPM | OXYGEN SATURATION: 98 % | DIASTOLIC BLOOD PRESSURE: 88 MMHG | SYSTOLIC BLOOD PRESSURE: 138 MMHG

## 2022-10-06 DIAGNOSIS — N18.31 STAGE 3A CHRONIC KIDNEY DISEASE (HCC): Primary | ICD-10-CM

## 2022-10-06 DIAGNOSIS — C83.00 MALIGNANT LYMPHOMA, LYMPHOPLASMACYTIC (HCC): ICD-10-CM

## 2022-10-06 DIAGNOSIS — I12.9 BENIGN HYPERTENSIVE KIDNEY DISEASE WITH CHRONIC KIDNEY DISEASE STAGE I THROUGH STAGE IV, OR UNSPECIFIED: ICD-10-CM

## 2022-10-06 DIAGNOSIS — I10 BENIGN ESSENTIAL HYPERTENSION: ICD-10-CM

## 2022-10-06 DIAGNOSIS — E78.5 DYSLIPIDEMIA: ICD-10-CM

## 2022-10-06 DIAGNOSIS — R82.90 URINE FINDING: ICD-10-CM

## 2022-10-06 DIAGNOSIS — E55.9 VITAMIN D DEFICIENCY: ICD-10-CM

## 2022-10-06 PROCEDURE — 99214 OFFICE O/P EST MOD 30 MIN: CPT | Performed by: PHYSICIAN ASSISTANT

## 2022-10-12 ENCOUNTER — DOCUMENTATION (OUTPATIENT)
Dept: NEPHROLOGY | Facility: CLINIC | Age: 62
End: 2022-10-12

## 2022-10-14 ENCOUNTER — TELEPHONE (OUTPATIENT)
Dept: NEPHROLOGY | Facility: CLINIC | Age: 62
End: 2022-10-14

## 2022-10-14 NOTE — TELEPHONE ENCOUNTER
Left voice mail to schedule appointment with Dr Gladis De Oliveira in the [facility] location  This is for their April appointment  This is the 2nd attempt

## 2022-11-14 ENCOUNTER — TELEPHONE (OUTPATIENT)
Dept: NEPHROLOGY | Facility: CLINIC | Age: 62
End: 2022-11-14

## 2022-11-16 DIAGNOSIS — I10 BENIGN ESSENTIAL HYPERTENSION: ICD-10-CM

## 2022-11-16 RX ORDER — LOSARTAN POTASSIUM 25 MG/1
25 TABLET ORAL DAILY
Qty: 90 TABLET | Refills: 0 | Status: SHIPPED | OUTPATIENT
Start: 2022-11-16

## 2022-11-16 NOTE — TELEPHONE ENCOUNTER
Medication Refill Request     Name losartan (COZAAR) 25 mg tablet   Dose/Frequency 1 tablet daily  Quantity 90  Verified pharmacy   [x]  Verified ordering Provider   [x]  Does patient have enough for the next 3 days? Yes [x] No []    Patient is also requesting a few refills if possible

## 2022-11-26 ENCOUNTER — APPOINTMENT (OUTPATIENT)
Dept: LAB | Facility: MEDICAL CENTER | Age: 62
End: 2022-11-26

## 2022-11-26 DIAGNOSIS — N18.31 STAGE 3A CHRONIC KIDNEY DISEASE (HCC): ICD-10-CM

## 2022-11-26 DIAGNOSIS — C83.00 MALIGNANT LYMPHOMA, LYMPHOPLASMACYTIC (HCC): ICD-10-CM

## 2022-11-26 LAB
ALBUMIN SERPL BCP-MCNC: 3.5 G/DL (ref 3.5–5)
ALP SERPL-CCNC: 44 U/L (ref 46–116)
ALT SERPL W P-5'-P-CCNC: 24 U/L (ref 12–78)
ANION GAP SERPL CALCULATED.3IONS-SCNC: 6 MMOL/L (ref 4–13)
AST SERPL W P-5'-P-CCNC: 13 U/L (ref 5–45)
BASOPHILS # BLD AUTO: 0.04 THOUSANDS/ÂΜL (ref 0–0.1)
BASOPHILS NFR BLD AUTO: 1 % (ref 0–1)
BILIRUB SERPL-MCNC: 0.54 MG/DL (ref 0.2–1)
BUN SERPL-MCNC: 23 MG/DL (ref 5–25)
CALCIUM SERPL-MCNC: 9.2 MG/DL (ref 8.3–10.1)
CHLORIDE SERPL-SCNC: 109 MMOL/L (ref 96–108)
CO2 SERPL-SCNC: 24 MMOL/L (ref 21–32)
CREAT SERPL-MCNC: 1.22 MG/DL (ref 0.6–1.3)
EOSINOPHIL # BLD AUTO: 0.08 THOUSAND/ÂΜL (ref 0–0.61)
EOSINOPHIL NFR BLD AUTO: 2 % (ref 0–6)
ERYTHROCYTE [DISTWIDTH] IN BLOOD BY AUTOMATED COUNT: 13.2 % (ref 11.6–15.1)
GFR SERPL CREATININE-BSD FRML MDRD: 62 ML/MIN/1.73SQ M
GLUCOSE P FAST SERPL-MCNC: 106 MG/DL (ref 65–99)
HCT VFR BLD AUTO: 43.3 % (ref 36.5–49.3)
HGB BLD-MCNC: 13.7 G/DL (ref 12–17)
IGA SERPL-MCNC: 229 MG/DL (ref 70–400)
IGG SERPL-MCNC: 854 MG/DL (ref 700–1600)
IGM SERPL-MCNC: 132 MG/DL (ref 40–230)
IMM GRANULOCYTES # BLD AUTO: 0.01 THOUSAND/UL (ref 0–0.2)
IMM GRANULOCYTES NFR BLD AUTO: 0 % (ref 0–2)
LDH SERPL-CCNC: 158 U/L (ref 81–234)
LYMPHOCYTES # BLD AUTO: 1.29 THOUSANDS/ÂΜL (ref 0.6–4.47)
LYMPHOCYTES NFR BLD AUTO: 25 % (ref 14–44)
MCH RBC QN AUTO: 31.4 PG (ref 26.8–34.3)
MCHC RBC AUTO-ENTMCNC: 31.6 G/DL (ref 31.4–37.4)
MCV RBC AUTO: 99 FL (ref 82–98)
MONOCYTES # BLD AUTO: 0.6 THOUSAND/ÂΜL (ref 0.17–1.22)
MONOCYTES NFR BLD AUTO: 12 % (ref 4–12)
NEUTROPHILS # BLD AUTO: 3.09 THOUSANDS/ÂΜL (ref 1.85–7.62)
NEUTS SEG NFR BLD AUTO: 60 % (ref 43–75)
NRBC BLD AUTO-RTO: 0 /100 WBCS
PLATELET # BLD AUTO: 218 THOUSANDS/UL (ref 149–390)
PMV BLD AUTO: 11.6 FL (ref 8.9–12.7)
POTASSIUM SERPL-SCNC: 4.2 MMOL/L (ref 3.5–5.3)
PROT SERPL-MCNC: 6.8 G/DL (ref 6.4–8.4)
RBC # BLD AUTO: 4.37 MILLION/UL (ref 3.88–5.62)
SODIUM SERPL-SCNC: 139 MMOL/L (ref 135–147)
WBC # BLD AUTO: 5.11 THOUSAND/UL (ref 4.31–10.16)

## 2022-11-29 LAB
KAPPA LC FREE SER-MCNC: 23.9 MG/L (ref 3.3–19.4)
KAPPA LC FREE/LAMBDA FREE SER: 1.9 {RATIO} (ref 0.26–1.65)
LAMBDA LC FREE SERPL-MCNC: 12.6 MG/L (ref 5.7–26.3)

## 2022-12-01 ENCOUNTER — OFFICE VISIT (OUTPATIENT)
Dept: HEMATOLOGY ONCOLOGY | Facility: CLINIC | Age: 62
End: 2022-12-01

## 2022-12-01 VITALS
HEART RATE: 68 BPM | TEMPERATURE: 96.9 F | WEIGHT: 188 LBS | OXYGEN SATURATION: 99 % | SYSTOLIC BLOOD PRESSURE: 128 MMHG | HEIGHT: 71 IN | DIASTOLIC BLOOD PRESSURE: 80 MMHG | BODY MASS INDEX: 26.32 KG/M2 | RESPIRATION RATE: 18 BRPM

## 2022-12-01 DIAGNOSIS — C83.00 MALIGNANT LYMPHOMA, LYMPHOPLASMACYTIC (HCC): Primary | ICD-10-CM

## 2022-12-01 LAB
ALBUMIN SERPL ELPH-MCNC: 3.95 G/DL (ref 3.5–5)
ALBUMIN SERPL ELPH-MCNC: 59 % (ref 52–65)
ALPHA1 GLOB SERPL ELPH-MCNC: 0.28 G/DL (ref 0.1–0.4)
ALPHA1 GLOB SERPL ELPH-MCNC: 4.2 % (ref 2.5–5)
ALPHA2 GLOB SERPL ELPH-MCNC: 0.68 G/DL (ref 0.4–1.2)
ALPHA2 GLOB SERPL ELPH-MCNC: 10.1 % (ref 7–13)
BETA GLOB ABNORMAL SERPL ELPH-MCNC: 0.45 G/DL (ref 0.4–0.8)
BETA1 GLOB SERPL ELPH-MCNC: 6.7 % (ref 5–13)
BETA2 GLOB SERPL ELPH-MCNC: 5.3 % (ref 2–8)
BETA2+GAMMA GLOB SERPL ELPH-MCNC: 0.36 G/DL (ref 0.2–0.5)
GAMMA GLOB ABNORMAL SERPL ELPH-MCNC: 0.98 G/DL (ref 0.5–1.6)
GAMMA GLOB SERPL ELPH-MCNC: 14.7 % (ref 12–22)
IGG/ALB SER: 1.44 {RATIO} (ref 1.1–1.8)
PROT PATTERN SERPL ELPH-IMP: NORMAL
PROT SERPL-MCNC: 6.7 G/DL (ref 6.4–8.2)

## 2022-12-01 NOTE — PROGRESS NOTES
[facility] - Hematology & Medical Oncology  Outpatient Visit Encounter Note    Ondina Avelar 60 y o  male DOB1960 [id number] Date:  12/1/2022    ONCOLOGY HISTORY      Oncology History   Malignant lymphoma, lymphoplasmacytic ([facility] [address] )   2/18/2021 Initial Diagnosis    Malignant lymphoma, lymphoplasmacytic ([facility] [address] )    This addendum is included to report the results of MYD88 testing which is positive, favoring a diagnosis of lymphoplasmacytic lymphoma, though is not definitively specific  Correlation with clinical impression is recommended  - MYD88 Mutation Analysis performed on the Colorectal Polyp @ [employer] (Specimen ID: [id number], evaluated by Yamila Cartagena MD, PhD) as follows:   Results: MYD88 Mutation(s):     Detected  Mutation(s) Detected:   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)   Addendum electronically signed by Ruth Ann Diaz MD on 3/1/2021 at 11:56 AM   Final Diagnosis   A  Colon, descending polyp, biopsy:  -  Tubular adenoma, negative for high-grade dysplasia  B  Colon, sigmoid polyp, biopsy:  -  Small B cell neoplasm with kappa light chain restricted plasma cell proliferation, compatible with lymphoma (see note)     PETCT 3/19/21  1  Small focus of FDG uptake at the gastric antrum for which underlying lesion should be excluded  Correlate with endoscopy  2   Single FDG avid left axillary lymph node  This may be related to recent Covid vaccination but given the history of lymphoma, malignancy cannot be entirely excluded  This can be reassessed on short-term follow-up CT or PET/CT in 3 months  3  Small focus of FDG uptake at the inferior aspect of the prostate, underlying lesion not excluded  Correlate with PSA levels and urologic evaluation  4  Mild FDG uptake in a left upper cervical chain lymph node may be reactive but can be reassessed on follow-up  5   Mildly aneurysmal ascending thoracic aorta at 4 1 cm in diameter      BASELINE DISEASE CHARACTERISTICS 2/20/21  SPEP - normal  Quant Ig - normal including normal IgM  SLFC Ratio - normal  CBC - normal             SUBJECTIVE      ECOG Initial Visit 0   ECOG This Visit 0   Pain Score Initial Visit 0   Pain Score This Visit 0   Personal Cancer History None   Family Cancer History Mother (Lung), Dad (Prostate), Maternal Aunt (Gyn)   Tobacco Use History Denies   Alcohol Use History Denies   Thrombotic History Denies   Occupation Purchasing     INITIAL CONSULT    Pk Conley is a 60 y o  here for new consultation with me today  The patient is referred by colorectal surgery and the reason for consultation is b-cell lymphoma diagnosed during screening colonoscopy  In review of his chart and talking with him, he underwent routine colonoscopy on 2/5/21 with Dr Harvey Ramos and was found to have two sessile polyps - in the descending and sigmoid colon that were removed and send for biopsy  While the descending polyp resulted as tubular adenoma, the sigmoid polyp resulted as low-grade CD20+ B-cell lymphoma  He is here with his wife today  He offers no acute complaints  He says the results of his biopsy from the colonoscopy were very surprising  He denies any fevers chills nausea vomiting chest pain abdominal pain shortness of breath cough weight loss night sweats fevers chills  Denies any diarrhea constipation or bloody stools  He denies any genitourinary ambulatory dysfunction symptoms as well  He has been living his routine active life  He said that the colonoscopy he underwent was for routine screening  THIS VISIT    Denies any f/c/n/v/cp/ap/sob/cough  Denies diarrhea or skin rash  No acute complaints  No b-symptoms  Continues to work without limits  I have reviewed the relevant past medical, surgical, social and family history  I have also reviewed allergies and medications for this patient  Review of Systems  Review of Systems   All other systems reviewed and are negative          OBJECTIVE Physical Exam  Vitals:    12/01/22 0753   BP: 128/80   BP Location: Left arm   Pulse: 68   Resp: 18   Temp: (!) 96 9 °F (36 1 °C)   TempSrc: Tympanic   SpO2: 99%   Weight: 85 3 kg (188 lb)   Height: 5' 11" (1 803 m)       Physical Exam  Constitutional:       General: He is not in acute distress  Appearance: He is not toxic-appearing  HENT:      Head: Normocephalic and atraumatic  Eyes:      General: No scleral icterus  Cardiovascular:      Rate and Rhythm: Normal rate  Pulmonary:      Effort: Pulmonary effort is normal  No respiratory distress  Abdominal:      General: There is no distension  Palpations: Abdomen is soft  Tenderness: There is no abdominal tenderness  There is no guarding  Musculoskeletal:         General: Normal range of motion  Cervical back: Normal range of motion  Lymphadenopathy:      Cervical: No cervical adenopathy  Neurological:      General: No focal deficit present  Mental Status: He is alert and oriented to person, place, and time  Mental status is at baseline  Imaging  Relevant imaging reviewed in chart    Labs  Relevant labs reviewed in chart   ASSESSMENT & PLAN      Diagnosis ICD-10-CM Associated Orders   1  Malignant lymphoma, lymphoplasmacytic (HCC)  C83 00 CBC and differential     Comprehensive metabolic panel     Protein electrophoresis, serum     Immunoglobulin free LT chains blood     IgG, IgA, IgM     LD,Blood             60 y o  male diagnosed with sigmoid polyp upon a screening colonoscopy that is biopsy proven to be lymphoplasmacytic lymphoma  Additional testing proved that he was  MYD88 positive and given his normal SPEP and quantitative immunoglobulin, his diagnosis of LPL stands      • Oncology history updated accordingly this visit  • Goals of care/patient communication  o Recommendation to continue with surveillance on clinical and diagnostic basis with labs as ordered above given no indication for systemic therapy   o Knows to contact the office if any signs symptoms occur in the interim basis that he is educated about from previous encounters and this encounter  • Disease Features/Tumor Markers/Genetics  o MPQ20+  • Treatment  o   Surveillance as detailed above  • Labs  o As ordered  • Diagnostics  o No indication for imaging from a lymphoma perspective      Follow Up  • Six months      All questions were answered to the patient's satisfaction during this encounter  They appreciated and thanked me for spending time with them  The patient knows the contact information for our office and know to reach out for any relevant concerns related to this encounter  For all other listed problems and medical diagnosis in his chart - they are managed by PCP and/or other specialists, which patient acknowledges  Nataliya River MD  Hematology & Medical Oncology

## 2023-02-07 ENCOUNTER — TELEPHONE (OUTPATIENT)
Dept: NEPHROLOGY | Facility: CLINIC | Age: 63
End: 2023-02-07

## 2023-02-07 NOTE — TELEPHONE ENCOUNTER
Spoke with Patient and schedule appointment for 5/16/23 with Dr Idalia Luis in the [facility] location

## 2023-02-19 ENCOUNTER — HOSPITAL ENCOUNTER (OUTPATIENT)
Facility: HOSPITAL | Age: 63
Setting detail: OBSERVATION
LOS: 1 days | Discharge: HOME | End: 2023-02-22
Attending: EMERGENCY MEDICINE | Admitting: INTERNAL MEDICINE

## 2023-02-19 ENCOUNTER — APPOINTMENT (EMERGENCY)
Dept: RADIOLOGY | Facility: HOSPITAL | Age: 63
End: 2023-02-19

## 2023-02-19 DIAGNOSIS — I10 BENIGN ESSENTIAL HYPERTENSION: ICD-10-CM

## 2023-02-19 DIAGNOSIS — R07.9 CHEST PAIN: ICD-10-CM

## 2023-02-19 DIAGNOSIS — I48.91 ATRIAL FIBRILLATION WITH RAPID VENTRICULAR RESPONSE (HCC): ICD-10-CM

## 2023-02-19 DIAGNOSIS — C83.00 MALIGNANT LYMPHOMA, LYMPHOPLASMACYTIC (HCC): ICD-10-CM

## 2023-02-19 DIAGNOSIS — R00.2 PALPITATIONS: Primary | ICD-10-CM

## 2023-02-19 LAB
2HR DELTA HS TROPONIN: 3 NG/L
4HR DELTA HS TROPONIN: 4 NG/L
ALBUMIN SERPL BCP-MCNC: 3.7 G/DL (ref 3.5–5)
ALP SERPL-CCNC: 45 U/L (ref 34–104)
ALT SERPL W P-5'-P-CCNC: 14 U/L (ref 7–52)
ANION GAP SERPL CALCULATED.3IONS-SCNC: 4 MMOL/L (ref 4–13)
APTT PPP: 26 SECONDS (ref 23–37)
AST SERPL W P-5'-P-CCNC: 14 U/L (ref 13–39)
ATRIAL RATE: 326 BPM
BASOPHILS # BLD AUTO: 0.05 THOUSANDS/ÂΜL (ref 0–0.1)
BASOPHILS NFR BLD AUTO: 1 % (ref 0–1)
BILIRUB SERPL-MCNC: 0.2 MG/DL (ref 0.2–1)
BUN SERPL-MCNC: 19 MG/DL (ref 5–25)
CALCIUM SERPL-MCNC: 8.8 MG/DL (ref 8.4–10.2)
CARDIAC TROPONIN I PNL SERPL HS: 5 NG/L
CARDIAC TROPONIN I PNL SERPL HS: 8 NG/L
CARDIAC TROPONIN I PNL SERPL HS: 9 NG/L
CHLORIDE SERPL-SCNC: 106 MMOL/L (ref 96–108)
CO2 SERPL-SCNC: 27 MMOL/L (ref 21–32)
CREAT SERPL-MCNC: 1.2 MG/DL (ref 0.6–1.3)
D DIMER PPP FEU-MCNC: <0.27 UG/ML FEU
EOSINOPHIL # BLD AUTO: 0.16 THOUSAND/ÂΜL (ref 0–0.61)
EOSINOPHIL NFR BLD AUTO: 2 % (ref 0–6)
ERYTHROCYTE [DISTWIDTH] IN BLOOD BY AUTOMATED COUNT: 12.6 % (ref 11.6–15.1)
GFR SERPL CREATININE-BSD FRML MDRD: 63 ML/MIN/1.73SQ M
GLUCOSE SERPL-MCNC: 163 MG/DL (ref 65–140)
HCT VFR BLD AUTO: 43 % (ref 36.5–49.3)
HGB BLD-MCNC: 14.2 G/DL (ref 12–17)
IMM GRANULOCYTES # BLD AUTO: 0.02 THOUSAND/UL (ref 0–0.2)
IMM GRANULOCYTES NFR BLD AUTO: 0 % (ref 0–2)
INR PPP: 0.91 (ref 0.84–1.19)
LYMPHOCYTES # BLD AUTO: 1.67 THOUSANDS/ÂΜL (ref 0.6–4.47)
LYMPHOCYTES NFR BLD AUTO: 19 % (ref 14–44)
MCH RBC QN AUTO: 31.8 PG (ref 26.8–34.3)
MCHC RBC AUTO-ENTMCNC: 33 G/DL (ref 31.4–37.4)
MCV RBC AUTO: 96 FL (ref 82–98)
MONOCYTES # BLD AUTO: 0.59 THOUSAND/ÂΜL (ref 0.17–1.22)
MONOCYTES NFR BLD AUTO: 7 % (ref 4–12)
NEUTROPHILS # BLD AUTO: 6.12 THOUSANDS/ÂΜL (ref 1.85–7.62)
NEUTS SEG NFR BLD AUTO: 71 % (ref 43–75)
NRBC BLD AUTO-RTO: 0 /100 WBCS
PLATELET # BLD AUTO: 249 THOUSANDS/UL (ref 149–390)
PMV BLD AUTO: 11.3 FL (ref 8.9–12.7)
POTASSIUM SERPL-SCNC: 3.6 MMOL/L (ref 3.5–5.3)
PROT SERPL-MCNC: 6.3 G/DL (ref 6.4–8.4)
PROTHROMBIN TIME: 12.3 SECONDS (ref 11.6–14.5)
QRS AXIS: 80 DEGREES
QRSD INTERVAL: 90 MS
QT INTERVAL: 302 MS
QTC INTERVAL: 472 MS
RBC # BLD AUTO: 4.47 MILLION/UL (ref 3.88–5.62)
SODIUM SERPL-SCNC: 137 MMOL/L (ref 135–147)
T WAVE AXIS: -52 DEGREES
TSH SERPL DL<=0.05 MIU/L-ACNC: 2.47 UIU/ML (ref 0.45–4.5)
VENTRICULAR RATE: 147 BPM
WBC # BLD AUTO: 8.61 THOUSAND/UL (ref 4.31–10.16)

## 2023-02-19 RX ORDER — DILTIAZEM HYDROCHLORIDE 5 MG/ML
15 INJECTION INTRAVENOUS ONCE
Status: COMPLETED | OUTPATIENT
Start: 2023-02-19 | End: 2023-02-19

## 2023-02-19 RX ORDER — ONDANSETRON 2 MG/ML
4 INJECTION INTRAMUSCULAR; INTRAVENOUS EVERY 6 HOURS PRN
Status: DISCONTINUED | OUTPATIENT
Start: 2023-02-19 | End: 2023-02-22 | Stop reason: HOSPADM

## 2023-02-19 RX ORDER — ACETAMINOPHEN 325 MG/1
650 TABLET ORAL EVERY 6 HOURS PRN
Status: DISCONTINUED | OUTPATIENT
Start: 2023-02-19 | End: 2023-02-22 | Stop reason: HOSPADM

## 2023-02-19 RX ORDER — MAGNESIUM HYDROXIDE/ALUMINUM HYDROXICE/SIMETHICONE 120; 1200; 1200 MG/30ML; MG/30ML; MG/30ML
30 SUSPENSION ORAL EVERY 6 HOURS PRN
Status: DISCONTINUED | OUTPATIENT
Start: 2023-02-19 | End: 2023-02-22 | Stop reason: HOSPADM

## 2023-02-19 RX ADMIN — DILTIAZEM HYDROCHLORIDE 5 MG/HR: 5 INJECTION INTRAVENOUS at 16:17

## 2023-02-19 RX ADMIN — DILTIAZEM HYDROCHLORIDE 15 MG: 5 INJECTION INTRAVENOUS at 16:12

## 2023-02-19 RX ADMIN — METOPROLOL TARTRATE 25 MG: 25 TABLET, FILM COATED ORAL at 21:23

## 2023-02-19 RX ADMIN — SODIUM CHLORIDE 1000 ML: 0.9 INJECTION, SOLUTION INTRAVENOUS at 16:17

## 2023-02-19 NOTE — H&P
VTE Prophylaxis:   Code Status:     Anticipated Length of Stay:  Patient will be admitted on an Inpatient basis with an anticipated length of stay of  2 midnights  Justification for Hospital Stay:     Total Time for Visit, including Counseling / Coordination of Care: 60 minutes  Greater than 50% of this total time spent on direct patient counseling and coordination of care  Chief Complaint:       History of Present Illness:    Joyce Mckeon is a 60 y o  male With past medical history of hypertension, paroxysmal atrial fibrillation, lymphoma under surveillance, CKD 3, hypertension presents to the hospital with persistent palpitations present over the last week  Patient also noted chest pain associated with symptoms today  On presentation was found to be in atrial fibrillation with RVR with rates in the 140s to 150s  Patient was started on Cardizem infusion and spontaneously cardioverted in the emergency department  Review of Systems:    Review of Systems    Past Medical and Surgical History:     Past Medical History:   Diagnosis Date   • Atrial fibrillation ([facility] )    • Cancer ([facility] )     lymphoma   • CKD (chronic kidney disease)    • Dilated aortic root ([facility] )    • Erectile dysfunction    • Hyperlipidemia 6/28/2016   • Hypertension    • Hypertensive CKD (chronic kidney disease)    • Mild renal insufficiency 9/26/2017   • Vitamin D deficiency        Past Surgical History:   Procedure Laterality Date   • COLONOSCOPY     • HIATAL HERNIA REPAIR         Meds/Allergies:    Prior to Admission medications    Medication Sig Start Date End Date Taking?  Authorizing Provider   cholecalciferol (VITAMIN D3) 1,000 units tablet Take 1,000 Units by mouth daily   Yes Historical Provider, MD   losartan (COZAAR) 25 mg tablet Take 1 tablet (25 mg total) by mouth daily  Patient taking differently: Take 12 5 mg by mouth daily 11/16/22  Yes Kyra King DO   diltiazem (DILACOR XR) 120 MG 24 hr capsule Take 1 capsule (120 mg total) by mouth daily 11/5/20 11/27/20  Kalpana Medina DO       Allergies: No Known Allergies    Social History:     Marital Status: /Civil Union   Substance Use History:   Social History     Substance and Sexual Activity   Alcohol Use Yes    Comment: social     Social History     Tobacco Use   Smoking Status Never   Smokeless Tobacco Never     Social History     Substance and Sexual Activity   Drug Use No       Family History:    Pertinent family history reviewed    Physical Exam:     Vitals:   Blood Pressure: 147/75 (02/19/23 1630)  Pulse: 99 (02/19/23 1630)  Temperature: 98 °F (36 7 °C) (02/19/23 1530)  Temp Source: Oral (02/19/23 1530)  Respirations: 20 (02/19/23 1630)  Weight - Scale: 84 8 kg (186 lb 15 2 oz) (02/19/23 1527)  SpO2: 97 % (02/19/23 1630)    Physical Exam     Additional Data:     Lab Results: I have reviewed pertinent results     Results from last 7 days   Lab Units 02/19/23  1551   WBC Thousand/uL 8 61   HEMOGLOBIN g/dL 14 2   HEMATOCRIT % 43 0   PLATELETS Thousands/uL 249   NEUTROS PCT % 71   LYMPHS PCT % 19   MONOS PCT % 7   EOS PCT % 2     Results from last 7 days   Lab Units 02/19/23  1551   SODIUM mmol/L 137   POTASSIUM mmol/L 3 6   CHLORIDE mmol/L 106   CO2 mmol/L 27   BUN mg/dL 19   CREATININE mg/dL 1 20   ANION GAP mmol/L 4   CALCIUM mg/dL 8 8   ALBUMIN g/dL 3 7   TOTAL BILIRUBIN mg/dL 0 20   ALK PHOS U/L 45   ALT U/L 14   AST U/L 14   GLUCOSE RANDOM mg/dL 163*     Results from last 7 days   Lab Units 02/19/23  1551   INR  0 91                   Imaging: I have reviewed pertinent imaging     XR chest 1 view portable   ED Interpretation by Jimmy Rogel MD (02/19 1644)   No significant acute dramality noted  EKG, Pathology, and Other Studies Reviewed on Admission:   · EKG: Reviewed     Allscripts / Epic Records Reviewed    ** Please Note: This note has been constructed using a voice recognition system   **

## 2023-02-19 NOTE — ED NOTES
Patient stood to urinate in urinal and marked increase in HR noted, 160's  Patient denies symptoms with episode  Back lying in bed and HR improving to low 100's  Will continue to monitor        Horris Simmonds, RN  02/19/23 5913

## 2023-02-19 NOTE — ED PROVIDER NOTES
History  Chief Complaint   Patient presents with   • Palpitations     Pt reports hx afib and reports chest discomfort that began at 1300  Denies n/v/d/c/SOB  Pt reports he feels his heart racing       History provided by:  Patient   used: No    Palpitations  Palpitations quality:  Irregular  Onset quality:  Gradual  Duration:  1 week  Timing:  Intermittent  Progression:  Waxing and waning  Chronicity:  Recurrent  Context comment:  Hx of Afib  Relieved by:  Nothing  Worsened by:  Nothing  Ineffective treatments:  None tried  Associated symptoms: no back pain, no chest pain, no cough, no dizziness, no lower extremity edema, no nausea, no shortness of breath, no vomiting and no weakness    Risk factors: hx of atrial fibrillation    Risk factors comment:  Lymphoma      Prior to Admission Medications   Prescriptions Last Dose Informant Patient Reported? Taking?    cholecalciferol (VITAMIN D3) 1,000 units tablet   Yes Yes   Sig: Take 1,000 Units by mouth daily   losartan (COZAAR) 25 mg tablet   No Yes   Sig: Take 1 tablet (25 mg total) by mouth daily   Patient taking differently: Take 12 5 mg by mouth daily      Facility-Administered Medications: None       Past Medical History:   Diagnosis Date   • Atrial fibrillation (HCC)    • Cancer (HCC)     lymphoma   • CKD (chronic kidney disease)    • Dilated aortic root (HCC)    • Erectile dysfunction    • Hyperlipidemia 6/28/2016   • Hypertension    • Hypertensive CKD (chronic kidney disease)    • Mild renal insufficiency 9/26/2017   • Vitamin D deficiency        Past Surgical History:   Procedure Laterality Date   • COLONOSCOPY     • HIATAL HERNIA REPAIR         Family History   Problem Relation Age of Onset   • Lung cancer Mother    • Heart attack Father 76   • Prostate cancer Father    • Hyperlipidemia Father    • No Known Problems Sister    • Lymphoma Maternal Aunt    • Fainting Maternal Grandfather      I have reviewed and agree with the history as documented  E-Cigarette/Vaping   • E-Cigarette Use Never User      E-Cigarette/Vaping Substances     Social History     Tobacco Use   • Smoking status: Never   • Smokeless tobacco: Never   Vaping Use   • Vaping Use: Never used   Substance Use Topics   • Alcohol use: Yes     Comment: social   • Drug use: No       Review of Systems   Constitutional: Negative for chills and fever  HENT: Negative for facial swelling, sore throat and trouble swallowing  Eyes: Negative for pain and visual disturbance  Respiratory: Negative for cough and shortness of breath  Cardiovascular: Positive for palpitations  Negative for chest pain and leg swelling  Gastrointestinal: Negative for abdominal pain, diarrhea, nausea and vomiting  Genitourinary: Negative for dysuria and flank pain  Musculoskeletal: Negative for back pain, neck pain and neck stiffness  Skin: Negative for pallor and rash  Allergic/Immunologic: Negative for environmental allergies and immunocompromised state  Neurological: Negative for dizziness, weakness and headaches  Hematological: Negative for adenopathy  Does not bruise/bleed easily  Psychiatric/Behavioral: Negative for agitation and behavioral problems  All other systems reviewed and are negative  Physical Exam  Physical Exam  Vitals and nursing note reviewed  Constitutional:       General: He is not in acute distress  Appearance: He is well-developed  HENT:      Head: Normocephalic and atraumatic  Eyes:      Extraocular Movements: Extraocular movements intact  Cardiovascular:      Rate and Rhythm: Tachycardia present  Rhythm irregular  Heart sounds: Normal heart sounds  Pulmonary:      Effort: Pulmonary effort is normal  No respiratory distress  Breath sounds: Normal breath sounds  Abdominal:      Palpations: Abdomen is soft  Tenderness: There is no abdominal tenderness  There is no guarding or rebound     Musculoskeletal:         General: Normal range of motion  Cervical back: Normal range of motion and neck supple  Skin:     General: Skin is warm and dry  Neurological:      General: No focal deficit present  Mental Status: He is alert and oriented to person, place, and time  Cranial Nerves: No cranial nerve deficit  Motor: No weakness     Psychiatric:         Mood and Affect: Mood normal          Behavior: Behavior normal          Vital Signs  ED Triage Vitals   Temperature Pulse Respirations Blood Pressure SpO2   02/19/23 1530 02/19/23 1530 02/19/23 1530 02/19/23 1530 02/19/23 1530   98 °F (36 7 °C) (!) 115 16 (!) 180/115 98 %      Temp Source Heart Rate Source Patient Position - Orthostatic VS BP Location FiO2 (%)   02/19/23 1530 02/19/23 1530 02/19/23 1530 02/19/23 1530 --   Oral Monitor Sitting Right arm       Pain Score       02/19/23 1612       2           Vitals:    02/19/23 1616 02/19/23 1630 02/19/23 1755 02/19/23 1818   BP: 133/75 147/75  143/92   Pulse: (!) 133 99 66 67   Patient Position - Orthostatic VS: Lying Sitting  Lying         Visual Acuity      ED Medications  Medications   metoprolol tartrate (LOPRESSOR) tablet 25 mg (25 mg Oral Given 2/19/23 2123)   acetaminophen (TYLENOL) tablet 650 mg (has no administration in time range)   ondansetron (ZOFRAN) injection 4 mg (has no administration in time range)   aluminum-magnesium hydroxide-simethicone (MYLANTA) oral suspension 30 mL (has no administration in time range)   sodium chloride 0 9 % bolus 1,000 mL (0 mL Intravenous Stopped 2/19/23 1720)   diltiazem (CARDIZEM) 125 mg in sodium chloride 0 9 % 125 mL infusion (0 mg/hr Intravenous Stopped 2/19/23 1755)   diltiazem (CARDIZEM) injection 15 mg (15 mg Intravenous Given 2/19/23 1612)       Diagnostic Studies  Results Reviewed     Procedure Component Value Units Date/Time    HS Troponin I 2hr [[id number]]  (Normal) Collected: 02/19/23 1912    Lab Status: Final result Specimen: Blood from Arm, Right Updated: 02/19/23 1945 hs TnI 2hr 8 ng/L      Delta 2hr hsTnI 3 ng/L     TSH [[id number]]  (Normal) Collected: 02/19/23 1551    Lab Status: Final result Specimen: Blood from Arm, Right Updated: 02/19/23 1701     TSH 3RD GENERATON 2 471 uIU/mL     Narrative:      Patients undergoing fluorescein dye angiography may retain small amounts of fluorescein in the body for 48-72 hours post procedure  Samples containing fluorescein can produce falsely depressed TSH values  If the patient had this procedure,a specimen should be resubmitted post fluorescein clearance  D-Dimer [[id number]]  (Normal) Collected: 02/19/23 1604    Lab Status: Final result Specimen: Blood from Arm, Right Updated: 02/19/23 1657     D-Dimer, Quant <0 27 ug/ml FEU     Narrative: In the evaluation for possible pulmonary embolism, in the appropriate (Well's Score of 4 or less) patient, the age adjusted d-dimer cutoff for this patient can be calculated as:    Age x 0 01 (in ug/mL) for Age-adjusted D-dimer exclusion threshold for a patient over 50 years      HS Troponin 0hr (reflex protocol) [[id number]]  (Normal) Collected: 02/19/23 1551    Lab Status: Final result Specimen: Blood from Arm, Right Updated: 02/19/23 1652     hs TnI 0hr 5 ng/L     Comprehensive metabolic panel [[id number]]  (Abnormal) Collected: 02/19/23 1551    Lab Status: Final result Specimen: Blood from Arm, Right Updated: 02/19/23 1648     Sodium 137 mmol/L      Potassium 3 6 mmol/L      Chloride 106 mmol/L      CO2 27 mmol/L      ANION GAP 4 mmol/L      BUN 19 mg/dL      Creatinine 1 20 mg/dL      Glucose 163 mg/dL      Calcium 8 8 mg/dL      AST 14 U/L      ALT 14 U/L      Alkaline Phosphatase 45 U/L      Total Protein 6 3 g/dL      Albumin 3 7 g/dL      Total Bilirubin 0 20 mg/dL      eGFR 63 ml/min/1 73sq m     Narrative:      [place] guidelines for Chronic Kidney Disease (CKD):   •  Stage 1 with normal or high GFR (GFR > 90 mL/min/1 73 square meters)  •  Stage 2 Mild CKD (GFR = 60-89 mL/min/1 73 square meters)  •  Stage 3A Moderate CKD (GFR = 45-59 mL/min/1 73 square meters)  •  Stage 3B Moderate CKD (GFR = 30-44 mL/min/1 73 square meters)  •  Stage 4 Severe CKD (GFR = 15-29 mL/min/1 73 square meters)  •  Stage 5 End Stage CKD (GFR <15 mL/min/1 73 square meters)  Note: GFR calculation is accurate only with a steady state creatinine    Protime-INR [[id number]]  (Normal) Collected: 02/19/23 1551    Lab Status: Final result Specimen: Blood from Arm, Right Updated: 02/19/23 1640     Protime 12 3 seconds      INR 0 91    APTT [[id number]]  (Normal) Collected: 02/19/23 1551    Lab Status: Final result Specimen: Blood from Arm, Right Updated: 02/19/23 1640     PTT 26 seconds     CBC and differential [[id number]] Collected: 02/19/23 1551    Lab Status: Final result Specimen: Blood from Arm, Right Updated: 02/19/23 1625     WBC 8 61 Thousand/uL      RBC 4 47 Million/uL      Hemoglobin 14 2 g/dL      Hematocrit 43 0 %      MCV 96 fL      MCH 31 8 pg      MCHC 33 0 g/dL      RDW 12 6 %      MPV 11 3 fL      Platelets 716 Thousands/uL      nRBC 0 /100 WBCs      Neutrophils Relative 71 %      Immat GRANS % 0 %      Lymphocytes Relative 19 %      Monocytes Relative 7 %      Eosinophils Relative 2 %      Basophils Relative 1 %      Neutrophils Absolute 6 12 Thousands/µL      Immature Grans Absolute 0 02 Thousand/uL      Lymphocytes Absolute 1 67 Thousands/µL      Monocytes Absolute 0 59 Thousand/µL      Eosinophils Absolute 0 16 Thousand/µL      Basophils Absolute 0 05 Thousands/µL                  XR chest 1 view portable   ED Interpretation by Jenaro Irving MD (02/19 0564)   No significant acute dramality noted                   Procedures  ECG 12 Lead Documentation Only    Date/Time: 2/19/2023 4:46 PM  Performed by: Jenaro Irving MD  Authorized by: Jenaro Irving MD     Indications / Diagnosis:  Palpitations  ECG reviewed by me, the ED Provider: yes    Patient location:  ED  Interpretation: Interpretation: normal    Rate:     ECG rate:  147    ECG rate assessment: tachycardic    Rhythm:     Rhythm: atrial fibrillation    Ectopy:     Ectopy: none    QRS:     QRS axis:  Normal  Conduction:     Conduction: normal    ST segments:     ST segments:  Normal  T waves:     T waves: normal    CriticalCare Time  Performed by: Ramirez Red MD  Authorized by: Ramirez Red MD     Critical care provider statement:     Critical care time (minutes):  30    Critical care time was exclusive of:  Separately billable procedures and treating other patients and teaching time    Critical care was necessary to treat or prevent imminent or life-threatening deterioration of the following conditions:  Cardiac failure (Atrial fibrillation with rapid ventricular response requiring IV meds and drip)    Critical care was time spent personally by me on the following activities:  Blood draw for specimens, obtaining history from patient or surrogate, development of treatment plan with patient or surrogate, discussions with consultants, evaluation of patient's response to treatment, examination of patient, interpretation of cardiac output measurements, ordering and performing treatments and interventions, ordering and review of laboratory studies, ordering and review of radiographic studies, re-evaluation of patient's condition and review of old charts    I assumed direction of critical care for this patient from another provider in my specialty: no               ED Course  ED Course as of 02/19/23 2208   Sun Feb 19, 2023   1547 , Afib, Cardizem bolus and drip ordered  1644 Chest x-ray independently reviewed, no significant acute abnormality noted  1658 WBC: 8 61   1658 Hemoglobin: 14 2   1658 Platelet Count: 607   1658 Sodium: 137   1658 Potassium: 3 6   1658 BUN: 19   1658 Creatinine: 1 20   1658 Glucose, Random(!): 163   1658 hs TnI 0hr: 5   1658 D-Dimer, Quant: <0 27  Labs reviewed     1710 Telemetry reviewed independently, patient appears to be in sinus rhythm, will get another EKG  1710 Case discussed with Dr Mayra Butcher for admission  SBIRT 20yo+    Flowsheet Row Most Recent Value   SBIRT (23 yo +)    In order to provide better care to our patients, we are screening all of our patients for alcohol and drug use  Would it be okay to ask you these screening questions? No Filed at: 02/19/2023 1711                    Medical Decision Making  Patient is a 68-year-old male, history of hypertension, paroxysmal atrial fibrillation, lymphoma, comes in with complaints of palpitations, chest pain described as central, pressure, nonradiating, patient states that he has been having episodes of heart racing and for the past week, but, on suddenly, resolve quickly however today afternoon around 1 PM, started having the similar symptoms which did not go away, for which he came to the ER, no recent illness, no fever, cough, abdominal pain, back pain  On exam, patient is conscious, alert, vital signs noted for tachycardia, EKG consistent with A-fib with RVR, lungs clear, heart sounds are regular, no peripheral edema, no calf tenderness or swelling  Impression: A-fib with RVR, chest pain, will get cardiac work-up, labs, EKG, chest x-ray, give Cardizem bolus followed by IV drip  Admit for further management and evaluation  Atrial fibrillation with rapid ventricular response [facility]): acute illness or injury  Chest pain: acute illness or injury  Palpitations: acute illness or injury  Amount and/or Complexity of Data Reviewed  Labs: ordered  Decision-making details documented in ED Course  Radiology: ordered and independent interpretation performed  ECG/medicine tests: ordered and independent interpretation performed  Decision-making details documented in ED Course  Risk  Prescription drug management  Decision regarding hospitalization            Disposition  Final diagnoses:   Palpitations Chest pain   Atrial fibrillation with rapid ventricular response ([facility] )     Time reflects when diagnosis was documented in both MDM as applicable and the Disposition within this note     Time User Action Codes Description Comment    2/19/2023  4:47 PM Ervin Freemaning Add [R00 2] Palpitations     2/19/2023  4:47 PM [place] Add [R07 9] Chest pain     2/19/2023  4:47 PM Ervin Freemaning Add [I48 91] Atrial fibrillation with rapid ventricular response [facility])       ED Disposition     ED Disposition   Admit    Condition   Stable    Date/Time   Sun Feb 19, 2023  6:14 PM    Comment   Case was discussed with Dr Kinga Poon and the patient's admission status was agreed to be Admission Status: observation status to the service of Dr Kinga Poon  Follow-up Information    None         Current Discharge Medication List      CONTINUE these medications which have NOT CHANGED    Details   cholecalciferol (VITAMIN D3) 1,000 units tablet Take 1,000 Units by mouth daily      losartan (COZAAR) 25 mg tablet Take 1 tablet (25 mg total) by mouth daily  Qty: 90 tablet, Refills: 0    Associated Diagnoses: Benign essential hypertension             No discharge procedures on file      PDMP Review     None          ED Provider  Electronically Signed by           Jonn Jerome MD  02/19/23 9512

## 2023-02-19 NOTE — Clinical Note
Case was discussed with Dr Richard Nam and the patient's admission status was agreed to be Admission Status: observation status to the service of Dr Richard Nam

## 2023-02-20 ENCOUNTER — APPOINTMENT (OUTPATIENT)
Dept: NON INVASIVE DIAGNOSTICS | Facility: HOSPITAL | Age: 63
End: 2023-02-20

## 2023-02-20 LAB
ANION GAP SERPL CALCULATED.3IONS-SCNC: 3 MMOL/L (ref 4–13)
AORTIC ROOT: 3.8 CM
AORTIC VALVE MEAN VELOCITY: 7 M/S
APICAL FOUR CHAMBER EJECTION FRACTION: 58 %
AV AREA BY CONTINUOUS VTI: 3.3 CM2
AV AREA PEAK VELOCITY: 3 CM2
AV LVOT MEAN GRADIENT: 2 MMHG
AV LVOT PEAK GRADIENT: 4 MMHG
AV MEAN GRADIENT: 2 MMHG
AV PEAK GRADIENT: 5 MMHG
AV VALVE AREA: 3.27 CM2
AV VELOCITY RATIO: 0.95
BUN SERPL-MCNC: 14 MG/DL (ref 5–25)
CALCIUM SERPL-MCNC: 8.9 MG/DL (ref 8.4–10.2)
CHLORIDE SERPL-SCNC: 110 MMOL/L (ref 96–108)
CO2 SERPL-SCNC: 25 MMOL/L (ref 21–32)
CREAT SERPL-MCNC: 1.1 MG/DL (ref 0.6–1.3)
DOP CALC AO PEAK VEL: 1.07 M/S
DOP CALC AO VTI: 22.28 CM
DOP CALC LVOT AREA: 3.14 CM2
DOP CALC LVOT DIAMETER: 2 CM
DOP CALC LVOT PEAK VEL VTI: 23.21 CM
DOP CALC LVOT PEAK VEL: 1.02 M/S
DOP CALC LVOT STROKE INDEX: 35.3 ML/M2
DOP CALC LVOT STROKE VOLUME: 72.88
E WAVE DECELERATION TIME: 216 MS
ERYTHROCYTE [DISTWIDTH] IN BLOOD BY AUTOMATED COUNT: 12.8 % (ref 11.6–15.1)
FRACTIONAL SHORTENING: 35 (ref 28–44)
GFR SERPL CREATININE-BSD FRML MDRD: 71 ML/MIN/1.73SQ M
GLUCOSE P FAST SERPL-MCNC: 98 MG/DL (ref 65–99)
GLUCOSE SERPL-MCNC: 98 MG/DL (ref 65–140)
HCT VFR BLD AUTO: 38.9 % (ref 36.5–49.3)
HGB BLD-MCNC: 12.6 G/DL (ref 12–17)
INTERVENTRICULAR SEPTUM IN DIASTOLE (PARASTERNAL SHORT AXIS VIEW): 1 CM
INTERVENTRICULAR SEPTUM: 1 CM (ref 0.6–1.1)
LAAS-AP2: 24.8 CM2
LAAS-AP4: 18.6 CM2
LEFT ATRIUM AREA SYSTOLE SINGLE PLANE A4C: 19.8 CM2
LEFT ATRIUM SIZE: 4 CM
LEFT INTERNAL DIMENSION IN SYSTOLE: 3.2 CM (ref 2.1–4)
LEFT VENTRICULAR INTERNAL DIMENSION IN DIASTOLE: 4.9 CM (ref 3.5–6)
LEFT VENTRICULAR POSTERIOR WALL IN END DIASTOLE: 0.9 CM
LEFT VENTRICULAR STROKE VOLUME: 74 ML
LVSV (TEICH): 74 ML
MCH RBC QN AUTO: 31.3 PG (ref 26.8–34.3)
MCHC RBC AUTO-ENTMCNC: 32.4 G/DL (ref 31.4–37.4)
MCV RBC AUTO: 97 FL (ref 82–98)
MV PEAK A VEL: 0.41 M/S
MV PEAK E VEL: 69 CM/S
MV STENOSIS PRESSURE HALF TIME: 63 MS
MV VALVE AREA P 1/2 METHOD: 3.49
PLATELET # BLD AUTO: 198 THOUSANDS/UL (ref 149–390)
PMV BLD AUTO: 11.1 FL (ref 8.9–12.7)
POTASSIUM SERPL-SCNC: 4 MMOL/L (ref 3.5–5.3)
RBC # BLD AUTO: 4.03 MILLION/UL (ref 3.88–5.62)
RIGHT ATRIUM AREA SYSTOLE A4C: 21.4 CM2
RIGHT VENTRICLE ID DIMENSION: 4.1 CM
SL CV LEFT ATRIUM LENGTH A2C: 5.2 CM
SL CV PED ECHO LEFT VENTRICLE DIASTOLIC VOLUME (MOD BIPLANE) 2D: 114 ML
SL CV PED ECHO LEFT VENTRICLE SYSTOLIC VOLUME (MOD BIPLANE) 2D: 40 ML
SODIUM SERPL-SCNC: 138 MMOL/L (ref 135–147)
TR MAX PG: 28 MMHG
TR PEAK VELOCITY: 2.6 M/S
TRICUSPID ANNULAR PLANE SYSTOLIC EXCURSION: 2 CM
TRICUSPID VALVE PEAK REGURGITATION VELOCITY: 2.63 M/S
WBC # BLD AUTO: 6.6 THOUSAND/UL (ref 4.31–10.16)

## 2023-02-20 RX ORDER — LOSARTAN POTASSIUM 50 MG/1
50 TABLET ORAL DAILY
Status: DISCONTINUED | OUTPATIENT
Start: 2023-02-20 | End: 2023-02-21

## 2023-02-20 RX ADMIN — METOPROLOL TARTRATE 25 MG: 25 TABLET, FILM COATED ORAL at 08:04

## 2023-02-20 RX ADMIN — LOSARTAN POTASSIUM 50 MG: 50 TABLET, FILM COATED ORAL at 16:28

## 2023-02-20 RX ADMIN — APIXABAN 5 MG: 5 TABLET, FILM COATED ORAL at 20:53

## 2023-02-20 RX ADMIN — APIXABAN 5 MG: 5 TABLET, FILM COATED ORAL at 13:29

## 2023-02-20 NOTE — ASSESSMENT & PLAN NOTE
Patient with 3-year history of paroxysmal atrial fibrillation currently not maintained on rate controlling medication or anticoagulation due to [id number] score of 1  · Patient reports frequent and more persistent episodes of palpitations  Today he had prolonged palpitations associated with substernal pressure  · On presentation patient found to be in atrial fibrillation with RVR and he was started on Cardizem infusion    Shortly after in the emergency department patient spontaneously converted to normal sinus rhythm  · ED discussed case with cardiology, no plan for transfer to [facility] for evaluation for ablation at this time  · Start metoprolol 25 mg twice daily  · Echocardiogram with borderline dilated right ventricle, biatrial enlargement, full left ventricular systolic and diastolic function  · Monitor on telemetry overnight  · Appreciate cardiology consultation  · Recommending nuclear stress test to be performed tomorrow 2/21  · Cardiology discussed with the EP team, following nuclear stress test potential for ablation is long-term atrial fibrillation solution versus rhythm control medications  · Initiated on Eliquis 5 mg twice daily

## 2023-02-20 NOTE — ASSESSMENT & PLAN NOTE
Lab Results   Component Value Date    EGFR 63 02/19/2023    EGFR 62 11/26/2022    EGFR 63 10/01/2022    CREATININE 1 20 02/19/2023    CREATININE 1 22 11/26/2022    CREATININE 1 20 10/01/2022     We will hold home losartan at this time with initiation of metoprolol to allow for rate control

## 2023-02-20 NOTE — ASSESSMENT & PLAN NOTE
Patient with 3-year history of paroxysmal atrial fibrillation currently not maintained on rate controlling medication or anticoagulation due to [id number] score of 1  Patient reports frequent and more persistent episodes of palpitations  Today he had prolonged palpitations associated with substernal pressure  On presentation patient found to be in atrial fibrillation with RVR and he was started on Cardizem infusion    Shortly after in the emergency department patient spontaneously converted to normal sinus rhythm  ED discussed case with cardiology, no plan for transfer to [facility] for evaluation for ablation at this time  Start metoprolol 25 mg twice daily  Obtain updated echocardiogram  Monitor on telemetry overnight  Appreciate cardiology consultation

## 2023-02-20 NOTE — CONSULTS
Cardiology   Charleen Camacho 60 y o  male MRN: [id number]  Unit/Bed#: E4 -01 Encounter: [id number]      Reason for Consult / Principal Problem: Palpitations, Atrial fibrillation with rapid ventricular response  Physician Requesting Consult:  Tomas Echevarria MD  Cardiologist: Dr Fabi Apodaca MD  Author: Jayleen Vela, Medical Student  PCP: Mesha Bland,       Assessment/Plan:  · Paroxysmal atrial fibrillation with rapid ventricular response   · Not currently on any anticoagulation  · [id number] score 1 (hypertension)  · Hypertension  · TTE 2/20/23: Normal LV side, borderline increased wall thickness, normal LV systolic and diastolic function, EF estimated to be 58%, borderline dilated RV, mild biatrial enlargement, trivial AR, mild mitral valve sclerosis, mild 2+ on scale of 0-4 MR, mild to moderate TR and mild HI, borderline dilated aortic root and mild ectasia of ascending aorta measuring up to 3 8 cm  No significant change from 2019 echo  · TTE 7/2/19: EF estimated to be 60%, no RWMA, mild concentric LVH, LA mildly dilated, trace to mild MR, trace to mild TR, estimated peak PA pressure 30mmHg, mild HI, root of aorta exhibited mild dilation (3 9 cm)  · Hyperlipidemia   · Last lipid panel 7 mo ago- Cholesterol 193, TG 84, HDL 55, LDLc 121, not currently on statin   · Cardiac risk factors include family history of two myocardial infarctions in father in late 62s and mid [de-identified] requiring stent placement  · Lymphoplasmacytic Lymphoma   · Under surveillance with oncologist, no current or past medications, chemo or radiation  · JODY  · Diagnosed by sleep study, recommended to wear CPAP to sleep but does not use at home secondary to uncomfortable mask while sleeping  · CKD3    Mr Charleen Camacho is a 65-year-old gentleman who has had paroxysmal atrial fibrillation diagnosed initially in 2019  He is now noted to have increasingly frequent episodes which seem to be lasting longer than before    His major risk factors for atrial fibrillation include his untreated sleep apnea and uncontrolled hypertension  He has intolerances to multiple medications and he is not taking his antihypertensives as prescribed  He reports that he was on CPAP before but was not able to tolerate the mask  His thyroid function is normal   On clinical examination there is no evidence of pulmonary or peripheral vascular congestion or significant valvular heart disease  He does have family history of CAD but not of atrial fibrillation  His last ECG in sinus rhythm was from 2021 and showed normal intervals, no significant chamber hypertrophy or enlargement and no evidence of prior infarction  His echocardiogram today showed mild biatrial enlargement, left ventricular hypertrophy, no evidence of pulmonary hypertension, and presence of mild mitral, mild to moderate tricuspid and trivial aortic valve regurgitations  Given recurring episodes with symptoms we are recommending rhythm management over rate control of his atrial fibrillation  We will plan to do the following:    Plan  · Monitor on telemetry overnight  · Nuclear stress test, plan for procedure tomorrow, 2/21/23 to screen for ischemic heart disease  · Discuss with electrophysiology team after nuclear stress test, potential for ablation as longterm afib solution vs rhythm control medications   · Continue Metoprolol 25mg BID  · Restart losartan at 50 mg daily  · Start Eliquis 5mg BID   · Given patient's [id number]- VASc score of 1 with paroxysmal atrial fibrillation, and potentially hypercoagulable/ hyperviscous state from lymphoma, recommend starting [address]  · Consider outpatient short acting Cardizem medication PRN for abortive therapy     Further recommendations to follow after nuclear stress test tomorrow  Assessment and recommendations were discussed in detail with the patient and his wife who are agreeable with the plan            HPI: Amanda Jeong 60y o  year old male with history of paroxysmal atrial fibrllation, not on any rate controlling medicaiton nor anticoagulation, HTN on Losartan (taking 1/2 recommended dose secondary to side effects of fatigue), lymphoma under surveillance, CKD 3, JDOY with no current CPAP use, who presents with palpitations, episodes increasing in frequency and persistence, with episode 2/19 involving substernal chest pain described as 2/10 achy pain and palpitations lasting hours, prompting visit to ED  Patient and wife at bedside endorse episodes of palpitations about a few times per month since Afib Dx ~ 2 years ago, most only lasting 10-15 minutes and self resolving, some occurring at night and waking patient from sleep  Patient reports he has a blood pressure machine at home and noticed his heart rate has been high and his palpitations have been more frequent within the last 10 days, with HR measuring ~135 bpm at home prior to arrival yesterday  Episodes of palpitations have been associated with lightheadedness and presyncopal episodes, worse when standing from a seated position or after meals, per patient  He denies SOB, diaphoresis, headaches, syncope, radiation of pain, paresthesias, leg edema, hematochezia, weight loss or weight gain  He does not drink large amounts of caffeine at home, no coffee intake, reports occasional unsweetened iced tea, but no noticeable correlation with symptoms  Patient was found to be in atrial fibrillation with heart rates in the 140s in the ED, started on Cardizem infusion in ED, and spontaneously converted to sinus rhythm  He has remained in sinus rhythm with normal heart rate on telemetry, no acute overnight events  Patient has no complaints this morning, reports palpitations and achy chest pain are resolved  Has no shortness of breath, lightheadedness, dizziness, or presyncopal episodes when walking to bathroom or moving around in hospital room       Mr Bhanu Noyola is known to [facility], has been followed by Dr Lorraine Barnes  Historical Information   Past Medical History:   Diagnosis Date   • Atrial fibrillation ([facility] [address] )    • Cancer (HCC)     lymphoma   • CKD (chronic kidney disease)    • Dilated aortic root (HCC)    • Erectile dysfunction    • Hyperlipidemia 6/28/2016   • Hypertension    • Hypertensive CKD (chronic kidney disease)    • Mild renal insufficiency 9/26/2017   • Vitamin D deficiency      Past Surgical History:   Procedure Laterality Date   • COLONOSCOPY     • HIATAL HERNIA REPAIR       Social History   Social History     Substance and Sexual Activity   Alcohol Use Yes    Comment: social     Social History     Substance and Sexual Activity   Drug Use No     Social History     Tobacco Use   Smoking Status Never   Smokeless Tobacco Never     Family History:   Family History   Problem Relation Age of Onset   • Lung cancer Mother    • Heart attack Father 76   • Prostate cancer Father    • Hyperlipidemia Father    • No Known Problems Sister    • Lymphoma Maternal Aunt    • Fainting Maternal Grandfather        Review of Systems:  Review of Systems   Constitutional: Negative for chills, diaphoresis, fatigue, fever and unexpected weight change  HENT: Negative  Eyes: Negative  Respiratory: Negative for cough, chest tightness and shortness of breath  Cardiovascular: Positive for chest pain (one episode of achy substernal pain ) and palpitations (increase in frequency and length of episodes)  Negative for leg swelling  Gastrointestinal: Negative for abdominal pain, blood in stool, diarrhea, nausea and vomiting  Endocrine: Negative  Genitourinary: Negative  Musculoskeletal: Negative  Skin: Negative for color change, pallor and rash  Neurological: Positive for light-headedness  Negative for syncope (pre-syncopal episodes, no loss of consciousness), numbness and headaches  Hematological: Negative  Psychiatric/Behavioral: Negative            Scheduled Meds:  Current Facility-Administered Medications   Medication Dose Route Frequency Provider Last Rate   • acetaminophen  650 mg Oral Q6H PRN Gabby Conch, DO     • aluminum-magnesium hydroxide-simethicone  30 mL Oral Q6H PRN Gabby Conch, DO     • apixaban  5 mg Oral BID RICK Chilel     • metoprolol tartrate  25 mg Oral Q12H [address] Gabby Conch, DO     • ondansetron  4 mg Intravenous Q6H PRN Gabby Conch, DO       Continuous Infusions:   PRN Meds: •  acetaminophen  •  aluminum-magnesium hydroxide-simethicone  •  ondansetron    No Known Allergies    Objective      Vitals: Blood pressure 138/82, pulse 55, temperature 98 3 °F (36 8 °C), temperature source Tympanic, resp  rate 18, height 5' 11" (1 803 m), weight 83 9 kg (185 lb), SpO2 98 %  , Body mass index is 25 8 kg/m² ,     Orthostatic Blood Pressures    Flowsheet Row Most Recent Value   Blood Pressure 138/82 filed at 02/20/2023 1516   Patient Position - Orthostatic VS Lying filed at 02/20/2023 1516            Intake/Output Summary (Last 24 hours) at 2/20/2023 1519  Last data filed at 2/20/2023 0450  Gross per 24 hour   Intake 880 ml   Output --   Net 880 ml       Invasive Devices     Peripheral Intravenous Line  Duration           Peripheral IV 02/19/23 Right Antecubital <1 day                Physical Exam:  Gen: Awake, Alert, NAD, resting comfortably   Head/eyes: AT/NC, pupils equal and round, Anicteric  ENT: mmm  Neck: Supple, No elevated JVP, trachea midline  Resp: CTA bilaterally no w/r/r, no respiratory distress, no increased effort of breathing   CV: RRR +S1, S2, No m/r/g appreciated on exam  Occasional PVC auscultated on exam   Abd: Soft, NT/ND + BS, no rebound or guarding  Ext: no LE edema bilaterally, bilateral lower extremities warm   Neuro:  Follows commands, moves all extermities  Psych: Appropriate affect, normal mood, pleasant attitude, non-combative  Skin: warm; no rash, erythema or venous stasis changes on exposed skin    Lab Results:     hsTnl 0hr 5  hs Tnl 2hr 8, delta 3  hs Tnl 4 hr 9, delta 4    TSH 2 471  D-dimer <0 27    K+ 3 6 -> 4 0    Cr 1 2 -> 1 1    Hgb 14 2 -> 12 6      Recent Results (from the past 24 hour(s))   ECG 12 lead    Collection Time: 02/19/23  3:39 PM   Result Value Ref Range    Ventricular Rate 147 BPM    Atrial Rate 326 BPM    DE Interval  ms    QRSD Interval 90 ms    QT Interval 302 ms    QTC Interval 472 ms    P Axis  degrees    QRS Axis 80 degrees    T Wave Axis -52 degrees   CBC and differential    Collection Time: 02/19/23  3:51 PM   Result Value Ref Range    WBC 8 61 4 31 - 10 16 Thousand/uL    RBC 4 47 3 88 - 5 62 Million/uL    Hemoglobin 14 2 12 0 - 17 0 g/dL    Hematocrit 43 0 36 5 - 49 3 %    MCV 96 82 - 98 fL    MCH 31 8 26 8 - 34 3 pg    MCHC 33 0 31 4 - 37 4 g/dL    RDW 12 6 11 6 - 15 1 %    MPV 11 3 8 9 - 12 7 fL    Platelets 201 791 - 532 Thousands/uL    nRBC 0 /100 WBCs    Neutrophils Relative 71 43 - 75 %    Immat GRANS % 0 0 - 2 %    Lymphocytes Relative 19 14 - 44 %    Monocytes Relative 7 4 - 12 %    Eosinophils Relative 2 0 - 6 %    Basophils Relative 1 0 - 1 %    Neutrophils Absolute 6 12 1 85 - 7 62 Thousands/µL    Immature Grans Absolute 0 02 0 00 - 0 20 Thousand/uL    Lymphocytes Absolute 1 67 0 60 - 4 47 Thousands/µL    Monocytes Absolute 0 59 0 17 - 1 22 Thousand/µL    Eosinophils Absolute 0 16 0 00 - 0 61 Thousand/µL    Basophils Absolute 0 05 0 00 - 0 10 Thousands/µL   Protime-INR    Collection Time: 02/19/23  3:51 PM   Result Value Ref Range    Protime 12 3 11 6 - 14 5 seconds    INR 0 91 0 84 - 1 19   APTT    Collection Time: 02/19/23  3:51 PM   Result Value Ref Range    PTT 26 23 - 37 seconds   Comprehensive metabolic panel    Collection Time: 02/19/23  3:51 PM   Result Value Ref Range    Sodium 137 135 - 147 mmol/L    Potassium 3 6 3 5 - 5 3 mmol/L    Chloride 106 96 - 108 mmol/L    CO2 27 21 - 32 mmol/L    ANION GAP 4 4 - 13 mmol/L    BUN 19 5 - 25 mg/dL    Creatinine 1 20 0 60 - 1 30 mg/dL    Glucose 163 (H) 65 - 140 mg/dL    Calcium 8 8 8 4 - 10 2 mg/dL    AST 14 13 - 39 U/L    ALT 14 7 - 52 U/L    Alkaline Phosphatase 45 34 - 104 U/L    Total Protein 6 3 (L) 6 4 - 8 4 g/dL    Albumin 3 7 3 5 - 5 0 g/dL    Total Bilirubin 0 20 0 20 - 1 00 mg/dL    eGFR 63 ml/min/1 73sq m   TSH    Collection Time: 02/19/23  3:51 PM   Result Value Ref Range    TSH 3RD GENERATON 2 471 0 450 - 4 500 uIU/mL   HS Troponin 0hr (reflex protocol)    Collection Time: 02/19/23  3:51 PM   Result Value Ref Range    hs TnI 0hr 5 "Refer to ACS Flowchart"- see link ng/L   D-Dimer    Collection Time: 02/19/23  4:04 PM   Result Value Ref Range    D-Dimer, Quant <0 27 <0 50 ug/ml FEU   HS Troponin I 2hr    Collection Time: 02/19/23  7:12 PM   Result Value Ref Range    hs TnI 2hr 8 "Refer to ACS Flowchart"- see link ng/L    Delta 2hr hsTnI 3 <20 ng/L   HS Troponin I 4hr    Collection Time: 02/19/23  9:18 PM   Result Value Ref Range    hs TnI 4hr 9 "Refer to ACS Flowchart"- see link ng/L    Delta 4hr hsTnI 4 <20 ng/L   CBC    Collection Time: 02/20/23  4:21 AM   Result Value Ref Range    WBC 6 60 4 31 - 10 16 Thousand/uL    RBC 4 03 3 88 - 5 62 Million/uL    Hemoglobin 12 6 12 0 - 17 0 g/dL    Hematocrit 38 9 36 5 - 49 3 %    MCV 97 82 - 98 fL    MCH 31 3 26 8 - 34 3 pg    MCHC 32 4 31 4 - 37 4 g/dL    RDW 12 8 11 6 - 15 1 %    Platelets 973 226 - 066 Thousands/uL    MPV 11 1 8 9 - 12 7 fL   Basic metabolic panel    Collection Time: 02/20/23  4:21 AM   Result Value Ref Range    Sodium 138 135 - 147 mmol/L    Potassium 4 0 3 5 - 5 3 mmol/L    Chloride 110 (H) 96 - 108 mmol/L    CO2 25 21 - 32 mmol/L    ANION GAP 3 (L) 4 - 13 mmol/L    BUN 14 5 - 25 mg/dL    Creatinine 1 10 0 60 - 1 30 mg/dL    Glucose 98 65 - 140 mg/dL    Glucose, Fasting 98 65 - 99 mg/dL    Calcium 8 9 8 4 - 10 2 mg/dL    eGFR 71 ml/min/1 73sq m   Echo complete w/ contrast if indicated    Collection Time: 02/20/23  9:35 AM   Result Value Ref Range    A4C EF 58 %    LVOT stroke volume 72 88 LVOT stroke volume index 35 30 ml/m2    LVIDd 4 90 cm    LVIDS 3 20 cm    IVSd 1 00 cm    LVPWd 0 90 cm    FS 35 28 - 44    E wave deceleration time 216 ms    MV Peak E Edilberto 69 cm/s    MV Peak A Edilberto 0 41 m/s    AV LVOT peak gradient 4 mmHg    LVOT peak VTI 23 21 cm    LVOT peak edilberto 1 02 m/s    RVID d 4 1 cm    Tricuspid annular plane systolic excursion 5 06 cm    LA size 4 cm    LA length (A2C) 5 20 cm    ERIC A4C 19 8 cm2    RAA A4C 21 4 cm2    LVOT diameter 2 0 cm    Aortic valve peak velocity 1 07 m/s    Ao VTI 22 28 cm    AV mean gradient 2 mmHg    LVOT mn grad 2 0 mmHg    AV peak gradient 5 mmHg    AV area by cont VTI 3 3 cm2    AV area peak edilberto 3 0 cm2    MV stenosis pressure 1/2 time 63 ms    MV valve area p 1/2 method 3 49     TR Peak Edilberto 2 6 m/s    Triscuspid Valve Regurgitation Peak Gradient 28 0 mmHg    Ao root 3 80 cm    Aortic valve mean velocity 7 00 m/s    Tricuspid valve peak regurgitation velocity 2 63 m/s    Left ventricular stroke volume (2D) 74 00 mL    IVS 1 cm    LEFT VENTRICLE SYSTOLIC VOLUME (MOD BIPLANE) 2D 40 mL    LV DIASTOLIC VOLUME (MOD BIPLANE) 2D 114 mL    Left Atrium Area-systolic Four Chamber 25 7 cm2    Left Atrium Area-systolic Apical Two Chamber 24 8 cm2    LVSV, 2D 74 mL    LVOT area 3 14 cm2    DVI 0 95     AV valve area 3 27 cm2       Imaging:     EKG 2/19/23 0915:     Atrial fibrillation with rapid ventricular response  ST & T wave abnormality, consider inferior ischemia  Abnormal ECG  No previous ECGs available  Confirmed by Ezequiel Breaux ([id number]) on 2/19/2023 3:40:56 PM    CHEST X-RAY 2/19/23:  FINDINGS:     Cardiomediastinal silhouette appears unremarkable      The lungs are clear  No pneumothorax or pleural effusion      Osseous structures appear within normal limits for patient age      IMPRESSION:     No acute cardiopulmonary disease      ECHO-   · TTE 2/20/23:    Indications: Afib  History: AFIB, chronic kidney disease, dyslipidemia, obstructive sleep apnea, hypertension  Interpretation Summary    Technically good quality transthoracic echocardiogram study      1  Normal left ventricle size, borderline increased wall thickness, normal left ventricle systolic and diastolic function  Left ventricular ejection fraction is estimated as around 58%  Normal global longitudinal strain  2  Borderline dilated right ventricle, normal right ventricle systolic function  3  Mild biatrial enlargement  4  Normal appearing tricuspid aortic valve, no aortic valve stenosis, trivial aortic valve regurgitation  5  Mild mitral valve leaflet sclerosis, mild, 2+ on a scale of 0-4 mitral valve regurgitation  6  Mild to moderate tricuspid valve regurgitation and mild pulmonic valve regurgitation  7  No obvious pulmonary hypertension  8  No pericardial effusion  9  Borderline dilated aortic root and mild ectasia of ascending aorta measuring up to 3 8 cm      Compared to previous echocardiogram from 2019, there is overall no significant change  · Prior TTE 7/2/19:  Indications: Abnormal Heart Sound  Dx: Benign essential hypertension [I10 (ICD-10-CM)]    SUMMARY     LEFT VENTRICLE:  Systolic function was normal  Ejection fraction was estimated to be 60 %  There were no regional wall motion abnormalities  There was mild concentric hypertrophy      LEFT ATRIUM:  The atrium was mildly dilated      MITRAL VALVE:  There was trace to mild regurgitation      TRICUSPID VALVE:  There was trace to mild regurgitation  Estimated peak PA pressure was 30 mmHg      PULMONIC VALVE:  There was mild regurgitation      AORTA:  The root exhibited mild dilatation (3 9 cm)  Code Status: Full     Epic/ Allscripts/Care Everywhere records reviewed    * Please Note: Fluency DirectDictation voice to text software may have been used in the creation of this document   **

## 2023-02-20 NOTE — UTILIZATION REVIEW
Initial Clinical Review    Admission: Date/Time/Statement:   Admission Orders (From admission, onward)     Ordered        02/19/23 1815  Place in Observation  Once            02/19/23 1710  INPATIENT ADMISSION  Once,   Status:  Canceled                      Orders Placed This Encounter   Procedures   • Place in Observation     Standing Status:   Standing     Number of Occurrences:   1     Order Specific Question:   Level of Care     Answer:   Med Surg [16]     ED Arrival Information     Expected   -    Arrival   2/19/2023 15:24    Acuity   Emergent            Means of arrival   Walk-In    Escorted by   Family Member    Service   Hospitalist    Admission type   Emergency            Arrival complaint   History of afib, chest pain           Chief Complaint   Patient presents with   • Palpitations     Pt reports hx afib and reports chest discomfort that began at 1300  Denies n/v/d/c/SOB  Pt reports he feels his heart racing       Initial Presentation: 60 y o  male presents to the ED from home with c/o persistent palpitations x 1 wk and chest pain   PMH: HTN, PAF, lymphoma under surveillance, CKD 3  In the ED he was found to be in A fib with RVR  Labs - negative troponins  Imaging - no acute disease  He was treated with IV fluids, Cardizem drip with spontaneous conversion  He was started on PO Cardizem  He had no other deficits on admission exam   He is admitted to OBSERVATION status with A fib w/ RVR - Metoprolol 25 mg BID, Echo, Tele, cardio consult  Benign HTN CDK - hold Losartan  Date: 2/20 -   On exam, feels improved, ambulating, no palpitations, CP or SOB, ambulating w/o discomfort  2/20 Cardio Consult - PAF w/ RVR - [id number] score 1 (hypertension)  HTN, HLD, JODY with HS CPAP, CKD 3, Lymphoplasmacytic Lymphoma  - tele, NM Stress test 2/21, discussion w/ EPS for next steps, Metoprolol BID, Eliquis, consider OP Cardizem PRN for abortive therapy       Date: 2/21:   Pt had stress test today w patient did have vasovagal response but now resolved with fluids and reversal agent  Initiated on Eliquis 5 mg twice daily  Recommend to follow up with EP outpatient as he is likely a candidate for ablation  He is at mental baseline  No edema on exam        2/21 NM Stress Test - •  Stress Function: Left ventricular function post-stress is normal  Post-stress ejection fraction is 58 %  •  Perfusion: There is a left ventricular perfusion defect that is small in size with mild reduction in uptake present in the basal to mid inferoseptal location(s) that is fixed  The defect appears to be an artifact as there is normal wall motion seen in this region on gated imaging  •  Stress Combined Conclusion: There is image artifact, without diagnostic evidence for perfusion abnormality  •  Stress ECG: No ST deviation is noted  Arrhythmias during recovery: Sinus bradycardia, ectopic atrial rhythm  The ECG was not diagnostic due to pharmacological (vasodilator) stress  •  Stress ECG: The patient reported dizziness, syncope , diaphoresis,and hypotension during the stress test  Symptoms began during stress and ended during recovery  Aminophylline was given to reverse the effects of the vasodilator      There is no evidence of stress-induced ischemia    It appears that the patient had a vagal reaction after vasodilator administration, noted to be bradycardic and hypotensive -resolved with IV fluids and reversal with aminophylline        ED Triage Vitals   Temperature Pulse Respirations Blood Pressure SpO2   02/19/23 1530 02/19/23 1530 02/19/23 1530 02/19/23 1530 02/19/23 1530   98 °F (36 7 °C) (!) 115 16 (!) 180/115 98 %      Temp Source Heart Rate Source Patient Position - Orthostatic VS BP Location FiO2 (%)   02/19/23 1530 02/19/23 1530 02/19/23 1530 02/19/23 1530 --   Oral Monitor Sitting Right arm       Pain Score       02/19/23 1612       2          Wt Readings from Last 1 Encounters:   02/20/23 83 9 kg (185 lb) Additional Vital Signs:   02/21/23 1506 99 1 °F (37 3 °C) 65 18 109/60 76 97 % None (Room air) Lying   02/21/23 0725 98 2 °F (36 8 °C) 59 16 137/93 -- 98 % None (Room air) Sitting   02/21/23 0426 97 7 °F (36 5 °C) 56 16 140/86 109 97 % None (Room air) Lying   02/20/23 2328 98 3 °F (36 8 °C) 52 Abnormal  18 128/77 98 100 % None (Room air) Lying   02/20/23 2033 -- 45 Abnormal  -- -- -- -- -- --   02/20/23 1913 97 9 °F (36 6 °C) 58 18 127/74 95 95 % None (Room air) Lying     02/20/23 1516 98 3 °F (36 8 °C) 55 18 138/82 -- 98 % None (Room air) Lying   02/20/23 0915 -- 55 -- 160/97 -- -- -- --   02/20/23 0700 98 5 °F (36 9 °C) 95 18 160/97 -- 98 % -- Lying   02/20/23 0247 97 6 °F (36 4 °C) 55 16 134/86 93 98 % None (Room air) Lying   02/19/23 2317 97 7 °F (36 5 °C) 55 18 140/83 92 98 % None (Room air) Lying   02/19/23 1913 -- -- -- -- -- -- None (Room air) --   02/19/23 1818 98 3 °F (36 8 °C) 67 18 143/92 -- 95 % None (Room air) Lying   02/19/23 1755 -- 66 16 -- -- 98 % None (Room air) --   02/19/23 1630 -- 99 20 147/75 104 97 % None (Room air) Sitting   02/19/23 1616 -- 133 Abnormal  20 133/75 -- 95 % None (Room air) Lying   02/19/23 1612 -- 133 Abnormal  16 179/95 Abnormal  -- 96 % None (Room air) Lying     Pertinent Labs/Diagnostic Test Results:     2/19 ECG - Atrial fibrillation with rapid ventricular response  ST & T wave abnormality, consider inferior ischemia  Abnormal ECG    1  2/20 Echo -Normal left ventricle size, borderline increased wall thickness, normal left ventricle systolic and diastolic function  Left ventricular ejection fraction is estimated as around 58%  Normal global longitudinal strain  2  Borderline dilated right ventricle, normal right ventricle systolic function  3  Mild biatrial enlargement  4  Normal appearing tricuspid aortic valve, no aortic valve stenosis, trivial aortic valve regurgitation    5  Mild mitral valve leaflet sclerosis, mild, 2+ on a scale of 0-4 mitral valve regurgitation  6  Mild to moderate tricuspid valve regurgitation and mild pulmonic valve regurgitation  7  No obvious pulmonary hypertension  8  No pericardial effusion  9  Borderline dilated aortic root and mild ectasia of ascending aorta measuring up to 3 8 cm      Compared to previous echocardiogram from 2019, there is overall no significant change      2/20 Stress Test - P    XR chest 1 view portable   ED Interpretation by Illona Buerger, MD (02/19 1644)   No significant acute dramality noted  Final Result by Shannon Verduzco MD (02/20 3208)      No acute cardiopulmonary disease                    Workstation performed: [id number]               Results from last 7 days   Lab Units 02/21/23  0431 02/20/23  0421 02/19/23  1551   WBC Thousand/uL 7 07 6 60 8 61   HEMOGLOBIN g/dL 13 6 12 6 14 2   HEMATOCRIT % 41 6 38 9 43 0   PLATELETS Thousands/uL 210 198 249   NEUTROS ABS Thousands/µL  --   --  6 12         Results from last 7 days   Lab Units 02/21/23  0431 02/20/23  0421 02/19/23  1551   SODIUM mmol/L 138 138 137   POTASSIUM mmol/L 4 0 4 0 3 6   CHLORIDE mmol/L 107 110* 106   CO2 mmol/L 27 25 27   ANION GAP mmol/L 4 3* 4   BUN mg/dL 14 14 19   CREATININE mg/dL 1 14 1 10 1 20   EGFR ml/min/1 73sq m 68 71 63   CALCIUM mg/dL 9 1 8 9 8 8     Results from last 7 days   Lab Units 02/19/23  1551   AST U/L 14   ALT U/L 14   ALK PHOS U/L 45   TOTAL PROTEIN g/dL 6 3*   ALBUMIN g/dL 3 7   TOTAL BILIRUBIN mg/dL 0 20         Results from last 7 days   Lab Units 02/21/23  0431 02/20/23  0421 02/19/23  1551   GLUCOSE RANDOM mg/dL 96 98 163*     Results from last 7 days   Lab Units 02/19/23  2118 02/19/23  1912 02/19/23  1551   HS TNI 0HR ng/L  --   --  5   HS TNI 2HR ng/L  --  8  --    HSTNI D2 ng/L  --  3  --    HS TNI 4HR ng/L 9  --   --    HSTNI D4 ng/L 4  --   --      Results from last 7 days   Lab Units 02/19/23  1604   D-DIMER QUANTITATIVE ug/ml FEU <0 27     Results from last 7 days   Lab Units 02/19/23  6249 PROTIME seconds 12 3   INR  0 91   PTT seconds 26     Results from last 7 days   Lab Units 02/19/23  1551   TSH 3RD GENERATON uIU/mL 2 471     ED Treatment:   Medication Administration from 02/19/2023 1524 to 02/19/2023 1808       Date/Time Order Dose Route Action     02/19/2023 1617 EST sodium chloride 0 9 % bolus 1,000 mL 1,000 mL Intravenous New Bag     02/19/2023 1653 EST diltiazem (CARDIZEM) 125 mg in sodium chloride 0 9 % 125 mL infusion 10 mg/hr Intravenous Rate/Dose Change     02/19/2023 1637 EST diltiazem (CARDIZEM) 125 mg in sodium chloride 0 9 % 125 mL infusion 7 5 mg/hr Intravenous Rate/Dose Change     02/19/2023 1617 EST diltiazem (CARDIZEM) 125 mg in sodium chloride 0 9 % 125 mL infusion 5 mg/hr Intravenous New Bag     02/19/2023 1612 EST diltiazem (CARDIZEM) injection 15 mg 15 mg Intravenous Given        Past Medical History:   Diagnosis Date   • Atrial fibrillation (HCC)    • Cancer (HCC)     lymphoma   • CKD (chronic kidney disease)    • Dilated aortic root (HCC)    • Erectile dysfunction    • Hyperlipidemia 6/28/2016   • Hypertension    • Hypertensive CKD (chronic kidney disease)    • Mild renal insufficiency 9/26/2017   • Vitamin D deficiency      Present on Admission:  • Benign hypertensive CKD  • Malignant lymphoma, lymphoplasmacytic (HCC)  • Dyslipidemia      Admitting Diagnosis: Palpitations [R00 2]  Chest pain [R07 9]  Atrial fibrillation with rapid ventricular response ([facility] [address] ) [I48 91]  Age/Sex: 60 y o  male  Admission Orders:  Scheduled Medications:  apixaban, 5 mg, Oral, BID  metoprolol tartrate, 25 mg, Oral, Q12H [facility]      Continuous IV Infusions:     PRN Meds:  acetaminophen, 650 mg, Oral, Q6H PRN  aluminum-magnesium hydroxide-simethicone, 30 mL, Oral, Q6H PRN  ondansetron, 4 mg, Intravenous, Q6H PRN    Tele  IP CONSULT TO CARDIOLOGY  IP CONSULT TO ONCOLOGY    Network Utilization Review Department  ATTENTION: Please call with any questions or concerns to [phone] and carefully listen to the prompts so that you are directed to the right person  All voicemails are confidential   [facility] all requests for admission clinical reviews, approved or denied determinations and any other requests to dedicated fax number below belonging to the campus where the patient is receiving treatment   List of dedicated fax numbers for the Facilities:  [address] DENIALS (Administrative/Medical Necessity) [phone]   [address] (Maternity/NICU/Pediatrics) [phone]   [address] [phone]   [address] [phone]   [address] [place] [address] [facility] [phone]   155 [facility] 134 815 [facility] [phone]

## 2023-02-20 NOTE — ASSESSMENT & PLAN NOTE
Lab Results   Component Value Date    EGFR 71 02/20/2023    EGFR 63 02/19/2023    EGFR 62 11/26/2022    CREATININE 1 10 02/20/2023    CREATININE 1 20 02/19/2023    CREATININE 1 22 11/26/2022     Restart losartan

## 2023-02-20 NOTE — PLAN OF CARE
Problem: PAIN - ADULT  Goal: Verbalizes/displays adequate comfort level or baseline comfort level  Description: Interventions:  - Encourage patient to monitor pain and request assistance  - Assess pain using appropriate pain scale  - Administer analgesics based on type and severity of pain and evaluate response  - Implement non-pharmacological measures as appropriate and evaluate response  - Consider cultural and social influences on pain and pain management  - Notify physician/advanced practitioner if interventions unsuccessful or patient reports new pain  Outcome: Progressing     Problem: Knowledge Deficit  Goal: Patient/family/caregiver demonstrates understanding of disease process, treatment plan, medications, and discharge instructions  Description: Complete learning assessment and assess knowledge base    Interventions:  - Provide teaching at level of understanding  - Provide teaching via preferred learning methods  Outcome: Progressing     Problem: CARDIOVASCULAR - ADULT  Goal: Maintains optimal cardiac output and hemodynamic stability  Description: INTERVENTIONS:  - Monitor I/O, vital signs and rhythm  - Monitor for S/S and trends of decreased cardiac output  - Administer and titrate ordered vasoactive medications to optimize hemodynamic stability  - Assess quality of pulses, skin color and temperature  - Assess for signs of decreased coronary artery perfusion  - Instruct patient to report change in severity of symptoms  Outcome: Progressing  Goal: Absence of cardiac dysrhythmias or at baseline rhythm  Description: INTERVENTIONS:  - Continuous cardiac monitoring, vital signs, obtain 12 lead EKG if ordered  - Administer antiarrhythmic and heart rate control medications as ordered  - Monitor electrolytes and administer replacement therapy as ordered  Outcome: Progressing

## 2023-02-20 NOTE — H&P
[address] [facility]  H&P- Kylie Benitez 1960, 60 y o  male MRN: [id number]  Unit/Bed#: E4 -01 Encounter: [id number]  Primary Care Provider: Jose Angel Bowers DO   Date and time admitted to hospital: 2/19/2023  3:32 PM    * Atrial fibrillation with rapid ventricular response [facility])  Assessment & Plan  Patient with 3-year history of paroxysmal atrial fibrillation currently not maintained on rate controlling medication or anticoagulation due to [id number] score of 1  Patient reports frequent and more persistent episodes of palpitations  Today he had prolonged palpitations associated with substernal pressure  On presentation patient found to be in atrial fibrillation with RVR and he was started on Cardizem infusion  Shortly after in the emergency department patient spontaneously converted to normal sinus rhythm  ED discussed case with cardiology, no plan for transfer to [place] for evaluation for ablation at this time  Start metoprolol 25 mg twice daily  Obtain updated echocardiogram  Monitor on telemetry overnight  Appreciate cardiology consultation      Benign hypertensive CKD  Assessment & Plan  Lab Results   Component Value Date    EGFR 63 02/19/2023    EGFR 62 11/26/2022    EGFR 63 10/01/2022    CREATININE 1 20 02/19/2023    CREATININE 1 22 11/26/2022    CREATININE 1 20 10/01/2022     We will hold home losartan at this time with initiation of metoprolol to allow for rate control    VTE Prophylaxis: Low risk, ambulation  Code Status: Level 1 full code    Anticipated Length of Stay:  Patient will be admitted on an Observation basis with an anticipated length of stay of less than 2 midnights  Justification for Hospital Stay: Management of atrial fibrillation with RVR requiring IV medications    Total Time for Visit, including Counseling / Coordination of Care: 60 minutes  Greater than 50% of this total time spent on direct patient counseling and coordination of care      Chief Complaint:   Palpitations    History of Present Illness:    Jeanie Berkowitz is a 60 y o  male With past medical history of hypertension, paroxysmal atrial fibrillation, lymphoma under surveillance, CKD 3, hypertension presents to the hospital with persistent palpitations present over the last week  Patient also noted chest pain associated with symptoms today  On presentation was found to be in atrial fibrillation with RVR with rates in the 140s to 150s  Patient was started on Cardizem infusion and spontaneously cardioverted in the emergency department  Review of Systems:    Review of Systems   Constitutional: Negative for chills and fever  Respiratory: Negative for shortness of breath and wheezing  Cardiovascular: Positive for chest pain and palpitations  Gastrointestinal: Negative for nausea and vomiting  Genitourinary: Negative for difficulty urinating  Musculoskeletal: Negative for arthralgias and myalgias  Neurological: Negative for dizziness and light-headedness  Past Medical and Surgical History:     Past Medical History:   Diagnosis Date   • Atrial fibrillation ([facility] )    • Cancer ([facility] )     lymphoma   • CKD (chronic kidney disease)    • Dilated aortic root (HCC)    • Erectile dysfunction    • Hyperlipidemia 6/28/2016   • Hypertension    • Hypertensive CKD (chronic kidney disease)    • Mild renal insufficiency 9/26/2017   • Vitamin D deficiency        Past Surgical History:   Procedure Laterality Date   • COLONOSCOPY     • HIATAL HERNIA REPAIR         Meds/Allergies:    Prior to Admission medications    Medication Sig Start Date End Date Taking?  Authorizing Provider   cholecalciferol (VITAMIN D3) 1,000 units tablet Take 1,000 Units by mouth daily   Yes Historical Provider, MD   losartan (COZAAR) 25 mg tablet Take 1 tablet (25 mg total) by mouth daily  Patient taking differently: Take 12 5 mg by mouth daily 11/16/22  Yes Kyra King DO   diltiazem (DILACOR XR) 120 MG 24 hr capsule Take 1 capsule (120 mg total) by mouth daily 11/5/20 11/27/20  [place], DO       Allergies: No Known Allergies    Social History:     Marital Status: /Civil Union   Substance Use History:   Social History     Substance and Sexual Activity   Alcohol Use Yes    Comment: social     Social History     Tobacco Use   Smoking Status Never   Smokeless Tobacco Never     Social History     Substance and Sexual Activity   Drug Use No       Family History:    Pertinent family history reviewed    Physical Exam:     Vitals:   Blood Pressure: 143/92 (02/19/23 1818)  Pulse: 67 (02/19/23 1818)  Temperature: 98 3 °F (36 8 °C) (02/19/23 1818)  Temp Source: Temporal (02/19/23 1818)  Respirations: 18 (02/19/23 1818)  Weight - Scale: 84 8 kg (186 lb 15 2 oz) (02/19/23 1527)  SpO2: 95 % (02/19/23 1818)    Physical Exam  Vitals reviewed  Constitutional:       General: He is not in acute distress  Appearance: He is well-developed  He is not ill-appearing, toxic-appearing or diaphoretic  HENT:      Head: Normocephalic and atraumatic  Mouth/Throat:      Mouth: Mucous membranes are moist    Eyes:      General: No scleral icterus  Extraocular Movements: Extraocular movements intact  Cardiovascular:      Rate and Rhythm: Normal rate and regular rhythm  Heart sounds: Normal heart sounds  Pulmonary:      Effort: Pulmonary effort is normal  No respiratory distress  Breath sounds: Normal breath sounds  No wheezing or rales  Abdominal:      General: There is no distension  Palpations: Abdomen is soft  Tenderness: There is no abdominal tenderness  There is no guarding or rebound  Musculoskeletal:         General: No swelling, tenderness or deformity  Skin:     General: Skin is warm and dry  Neurological:      General: No focal deficit present  Mental Status: He is alert  Mental status is at baseline     Psychiatric:         Mood and Affect: Mood normal          Behavior: Behavior normal  Thought Content: Thought content normal          Judgment: Judgment normal           Additional Data:     Lab Results: I have reviewed pertinent results     Results from last 7 days   Lab Units 02/19/23  1551   WBC Thousand/uL 8 61   HEMOGLOBIN g/dL 14 2   HEMATOCRIT % 43 0   PLATELETS Thousands/uL 249   NEUTROS PCT % 71   LYMPHS PCT % 19   MONOS PCT % 7   EOS PCT % 2     Results from last 7 days   Lab Units 02/19/23  1551   SODIUM mmol/L 137   POTASSIUM mmol/L 3 6   CHLORIDE mmol/L 106   CO2 mmol/L 27   BUN mg/dL 19   CREATININE mg/dL 1 20   ANION GAP mmol/L 4   CALCIUM mg/dL 8 8   ALBUMIN g/dL 3 7   TOTAL BILIRUBIN mg/dL 0 20   ALK PHOS U/L 45   ALT U/L 14   AST U/L 14   GLUCOSE RANDOM mg/dL 163*     Results from last 7 days   Lab Units 02/19/23  1551   INR  0 91                   Imaging: I have reviewed pertinent imaging     XR chest 1 view portable   ED Interpretation by Torey Robbins MD (02/19 1644)   No significant acute dramality noted  EKG, Pathology, and Other Studies Reviewed on Admission:   · EKG: Reviewed     Allscripts / Epic Records Reviewed    ** Please Note: This note has been constructed using a voice recognition system   **

## 2023-02-20 NOTE — APP STUDENT NOTE
[facility] STUDENT  Inpatient Progress Note for TRAINING ONLY  Not Part of Legal Medical Record       Progress Note - Vinny Lopes 60 y o  male MRN: [id number]    Unit/Bed#: E4 -01 Encounter: [id number]      Assessment:  1  Atrial fibrillation w/ RVR  2  Benign hypertensive CKD    Plan:  1  Has had a 3 year history of paroxysmal afib with [id number] score of 1, therefore he has not been on rate controlling medications  He has been having more frequent episodes of palpitations, which he feels come on after eating  In ED, was in afib with RVR, but was then started on Cardizem infusion and converted to normal sinus rhythm  Started metoprolol 25 mg BID  Updated ECHO 2/20/23 showed EF 58% with borderline dilated right ventricle and aortic root, mild biatrial enlargement, mild mitral valve leaflet sclerosis, and mild tricuspid/pulmonic valve regurgitation  Otherwise, no abnormalities  Cardiology input appreciated  Plan for discharge today pending cardiology consult  2  Current GFR 63, at baseline, and creatinine 1 20, at baseline  Continue to hold home losartan with continuation of metoprolol for rate control  Subjective:   Patient sitting upright comfortably in bed in NAD  He reports the palpitations have resolved  He was able to ambulate throughout the [place] without tachycardia or palpitations  He denies SOB, chest pain, lower extremity edema, dizziness, HA, and N/V/D  Has been able to eat, sleep, and ambulate well  No acute overnight events  Objective:     Vitals: Blood pressure 160/97, pulse 55, temperature 98 5 °F (36 9 °C), temperature source Tympanic, resp  rate 18, height 5' 11" (1 803 m), weight 83 9 kg (185 lb), SpO2 98 %  ,Body mass index is 25 8 kg/m²        Intake/Output Summary (Last 24 hours) at 2/20/2023 1304  Last data filed at 2/20/2023 0450  Gross per 24 hour   Intake 880 ml   Output --   Net 880 ml       Physical Exam: General appearance: alert and oriented, in no acute distress  Head: Normocephalic, without obvious abnormality, atraumatic  Throat: lips, mucosa, and tongue normal; teeth and gums normal  Lungs: clear to auscultation bilaterally  Heart: regular rate and rhythm, S1, S2 normal, no murmur, click, rub or gallop  Abdomen: soft, non-tender; bowel sounds normal; no masses,  no organomegaly  Extremities: extremities normal, warm and well-perfused; no cyanosis, clubbing, or edema  Skin: Skin color, texture, turgor normal  No rashes or lesions     Invasive Devices     Peripheral Intravenous Line  Duration           Peripheral IV 02/19/23 Right Antecubital <1 day                Lab, Imaging and other studies: I have personally reviewed pertinent reports      VTE Pharmacologic Prophylaxis: Eliquis

## 2023-02-20 NOTE — CONSULTS
Cardiology   [place] 60 y o  male MRN: [id number]  Unit/Bed#: E4 -01 Encounter: [id number]      Reason for Consult / Principal Problem: Palpitations, Atrial fibrillation with rapid ventricular response    Physician Requesting Consult:  Lindsey Olivera MD    Cardiologist: Dr Birtha Skiff, MD    Author: Lyndsay Araiza, Medical Student    PCP: Everette Siegel DO      Assessment/Plan:  · Paroxysmal atrial fibrillation with rapid ventricular response   · Not currently on any anticoagulation  · [id number] score 1 (hypertension)  · Hypertension  · TTE 2/20/23: Normal LV side, borderline increased wall thickness, normal LV systolic and diastolic function, EF estimated to be 58%, borderline dilated RV, mild biatrial enlargement, trivial AR, mild mitral valve sclerosis, mild 2+ on scale of 0-4 MR, mild to moderate TR and mild IA, borderline dilated aortic root and mild ectasia of ascending aorta measuring up to 3 8 cm  No significant change from 2019 echo    · TTE 7/2/19: EF estimated to be 60%, no RWMA, mild concentric LVH, LA mildly dilated, trace to mild MR, trace to mild TR, estimated peak PA pressure 30mmHg, mild IA, root of aorta exhibited mild dilation (3 9 cm)  · Hyperlipidemia   · Last lipid panel 7 mo ago- Cholesterol 193, TG 84, HDL 55, LDLc 121, not currently on statin   · Cardiac risk factors include family history of two myocardial infarctions in father in late 62s and mid [de-identified] requiring stent placement  · Lymphoplasmacytic Lymphoma   · Under surveillance with oncologist, no current or past medications, chemo or radiation  · JODY  · Diagnosed by sleep study, recommended to wear CPAP to sleep but does not use at home secondary to uncomfortable mask while sleeping  · CKD3      Plan  · Monitor on telemetry overnight  · Nuclear stress test, plan for procedure tomorrow, 2/21/23  · Discuss with electrophysiology team after nuclear stress test, potential for ablation as longterm afib solution vs rhythm control medications   · Continue Metoprolol 25mg BID  · Start Eliquis 5mg BID   · Given patient's [id number]- VASc score of 1 with paroxysmal atrial fibrillation, and potentially hypercoagulable/ hyperviscous state from lymphoma, recommend starting [address]  · Consider outpatient Cardizem medication PRN for abortive therapy       HPI: Jorge Pop 60y o  year old male with history of paroxysmal atrial fibrllation, not on any rate controlling medicaiton nor anticoagulation, HTN on Losartan (taking 1/2 recommended dose secondary to side effects of fatigue), lymphoma under surveillance, CKD 3, JODY with no current CPAP use, who presents with palpitations, episodes increasing in frequency and persistence, with episode 2/19 involving substernal chest pain described as 2/10 achy pain and palpitations lasting hours, prompting visit to ED  Patient and wife at bedside endorse episodes of palpitations about a few times per month since Afib Dx ~ 2 years ago, most only lasting 10-15 minutes and self resolving, some occurring at night and waking patient from sleep  Patient reports he has a blood pressure machine at home and noticed his heart rate has been high and his palpitations have been more frequent within the last 10 days, with HR measuring ~135 bpm at home prior to arrival yesterday  Episodes of palpitations have been associated with lightheadedness and presyncopal episodes, worse when standing from a seated position or after meals, per patient  He denies SOB, diaphoresis, headaches, syncope, radiation of pain, paresthesias, leg edema, hematochezia, weight loss or weight gain  He does not drink large amounts of caffeine at home, no coffee intake, reports occasional unsweetened iced tea, but no noticeable correlation with symptoms  Patient was found to be in atrial fibrillation with heart rates in the 140s in the ED, started on Cardizem infusion in ED, and spontaneously converted to sinus rhythm   He has remained in sinus rhythm with normal heart rate on telemetry, no acute overnight events  Patient has no complaints this morning, reports palpitations and achy chest pain are resolved  Has no shortness of breath, lightheadedness, dizziness, or presyncopal episodes when walking to bathroom or moving around in hospital room  Mr Son Montague is [facility] to [facility], has been followed by Dr Xiong Core  Historical Information   Past Medical History:   Diagnosis Date   • Atrial fibrillation ([address] )    • Cancer (HCC)     lymphoma   • CKD (chronic kidney disease)    • Dilated aortic root (HCC)    • Erectile dysfunction    • Hyperlipidemia 6/28/2016   • Hypertension    • Hypertensive CKD (chronic kidney disease)    • Mild renal insufficiency 9/26/2017   • Vitamin D deficiency      Past Surgical History:   Procedure Laterality Date   • COLONOSCOPY     • HIATAL HERNIA REPAIR       Social History   Social History     Substance and Sexual Activity   Alcohol Use Yes    Comment: social     Social History     Substance and Sexual Activity   Drug Use No     Social History     Tobacco Use   Smoking Status Never   Smokeless Tobacco Never     Family History:   Family History   Problem Relation Age of Onset   • Lung cancer Mother    • Heart attack Father 76   • Prostate cancer Father    • Hyperlipidemia Father    • No Known Problems Sister    • Lymphoma Maternal Aunt    • Fainting Maternal Grandfather        Review of Systems:  Review of Systems   Constitutional: Negative for chills, diaphoresis, fatigue, fever and unexpected weight change  HENT: Negative  Eyes: Negative  Respiratory: Negative for cough, chest tightness and shortness of breath  Cardiovascular: Positive for chest pain (one episode of achy substernal pain ) and palpitations (increase in frequency and length of episodes)  Negative for leg swelling  Gastrointestinal: Negative for abdominal pain, blood in stool, diarrhea, nausea and vomiting     Endocrine: Negative  Genitourinary: Negative  Musculoskeletal: Negative  Skin: Negative for color change, pallor and rash  Neurological: Positive for light-headedness  Negative for syncope (pre-syncopal episodes, no loss of consciousness), numbness and headaches  Hematological: Negative  Psychiatric/Behavioral: Negative  Scheduled Meds:  Current Facility-Administered Medications   Medication Dose Route Frequency Provider Last Rate   • acetaminophen  650 mg Oral Q6H PRN Nancy Lopez DO     • aluminum-magnesium hydroxide-simethicone  30 mL Oral Q6H PRN Nancy Lopez DO     • metoprolol tartrate  25 mg Oral Q12H [address] Nancy Lopez,      • ondansetron  4 mg Intravenous Q6H PRN Nancy Lopez DO       Continuous Infusions:   PRN Meds: •  acetaminophen  •  aluminum-magnesium hydroxide-simethicone  •  ondansetron    No Known Allergies    Objective      Vitals: Blood pressure 160/97, pulse 55, temperature 98 5 °F (36 9 °C), temperature source Tympanic, resp  rate 18, height 5' 11" (1 803 m), weight 83 9 kg (185 lb), SpO2 98 %  , Body mass index is 25 8 kg/m² ,     Orthostatic Blood Pressures    Flowsheet Row Most Recent Value   Blood Pressure 160/97 filed at 02/20/2023 0915   Patient Position - Orthostatic VS Lying filed at 02/20/2023 0700            Intake/Output Summary (Last 24 hours) at 2/20/2023 1040  Last data filed at 2/20/2023 0450  Gross per 24 hour   Intake 880 ml   Output --   Net 880 ml       Invasive Devices     Peripheral Intravenous Line  Duration           Peripheral IV 02/19/23 Right Antecubital <1 day                Physical Exam:  Gen: Awake, Alert, NAD, resting comfortably   Head/eyes: AT/NC, pupils equal and round, Anicteric  ENT: mmm  Neck: Supple, No elevated JVP, trachea midline  Resp: CTA bilaterally no w/r/r, no respiratory distress, no increased effort of breathing   CV: RRR +S1, S2, No m/r/g appreciated on exam  Occasional PVC auscultated on exam   Abd: Soft, NT/ND + BS, no rebound or guarding  Ext: no LE edema bilaterally, bilateral lower extremities warm   Neuro:  Follows commands, moves all extermities  Psych: Appropriate affect, normal mood, pleasant attitude, non-combative  Skin: warm; no rash, erythema or venous stasis changes on exposed skin    Lab Results:     hsTnl 0hr 5  hs Tnl 2hr 8, delta 3  hs Tnl 4 hr 9, delta 4    TSH 2 471  D-dimer <0 27    K+ 3 6 -> 4 0    Cr 1 2 -> 1 1    Hgb 14 2 -> 12 6      Recent Results (from the past 24 hour(s))   ECG 12 lead    Collection Time: 02/19/23  3:39 PM   Result Value Ref Range    Ventricular Rate 147 BPM    Atrial Rate 326 BPM    NV Interval  ms    QRSD Interval 90 ms    QT Interval 302 ms    QTC Interval 472 ms    P Axis  degrees    QRS Axis 80 degrees    T Wave Axis -52 degrees   CBC and differential    Collection Time: 02/19/23  3:51 PM   Result Value Ref Range    WBC 8 61 4 31 - 10 16 Thousand/uL    RBC 4 47 3 88 - 5 62 Million/uL    Hemoglobin 14 2 12 0 - 17 0 g/dL    Hematocrit 43 0 36 5 - 49 3 %    MCV 96 82 - 98 fL    MCH 31 8 26 8 - 34 3 pg    MCHC 33 0 31 4 - 37 4 g/dL    RDW 12 6 11 6 - 15 1 %    MPV 11 3 8 9 - 12 7 fL    Platelets 497 594 - 610 Thousands/uL    nRBC 0 /100 WBCs    Neutrophils Relative 71 43 - 75 %    Immat GRANS % 0 0 - 2 %    Lymphocytes Relative 19 14 - 44 %    Monocytes Relative 7 4 - 12 %    Eosinophils Relative 2 0 - 6 %    Basophils Relative 1 0 - 1 %    Neutrophils Absolute 6 12 1 85 - 7 62 Thousands/µL    Immature Grans Absolute 0 02 0 00 - 0 20 Thousand/uL    Lymphocytes Absolute 1 67 0 60 - 4 47 Thousands/µL    Monocytes Absolute 0 59 0 17 - 1 22 Thousand/µL    Eosinophils Absolute 0 16 0 00 - 0 61 Thousand/µL    Basophils Absolute 0 05 0 00 - 0 10 Thousands/µL   Protime-INR    Collection Time: 02/19/23  3:51 PM   Result Value Ref Range    Protime 12 3 11 6 - 14 5 seconds    INR 0 91 0 84 - 1 19   APTT    Collection Time: 02/19/23  3:51 PM   Result Value Ref Range    PTT 26 23 - 37 seconds   Comprehensive metabolic panel    Collection Time: 02/19/23  3:51 PM   Result Value Ref Range    Sodium 137 135 - 147 mmol/L    Potassium 3 6 3 5 - 5 3 mmol/L    Chloride 106 96 - 108 mmol/L    CO2 27 21 - 32 mmol/L    ANION GAP 4 4 - 13 mmol/L    BUN 19 5 - 25 mg/dL    Creatinine 1 20 0 60 - 1 30 mg/dL    Glucose 163 (H) 65 - 140 mg/dL    Calcium 8 8 8 4 - 10 2 mg/dL    AST 14 13 - 39 U/L    ALT 14 7 - 52 U/L    Alkaline Phosphatase 45 34 - 104 U/L    Total Protein 6 3 (L) 6 4 - 8 4 g/dL    Albumin 3 7 3 5 - 5 0 g/dL    Total Bilirubin 0 20 0 20 - 1 00 mg/dL    eGFR 63 ml/min/1 73sq m   TSH    Collection Time: 02/19/23  3:51 PM   Result Value Ref Range    TSH 3RD GENERATON 2 471 0 450 - 4 500 uIU/mL   HS Troponin 0hr (reflex protocol)    Collection Time: 02/19/23  3:51 PM   Result Value Ref Range    hs TnI 0hr 5 "Refer to ACS Flowchart"- see link ng/L   D-Dimer    Collection Time: 02/19/23  4:04 PM   Result Value Ref Range    D-Dimer, Quant <0 27 <0 50 ug/ml FEU   HS Troponin I 2hr    Collection Time: 02/19/23  7:12 PM   Result Value Ref Range    hs TnI 2hr 8 "Refer to ACS Flowchart"- see link ng/L    Delta 2hr hsTnI 3 <20 ng/L   HS Troponin I 4hr    Collection Time: 02/19/23  9:18 PM   Result Value Ref Range    hs TnI 4hr 9 "Refer to ACS Flowchart"- see link ng/L    Delta 4hr hsTnI 4 <20 ng/L   CBC    Collection Time: 02/20/23  4:21 AM   Result Value Ref Range    WBC 6 60 4 31 - 10 16 Thousand/uL    RBC 4 03 3 88 - 5 62 Million/uL    Hemoglobin 12 6 12 0 - 17 0 g/dL    Hematocrit 38 9 36 5 - 49 3 %    MCV 97 82 - 98 fL    MCH 31 3 26 8 - 34 3 pg    MCHC 32 4 31 4 - 37 4 g/dL    RDW 12 8 11 6 - 15 1 %    Platelets 000 913 - 156 Thousands/uL    MPV 11 1 8 9 - 12 7 fL   Basic metabolic panel    Collection Time: 02/20/23  4:21 AM   Result Value Ref Range    Sodium 138 135 - 147 mmol/L    Potassium 4 0 3 5 - 5 3 mmol/L    Chloride 110 (H) 96 - 108 mmol/L    CO2 25 21 - 32 mmol/L    ANION GAP 3 (L) 4 - 13 mmol/L    BUN 14 5 - 25 mg/dL    Creatinine 1 10 0 60 - 1 30 mg/dL    Glucose 98 65 - 140 mg/dL    Glucose, Fasting 98 65 - 99 mg/dL    Calcium 8 9 8 4 - 10 2 mg/dL    eGFR 71 ml/min/1 73sq m       Imaging:     EKG 2/19/23 0915:   Atrial fibrillation with rapid ventricular response  ST & T wave abnormality, consider inferior ischemia  Abnormal ECG  No previous ECGs available  Confirmed by Ezequiel Breaux ([id number]) on 2/19/2023 3:40:56 PM    CHEST X-RAY 2/19/23:  FINDINGS:     Cardiomediastinal silhouette appears unremarkable      The lungs are clear  No pneumothorax or pleural effusion      Osseous structures appear within normal limits for patient age      IMPRESSION:     No acute cardiopulmonary disease      ECHO-   · TTE 2/20/23: Indications: Afib  History: AFIB, chronic kidney disease, dyslipidemia, obstructive sleep apnea, hypertension  Interpretation Summary    Technically good quality transthoracic echocardiogram study      1  Normal left ventricle size, borderline increased wall thickness, normal left ventricle systolic and diastolic function  Left ventricular ejection fraction is estimated as around 58%  Normal global longitudinal strain  2  Borderline dilated right ventricle, normal right ventricle systolic function  3  Mild biatrial enlargement  4  Normal appearing tricuspid aortic valve, no aortic valve stenosis, trivial aortic valve regurgitation  5  Mild mitral valve leaflet sclerosis, mild, 2+ on a scale of 0-4 mitral valve regurgitation  6  Mild to moderate tricuspid valve regurgitation and mild pulmonic valve regurgitation  7  No obvious pulmonary hypertension  8  No pericardial effusion  9  Borderline dilated aortic root and mild ectasia of ascending aorta measuring up to 3 8 cm      Compared to previous echocardiogram from 2019, there is overall no significant change  · Prior TTE 7/2/19:  Indications: Abnormal Heart Sound  Dx:  Benign essential hypertension [I10 (ICD-10-CM)]    SUMMARY     LEFT VENTRICLE:  Systolic function was normal  Ejection fraction was estimated to be 60 %  There were no regional wall motion abnormalities  There was mild concentric hypertrophy      LEFT ATRIUM:  The atrium was mildly dilated      MITRAL VALVE:  There was trace to mild regurgitation      TRICUSPID VALVE:  There was trace to mild regurgitation  Estimated peak PA pressure was 30 mmHg      PULMONIC VALVE:  There was mild regurgitation      AORTA:  The root exhibited mild dilatation (3 9 cm)  Code Status: Full     [facility]/ [facility] Everywhere records reviewed    * Please Note: Fluency DirectDictation voice to text software may have been used in the creation of this document   **

## 2023-02-20 NOTE — PROGRESS NOTES
[facility]  Progress Note - Karsten Bookbinder 1960, 60 y o  male MRN: [id number]  Unit/Bed#: E4 -01 Encounter: [id number]  Primary Care Provider: Bright Lackey DO   Date and time admitted to hospital: 2/19/2023  3:32 PM    * Atrial fibrillation with rapid ventricular response [facility])  Assessment & Plan  Patient with 3-year history of paroxysmal atrial fibrillation currently not maintained on rate controlling medication or anticoagulation due to [id number] score of 1  · Patient reports frequent and more persistent episodes of palpitations  Today he had prolonged palpitations associated with substernal pressure  · On presentation patient found to be in atrial fibrillation with RVR and he was started on Cardizem infusion    Shortly after in the emergency department patient spontaneously converted to normal sinus rhythm  · ED discussed case with cardiology, no plan for transfer to [place] for evaluation for ablation at this time  · Start metoprolol 25 mg twice daily  · Echocardiogram with borderline dilated right ventricle, biatrial enlargement, full left ventricular systolic and diastolic function  · Monitor on telemetry overnight  · Appreciate cardiology consultation  · Recommending nuclear stress test to be performed tomorrow 2/21  · Cardiology discussed with the EP team, following nuclear stress test potential for ablation is long-term atrial fibrillation solution versus rhythm control medications  · Initiated on Eliquis 5 mg twice daily    Malignant lymphoma, lymphoplasmacytic ([facility] [address] )  Assessment & Plan  Follows with outpatient oncology    Dyslipidemia  Assessment & Plan  Not maintained on medication    Benign hypertensive CKD  Assessment & Plan  Lab Results   Component Value Date    EGFR 71 02/20/2023    EGFR 63 02/19/2023    EGFR 62 11/26/2022    CREATININE 1 10 02/20/2023    CREATININE 1 20 02/19/2023    CREATININE 1 22 11/26/2022     Restart losartan      VTE Pharmacologic Prophylaxis: VTE Score: 3 Moderate Risk (Score 3-4) - Pharmacological DVT Prophylaxis Ordered: apixaban (Eliquis)  Patient Centered Rounds: I performed bedside rounds with nursing staff today  Discussions with Specialists or Other Care Team Provider: cardiology    Education and Discussions with Family / Patient: Updated  (wife) at bedside  Total Time Spent on Date of Encounter in care of patient: 45 minutes This time was spent on one or more of the following: performing physical exam; counseling and coordination of care; obtaining or reviewing history; documenting in the medical record; reviewing/ordering tests, medications or procedures; communicating with other healthcare professionals and discussing with patient's family/caregivers  Current Length of Stay: 1 day(s)  Current Patient Status: Observation   Certification Statement: The patient will continue to require additional inpatient hospital stay due to Stress test  Discharge Plan: Anticipate discharge tomorrow to home  Code Status: Level 1 - Full Code    Subjective: The patient is seen resting comfortably in bed  He denies any symptoms of palpitations, chest pain or shortness of breath  He reports that he was ambulating around his room without any discomfort  He denies any nausea or vomiting, lightheadedness or dizziness  Objective:     Vitals:   Temp (24hrs), Av 1 °F (36 7 °C), Min:97 6 °F (36 4 °C), Max:98 5 °F (36 9 °C)    Temp:  [97 6 °F (36 4 °C)-98 5 °F (36 9 °C)] 98 3 °F (36 8 °C)  HR:  [] 55  Resp:  [16-20] 18  BP: (133-179)/(75-97) 138/82  SpO2:  [95 %-98 %] 98 %  Body mass index is 25 8 kg/m²  Input and Output Summary (last 24 hours): Intake/Output Summary (Last 24 hours) at 2023 1535  Last data filed at 2023 0450  Gross per 24 hour   Intake 880 ml   Output --   Net 880 ml       Physical Exam:   Physical Exam  Vitals and nursing note reviewed     Constitutional:       General: He is not in acute distress  Appearance: Normal appearance  He is not ill-appearing, toxic-appearing or diaphoretic  HENT:      Head: Normocephalic and atraumatic  Cardiovascular:      Rate and Rhythm: Normal rate and regular rhythm  Heart sounds: No murmur heard  No friction rub  No gallop  Pulmonary:      Effort: Pulmonary effort is normal  No respiratory distress  Breath sounds: Normal breath sounds  No wheezing, rhonchi or rales  Abdominal:      General: Abdomen is flat  Bowel sounds are normal  There is no distension  Palpations: Abdomen is soft  Tenderness: There is no abdominal tenderness  Musculoskeletal:      Right lower leg: No edema  Left lower leg: No edema  Skin:     General: Skin is warm and dry  Coloration: Skin is not jaundiced or pale  Neurological:      General: No focal deficit present  Mental Status: He is alert  Mental status is at baseline            Additional Data:     Labs:  Results from last 7 days   Lab Units 02/20/23  0421 02/19/23  1551   WBC Thousand/uL 6 60 8 61   HEMOGLOBIN g/dL 12 6 14 2   HEMATOCRIT % 38 9 43 0   PLATELETS Thousands/uL 198 249   NEUTROS PCT %  --  71   LYMPHS PCT %  --  19   MONOS PCT %  --  7   EOS PCT %  --  2     Results from last 7 days   Lab Units 02/20/23  0421 02/19/23  1551   SODIUM mmol/L 138 137   POTASSIUM mmol/L 4 0 3 6   CHLORIDE mmol/L 110* 106   CO2 mmol/L 25 27   BUN mg/dL 14 19   CREATININE mg/dL 1 10 1 20   ANION GAP mmol/L 3* 4   CALCIUM mg/dL 8 9 8 8   ALBUMIN g/dL  --  3 7   TOTAL BILIRUBIN mg/dL  --  0 20   ALK PHOS U/L  --  45   ALT U/L  --  14   AST U/L  --  14   GLUCOSE RANDOM mg/dL 98 163*     Results from last 7 days   Lab Units 02/19/23  1551   INR  0 91                   Lines/Drains:  Invasive Devices     Peripheral Intravenous Line  Duration           Peripheral IV 02/19/23 Right Antecubital <1 day                  Telemetry:  Telemetry Orders (From admission, onward)             48 Hour Telemetry Monitoring  Continuous x 48 hours        References:    Telemetry Guidelines   Question:  Reason for 48 Hour Telemetry  Answer:  Acute MI, chest pain - R/O MI, or unstable angina                 Telemetry Reviewed: Normal Sinus Rhythm  Indication for Continued Telemetry Use: Arrthymias requiring medical therapy             Imaging: No pertinent imaging reviewed  Recent Cultures (last 7 days):         Last 24 Hours Medication List:   Current Facility-Administered Medications   Medication Dose Route Frequency Provider Last Rate   • acetaminophen  650 mg Oral Q6H PRN Charo Iba, DO     • aluminum-magnesium hydroxide-simethicone  30 mL Oral Q6H PRN Charo Iba, DO     • apixaban  5 mg Oral BID RICK Chilel     • losartan  50 mg Oral Daily Ezequeil Breaux MD     • metoprolol tartrate  25 mg Oral Q12H [address] Mancilla Iba, DO     • ondansetron  4 mg Intravenous Q6H PRN Charo Iba, DO          Today, Patient Was Seen By: Jennifer Carrear PA-C    **Please Note: This note may have been constructed using a voice recognition system  **

## 2023-02-20 NOTE — ASSESSMENT & PLAN NOTE
Patient with 3-year history of paroxysmal atrial fibrillation currently not maintained on rate controlling medication or anticoagulation due to [id number] score of 1  Patient reports frequent and more persistent episodes of palpitations  Today he had prolonged palpitations associated with substernal pressure  On presentation patient found to be in atrial fibrillation with RVR and he was started on Cardizem infusion    Shortly after in the emergency department patient spontaneously converted to normal sinus rhythm  ED discussed case with cardiology, no plan for transfer to [place] for evaluation for ablation at this time  Start metoprolol 25 mg twice daily  Obtain updated echocardiogram  Monitor on telemetry overnight  Appreciate cardiology consultation

## 2023-02-21 ENCOUNTER — APPOINTMENT (OUTPATIENT)
Dept: NUCLEAR MEDICINE | Facility: HOSPITAL | Age: 63
End: 2023-02-21

## 2023-02-21 LAB
ANION GAP SERPL CALCULATED.3IONS-SCNC: 4 MMOL/L (ref 4–13)
BUN SERPL-MCNC: 14 MG/DL (ref 5–25)
CALCIUM SERPL-MCNC: 9.1 MG/DL (ref 8.4–10.2)
CHLORIDE SERPL-SCNC: 107 MMOL/L (ref 96–108)
CO2 SERPL-SCNC: 27 MMOL/L (ref 21–32)
CREAT SERPL-MCNC: 1.14 MG/DL (ref 0.6–1.3)
ERYTHROCYTE [DISTWIDTH] IN BLOOD BY AUTOMATED COUNT: 12.8 % (ref 11.6–15.1)
GFR SERPL CREATININE-BSD FRML MDRD: 68 ML/MIN/1.73SQ M
GLUCOSE P FAST SERPL-MCNC: 96 MG/DL (ref 65–99)
GLUCOSE SERPL-MCNC: 96 MG/DL (ref 65–140)
HCT VFR BLD AUTO: 41.6 % (ref 36.5–49.3)
HGB BLD-MCNC: 13.6 G/DL (ref 12–17)
MCH RBC QN AUTO: 31.9 PG (ref 26.8–34.3)
MCHC RBC AUTO-ENTMCNC: 32.7 G/DL (ref 31.4–37.4)
MCV RBC AUTO: 98 FL (ref 82–98)
NUC STRESS EJECTION FRACTION: 58 %
PLATELET # BLD AUTO: 210 THOUSANDS/UL (ref 149–390)
PMV BLD AUTO: 10.7 FL (ref 8.9–12.7)
POTASSIUM SERPL-SCNC: 4 MMOL/L (ref 3.5–5.3)
RATE PRESSURE PRODUCT: 3840
RBC # BLD AUTO: 4.26 MILLION/UL (ref 3.88–5.62)
SL CV REST NUCLEAR ISOTOPE DOSE: 10.8 MCI
SL CV STRESS NUCLEAR ISOTOPE DOSE: 31.1 MCI
SL CV STRESS RECOVERY BP: NORMAL MMHG
SL CV STRESS RECOVERY HR: 54 BPM
SL CV STRESS RECOVERY O2 SAT: 98 %
SODIUM SERPL-SCNC: 138 MMOL/L (ref 135–147)
STRESS ANGINA INDEX: 0
STRESS BASELINE BP: NORMAL MMHG
STRESS BASELINE HR: 60 BPM
STRESS DUKE TREADMILL SCORE: 1
STRESS O2 SAT REST: 98 %
STRESS PEAK HR: 48 BPM
STRESS POST ESTIMATED WORKLOAD: 1.2 METS
STRESS POST EXERCISE DUR MIN: 1 MIN
STRESS POST EXERCISE DUR SEC: 15 SEC
STRESS POST O2 SAT PEAK: 98 %
STRESS POST PEAK BP: 80 MMHG
STRESS ST DEPRESSION: 0 MM
STRESS/REST PERFUSION RATIO: 0.96
WBC # BLD AUTO: 7.07 THOUSAND/UL (ref 4.31–10.16)

## 2023-02-21 RX ORDER — LOSARTAN POTASSIUM 25 MG/1
25 TABLET ORAL DAILY
Status: DISCONTINUED | OUTPATIENT
Start: 2023-02-22 | End: 2023-02-21

## 2023-02-21 RX ORDER — AMINOPHYLLINE DIHYDRATE 25 MG/ML
125 INJECTION, SOLUTION INTRAVENOUS ONCE
Status: COMPLETED | OUTPATIENT
Start: 2023-02-21 | End: 2023-02-21

## 2023-02-21 RX ADMIN — REGADENOSON 0.4 MG: 0.08 INJECTION, SOLUTION INTRAVENOUS at 12:02

## 2023-02-21 RX ADMIN — APIXABAN 5 MG: 5 TABLET, FILM COATED ORAL at 08:26

## 2023-02-21 RX ADMIN — AMINOPHYLLINE 125 MG: 25 INJECTION, SOLUTION INTRAVENOUS at 12:17

## 2023-02-21 RX ADMIN — LOSARTAN POTASSIUM 50 MG: 50 TABLET, FILM COATED ORAL at 08:26

## 2023-02-21 RX ADMIN — METOPROLOL TARTRATE 25 MG: 25 TABLET, FILM COATED ORAL at 20:31

## 2023-02-21 RX ADMIN — APIXABAN 5 MG: 5 TABLET, FILM COATED ORAL at 17:26

## 2023-02-21 RX ADMIN — METOPROLOL TARTRATE 25 MG: 25 TABLET, FILM COATED ORAL at 08:26

## 2023-02-21 NOTE — PROGRESS NOTES
[address] [facility]  Progress Note - Parish Raymond 1960, 60 y o  male MRN: [id number]  Unit/Bed#: E4 -01 Encounter: [id number]  Primary Care Provider: Marisol Mckee DO   Date and time admitted to hospital: 2/19/2023  3:32 PM    * Atrial fibrillation with rapid ventricular response [facility])  Assessment & Plan  History of paroxysmal atrial fibrillation currently not maintained on rate controlling medication or anticoagulation  · Patient reports frequent and more persistent episodes of palpitations with substernal pressure on admission, found to be in Afib with RVR  · Continue metoprolol 25 mg twice daily  · Echocardiogram with borderline dilated right ventricle, biatrial enlargement, full left ventricular systolic and diastolic function  · Has been NSR overnight  · NM stress test on 02/21 without inducible ischemia, patient did have vasovagal response but now resolved with fluids and reversal agent  · Initiated on Eliquis 5 mg twice daily  · Recommend to follow up with EP outpatient as he is likely a candidate for ablation    Malignant lymphoma, lymphoplasmacytic ([facility] [address] )  Assessment & Plan  Follows with outpatient oncology  Oncology consulted for further recommendations    Benign hypertensive CKD  Assessment & Plan  Hold losartan and continue metoprolol 25mg BID  Continue to monitor due to episode of hypotension while undergoing NM stress test      VTE Pharmacologic Prophylaxis:   Pharmacologic: Apixaban (Eliquis)  Mechanical VTE Prophylaxis in Place: Yes    Patient Centered Rounds: I have performed bedside rounds with nursing staff today      Discussions with Specialists or Other Care Team Provider: Cardiology    Current Length of Stay: 1 day(s)    Current Patient Status: Observation   Certification Statement: The patient will continue to require additional inpatient hospital stay due to cardiology input, oncology evaluation    Discharge Plan: pending    Code Status: Level 1 - Full Code      Subjective:   No events overnight  Underwent cardiac stress test, had a near syncopal episode and noted to be hypotensive  Resolved with fluids and aminophylline  Denies any CP, SOB, nausea or palpitations  Objective:     Vitals:   Temp (24hrs), Av 2 °F (36 8 °C), Min:97 7 °F (36 5 °C), Max:99 1 °F (37 3 °C)    Temp:  [97 7 °F (36 5 °C)-99 1 °F (37 3 °C)] 99 1 °F (37 3 °C)  HR:  [45-65] 65  Resp:  [16-18] 18  BP: (109-140)/(60-93) 109/60  SpO2:  [95 %-100 %] 97 %  Body mass index is 25 8 kg/m²  Input and Output Summary (last 24 hours): Intake/Output Summary (Last 24 hours) at 2023 1557  Last data filed at 2023 0427  Gross per 24 hour   Intake 720 ml   Output --   Net 720 ml       Physical Exam:     Physical Exam  Vitals and nursing note reviewed  Constitutional:       Appearance: Normal appearance  HENT:      Head: Normocephalic  Eyes:      General:         Right eye: No discharge  Conjunctiva/sclera: Conjunctivae normal    Cardiovascular:      Rate and Rhythm: Normal rate and regular rhythm  Pulmonary:      Effort: Pulmonary effort is normal  No respiratory distress  Abdominal:      General: Bowel sounds are normal  There is no distension  Palpations: Abdomen is soft  Musculoskeletal:         General: No swelling  Right lower leg: No edema  Left lower leg: No edema  Skin:     General: Skin is warm  Neurological:      General: No focal deficit present  Mental Status: He is alert  Mental status is at baseline  Additional Data:     Labs:    Results from last 7 days   Lab Units 23  0431 23  0421 23  1551   WBC Thousand/uL 7 07   < > 8 61   HEMOGLOBIN g/dL 13 6   < > 14 2   HEMATOCRIT % 41 6   < > 43 0   PLATELETS Thousands/uL 210   < > 249   NEUTROS PCT %  --   --  71   LYMPHS PCT %  --   --  19   MONOS PCT %  --   --  7   EOS PCT %  --   --  2    < > = values in this interval not displayed       Results from last 7 days   Lab Units 02/21/23  0431 02/20/23  0421 02/19/23  1551   SODIUM mmol/L 138   < > 137   POTASSIUM mmol/L 4 0   < > 3 6   CHLORIDE mmol/L 107   < > 106   CO2 mmol/L 27   < > 27   BUN mg/dL 14   < > 19   CREATININE mg/dL 1 14   < > 1 20   ANION GAP mmol/L 4   < > 4   CALCIUM mg/dL 9 1   < > 8 8   ALBUMIN g/dL  --   --  3 7   TOTAL BILIRUBIN mg/dL  --   --  0 20   ALK PHOS U/L  --   --  45   ALT U/L  --   --  14   AST U/L  --   --  14   GLUCOSE RANDOM mg/dL 96   < > 163*    < > = values in this interval not displayed  Results from last 7 days   Lab Units 02/19/23  1551   INR  0 91                       * I Have Reviewed All Lab Data Listed Above  * Additional Pertinent Lab Tests Reviewed: All Labs For Current Hospital Admission Reviewed    Imaging:    XR chest 1 view portable    Result Date: 2/20/2023  Impression: No acute cardiopulmonary disease  Workstation performed: [id number]       Recent Cultures (last 7 days):           Last 24 Hours Medication List:   Current Facility-Administered Medications   Medication Dose Route Frequency Provider Last Rate   • acetaminophen  650 mg Oral Q6H PRN Wandalee Lobe, DO     • aluminum-magnesium hydroxide-simethicone  30 mL Oral Q6H PRN Wandalee Lobe, DO     • apixaban  5 mg Oral BID RICK Chilel     • metoprolol tartrate  25 mg Oral Q12H [address] Wandalee Lobe, DO     • ondansetron  4 mg Intravenous Q6H PRN Wandalee Lobe, DO          Today, Patient Was Seen By: Minerva Joyner MD    ** Please Note: Dictation voice to text software may have been used in the creation of this document   **

## 2023-02-21 NOTE — PROGRESS NOTES
Cardiology Progress Note - Kylie Benitez 60 y o  male MRN: [id number]    Unit/Bed#: E4 -01 Encounter: [id number]      Assessment/Plan:  • Paroxysmal atrial fibrillation with rapid ventricular response   • [id number] score 1 (hypertension)  • Eliquis started 2/20, 5mg BID  • NM Nuclear Stress Test nonischemic, performed today  • Hypertension  • TTE 2/20/23: Normal LV side, borderline increased wall thickness, normal LV systolic and diastolic function, EF estimated to be 58%, borderline dilated RV, mild biatrial enlargement, trivial AR, mild mitral valve sclerosis, mild 2+ on scale of 0-4 MR, mild to moderate TR and mild WA, borderline dilated aortic root and mild ectasia of ascending aorta measuring up to 3 8 cm  No significant change from 2019 echo  • TTE 7/2/19: EF estimated to be 60%, no RWMA, mild concentric LVH, LA mildly dilated, trace to mild MR, trace to mild TR, estimated peak PA pressure 30mmHg, mild WA, root of aorta exhibited mild dilation (3 9 cm)  • Blood pressure is now controlled, 109/60  • Hyperlipidemia   • Last lipid panel 7 mo ago- Cholesterol 193, TG 84, HDL 55, LDLc 121, not currently on statin   • Cardiac risk factors include family history of two myocardial infarctions in father in late 62s and mid [de-identified] requiring stent placement  • Lymphoplasmacytic Lymphoma   • Under surveillance with oncologist, no current or past medications, chemo or radiation  • JODY  • Diagnosed by sleep study, recommended to wear CPAP to sleep but does not use at home secondary to uncomfortable mask while sleeping  • CKD3     Mr Kylie Benitez is a 67-year-old gentleman who has had paroxysmal atrial fibrillation diagnosed initially in 2019  He is now noted to have increasingly frequent episodes which seem to be lasting longer than before  His major risk factors for atrial fibrillation include his untreated sleep apnea and uncontrolled hypertension    He has intolerances to multiple medications and he is not taking his antihypertensives as prescribed  He reports that he was on CPAP before but was not able to tolerate the mask  His thyroid function is normal   On clinical examination there is no evidence of pulmonary or peripheral vascular congestion or significant valvular heart disease  He does have family history of CAD but not of atrial fibrillation  His last ECG in sinus rhythm was from 2021 and showed normal intervals, no significant chamber hypertrophy or enlargement and no evidence of prior infarction  His echocardiogram today showed mild biatrial enlargement, left ventricular hypertrophy, no evidence of pulmonary hypertension, and presence of mild mitral, mild to moderate tricuspid and trivial aortic valve regurgitations      Given recurring episodes with symptoms we are recommending rhythm management over rate control of his atrial fibrillation  We will plan to do the following:     Plan  • Nuclear stress test nonischemic  • Recommend outpatient evaluation with EP for ablation  • Continue Metoprolol 25mg BID   • Losartan at 50 mg daily  • Eliquis 5mg BID   • Given patient's [id number] VASc score of 1 with paroxysmal atrial fibrillation, and potentially hypercoagulable/ hyperviscous state from lymphoma, recommend starting [facility]  • Consider outpatient short acting Cardizem medication PRN for abortive therapy on discharge       Assessment and recommendations were discussed in detail with the patient and his wife who are agreeable with the plan           Subjective:   Patient seen and examined  Metoprolol 25mg held last night at 2100 secondary to bradycardia, HR documented 45-52 in vital signs on chart  No events documented on telemetry  Metoprolol given this AM at 0830  Patient is without symptoms, HR is 50 on examination  Objective:     Vitals: Blood pressure 137/93, pulse 59, temperature 98 2 °F (36 8 °C), temperature source Temporal, resp   rate 16, height 5' 11" (1 803 m), weight 83 9 kg (185 lb), SpO2 98 % , Body mass index is 25 8 kg/m² ,     Orthostatic Blood Pressures    Flowsheet Row Most Recent Value   Blood Pressure 137/93 filed at 02/21/2023 0725   Patient Position - Orthostatic VS Sitting filed at 02/21/2023 0725            Intake/Output Summary (Last 24 hours) at 2/21/2023 0935  Last data filed at 2/21/2023 0427  Gross per 24 hour   Intake 720 ml   Output --   Net 720 ml         Physical Exam:  Constitutional: awake, alert and oriented, in no acute distress, no obvious deformities  Head: Normocephalic, without obvious abnormality, atraumatic  Eyes: conjunctivae clear and moist  Sclera anicteric  No xanthelasmas  Pupils equal bilaterally  Extraocular motions are full  Ear nose mouth and throat: ears are symmetrical bilaterally, hearing appears to be equal bilaterally, no nasal discharge or epistaxis, oropharynx is clear with moist mucous membranes  Neck: Trachea is midline, neck is supple, no thyromegaly or significant lymphadenopathy, there is full range of motion  Lungs: clear to auscultation bilaterally, no wheezes, no rales, no rhonchi, no accessory muscle use, breathing is nonlabored  Heart: regular rate and rhythm, S1, S2 normal, no murmur, no click, no rub and no gallop, no lower extremity edema  Abdomen: soft, non-tender; bowel sounds normal; no masses, no organomegaly  Psychiatric: Patient is oriented to time, place, person, mood/affect is negative for depression, anxiety, agitation, appears to have appropriate insight  Skin: Skin is warm, dry, intact  No obvious rashes or lesions on exposed extremities  Nail beds are pink with no cyanosis or clubbing        Medications:      Current Facility-Administered Medications:   •  acetaminophen (TYLENOL) tablet 650 mg, 650 mg, Oral, Q6H PRN, Madi Edwin, DO  •  aluminum-magnesium hydroxide-simethicone (MYLANTA) oral suspension 30 mL, 30 mL, Oral, Q6H PRN, Madi Edwin, DO  •  apixaban (ELIQUIS) tablet 5 mg, 5 mg, Oral, BID, RICK Chilel, 5 mg at 02/21/23 2683  •  losartan (COZAAR) tablet 50 mg, 50 mg, Oral, Daily, Ezequiel Breaux MD, 50 mg at 02/21/23 6298  •  metoprolol tartrate (LOPRESSOR) tablet 25 mg, 25 mg, Oral, Q12H [facility], Opal Santizo DO, 25 mg at 02/21/23 0727  •  ondansetron (ZOFRAN) injection 4 mg, 4 mg, Intravenous, Q6H PRN, Opal Santizo DO     Labs & Results:        Results from last 7 days   Lab Units 02/21/23  0431 02/20/23  0421 02/19/23  1551   WBC Thousand/uL 7 07 6 60 8 61   HEMOGLOBIN g/dL 13 6 12 6 14 2   HEMATOCRIT % 41 6 38 9 43 0   PLATELETS Thousands/uL 210 198 249         Results from last 7 days   Lab Units 02/21/23  0431 02/20/23  0421 02/19/23  1551   POTASSIUM mmol/L 4 0 4 0 3 6   CHLORIDE mmol/L 107 110* 106   CO2 mmol/L 27 25 27   BUN mg/dL 14 14 19   CREATININE mg/dL 1 14 1 10 1 20   CALCIUM mg/dL 9 1 8 9 8 8   ALK PHOS U/L  --   --  45   ALT U/L  --   --  14   AST U/L  --   --  14     Results from last 7 days   Lab Units 02/19/23  1551   INR  0 91   PTT seconds 26         Imaging:      EKG 2/19/23 0915:      Atrial fibrillation with rapid ventricular response  ST & T wave abnormality, consider inferior ischemia  Abnormal ECG  No previous ECGs available  Confirmed by Ezequiel Breaux ([id number]) on 2/19/2023 3:40:56 PM             CHEST X-RAY 2/19/23:  FINDINGS:     Cardiomediastinal silhouette appears unremarkable      The lungs are clear   No pneumothorax or pleural effusion      Osseous structures appear within normal limits for patient age      IMPRESSION:     No acute cardiopulmonary disease      ECHO-   • TTE 2/20/23: Indications: Afib  History: AFIB, chronic kidney disease, dyslipidemia, obstructive sleep apnea, hypertension      Interpretation Summary     Technically good quality transthoracic echocardiogram study      1  Normal left ventricle size, borderline increased wall thickness, normal left ventricle systolic and diastolic function    Left ventricular ejection fraction is estimated as around 58%   Normal global longitudinal strain  2      Borderline dilated right ventricle, normal right ventricle systolic function  3      Mild biatrial enlargement  4      Normal appearing tricuspid aortic valve, no aortic valve stenosis, trivial aortic valve regurgitation  5      Mild mitral valve leaflet sclerosis, mild, 2+ on a scale of 0-4 mitral valve regurgitation  6      Mild to moderate tricuspid valve regurgitation and mild pulmonic valve regurgitation  7      No obvious pulmonary hypertension  8      No pericardial effusion  9      Borderline dilated aortic root and mild ectasia of ascending aorta measuring up to 3 8 cm      Compared to previous echocardiogram from 2019, there is overall no significant change      • Prior TTE 7/2/19:  Indications: Abnormal Heart Sound  Dx: Benign essential hypertension [I10 (ICD-10-CM)]     SUMMARY     LEFT VENTRICLE:  Systolic function was normal  Ejection fraction was estimated to be 60 %  There were no regional wall motion abnormalities  There was mild concentric hypertrophy      LEFT ATRIUM:  The atrium was mildly dilated      MITRAL VALVE:  There was trace to mild regurgitation      TRICUSPID VALVE:  There was trace to mild regurgitation  Estimated peak PA pressure was 30 mmHg      PULMONIC VALVE:  There was mild regurgitation      AORTA:  The root exhibited mild dilatation (3 9 cm)        Code Status: Full           Epic/ Allscripts/Care Everywhere records reviewed     * Please Note: Fluency DirectDictation voice to text software may have been used in the creation of this document   **

## 2023-02-21 NOTE — ASSESSMENT & PLAN NOTE
Hold losartan and continue metoprolol 25mg BID  Continue to monitor due to episode of hypotension while undergoing NM stress test

## 2023-02-21 NOTE — ASSESSMENT & PLAN NOTE
History of paroxysmal atrial fibrillation currently not maintained on rate controlling medication or anticoagulation  · Patient reports frequent and more persistent episodes of palpitations with substernal pressure on admission, found to be in Afib with RVR  · Continue metoprolol 25 mg twice daily  · Echocardiogram with borderline dilated right ventricle, biatrial enlargement, full left ventricular systolic and diastolic function  · Has been NSR overnight  · NM stress test on 02/21 without inducible ischemia, patient did have vasovagal response but now resolved with fluids and reversal agent  · Initiated on Eliquis 5 mg twice daily  · Recommend to follow up with EP outpatient as he is likely a candidate for ablation

## 2023-02-22 ENCOUNTER — TELEPHONE (OUTPATIENT)
Dept: OTHER | Facility: OTHER | Age: 63
End: 2023-02-22

## 2023-02-22 VITALS
RESPIRATION RATE: 16 BRPM | HEIGHT: 71 IN | DIASTOLIC BLOOD PRESSURE: 70 MMHG | BODY MASS INDEX: 25.9 KG/M2 | TEMPERATURE: 98.1 F | SYSTOLIC BLOOD PRESSURE: 130 MMHG | HEART RATE: 52 BPM | OXYGEN SATURATION: 96 % | WEIGHT: 185 LBS

## 2023-02-22 LAB
CHEST PAIN STATEMENT: NORMAL
MAX DIASTOLIC BP: 54 MMHG
MAX HEART RATE: 84 BPM
MAX PREDICTED HEART RATE: 157 BPM
MAX. SYSTOLIC BP: 123 MMHG
PROTOCOL NAME: NORMAL
REASON FOR TERMINATION: NORMAL
TARGET HR FORMULA: NORMAL
TEST INDICATION: NORMAL
TIME IN EXERCISE PHASE: NORMAL

## 2023-02-22 RX ORDER — LOSARTAN POTASSIUM 25 MG/1
12.5 TABLET ORAL DAILY
Start: 2023-02-22

## 2023-02-22 RX ADMIN — METOPROLOL TARTRATE 25 MG: 25 TABLET, FILM COATED ORAL at 09:02

## 2023-02-22 RX ADMIN — APIXABAN 5 MG: 5 TABLET, FILM COATED ORAL at 09:01

## 2023-02-22 NOTE — PROGRESS NOTES
Progress Note - Cardiology   Elizabeth Morillo 60 y o  male MRN: [id number]  Unit/Bed#: E4 -01 Encounter: [id number]      Assessment/Recommendations/Discussion:   • Paroxysmal atrial fibrillation with rapid ventricular response now sinus  • Hypertension  • Hyperlipidemia  • Lymph oh plasmacytic lymphoma  • Obstructive sleep apnea but does not wear CPAP due to intolerance and uncomfortable fit  • Chronic kidney disease stage III      PLAN  • Nuclear stress test was nonischemic  • Rates have been controlled, rhythm controlled on current medications, continue metoprolol 25 twice daily  • Continue Eliquis for anticoagulation  • Continue losartan for blood pressure  • He is very symptomatic regarding the A-fib and intolerant of many medications  He would rather not pursue further medical therapy with antiarrhythmics  We will plan for outpatient evaluation with electrophysiology to discuss more permanent treatment, pulmonary vein isolation -placed the order for this in his discharge orders  • Otherwise he is stable at this time, could be discharged from cardiac standpoint  Subjective:   HPI  Did well overnight, no arrhythmia on telemetry      Review of Systems: As noted in HPI  Rest of ROS is negative  Vitals:   /82 (BP Location: Right arm)   Pulse 57   Temp 98 1 °F (36 7 °C) (Temporal)   Resp 17   Ht 5' 11" (1 803 m)   Wt 83 9 kg (185 lb)   SpO2 96%   BMI 25 80 kg/m²   I/O       02/20 0701 02/21 0700 02/21 0701  02/22 0700 02/22 0701  02/23 0700    P  O  720      Total Intake(mL/kg) 720 (8 6)      Net +720             Unmeasured Urine Occurrence 2 x          Weight (last 2 days)     Date/Time Weight    02/20/23 0915 83 9 (185)          Physical Exam   Constitutional: awake, alert and oriented, in no acute distress, no obvious deformities  Head: Normocephalic, without obvious abnormality, atraumatic  Eyes: conjunctivae clear and moist  Sclera anicteric  No xanthelasmas  Pupils equal bilaterally  Extraocular motions are full  Ear nose mouth and throat: ears are symmetrical bilaterally, hearing appears to be equal bilaterally, no nasal discharge or epistaxis, oropharynx is clear with moist mucous membranes  Neck: Trachea is midline, neck is supple, no thyromegaly or significant lymphadenopathy, there is full range of motion  Lungs: clear to auscultation bilaterally, no wheezes, no rales, no rhonchi, no accessory muscle use, breathing is nonlabored  Heart: regular rate and rhythm, S1, S2 normal, no murmur, no click, no rub and no gallop, no lower extremity edema  Abdomen: soft, non-tender; bowel sounds normal; no masses, no organomegaly  Psychiatric: Patient is oriented to time, place, person, mood/affect is negative for depression, anxiety, agitation, appears to have appropriate insight  Skin: Skin is warm, dry, intact  No obvious rashes or lesions on exposed extremities  Nail beds are pink with no cyanosis or clubbing  TELEMETRY: No recurrent A-fib on telemetry overnight  Lab Results:  Results from last 7 days   Lab Units 02/21/23  0431   WBC Thousand/uL 7 07   HEMOGLOBIN g/dL 13 6   HEMATOCRIT % 41 6   PLATELETS Thousands/uL 210     Results from last 7 days   Lab Units 02/21/23  0431 02/20/23  0421 02/19/23  1551   POTASSIUM mmol/L 4 0   < > 3 6   CHLORIDE mmol/L 107   < > 106   CO2 mmol/L 27   < > 27   BUN mg/dL 14   < > 19   CREATININE mg/dL 1 14   < > 1 20   CALCIUM mg/dL 9 1   < > 8 8   ALK PHOS U/L  --   --  45   ALT U/L  --   --  14   AST U/L  --   --  14    < > = values in this interval not displayed       Results from last 7 days   Lab Units 02/21/23  0431   POTASSIUM mmol/L 4 0   CHLORIDE mmol/L 107   CO2 mmol/L 27   BUN mg/dL 14   CREATININE mg/dL 1 14   CALCIUM mg/dL 9 1           Medications:    Current Facility-Administered Medications:   •  acetaminophen (TYLENOL) tablet 650 mg, 650 mg, Oral, Q6H PRN, Mardelle Pancoast, DO  •  aluminum-magnesium hydroxide-simethicone (MYLANTA) oral suspension 30 mL, 30 mL, Oral, Q6H PRN, Alexandrea Stager, DO  •  apixaban (ELIQUIS) tablet 5 mg, 5 mg, Oral, BID, RICK Chilel, 5 mg at 02/22/23 0901  •  metoprolol tartrate (LOPRESSOR) tablet 25 mg, 25 mg, Oral, Q12H [facility], Alexandrea Stager, DO, 25 mg at 02/22/23 6114  •  ondansetron [facility] injection 4 mg, 4 mg, Intravenous, Q6H PRN, Alexandrea Stager, DO    Portions of the record may have been created with voice recognition software  Occasional wrong word or "sound a like" substitutions may have occurred due to the inherent limitations of voice recognition software  Read the chart carefully and recognize, using context, where substitutions have occurred      Mitesh Guzman DO, [facility]  2/22/2023 10:41 AM

## 2023-02-22 NOTE — NURSING NOTE
Patient has been discharge, instructions reviewed and expressed understanding  Belongings reviewed and returned  Accompanied to pharmacy by RN, wife present

## 2023-02-22 NOTE — DISCHARGE SUMMARY
[facility]  Discharge- Jorge Pop 1960, 60 y o  male MRN: [id number]  Unit/Bed#: E4 -01 Encounter: [id number]  Primary Care Provider: Cinthya Bacon DO   Date and time admitted to hospital: 2/19/2023  3:32 PM    * Atrial fibrillation with rapid ventricular response [facility])  Assessment & Plan  History of paroxysmal atrial fibrillation currently not maintained on rate controlling medication or anticoagulation  · Patient reports frequent and more persistent episodes of palpitations with substernal pressure on admission, found to be in Afib with RVR  · Continue metoprolol 25 mg twice daily  · Echocardiogram with borderline dilated right ventricle, biatrial enlargement, full left ventricular systolic and diastolic function  · Has been NSR overnight  · NM stress test on 02/21 without inducible ischemia, patient did have vasovagal response but now resolved with fluids and reversal agent  · Initiated on Eliquis 5 mg twice daily  · Recommend to follow up with EP outpatient as he is likely a candidate for ablation  · Continue metoprolol 25 mg twice daily, p o   Cardizem as needed for palpitations and heart rate greater than 120    Malignant lymphoma, lymphoplasmacytic ([facility] [address] )  Assessment & Plan  Follows with outpatient oncology  Recently diagnosed on colonoscopy    Benign hypertensive CKD  Assessment & Plan  Recommend continue metoprolol 25 twice daily on discharge, along with losartan 12 5 mg daily        Discharging Physician / Practitioner: Ramonita Jones MD  PCP: Cinthya Bacon DO  Admission Date:   Admission Orders (From admission, onward)     Ordered        02/19/23 1815  Place in Observation  Once            02/19/23 1710  INPATIENT ADMISSION  Once,   Status:  Canceled                      Discharge Date: 02/22/23    Medical Problems     Resolved Problems  Date Reviewed: 2/22/2023   None         Consultations During Hospital Stay:  · Cardiology    Procedures Performed: · none    Significant Findings / Test Results:   XR chest 1 view portable    Result Date: 2/20/2023  · Impression: No acute cardiopulmonary disease  Workstation performed: [id number]   ·     Incidental Findings:   · none     Test Results Pending at Discharge (will require follow up):   · none     Outpatient Tests Requested:  · none    Complications:  none    Reason for Admission: Afib with RVR    Hospital Course:     Claudia Lemus is a 60 y o  male patient who originally presented to the hospital on 2/19/2023 due to palpitations  Patient found to be in A-fib RVR, started on Cardizem drip and spontaneous cardioverted to sinus rhythm  Cardiology evaluated, recommending adding metoprolol 25 twice daily  Blood pressure has been fairly stable, initially titrated upwards of losartan but patient with soft blood pressure  He did have reports of chest pain, underwent nuclear medicine stress test which was negative  Patient had episode of likely vasovagal while undergoing stress test, that resolved with IV fluids  Cardiology recommended that given patient's recurrence of A-fib, he will likely benefit from anticoagulation though he had a [address] of [address]  Recommend to follow-up with EP outpatient to evaluate for EP study for ablation  Patient discharged home  Cardiology recommend continue metoprolol 25 twice daily on discharge, as needed Cardizem p o  for recurrent palpitations/heart rate greater than 120  Please see above list of diagnoses and related plan for additional information  Condition at Discharge: fair     Discharge Day Visit / Exam:     Subjective:    No events overnight  Currently in SR  Denies any CP, SOB, dyspnea    Vitals: Blood Pressure: 130/70 (02/22/23 1100)  Pulse: (!) 52 (02/22/23 1100)  Temperature: 98 1 °F (36 7 °C) (02/22/23 0723)  Temp Source: Temporal (02/22/23 0723)  Respirations: 16 (02/22/23 1100)  Height: 5' 11" (180 3 cm) (02/20/23 0915)  Weight - Scale: 83 9 kg (185 lb) (02/20/23 0915)  SpO2: 96 % (02/22/23 7971)  Exam:   Physical Exam  Vitals and nursing note reviewed  Constitutional:       Appearance: Normal appearance  HENT:      Head: Normocephalic  Eyes:      General:         Right eye: No discharge  Conjunctiva/sclera: Conjunctivae normal    Cardiovascular:      Rate and Rhythm: Normal rate and regular rhythm  Pulmonary:      Effort: Pulmonary effort is normal  No respiratory distress  Abdominal:      General: Bowel sounds are normal  There is no distension  Palpations: Abdomen is soft  Musculoskeletal:         General: No swelling  Right lower leg: No edema  Left lower leg: No edema  Skin:     General: Skin is warm  Neurological:      General: No focal deficit present  Mental Status: He is alert  Mental status is at baseline  Discharge instructions/Information to patient and family:   See after visit summary for information provided to patient and family  Provisions for Follow-Up Care:  See after visit summary for information related to follow-up care and any pertinent home health orders  Disposition:     Home    Planned Readmission: none     Discharge Statement:  I spent 40 minutes discharging the patient  This time was spent on the day of discharge  I had direct contact with the patient on the day of discharge  Greater than 50% of the total time was spent examining patient, answering all patient questions, arranging and discussing plan of care with patient as well as directly providing post-discharge instructions  Additional time then spent on discharge activities  Discharge Medications:  See after visit summary for reconciled discharge medications provided to patient and family        ** Please Note: This note has been constructed using a voice recognition system **

## 2023-02-22 NOTE — ASSESSMENT & PLAN NOTE
History of paroxysmal atrial fibrillation currently not maintained on rate controlling medication or anticoagulation  · Patient reports frequent and more persistent episodes of palpitations with substernal pressure on admission, found to be in Afib with RVR  · Continue metoprolol 25 mg twice daily  · Echocardiogram with borderline dilated right ventricle, biatrial enlargement, full left ventricular systolic and diastolic function  · Has been NSR overnight  · NM stress test on 02/21 without inducible ischemia, patient did have vasovagal response but now resolved with fluids and reversal agent  · Initiated on Eliquis 5 mg twice daily  · Recommend to follow up with EP outpatient as he is likely a candidate for ablation  · Continue metoprolol 25 mg twice daily, p o   Cardizem as needed for palpitations and heart rate greater than 120

## 2023-02-22 NOTE — PLAN OF CARE
Problem: PAIN - ADULT  Goal: Verbalizes/displays adequate comfort level or baseline comfort level  Description: Interventions:  - Encourage patient to monitor pain and request assistance  - Assess pain using appropriate pain scale  - Administer analgesics based on type and severity of pain and evaluate response  - Implement non-pharmacological measures as appropriate and evaluate response  - Consider cultural and social influences on pain and pain management  - Notify physician/advanced practitioner if interventions unsuccessful or patient reports new pain  Outcome: Adequate for Discharge     Problem: Knowledge Deficit  Goal: Patient/family/caregiver demonstrates understanding of disease process, treatment plan, medications, and discharge instructions  Description: Complete learning assessment and assess knowledge base    Interventions:  - Provide teaching at level of understanding  - Provide teaching via preferred learning methods  Outcome: Adequate for Discharge     Problem: CARDIOVASCULAR - ADULT  Goal: Maintains optimal cardiac output and hemodynamic stability  Description: INTERVENTIONS:  - Monitor I/O, vital signs and rhythm  - Monitor for S/S and trends of decreased cardiac output  - Administer and titrate ordered vasoactive medications to optimize hemodynamic stability  - Assess quality of pulses, skin color and temperature  - Assess for signs of decreased coronary artery perfusion  - Instruct patient to report change in severity of symptoms  Outcome: Adequate for Discharge  Goal: Absence of cardiac dysrhythmias or at baseline rhythm  Description: INTERVENTIONS:  - Continuous cardiac monitoring, vital signs, obtain 12 lead EKG if ordered  - Administer antiarrhythmic and heart rate control medications as ordered  - Monitor electrolytes and administer replacement therapy as ordered  Outcome: Adequate for Discharge

## 2023-02-22 NOTE — PLAN OF CARE
Problem: PAIN - ADULT  Goal: Verbalizes/displays adequate comfort level or baseline comfort level  Description: Interventions:  - Encourage patient to monitor pain and request assistance  - Assess pain using appropriate pain scale  - Administer analgesics based on type and severity of pain and evaluate response  - Implement non-pharmacological measures as appropriate and evaluate response  - Consider cultural and social influences on pain and pain management  - Notify physician/advanced practitioner if interventions unsuccessful or patient reports new pain  Outcome: Progressing     Problem: Knowledge Deficit  Goal: Patient/family/caregiver demonstrates understanding of disease process, treatment plan, medications, and discharge instructions  Description: Complete learning assessment and assess knowledge base    Interventions:  - Provide teaching at level of understanding  - Provide teaching via preferred learning methods  Outcome: Progressing     Problem: CARDIOVASCULAR - ADULT  Goal: Maintains optimal cardiac output and hemodynamic stability  Description: INTERVENTIONS:  - Monitor I/O, vital signs and rhythm  - Monitor for S/S and trends of decreased cardiac output  - Administer and titrate ordered vasoactive medications to optimize hemodynamic stability  - Assess quality of pulses, skin color and temperature  - Assess for signs of decreased coronary artery perfusion  - Instruct patient to report change in severity of symptoms  Outcome: Progressing  Goal: Absence of cardiac dysrhythmias or at baseline rhythm  Description: INTERVENTIONS:  - Continuous cardiac monitoring, vital signs, obtain 12 lead EKG if ordered  - Administer antiarrhythmic and heart rate control medications as ordered  - Monitor electrolytes and administer replacement therapy as ordered  Outcome: Progressing     Problem: CARDIOVASCULAR - ADULT  Goal: Absence of cardiac dysrhythmias or at baseline rhythm  Description: INTERVENTIONS:  - Continuous cardiac monitoring, vital signs, obtain 12 lead EKG if ordered  - Administer antiarrhythmic and heart rate control medications as ordered  - Monitor electrolytes and administer replacement therapy as ordered  Outcome: Progressing

## 2023-02-23 ENCOUNTER — TRANSITIONAL CARE MANAGEMENT (OUTPATIENT)
Dept: FAMILY MEDICINE CLINIC | Facility: CLINIC | Age: 63
End: 2023-02-23

## 2023-02-28 ENCOUNTER — TELEPHONE (OUTPATIENT)
Dept: CARDIOLOGY CLINIC | Facility: CLINIC | Age: 63
End: 2023-02-28

## 2023-02-28 ENCOUNTER — APPOINTMENT (EMERGENCY)
Dept: RADIOLOGY | Facility: HOSPITAL | Age: 63
End: 2023-02-28

## 2023-02-28 ENCOUNTER — HOSPITAL ENCOUNTER (EMERGENCY)
Facility: HOSPITAL | Age: 63
Discharge: HOME | End: 2023-02-28
Attending: EMERGENCY MEDICINE | Admitting: EMERGENCY MEDICINE

## 2023-02-28 VITALS
DIASTOLIC BLOOD PRESSURE: 70 MMHG | HEART RATE: 54 BPM | OXYGEN SATURATION: 98 % | RESPIRATION RATE: 20 BRPM | SYSTOLIC BLOOD PRESSURE: 146 MMHG | TEMPERATURE: 97.4 F

## 2023-02-28 DIAGNOSIS — I48.0 PAROXYSMAL ATRIAL FIBRILLATION (HCC): Primary | ICD-10-CM

## 2023-02-28 DIAGNOSIS — R03.0 ELEVATED BLOOD PRESSURE READING: ICD-10-CM

## 2023-02-28 DIAGNOSIS — I48.91 ATRIAL FIBRILLATION WITH RAPID VENTRICULAR RESPONSE (HCC): Primary | ICD-10-CM

## 2023-02-28 LAB
2HR DELTA HS TROPONIN: 0 NG/L
4HR DELTA HS TROPONIN: 1 NG/L
ALBUMIN SERPL BCP-MCNC: 3.3 G/DL (ref 3.5–5)
ALP SERPL-CCNC: 48 U/L (ref 46–116)
ALT SERPL W P-5'-P-CCNC: 19 U/L (ref 12–78)
ANION GAP SERPL CALCULATED.3IONS-SCNC: 2 MMOL/L (ref 4–13)
AST SERPL W P-5'-P-CCNC: 11 U/L (ref 5–45)
ATRIAL RATE: 340 BPM
ATRIAL RATE: 54 BPM
BASOPHILS # BLD AUTO: 0.05 THOUSANDS/ÂΜL (ref 0–0.1)
BASOPHILS NFR BLD AUTO: 1 % (ref 0–1)
BILIRUB SERPL-MCNC: 0.41 MG/DL (ref 0.2–1)
BUN SERPL-MCNC: 21 MG/DL (ref 5–25)
CALCIUM ALBUM COR SERPL-MCNC: 9.8 MG/DL (ref 8.3–10.1)
CALCIUM SERPL-MCNC: 9.2 MG/DL (ref 8.3–10.1)
CARDIAC TROPONIN I PNL SERPL HS: 4 NG/L
CARDIAC TROPONIN I PNL SERPL HS: 4 NG/L
CARDIAC TROPONIN I PNL SERPL HS: 5 NG/L
CHLORIDE SERPL-SCNC: 110 MMOL/L (ref 96–108)
CO2 SERPL-SCNC: 26 MMOL/L (ref 21–32)
CREAT SERPL-MCNC: 1.11 MG/DL (ref 0.6–1.3)
EOSINOPHIL # BLD AUTO: 0.03 THOUSAND/ÂΜL (ref 0–0.61)
EOSINOPHIL NFR BLD AUTO: 0 % (ref 0–6)
ERYTHROCYTE [DISTWIDTH] IN BLOOD BY AUTOMATED COUNT: 12.6 % (ref 11.6–15.1)
GFR SERPL CREATININE-BSD FRML MDRD: 70 ML/MIN/1.73SQ M
GLUCOSE SERPL-MCNC: 115 MG/DL (ref 65–140)
HCT VFR BLD AUTO: 42.5 % (ref 36.5–49.3)
HGB BLD-MCNC: 13.7 G/DL (ref 12–17)
IMM GRANULOCYTES # BLD AUTO: 0.02 THOUSAND/UL (ref 0–0.2)
IMM GRANULOCYTES NFR BLD AUTO: 0 % (ref 0–2)
LYMPHOCYTES # BLD AUTO: 0.92 THOUSANDS/ÂΜL (ref 0.6–4.47)
LYMPHOCYTES NFR BLD AUTO: 13 % (ref 14–44)
MCH RBC QN AUTO: 31.1 PG (ref 26.8–34.3)
MCHC RBC AUTO-ENTMCNC: 32.2 G/DL (ref 31.4–37.4)
MCV RBC AUTO: 97 FL (ref 82–98)
MONOCYTES # BLD AUTO: 0.56 THOUSAND/ÂΜL (ref 0.17–1.22)
MONOCYTES NFR BLD AUTO: 8 % (ref 4–12)
NEUTROPHILS # BLD AUTO: 5.33 THOUSANDS/ÂΜL (ref 1.85–7.62)
NEUTS SEG NFR BLD AUTO: 78 % (ref 43–75)
NRBC BLD AUTO-RTO: 0 /100 WBCS
P AXIS: 57 DEGREES
PLATELET # BLD AUTO: 226 THOUSANDS/UL (ref 149–390)
PMV BLD AUTO: 11.1 FL (ref 8.9–12.7)
POTASSIUM SERPL-SCNC: 4.4 MMOL/L (ref 3.5–5.3)
PR INTERVAL: 142 MS
PROT SERPL-MCNC: 6.5 G/DL (ref 6.4–8.4)
QRS AXIS: 69 DEGREES
QRS AXIS: 76 DEGREES
QRSD INTERVAL: 100 MS
QRSD INTERVAL: 86 MS
QT INTERVAL: 304 MS
QT INTERVAL: 404 MS
QTC INTERVAL: 383 MS
QTC INTERVAL: 419 MS
RBC # BLD AUTO: 4.4 MILLION/UL (ref 3.88–5.62)
SODIUM SERPL-SCNC: 138 MMOL/L (ref 135–147)
T WAVE AXIS: 39 DEGREES
T WAVE AXIS: 61 DEGREES
VENTRICULAR RATE: 114 BPM
VENTRICULAR RATE: 54 BPM
WBC # BLD AUTO: 6.91 THOUSAND/UL (ref 4.31–10.16)

## 2023-02-28 RX ORDER — METOPROLOL TARTRATE 5 MG/5ML
5 INJECTION INTRAVENOUS ONCE
Status: COMPLETED | OUTPATIENT
Start: 2023-02-28 | End: 2023-02-28

## 2023-02-28 RX ADMIN — IOHEXOL 100 ML: 350 INJECTION, SOLUTION INTRAVENOUS at 15:39

## 2023-02-28 RX ADMIN — METOROPROLOL TARTRATE 5 MG: 5 INJECTION, SOLUTION INTRAVENOUS at 07:14

## 2023-02-28 NOTE — DISCHARGE INSTR - AVS FIRST PAGE
*If you have recurrent atrial fibrillation while await your ablation, take 60 mg Cardizem (two 30 tabs)*  *Take last dose of Eliquis Thursday morning, 3/2/2023*

## 2023-02-28 NOTE — DISCHARGE INSTRUCTIONS
You were evaluated in the emergency department for: atrial fibrillation  You can access your current and pending results through [facility]  A radiologist will take a second look at your X-Rays, if you had any, and you will be contacted with any new findings  You should follow-up with your primary care provider and cardiologist, as soon as possible, for re-evaluation  If you do not have a primary care provider, I have referred you to [address] [place] [address]  You will be contacted about scheduling an appointment  Their phone number is also included on this paperwork  Your workup revealed no emergent features at this time; however, many disease processes are dynamic:    Please, return to the emergency department if you experience new or worsening symptoms, fever, chest pain, shortness of breath, difficulty breathing, dizziness, abdominal pain, persistent nausea/vomiting, syncope or passing out, blood in your urine or stool, coughing up blood, leg swelling/pain, urinary retention, bowel or bladder incontinence, numbness between your legs  Additionally, your blood pressure was measured to be high  This is something that you should discuss with your primary care provider and have re-checked within one week       __________    Per Electrophysiology: "we recommend that he continue lopressor 25 mg twice daily and Eliquis 5 mg twice daily  His last dose of Eliquis will be Thursday morning (3/2/2023) in preparation for the ablation  If he has recurrent episodes in the meantime, we instructed him to take diltiazem 60 mg and monitor his symptoms  "

## 2023-02-28 NOTE — TELEPHONE ENCOUNTER
----- Message from Estrella Crawford PA-C sent at 2/28/2023 11:27 AM EST -----  Hi everyone,    I just saw this patient in the emergency room with Dr Madrigal Route  He needs a BALDO/atrial fibrillation ablation  We would like to add him on as an outpatient for this Friday, 3/3/2023 in room 4 with Dr Alice Lanes  We have ordered an inpatient CT of the pulmonary veins to be completed prior to discharge (will likely go home as soon as this is done, and then come back as an outpatient Friday)  I already instructed him to take his last dose of Eliquis Thursday morning, 3/2/2023  Can we expedite his prior Auth so we can put him on the schedule for Friday? He really needs it, he has had multiple ER admissions for recurrent episodes  Please let me know if you need anything else  Thank you!   Mika Hanks

## 2023-02-28 NOTE — H&P (VIEW-ONLY)
Consultation - Electrophysiology-Cardiology (EP)   Rose Herrera 60 y o  male MRN: [id number]  Unit/Bed#: ED 03 Encounter: [id number]      Inpatient consult to Cardiology  Consult performed by: Aria Ragsdale PA-C  Consult ordered by: Darien Mitchell MD          Assessment/Plan     Assessment:  1  Symptomatic paroxysmal atrial fibrillation with rapid ventricular response, currently in normal sinus rhythm   A )  Initially diagnosed 11/2020, but reported palpitations prior to this   B )  Started on diltiazem and Eliquis (CHADS2 Vasc = 1 - HTN) at that time; both eventually discontinued due to side effects and patient preference, respectively   C )  Recurrent episode prompting ER visit 2/2023, restarted on Eliquis and started on low-dose Lopressor 25 mg twice daily on discharge  2  Normal LV systolic function with EF 58% per echo 2/2023  3  Nonischemic nuclear stress test 2/2023 - vagal reaction reported after vasodilator administration, improved with IV fluids/aminophylline  4  Hypertension, currently maintained on losartan   A )  Intolerant of many medications, including Bystolic, lisinopril (cough), candesartan   B )  Had not been taking antihypertensives as prescribed, started on losartan 50 mg daily during recent admission  5  Obstructive sleep apnea, noncompliant with CPAP      Plan:  Given his recurrent symptomatic episodes and intolerance to medications, recommend atrial fibrillation ablation  The soonest we could accommodate this would be this Friday, 3/3/2023  He does not need to stay in the hospital until then, but rather can be discharged and return as an outpatient  The patient and his wife would prefer this plan  Request that he undergo CT pulmonary veins prior to discharge so that this information is available for his procedure later this week  In the meantime, we recommend that he continue lopressor 25 mg twice daily and Eliquis 5 mg twice daily    His last dose of Eliquis will be Thursday morning (3/2/2023) in preparation for the ablation  If he has recurrent episodes in the meantime, we instructed him to take diltiazem 60 mg and monitor his symptoms  The procedure was explained to the patient in details, all questions were answered and he is in agreement to proceed  Ok to discharge from the ED from an EP standpoint after CT is performed  History of Present Illness   Physician Requesting Consult: Gladis Hunter MD  Reason for Consult / Principal Problem: Symptomatic paroxysmal atrial fibrillation    HPI: Mecca Ruano is a 60y o  year old male with symptomatic paroxysmal atrial fibrillation with periods of rapid ventricular response, multiple medication intolerances, normal LV systolic function and recent nonischemic nuclear stress test, hypertension, and untreated obstructive sleep apnea  He typically follows with Dr Sean Canales as an outpatient  He initially reported palpitations in early 2020, but declined an outpatient event monitor  He was then seen in the office 11/2020 at which time he was in atrial fibrillation but converted spontaneously to normal sinus rhythm during that visit  He was prescribed Cardizem as well as Eliquis, however he discontinued his medications on his own as he felt unwell while taking them  He is also had issues with being able to tolerate antihypertensives, including Bystolic, candesartan, lisinopril, and hydrochlorothiazide  More recently, he was admitted to [facility]20/2023 with recurrent symptomatic rapid A-fib  He was started on IV Cardizem, and converted to normal sinus rhythm  He was restarted on Eliquis anticoagulation, and started on Lopressor for rate control  He was restarted on losartan for better blood pressure control  He was discharged with eventual outpatient EP follow-up  Unfortunately, he presented to the emergency room earlier this morning with another episode of rapid A-fib    EP has been asked to see him in consultation for more definitive management, as he is unable to and does not wish to pursue medical management  In the ED, he was given IV lopressor 5 mg x 1 with conversion to normal sinus rhythm    He reports intermittent episodes over the past 2-3 years, however they are now becoming more frequent and lasting longer  Last week, his episode was more persistent which prompted his ED visit  When he is in afib he reports palpitations, fatigue, chest tightness/feelings of anxiety  At baseline he denies significant complaints  He denies tobacco or ETOH use, he has even limited his caffeine intake  He admits to family history of CAD, his father had prior MI with stent placement  Review of Systems  ROS as noted above, otherwise 12 point review of systems was performed and is negative  Historical Information   Past Medical History:   Diagnosis Date   • Atrial fibrillation ([address] )    • Cancer (HCC)     lymphoma   • CKD (chronic kidney disease)    • Dilated aortic root (HCC)    • Erectile dysfunction    • Hyperlipidemia 6/28/2016   • Hypertension    • Hypertensive CKD (chronic kidney disease)    • Mild renal insufficiency 9/26/2017   • Vitamin D deficiency      Past Surgical History:   Procedure Laterality Date   • COLONOSCOPY     • HIATAL HERNIA REPAIR       Social History     Substance and Sexual Activity   Alcohol Use Yes    Comment: social     Social History     Substance and Sexual Activity   Drug Use No     Social History     Tobacco Use   Smoking Status Never   Smokeless Tobacco Never     Family History:   Family History   Problem Relation Age of Onset   • Lung cancer Mother    • Heart attack Father 76   • Prostate cancer Father    • Hyperlipidemia Father    • No Known Problems Sister    • Lymphoma Maternal Aunt    • Fainting Maternal Grandfather        Meds/Allergies   Hospital Medications:   No current facility-administered medications for this encounter       Home Medications: (Not in a hospital admission)      No Known Allergies    Objective   Vitals: Blood pressure 143/82, pulse (!) 52, temperature (!) 97 4 °F (36 3 °C), temperature source Oral, resp  rate 16, SpO2 98 %  Orthostatic Blood Pressures    Flowsheet Row Most Recent Value   Blood Pressure 143/82 filed at 2023 0830   Patient Position - Orthostatic VS Lying filed at 2023 0830          No intake or output data in the 24 hours ending 23 0856    Invasive Devices     Peripheral Intravenous Line  Duration           Peripheral IV 23 Left Antecubital <1 day                Physical Exam  GEN: NAD, alert and oriented x 3, well appearing  SKIN: dry without significant lesions or rashes  HEENT: NCAT, PERRL, EOMs intact  NECK: No JVD appreciated  CARDIOVASCULAR: RRR, normal S1, S2 without murmurs, rubs, or gallops appreciated  LUNGS: Clear to auscultation bilaterally without wheezes, rhonchi, or rales  ABDOMEN: Soft, nontender, nondistended  EXTREMITIES/VASCULAR: perfused without clubbing, cyanosis, or LE edema b/l  PSYCH: Normal mood and affect  NEURO: CN ll-Xll grossly intact      Lab Results: I have personally reviewed pertinent lab results  Results from last 7 days   Lab Units 23  0715   WBC Thousand/uL 6 91   HEMOGLOBIN g/dL 13 7   HEMATOCRIT % 42 5   PLATELETS Thousands/uL 226           Invalid input(s): LABGLOM            Imaging: I have personally reviewed pertinent reports  ECHO: Results for orders placed during the hospital encounter of 19    Echo complete with contrast if indicated    Narrative  [address] [place] [address]    [facility], [address] [facility]  [phone]    Transthoracic Echocardiogram  2D, M-mode, Doppler, and Color Doppler    Study date:  2019    Patient: Neptali Miles [facility]  MR number: [id number]  Account number: [de-identified]  : 1960  Age: 60 years  Gender: Male  Status: Outpatient  Location: [facility]  Height: 71 in  Weight: 191 lb  BP: 132/ 82 mmHg    Indications: Murmur    Diagnoses: R01 1 - Cardiac murmur, unspecified    Sonographer:  Jailene Cuellar [facility]  Primary Physician:  Orquidea Lopez DO  Referring Physician:  Anushka Alatorre DO  Group:  [facility]  Interpreting Physician:  Felisa John MD    SUMMARY    LEFT VENTRICLE:  Systolic function was normal  Ejection fraction was estimated to be 60 %  There were no regional wall motion abnormalities  There was mild concentric hypertrophy  LEFT ATRIUM:  The atrium was mildly dilated  MITRAL VALVE:  There was trace to mild regurgitation  TRICUSPID VALVE:  There was trace to mild regurgitation  Estimated peak PA pressure was 30 mmHg  PULMONIC VALVE:  There was mild regurgitation  AORTA:  The root exhibited mild dilatation (3 9 cm)  HISTORY: PRIOR HISTORY: HTN, HLD, CKD    PROCEDURE: The study was performed in the [address] [place] [address] [facility]  This was a routine study  The transthoracic approach was used  The study included complete 2D imaging, M-mode, complete spectral Doppler, and color Doppler  Image  quality was adequate  LEFT VENTRICLE: Size was normal  Systolic function was normal  Ejection fraction was estimated to be 60 %  There were no regional wall motion abnormalities  There was mild concentric hypertrophy  No evidence of apical thrombus  DOPPLER:  Left ventricular diastolic function parameters were low normal     RIGHT VENTRICLE: The size was normal  Systolic function was normal  Wall thickness was normal     LEFT ATRIUM: The atrium was mildly dilated  RIGHT ATRIUM: Size was at the upper limits of normal     MITRAL VALVE: Valve structure was normal  There was normal leaflet separation  DOPPLER: The transmitral velocity was within the normal range  There was no evidence for stenosis  There was trace to mild regurgitation  AORTIC VALVE: The valve was trileaflet  Leaflets exhibited normal thickness and normal cuspal separation   DOPPLER: Transaortic velocity was within the normal range  There was no evidence for stenosis  There was no significant  regurgitation  TRICUSPID VALVE: The valve structure was normal  There was normal leaflet separation  DOPPLER: The transtricuspid velocity was within the normal range  There was no evidence for stenosis  There was trace to mild regurgitation  Estimated  peak PA pressure was 30 mmHg  PULMONIC VALVE: Leaflets exhibited normal thickness, no calcification, and normal cuspal separation  DOPPLER: The transpulmonic velocity was within the normal range  There was mild regurgitation  PERICARDIUM: There was no pericardial effusion  The pericardium was normal in appearance  AORTA: The root exhibited mild dilatation (3 9 cm)  SYSTEMIC VEINS: IVC: The inferior vena cava was normal in size  SYSTEM MEASUREMENT TABLES    2D  %FS: 40 7 %  Ao Diam: 3 4 cm  EF(Teich): 71 3 %  ESV(Teich): 31 1 ml  IVSd: 1 1 cm  LA Area: 20 5 cm2  LA Diam: 3 9 cm  LVEF MOD A4C: 56 9 %  LVIDd: 4 8 cm  LVIDs: 2 9 cm  LVPWd: 1 1 cm  RA Area: 18 5 cm2  SV(Teich): 77 4 ml    CW  TR Vmax: 2 6 m/s  TR maxP 3 mmHg    MM  TAPSE: 2 3 cm    [facility] [place] [facility] Accredited Echocardi[facility]    Prepared and electronically signed by    Valentina Cunningham MD  Signed 2019 16:16:05      NUCLEAR STRESS TEST 2023:  Interpretation Summary       •  Stress Function: Left ventricular function post-stress is normal  Post-stress ejection fraction is 58 %  •  Perfusion: There is a left ventricular perfusion defect that is small in size with mild reduction in uptake present in the basal to mid inferoseptal location(s) that is fixed  The defect appears to be an artifact as there is normal wall motion seen in this region on gated imaging  •  Stress Combined Conclusion: There is image artifact, without diagnostic evidence for perfusion abnormality  •  Stress ECG: No ST deviation is noted   Arrhythmias during recovery: Sinus bradycardia, ectopic atrial rhythm  The ECG was not diagnostic due to pharmacological (vasodilator) stress  •  Stress ECG: The patient reported dizziness, syncope , diaphoresis,and hypotension during the stress test  Symptoms began during stress and ended during recovery  Aminophylline was given to reverse the effects of the vasodilator      There is no evidence of stress-induced ischemia  It appears that the patient had a vagal reaction after vasodilator administration, noted to be bradycardic and hypotensive -resolved with IV fluids and reversal with aminophylline          EKG: previously noted atrial fibrillation with rapid ventricular response      Most recent ECG - sinus bradycardia        TELEMETRY: currently in sinus rhythm, slightly bradycardic in the low 50s    Afib on admission -       Conversion to normal sinus -         VTE Prophylaxis: Eliquis

## 2023-02-28 NOTE — TELEPHONE ENCOUNTER
Patient referred for BALDO/Afib ablation to be done on 2023 with Dr Sanders Apgar at [facility]  Patient aware of med holds, Ekliquis to hold the night prior and the morning of the procedure  Can we please check insurance for service  Kriss Player can you please place this case is for this Friday 3/3/23  Thank you

## 2023-02-28 NOTE — ED ATTENDING ATTESTATION
2/28/2023  IJessica MD, saw and evaluated the patient  I have discussed the patient with the resident/non-physician practitioner and agree with the resident's/non-physician practitioner's findings, Plan of Care, and MDM as documented in the resident's/non-physician practitioner's note, except where noted  All available labs and Radiology studies were reviewed  I was present for key portions of any procedure(s) performed by the resident/non-physician practitioner and I was immediately available to provide assistance  At this point I agree with the current assessment done in the Emergency Department  I have conducted an independent evaluation of this patient a history and physical is as follows:    Patient has a history of atrial fibrillation over the last 1-1/2 to 2 years that has been followed by cardiologist   The patient reports he had not had any episodes of atrial fibrillation for some period of time and so the cardiologist had stopped his Eliquis  9 days ago the patient went to [facility] with a complaint of palpitations and fast heart rate  The patient was found to be in atrial fibrillation with a rapid ventricular response  The patient had ventricular rate control and was hospitalized  The patient reports he underwent an echocardiogram and a stress test   The patient was discharged on Eliquis and medications  The patient reports he been doing well until last evening at approximately 11 PM when he noticed palpitations and fast heart rate  The patient was able to sleep throughout the night but woke this morning and still had palpitations  The patient also reports that since last evening he has had a dull discomfort in his chest that was mild and felt like a pressure  The patient reports there was no radiation of the discomfort to the chest, arm, neck, or back  The patient denies any neurologic symptoms the patient denied any shortness of breath or diaphoresis    This morning with the rapid heart rate the patient decided to come to the emergency department for evaluation  The patient denies any headache, abdominal pain, or lower extremity pain or swelling  Physical exam demonstrates a male in no acute distress  HEENT exam is normal   Lungs are clear with equal breath sounds  The heart had an irregular rhythm but a regular rate at 85  The chest was nontender  The abdomen was soft and nontender  No masses were appreciated  The lower extremities are symmetric and nontender  There was no focal neurologic deficit  Skin had no rash    ED Course         Critical Care Time  Procedures

## 2023-02-28 NOTE — CONSULTS
Consultation - Electrophysiology-Cardiology (EP)   Lucas Farr 60 y o  male MRN: [id number]  Unit/Bed#: ED 03 Encounter: [id number]      Inpatient consult to Cardiology  Consult performed by: Christal Solorio PA-C  Consult ordered by: Alton Sterling MD          Assessment/Plan     Assessment:  1  Symptomatic paroxysmal atrial fibrillation with rapid ventricular response, currently in normal sinus rhythm   A )  Initially diagnosed 11/2020, but reported palpitations prior to this   B )  Started on diltiazem and Eliquis (CHADS2 Vasc = 1 - HTN) at that time; both eventually discontinued due to side effects and patient preference, respectively   C )  Recurrent episode prompting ER visit 2/2023, restarted on Eliquis and started on low-dose Lopressor 25 mg twice daily on discharge  2  Normal LV systolic function with EF 58% per echo 2/2023  3  Nonischemic nuclear stress test 2/2023 - vagal reaction reported after vasodilator administration, improved with IV fluids/aminophylline  4  Hypertension, currently maintained on losartan   A )  Intolerant of many medications, including Bystolic, lisinopril (cough), candesartan   B )  Had not been taking antihypertensives as prescribed, started on losartan 50 mg daily during recent admission  5  Obstructive sleep apnea, noncompliant with CPAP      Plan:  Given his recurrent symptomatic episodes and intolerance to medications, recommend atrial fibrillation ablation  The soonest we could accommodate this would be this Friday, 3/3/2023  He does not need to stay in the hospital until then, but rather can be discharged and return as an outpatient  The patient and his wife would prefer this plan  Request that he undergo CT pulmonary veins prior to discharge so that this information is available for his procedure later this week  In the meantime, we recommend that he continue lopressor 25 mg twice daily and Eliquis 5 mg twice daily    His last dose of Eliquis will be Thursday morning (3/2/2023) in preparation for the ablation  If he has recurrent episodes in the meantime, we instructed him to take diltiazem 60 mg and monitor his symptoms  The procedure was explained to the patient in details, all questions were answered and he is in agreement to proceed  Ok to discharge from the ED from an EP standpoint after CT is performed  History of Present Illness   Physician Requesting Consult: Karely Alberts MD  Reason for Consult / Principal Problem: Symptomatic paroxysmal atrial fibrillation    HPI: Giselle Henriquez is a 60y o  year old male with symptomatic paroxysmal atrial fibrillation with periods of rapid ventricular response, multiple medication intolerances, normal LV systolic function and recent nonischemic nuclear stress test, hypertension, and untreated obstructive sleep apnea  He typically follows with Dr Raquel Curry as an outpatient  He initially reported palpitations in early 2020, but declined an outpatient event monitor  He was then seen in the office 11/2020 at which time he was in atrial fibrillation but converted spontaneously to normal sinus rhythm during that visit  He was prescribed Cardizem as well as Eliquis, however he discontinued his medications on his own as he felt unwell while taking them  He is also had issues with being able to tolerate antihypertensives, including Bystolic, candesartan, lisinopril, and hydrochlorothiazide  More [facility], [facility] with recurrent symptomatic rapid A-fib  He was started on IV Cardizem, and converted to normal sinus rhythm  He was restarted on Eliquis anticoagulation, and started on Lopressor for rate control  He was restarted on losartan for better blood pressure control  He was discharged with eventual outpatient EP follow-up  Unfortunately, he presented to the emergency room earlier this morning with another episode of rapid A-fib    EP has been asked to see him in consultation for more definitive management, as he is unable to and does not wish to pursue medical management  In the ED, he was given IV lopressor 5 mg x 1 with conversion to normal sinus rhythm    He reports intermittent episodes over the past 2-3 years, however they are now becoming more frequent and lasting longer  Last week, his episode was more persistent which prompted his ED visit  When he is in afib he reports palpitations, fatigue, chest tightness/feelings of anxiety  At baseline he denies significant complaints  He denies tobacco or ETOH use, he has even limited his caffeine intake  He admits to family history of CAD, his father had prior MI with stent placement  Review of Systems  ROS as noted above, otherwise 12 point review of systems was performed and is negative  Historical Information   Past Medical History:   Diagnosis Date   • Atrial fibrillation ([address] )    • Cancer (HCC)     lymphoma   • CKD (chronic kidney disease)    • Dilated aortic root (HCC)    • Erectile dysfunction    • Hyperlipidemia 6/28/2016   • Hypertension    • Hypertensive CKD (chronic kidney disease)    • Mild renal insufficiency 9/26/2017   • Vitamin D deficiency      Past Surgical History:   Procedure Laterality Date   • COLONOSCOPY     • HIATAL HERNIA REPAIR       Social History     Substance and Sexual Activity   Alcohol Use Yes    Comment: social     Social History     Substance and Sexual Activity   Drug Use No     Social History     Tobacco Use   Smoking Status Never   Smokeless Tobacco Never     Family History:   Family History   Problem Relation Age of Onset   • Lung cancer Mother    • Heart attack Father 76   • Prostate cancer Father    • Hyperlipidemia Father    • No Known Problems Sister    • Lymphoma Maternal Aunt    • Fainting Maternal Grandfather        Meds/Allergies   Hospital Medications:   No current facility-administered medications for this encounter       Home Medications: (Not in a hospital admission)      No Known Allergies    Objective   Vitals: Blood pressure 143/82, pulse (!) 52, temperature (!) 97 4 °F (36 3 °C), temperature source Oral, resp  rate 16, SpO2 98 %  Orthostatic Blood Pressures    Flowsheet Row Most Recent Value   Blood Pressure 143/82 filed at 2023 0830   Patient Position - Orthostatic VS Lying filed at 2023 0830          No intake or output data in the 24 hours ending 23 0856    Invasive Devices     Peripheral Intravenous Line  Duration           Peripheral IV 23 Left Antecubital <1 day                Physical Exam  GEN: NAD, alert and oriented x 3, well appearing  SKIN: dry without significant lesions or rashes  HEENT: NCAT, PERRL, EOMs intact  NECK: No JVD appreciated  CARDIOVASCULAR: RRR, normal S1, S2 without murmurs, rubs, or gallops appreciated  LUNGS: Clear to auscultation bilaterally without wheezes, rhonchi, or rales  ABDOMEN: Soft, nontender, nondistended  EXTREMITIES/VASCULAR: perfused without clubbing, cyanosis, or LE edema b/l  PSYCH: Normal mood and affect  NEURO: CN ll-Xll grossly intact      Lab Results: I have personally reviewed pertinent lab results  Results from last 7 days   Lab Units 23  0715   WBC Thousand/uL 6 91   HEMOGLOBIN g/dL 13 7   HEMATOCRIT % 42 5   PLATELETS Thousands/uL 226           Invalid input(s): LABGLOM            Imaging: I have personally reviewed pertinent reports  ECHO: Results for orders placed during the hospital encounter of 19    Echo complete with contrast if indicated    Narrative  [address] [place] [address], [address] [facility]  [phone]    Transthoracic Echocardiogram  2D, M-mode, Doppler, and Color Doppler    Study date:  2019    Patient: Ellie Loco [facility]  MR number: [id number]  Account number: [de-identified]  : 1960  Age: 60 years  Gender: Male  Status: Outpatient  Location: [facility]  Height: 71 in  Weight: 191 lb  BP: 132/ 82 mmHg    Indications: Murmur    Diagnoses: R01 1 - Cardiac murmur, unspecified    Sonographer:  Danielevivienne Araiza [facility]  Primary Physician:  Walter Zamudio DO  Referring Physician:  Mary Garcia DO  Group:  [facility]  Interpreting Physician:  Hermelinda Cormier MD    SUMMARY    LEFT VENTRICLE:  Systolic function was normal  Ejection fraction was estimated to be 60 %  There were no regional wall motion abnormalities  There was mild concentric hypertrophy  LEFT ATRIUM:  The atrium was mildly dilated  MITRAL VALVE:  There was trace to mild regurgitation  TRICUSPID VALVE:  There was trace to mild regurgitation  Estimated peak PA pressure was 30 mmHg  PULMONIC VALVE:  There was mild regurgitation  AORTA:  The root exhibited mild dilatation (3 9 cm)  HISTORY: PRIOR HISTORY: HTN, HLD, CKD    PROCEDURE: The study was performed in the [address] [place] [address] [facility]  This was a routine study  The transthoracic approach was used  The study included complete 2D imaging, M-mode, complete spectral Doppler, and color Doppler  Image  quality was adequate  LEFT VENTRICLE: Size was normal  Systolic function was normal  Ejection fraction was estimated to be 60 %  There were no regional wall motion abnormalities  There was mild concentric hypertrophy  No evidence of apical thrombus  DOPPLER:  Left ventricular diastolic function parameters were low normal     RIGHT VENTRICLE: The size was normal  Systolic function was normal  Wall thickness was normal     LEFT ATRIUM: The atrium was mildly dilated  RIGHT ATRIUM: Size was at the upper limits of normal     MITRAL VALVE: Valve structure was normal  There was normal leaflet separation  DOPPLER: The transmitral velocity was within the normal range  There was no evidence for stenosis  There was trace to mild regurgitation  AORTIC VALVE: The valve was trileaflet  Leaflets exhibited normal thickness and normal cuspal separation   DOPPLER: Transaortic velocity was within the normal range  There was no evidence for stenosis  There was no significant  regurgitation  TRICUSPID VALVE: The valve structure was normal  There was normal leaflet separation  DOPPLER: The transtricuspid velocity was within the normal range  There was no evidence for stenosis  There was trace to mild regurgitation  Estimated  peak PA pressure was 30 mmHg  PULMONIC VALVE: Leaflets exhibited normal thickness, no calcification, and normal cuspal separation  DOPPLER: The transpulmonic velocity was within the normal range  There was mild regurgitation  PERICARDIUM: There was no pericardial effusion  The pericardium was normal in appearance  AORTA: The root exhibited mild dilatation (3 9 cm)  SYSTEMIC VEINS: IVC: The inferior vena cava was normal in size  SYSTEM MEASUREMENT TABLES    2D  %FS: 40 7 %  Ao Diam: 3 4 cm  EF(Teich): 71 3 %  ESV(Teich): 31 1 ml  IVSd: 1 1 cm  LA Area: 20 5 cm2  LA Diam: 3 9 cm  LVEF MOD A4C: 56 9 %  LVIDd: 4 8 cm  LVIDs: 2 9 cm  LVPWd: 1 1 cm  RA Area: 18 5 cm2  SV(Teich): 77 4 ml    CW  TR Vmax: 2 6 m/s  TR maxP 3 mmHg    MM  TAPSE: 2 3 cm    [facility]redited [facility]    Prepared and electronically signed by    Gina Villegas MD  Signed 2019 16:16:05      NUCLEAR STRESS TEST 2023:  Interpretation Summary       •  Stress Function: Left ventricular function post-stress is normal  Post-stress ejection fraction is 58 %  •  Perfusion: There is a left ventricular perfusion defect that is small in size with mild reduction in uptake present in the basal to mid inferoseptal location(s) that is fixed  The defect appears to be an artifact as there is normal wall motion seen in this region on gated imaging  •  Stress Combined Conclusion: There is image artifact, without diagnostic evidence for perfusion abnormality  •  Stress ECG: No ST deviation is noted   Arrhythmias during recovery: Sinus bradycardia, ectopic atrial rhythm  The ECG was not diagnostic due to pharmacological (vasodilator) stress  •  Stress ECG: The patient reported dizziness, syncope , diaphoresis,and hypotension during the stress test  Symptoms began during stress and ended during recovery  Aminophylline was given to reverse the effects of the vasodilator      There is no evidence of stress-induced ischemia  It appears that the patient had a vagal reaction after vasodilator administration, noted to be bradycardic and hypotensive -resolved with IV fluids and reversal with aminophylline          EKG: previously noted atrial fibrillation with rapid ventricular response      Most recent ECG - sinus bradycardia        TELEMETRY: currently in sinus rhythm, slightly bradycardic in the low 50s    Afib on admission -       Conversion to normal sinus -         VTE Prophylaxis: Eliquis

## 2023-02-28 NOTE — ED PROVIDER NOTES
History  Chief Complaint   Patient presents with   • Atrial Fibrillation     Pt was diagnosed with Afib last week and is awaiting EP  Pt states his reading this morning showed his HR was in 150-160's and reports similar symptoms  Pt also states mild chest pain      Patient is a 50-year-old male, with a history significant for newly diagnosed A-fib anticoagulated on Eliquis and managed with metoprolol (planned ablation in March, recent hospitalization with discharge Wednesday for this, echo on 2/20 revealed borderline dilated RV and NM stress test on 2/21 revealed no inducible ischemia), who presents to the ED today due to chest pressure that began at 11:00 last evening and has been constant and unchanged since its onset  Patient reports associated fatigue, palpitations, jittery feeling  There is no associated fever, alcohol use, caffeine use, other complaints at this time  Patient's wife states that patient is at his baseline mental status  No clear exacerbating factors  Patient took his metoprolol this morning  Patient states that his heart rate went to the 150s and 160s throughout the night  Patient is without other concerns at this time  Prior to Admission Medications   Prescriptions Last Dose Informant Patient Reported? Taking?    apixaban (ELIQUIS) 5 mg   No No   Sig: Take 1 tablet (5 mg total) by mouth 2 (two) times a day   cholecalciferol (VITAMIN D3) 1,000 units tablet   Yes No   Sig: Take 1,000 Units by mouth daily   diltiazem (CARDIZEM) 30 mg tablet   No No   Sig: Take 1 tablet (30 mg total) by mouth every 8 (eight) hours as needed (palpitations, HR > 120s)   losartan (COZAAR) 25 mg tablet   No No   Sig: Take 0 5 tablets (12 5 mg total) by mouth daily   metoprolol tartrate (LOPRESSOR) 25 mg tablet   No No   Sig: Take 1 tablet (25 mg total) by mouth every 12 (twelve) hours      Facility-Administered Medications: None       Past Medical History:   Diagnosis Date   • Atrial fibrillation ([facility] ) • Cancer [facility])     lymphoma   • CKD (chronic kidney disease)    • Dilated aortic root (HCC)    • Erectile dysfunction    • Hyperlipidemia 6/28/2016   • Hypertension    • Hypertensive CKD (chronic kidney disease)    • Mild renal insufficiency 9/26/2017   • Vitamin D deficiency        Past Surgical History:   Procedure Laterality Date   • COLONOSCOPY     • HIATAL HERNIA REPAIR         Family History   Problem Relation Age of Onset   • Lung cancer Mother    • Heart attack Father 76   • Prostate cancer Father    • Hyperlipidemia Father    • No Known Problems Sister    • Lymphoma Maternal Aunt    • Fainting Maternal Grandfather      I have reviewed and agree with the history as documented  E-Cigarette/Vaping   • E-Cigarette Use Never User      E-Cigarette/Vaping Substances     Social History     Tobacco Use   • Smoking status: Never   • Smokeless tobacco: Never   Vaping Use   • Vaping Use: Never used   Substance Use Topics   • Alcohol use: Yes     Comment: social   • Drug use: No        Review of Systems   Constitutional: Positive for fatigue  Negative for fever  HENT: Negative for trouble swallowing  Eyes: Negative for visual disturbance  Respiratory: Negative for shortness of breath  Cardiovascular: Positive for chest pain and palpitations  Gastrointestinal: Negative for abdominal pain  Endocrine: Negative for polyuria  Genitourinary: Negative for dysuria  Musculoskeletal: Negative for gait problem  Skin: Negative for rash  Allergic/Immunologic: Negative for environmental allergies  Neurological: Negative for weakness and numbness  Hematological: Negative for adenopathy  Psychiatric/Behavioral: Negative for confusion  All other systems reviewed and are negative        Physical Exam  ED Triage Vitals   Temperature Pulse Respirations Blood Pressure SpO2   02/28/23 0651 02/28/23 0648 02/28/23 0648 02/28/23 0649 02/28/23 0648   (!) 97 4 °F (36 3 °C) (!) 111 18 (!) 178/112 98 %      Temp Source Heart Rate Source Patient Position - Orthostatic VS BP Location FiO2 (%)   02/28/23 0651 02/28/23 0648 02/28/23 0648 02/28/23 0648 --   Oral Monitor Lying Right arm       Pain Score       --                    Orthostatic Vital Signs  Vitals:    02/28/23 1200 02/28/23 1300 02/28/23 1400 02/28/23 1500   BP: 130/65 150/74 142/68 146/70   Pulse: (!) 54 (!) 50 56 (!) 54   Patient Position - Orthostatic VS: Lying Lying Lying Lying       Physical Exam  Vitals and nursing note reviewed  Constitutional:       General: He is not in acute distress  Appearance: He is not ill-appearing, toxic-appearing or diaphoretic  Comments: Patient appears comfortable during my evaluation    HENT:      Head: Normocephalic and atraumatic  Right Ear: External ear normal       Left Ear: External ear normal       Nose: Nose normal  No rhinorrhea  Mouth/Throat:      Mouth: Mucous membranes are moist       Pharynx: Oropharynx is clear  No oropharyngeal exudate or posterior oropharyngeal erythema  Comments: Uvula midline  No oropharyngeal or submandibular mass/swelling  Eyes:      General: No scleral icterus  Right eye: No discharge  Left eye: No discharge  Conjunctiva/sclera: Conjunctivae normal       Pupils: Pupils are equal, round, and reactive to light  Neck:      Comments: Patient is spontaneously rotating their neck to the left and right during the history and physical exam interaction without difficulty or apparent discomfort    Cardiovascular:      Rate and Rhythm: Tachycardia present  Rhythm irregular  Pulses: Normal pulses  Heart sounds: Normal heart sounds  No murmur heard  No friction rub  No gallop  Comments: 2+ Radial  Pulmonary:      Effort: Pulmonary effort is normal  No respiratory distress  Breath sounds: Normal breath sounds  No stridor  No wheezing, rhonchi or rales  Abdominal:      General: Abdomen is flat  There is no distension        Palpations: Abdomen is soft  Tenderness: There is no abdominal tenderness  There is no right CVA tenderness, left CVA tenderness, guarding or rebound  Musculoskeletal:         General: No tenderness (No calf tenderness ) or deformity  Cervical back: Neck supple  No tenderness  No muscular tenderness  Right lower leg: No edema  Left lower leg: No edema  Lymphadenopathy:      Cervical: No cervical adenopathy  Skin:     General: Skin is warm and dry  Capillary Refill: Capillary refill takes less than 2 seconds  Neurological:      Mental Status: He is alert  Comments: Patient is speaking clearly in complete sentences  Patient is answering appropriately and able follow commands  Patient is moving all four extremities spontaneously  No facial droop  Tongue midline        Psychiatric:         Mood and Affect: Mood normal          Behavior: Behavior normal          ED Medications  Medications   metoprolol (LOPRESSOR) injection 5 mg (5 mg Intravenous Given 2/28/23 0714)   iohexol (OMNIPAQUE) 350 MG/ML injection (SINGLE-DOSE) 100 mL (100 mL Intravenous Given 2/28/23 1539)       Diagnostic Studies  Results Reviewed     Procedure Component Value Units Date/Time    HS Troponin I 4hr [[id number]]  (Normal) Collected: 02/28/23 1301    Lab Status: Final result Specimen: Blood from Arm, Left Updated: 02/28/23 1355     hs TnI 4hr 5 ng/L      Delta 4hr hsTnI 1 ng/L     HS Troponin I 2hr [[id number]]  (Normal) Collected: 02/28/23 1034    Lab Status: Final result Specimen: Blood from Arm, Left Updated: 02/28/23 1120     hs TnI 2hr 4 ng/L      Delta 2hr hsTnI 0 ng/L     HS Troponin 0hr (reflex protocol) [[id number]]  (Normal) Collected: 02/28/23 0715    Lab Status: Final result Specimen: Blood from Arm, Left Updated: 02/28/23 0909     hs TnI 0hr 4 ng/L     Comprehensive metabolic panel [[id number]]  (Abnormal) Collected: 02/28/23 0715    Lab Status: Final result Specimen: Blood from Arm, Left Updated: 02/28/23 7815     Sodium 138 mmol/L      Potassium 4 4 mmol/L      Chloride 110 mmol/L      CO2 26 mmol/L      ANION GAP 2 mmol/L      BUN 21 mg/dL      Creatinine 1 11 mg/dL      Glucose 115 mg/dL      Calcium 9 2 mg/dL      Corrected Calcium 9 8 mg/dL      AST 11 U/L      ALT 19 U/L      Alkaline Phosphatase 48 U/L      Total Protein 6 5 g/dL      Albumin 3 3 g/dL      Total Bilirubin 0 41 mg/dL      eGFR 70 ml/min/1 73sq m     Narrative:      National Kidney Disease Foundation guidelines for Chronic Kidney Disease (CKD):   •  Stage 1 with normal or high GFR (GFR > 90 mL/min/1 73 square meters)  •  Stage 2 Mild CKD (GFR = 60-89 mL/min/1 73 square meters)  •  Stage 3A Moderate CKD (GFR = 45-59 mL/min/1 73 square meters)  •  Stage 3B Moderate CKD (GFR = 30-44 mL/min/1 73 square meters)  •  Stage 4 Severe CKD (GFR = 15-29 mL/min/1 73 square meters)  •  Stage 5 End Stage CKD (GFR <15 mL/min/1 73 square meters)  Note: GFR calculation is accurate only with a steady state creatinine    CBC and differential [[id number]]  (Abnormal) Collected: 02/28/23 0715    Lab Status: Final result Specimen: Blood from Arm, Left Updated: 02/28/23 0840     WBC 6 91 Thousand/uL      RBC 4 40 Million/uL      Hemoglobin 13 7 g/dL      Hematocrit 42 5 %      MCV 97 fL      MCH 31 1 pg      MCHC 32 2 g/dL      RDW 12 6 %      MPV 11 1 fL      Platelets 100 Thousands/uL      nRBC 0 /100 WBCs      Neutrophils Relative 78 %      Immat GRANS % 0 %      Lymphocytes Relative 13 %      Monocytes Relative 8 %      Eosinophils Relative 0 %      Basophils Relative 1 %      Neutrophils Absolute 5 33 Thousands/µL      Immature Grans Absolute 0 02 Thousand/uL      Lymphocytes Absolute 0 92 Thousands/µL      Monocytes Absolute 0 56 Thousand/µL      Eosinophils Absolute 0 03 Thousand/µL      Basophils Absolute 0 05 Thousands/µL                  XR chest 1 view portable   ED Interpretation by Emre Hernandez MD (02/28 3170)   Per my independent interpretation: no acute cardiopulmonary process      Final Result by Peg Vidal MD (02/28 1132)      No acute cardiopulmonary disease  Workstation performed: [id number]         CT cardiac w pulmonary vein mapping    (Results Pending)         Procedures  Procedures      ED Course  ED Course as of 02/28/23 1558   Tue Feb 28, 2023   0706 ECG per my independent interpretation: Tachycardic rate, irregular rhythm, no ectopy, normal axis, no ST elevations or depressions     0914 hs TnI 0hr: 4  WNL   1131 Delta 2hr hsTnI: 0   1352 Discussed with Min Arango from EP: plan is for an outpatient ablation this Friday, but to get a ct of the pulm veins today before discharge  We are continuing the meds he was before, but told him to take extra cardizem for recurrent episodes  So basically, when his ct is done he can be discharged   1357 Patient feels well at this time     Patient continues to feel well  Discharge instructions and return precautions given  Patient questions answered to his satisfaction             HEART Risk Score    Flowsheet Row Most Recent Value   Heart Score Risk Calculator    History 1 Filed at: 02/28/2023 0915   ECG 1 Filed at: 02/28/2023 0915   Age 1 Filed at: 02/28/2023 0915   Risk Factors 1 Filed at: 02/28/2023 0915   Troponin 0 Filed at: 02/28/2023 0915   HEART Score 4 Filed at: 02/28/2023 5998                                Medical Decision Making  Patient is a 70-year-old male, with a history significant for newly diagnosed A-fib anticoagulated on Eliquis and managed with metoprolol (planned ablation in March, recent hospitalization with discharge Wednesday for this, echo on 2/20 revealed borderline dilated RV and NM stress test on 2/21 revealed no inducible ischemia), who presents to the ED today due to chest pressure that began at 11:00 last evening and has been constant and unchanged since its onset  Patient reports associated fatigue, palpitations, jittery feeling    There is no associated fever, alcohol use, caffeine use, other complaints at this time  Patient's wife states that patient is at his baseline mental status  Patient is currently afebrile, tachycardic with a labile heart rate between the 80s and 130s, hypertensive, otherwise stable  His physical exam is notable for overall well appearance with irregularly irregular rhythm  This presentation is concerning for: Atrial fibrillation  I also considered ACS  As TSH was within normal limits during his recent hospitalization, unlikely hyperthyroid  Will investigate with cardiac work-up and discuss with cardiology  Will manage with Lopressor IV and further based upon work-up/recommendations     - No language barrier    - History obtained from patient and his wife  - There are no limitations to the history obtained  - External record review performed of previous charting was performed by me - recent afib diagnosis - NM study and echo  - I independently reviewed and interpreted ordered labs, ECGs, and imaging from this encounter   - Patient's medication regimen reviewed - eliquis  - Patient's comorbidities factored into diagnosis considerations and disposition planning  Atrial fibrillation with rapid ventricular response ([address] ): self-limited or minor problem  Elevated blood pressure reading: acute illness or injury  Amount and/or Complexity of Data Reviewed  Labs: ordered  Decision-making details documented in ED Course  Radiology: ordered and independent interpretation performed  Risk  Prescription drug management              Disposition  Final diagnoses:   Atrial fibrillation with rapid ventricular response (HCC)   Elevated blood pressure reading     Time reflects when diagnosis was documented in both MDM as applicable and the Disposition within this note     Time User Action Codes Description Comment    2/28/2023  7:28 AM Jennifer MARTÍNEZ Add [I48 91] Atrial fibrillation with rapid ventricular response ([address] ) 2/28/2023  1:53 PM Kirk Partida Add [R03 0] Elevated blood pressure reading       ED Disposition     ED Disposition   Discharge    Condition   Stable    Date/Time   Tue Feb 28, 2023  3:51 PM    Comment   Iliana Dean discharge to home/self care  Follow-up Information     Follow up With Specialties Details Why Contact Info    Jonathon Zavala DO Family Medicine Schedule an appointment as soon as possible for a visit   [address] [place] [address] [place] [address], [address] [place] [address] Cardiology Schedule an appointment as soon as possible for a visit   [facility] [address] [facility]  [phone]            Current Discharge Medication List      CONTINUE these medications which have NOT CHANGED    Details   apixaban (ELIQUIS) 5 mg Take 1 tablet (5 mg total) by mouth 2 (two) times a day  Qty: 60 tablet, Refills: 0    Associated Diagnoses: Atrial fibrillation with rapid ventricular response (HCC)      cholecalciferol (VITAMIN D3) 1,000 units tablet Take 1,000 Units by mouth daily      diltiazem (CARDIZEM) 30 mg tablet Take 1 tablet (30 mg total) by mouth every 8 (eight) hours as needed (palpitations, HR > 120s)  Qty: 30 tablet, Refills: 0    Associated Diagnoses: Atrial fibrillation with rapid ventricular response (HCC)      losartan (COZAAR) 25 mg tablet Take 0 5 tablets (12 5 mg total) by mouth daily    Associated Diagnoses: Benign essential hypertension      metoprolol tartrate (LOPRESSOR) 25 mg tablet Take 1 tablet (25 mg total) by mouth every 12 (twelve) hours  Qty: 60 tablet, Refills: 0    Associated Diagnoses: Atrial fibrillation with rapid ventricular response (HCC)           No discharge procedures on file  PDMP Review     None           ED Provider  Attending physically available and evaluated Iliana Dean I managed the patient along with the ED Attending      Electronically Signed by         Keri Nieves MD  02/28/23 5880

## 2023-02-28 NOTE — TELEPHONE ENCOUNTER
[employer] [place] [place] does not require auth for BALDO/Afib Abl - [address] [facility]

## 2023-03-01 ENCOUNTER — PREP FOR PROCEDURE (OUTPATIENT)
Dept: CARDIOLOGY CLINIC | Facility: CLINIC | Age: 63
End: 2023-03-01

## 2023-03-01 DIAGNOSIS — R00.2 PALPITATION: Primary | ICD-10-CM

## 2023-03-01 NOTE — TELEPHONE ENCOUNTER
Called patient and LVm to call back to go over procedure instructions for his ablation on 03/03/2023

## 2023-03-03 ENCOUNTER — HOSPITAL ENCOUNTER (OUTPATIENT)
Facility: HOSPITAL | Age: 63
Setting detail: OUTPATIENT SURGERY
Discharge: HOME | End: 2023-03-04
Attending: INTERNAL MEDICINE | Admitting: INTERNAL MEDICINE

## 2023-03-03 ENCOUNTER — ANESTHESIA (OUTPATIENT)
Dept: NON INVASIVE DIAGNOSTICS | Facility: HOSPITAL | Age: 63
End: 2023-03-03

## 2023-03-03 ENCOUNTER — APPOINTMENT (OUTPATIENT)
Dept: NON INVASIVE DIAGNOSTICS | Facility: HOSPITAL | Age: 63
End: 2023-03-03
Attending: INTERNAL MEDICINE

## 2023-03-03 ENCOUNTER — ANESTHESIA EVENT (OUTPATIENT)
Dept: NON INVASIVE DIAGNOSTICS | Facility: HOSPITAL | Age: 63
End: 2023-03-03

## 2023-03-03 DIAGNOSIS — I48.0 PAROXYSMAL ATRIAL FIBRILLATION (HCC): ICD-10-CM

## 2023-03-03 DIAGNOSIS — I48.91 ATRIAL FIBRILLATION WITH RAPID VENTRICULAR RESPONSE (HCC): Primary | ICD-10-CM

## 2023-03-03 DIAGNOSIS — R00.2 PALPITATION: ICD-10-CM

## 2023-03-03 LAB
ANION GAP SERPL CALCULATED.3IONS-SCNC: 5 MMOL/L (ref 4–13)
ATRIAL RATE: 50 BPM
ATRIAL RATE: 85 BPM
BUN SERPL-MCNC: 16 MG/DL (ref 5–25)
CALCIUM SERPL-MCNC: 9.1 MG/DL (ref 8.3–10.1)
CHLORIDE SERPL-SCNC: 108 MMOL/L (ref 96–108)
CO2 SERPL-SCNC: 26 MMOL/L (ref 21–32)
CREAT SERPL-MCNC: 1.18 MG/DL (ref 0.6–1.3)
ERYTHROCYTE [DISTWIDTH] IN BLOOD BY AUTOMATED COUNT: 12.6 % (ref 11.6–15.1)
FLUAV RNA RESP QL NAA+PROBE: NEGATIVE
FLUBV RNA RESP QL NAA+PROBE: NEGATIVE
GFR SERPL CREATININE-BSD FRML MDRD: 65 ML/MIN/1.73SQ M
GLUCOSE P FAST SERPL-MCNC: 109 MG/DL (ref 65–99)
GLUCOSE SERPL-MCNC: 109 MG/DL (ref 65–140)
HCT VFR BLD AUTO: 42.9 % (ref 36.5–49.3)
HGB BLD-MCNC: 13.8 G/DL (ref 12–17)
INR PPP: 0.98 (ref 0.84–1.19)
KCT BLD-ACNC: 320 SEC (ref 89–137)
KCT BLD-ACNC: 334 SEC (ref 89–137)
KCT BLD-ACNC: 355 SEC (ref 89–137)
KCT BLD-ACNC: 355 SEC (ref 89–137)
KCT BLD-ACNC: 362 SEC (ref 89–137)
KCT BLD-ACNC: 377 SEC (ref 89–137)
KCT BLD-ACNC: 384 SEC (ref 89–137)
KCT BLD-ACNC: 398 SEC (ref 89–137)
MCH RBC QN AUTO: 31.4 PG (ref 26.8–34.3)
MCHC RBC AUTO-ENTMCNC: 32.2 G/DL (ref 31.4–37.4)
MCV RBC AUTO: 98 FL (ref 82–98)
P AXIS: 38 DEGREES
P AXIS: 75 DEGREES
PLATELET # BLD AUTO: 232 THOUSANDS/UL (ref 149–390)
PMV BLD AUTO: 11.1 FL (ref 8.9–12.7)
POTASSIUM SERPL-SCNC: 3.9 MMOL/L (ref 3.5–5.3)
PR INTERVAL: 138 MS
PR INTERVAL: 148 MS
PROTHROMBIN TIME: 13.2 SECONDS (ref 11.6–14.5)
QRS AXIS: 48 DEGREES
QRS AXIS: 79 DEGREES
QRSD INTERVAL: 92 MS
QRSD INTERVAL: 96 MS
QT INTERVAL: 396 MS
QT INTERVAL: 428 MS
QTC INTERVAL: 390 MS
QTC INTERVAL: 471 MS
RBC # BLD AUTO: 4.4 MILLION/UL (ref 3.88–5.62)
RSV RNA RESP QL NAA+PROBE: NEGATIVE
SARS-COV-2 RNA RESP QL NAA+PROBE: NEGATIVE
SL CV LV EF: 60
SODIUM SERPL-SCNC: 139 MMOL/L (ref 135–147)
SPECIMEN SOURCE: ABNORMAL
T WAVE AXIS: 28 DEGREES
T WAVE AXIS: 56 DEGREES
VENTRICULAR RATE: 50 BPM
VENTRICULAR RATE: 85 BPM
WBC # BLD AUTO: 5.92 THOUSAND/UL (ref 4.31–10.16)

## 2023-03-03 RX ORDER — MIDAZOLAM HYDROCHLORIDE 2 MG/2ML
INJECTION, SOLUTION INTRAMUSCULAR; INTRAVENOUS AS NEEDED
Status: DISCONTINUED | OUTPATIENT
Start: 2023-03-03 | End: 2023-03-03

## 2023-03-03 RX ORDER — HEPARIN SODIUM 10000 [USP'U]/100ML
INJECTION, SOLUTION INTRAVENOUS
Status: DISCONTINUED | OUTPATIENT
Start: 2023-03-03 | End: 2023-03-03 | Stop reason: HOSPADM

## 2023-03-03 RX ORDER — ONDANSETRON 2 MG/ML
INJECTION INTRAMUSCULAR; INTRAVENOUS AS NEEDED
Status: DISCONTINUED | OUTPATIENT
Start: 2023-03-03 | End: 2023-03-03

## 2023-03-03 RX ORDER — PANTOPRAZOLE SODIUM 40 MG/10ML
40 INJECTION, POWDER, LYOPHILIZED, FOR SOLUTION INTRAVENOUS ONCE
Status: COMPLETED | OUTPATIENT
Start: 2023-03-03 | End: 2023-03-03

## 2023-03-03 RX ORDER — SODIUM CHLORIDE 9 MG/ML
INJECTION, SOLUTION INTRAVENOUS CONTINUOUS PRN
Status: DISCONTINUED | OUTPATIENT
Start: 2023-03-03 | End: 2023-03-03

## 2023-03-03 RX ORDER — PROTAMINE SULFATE 10 MG/ML
INJECTION, SOLUTION INTRAVENOUS AS NEEDED
Status: DISCONTINUED | OUTPATIENT
Start: 2023-03-03 | End: 2023-03-03

## 2023-03-03 RX ORDER — HEPARIN SODIUM 1000 [USP'U]/ML
INJECTION, SOLUTION INTRAVENOUS; SUBCUTANEOUS CODE/TRAUMA/SEDATION MEDICATION
Status: DISCONTINUED | OUTPATIENT
Start: 2023-03-03 | End: 2023-03-03 | Stop reason: HOSPADM

## 2023-03-03 RX ORDER — LIDOCAINE HYDROCHLORIDE 10 MG/ML
INJECTION, SOLUTION EPIDURAL; INFILTRATION; INTRACAUDAL; PERINEURAL CODE/TRAUMA/SEDATION MEDICATION
Status: DISCONTINUED | OUTPATIENT
Start: 2023-03-03 | End: 2023-03-03 | Stop reason: HOSPADM

## 2023-03-03 RX ORDER — FENTANYL CITRATE/PF 50 MCG/ML
25 SYRINGE (ML) INJECTION
Status: DISCONTINUED | OUTPATIENT
Start: 2023-03-03 | End: 2023-03-03 | Stop reason: HOSPADM

## 2023-03-03 RX ORDER — ONDANSETRON 2 MG/ML
4 INJECTION INTRAMUSCULAR; INTRAVENOUS ONCE AS NEEDED
Status: DISCONTINUED | OUTPATIENT
Start: 2023-03-03 | End: 2023-03-03 | Stop reason: HOSPADM

## 2023-03-03 RX ORDER — SUCCINYLCHOLINE/SOD CL,ISO/PF 100 MG/5ML
SYRINGE (ML) INTRAVENOUS AS NEEDED
Status: DISCONTINUED | OUTPATIENT
Start: 2023-03-03 | End: 2023-03-03

## 2023-03-03 RX ORDER — LIDOCAINE HYDROCHLORIDE 10 MG/ML
INJECTION, SOLUTION EPIDURAL; INFILTRATION; INTRACAUDAL; PERINEURAL AS NEEDED
Status: DISCONTINUED | OUTPATIENT
Start: 2023-03-03 | End: 2023-03-03

## 2023-03-03 RX ORDER — EPHEDRINE SULFATE 50 MG/ML
INJECTION INTRAVENOUS AS NEEDED
Status: DISCONTINUED | OUTPATIENT
Start: 2023-03-03 | End: 2023-03-03

## 2023-03-03 RX ORDER — FENTANYL CITRATE 50 UG/ML
INJECTION, SOLUTION INTRAMUSCULAR; INTRAVENOUS AS NEEDED
Status: DISCONTINUED | OUTPATIENT
Start: 2023-03-03 | End: 2023-03-03

## 2023-03-03 RX ORDER — PROPOFOL 10 MG/ML
INJECTION, EMULSION INTRAVENOUS AS NEEDED
Status: DISCONTINUED | OUTPATIENT
Start: 2023-03-03 | End: 2023-03-03

## 2023-03-03 RX ORDER — GLYCOPYRROLATE 0.2 MG/ML
INJECTION INTRAMUSCULAR; INTRAVENOUS AS NEEDED
Status: DISCONTINUED | OUTPATIENT
Start: 2023-03-03 | End: 2023-03-03

## 2023-03-03 RX ADMIN — PANTOPRAZOLE SODIUM 40 MG: 40 INJECTION, POWDER, FOR SOLUTION INTRAVENOUS at 12:13

## 2023-03-03 RX ADMIN — ONDANSETRON 4 MG: 2 INJECTION INTRAMUSCULAR; INTRAVENOUS at 16:18

## 2023-03-03 RX ADMIN — PROPOFOL 50 MG: 10 INJECTION, EMULSION INTRAVENOUS at 11:55

## 2023-03-03 RX ADMIN — Medication 100 MG: at 11:49

## 2023-03-03 RX ADMIN — FENTANYL CITRATE 50 MCG: 50 INJECTION, SOLUTION INTRAMUSCULAR; INTRAVENOUS at 11:59

## 2023-03-03 RX ADMIN — NOREPINEPHRINE BITARTRATE 3 MCG/MIN: 1 INJECTION INTRAVENOUS at 12:03

## 2023-03-03 RX ADMIN — SODIUM CHLORIDE: 0.9 INJECTION, SOLUTION INTRAVENOUS at 13:13

## 2023-03-03 RX ADMIN — PROPOFOL 50 MG: 10 INJECTION, EMULSION INTRAVENOUS at 12:10

## 2023-03-03 RX ADMIN — EPHEDRINE SULFATE 5 MG: 50 INJECTION INTRAVENOUS at 12:39

## 2023-03-03 RX ADMIN — SODIUM CHLORIDE 0.15 MCG/KG/MIN: 900 INJECTION INTRAVENOUS at 12:29

## 2023-03-03 RX ADMIN — PROTAMINE SULFATE 70 MG: 10 INJECTION, SOLUTION INTRAVENOUS at 16:10

## 2023-03-03 RX ADMIN — SODIUM CHLORIDE: 0.9 INJECTION, SOLUTION INTRAVENOUS at 11:32

## 2023-03-03 RX ADMIN — LIDOCAINE HYDROCHLORIDE 50 MG: 10 INJECTION, SOLUTION EPIDURAL; INFILTRATION; INTRACAUDAL; PERINEURAL at 11:48

## 2023-03-03 RX ADMIN — MIDAZOLAM 1 MG: 1 INJECTION INTRAMUSCULAR; INTRAVENOUS at 11:35

## 2023-03-03 RX ADMIN — PROPOFOL 150 MG: 10 INJECTION, EMULSION INTRAVENOUS at 11:48

## 2023-03-03 RX ADMIN — FENTANYL CITRATE 50 MCG: 50 INJECTION, SOLUTION INTRAMUSCULAR; INTRAVENOUS at 11:48

## 2023-03-03 RX ADMIN — GLYCOPYRROLATE 0.3 MCG: 0.2 INJECTION, SOLUTION INTRAMUSCULAR; INTRAVENOUS at 13:31

## 2023-03-03 RX ADMIN — MIDAZOLAM 1 MG: 1 INJECTION INTRAMUSCULAR; INTRAVENOUS at 11:43

## 2023-03-03 NOTE — ANESTHESIA POSTPROCEDURE EVALUATION
Post-Op Assessment Note    CV Status:  Stable  Pain Score: 0    Pain management: adequate     Mental Status:  Sleepy and awake   Hydration Status:  Stable   PONV Controlled:  Controlled   Airway Patency:  Patent      Post Op Vitals Reviewed: Yes      Staff: Anesthesiologist, CRNA         There were no known notable events for this encounter      BP   142/68   Temp   97 5   Pulse 79   Resp   18   SpO2   100

## 2023-03-03 NOTE — DISCHARGE INSTR - AVS FIRST PAGE
PLEASE NOTE THE FOLLOWING MEDICATION RECOMMENDATIONS:  - take pantoprazole 40 mg daily for 30 days, at which time it can be discontinued  - continue metoprolol 25 mg twice daily and diltiazem as needed for palpitations as previously instructed  - continue Eliquis anticoagulation as previously instructed        RESTRICTIONS:  No lifting more than 10 lbs and no strenuous activity for one week  No soaking in a bath tub, hot tub, swimming pool, or any water for at least one week or until groins heal  You may shower  Please let soap and water run over the groins  No scrubbing  Pat the area dry  You may place band-aids on groins daily for up to five days, but you may remove sooner if no issues are noted  If you notice ongoing bleeding, swelling, or large firm lumps in groin near ablation incision, please contact the office of  Dr Ismael Gauthier at [phone]

## 2023-03-03 NOTE — ANESTHESIA PREPROCEDURE EVALUATION
Procedure:  Cardiac eps/afib ablation (Chest)    Relevant Problems   CARDIO   (+) Atrial fibrillation with rapid ventricular response (HCC)   (+) Benign essential hypertension   (+) Dilated aortic root (HCC)   (+) Paroxysmal atrial fibrillation (HCC)      /RENAL   (+) Benign hypertensive CKD   (+) Stage 3 chronic kidney disease (HCC)      HEMATOLOGY   (+) Malignant lymphoma, lymphoplasmacytic (HCC)      NEURO/PSYCH   (+) Episodic tension-type headache, not intractable      PULMONARY   (+) JODY (obstructive sleep apnea)      Other   (+) Screening for colon cancer        Physical Exam    Airway    Mallampati score: III  TM Distance: >3 FB  Neck ROM: full     Dental   No notable dental hx     Cardiovascular      Pulmonary      Other Findings        Anesthesia Plan  ASA Score- 3     Anesthesia Type- general with ASA Monitors  Additional Monitors: arterial line  Airway Plan: ETT  Plan Factors-    Chart reviewed  EKG reviewed  Imaging results reviewed  Existing labs reviewed  Patient summary reviewed  Induction- intravenous  Postoperative Plan-   Planned trial extubation    Informed Consent- Anesthetic plan and risks discussed with patient  I personally reviewed this patient with the CRNA  Discussed and agreed on the Anesthesia Plan with the CRNA  Jaime Bourgeois       VITALS  /77 (BP Location: Left arm)   Pulse (!) 49   Temp 98 2 °F (36 8 °C) (Temporal)   Resp 16   Ht 5' 11" (1 803 m)   Wt 83 kg (183 lb)   SpO2 100%   BMI 25 52 kg/m²   BP Readings from Last 3 Encounters:   03/03/23 160/77   02/28/23 146/70   02/22/23 130/70     LABS  Results from Last 12 Months   Lab Units 02/28/23  0715 02/21/23  0431 02/20/23  0421 02/19/23  1551 11/26/22  0822 10/01/22  0841   SODIUM mmol/L 138 138   < > 137   < > 136   POTASSIUM mmol/L 4 4 4 0   < > 3 6   < > 4 0   CHLORIDE mmol/L 110* 107   < > 106   < > 107   CO2 mmol/L 26 27   < > 27   < > 25   ANION GAP mmol/L 2* 4   < > 4   < > 4   BUN mg/dL 21 14   < > 19   < > 19   CREATININE mg/dL 1 11 1 14   < > 1 20   < > 1 20   CALCIUM mg/dL 9 2 9 1   < > 8 8   < > 8 9   GLUCOSE RANDOM mg/dL 115 96   < > 163*  --   --    PHOSPHORUS mg/dL  --   --   --   --   --  2 3   MAGNESIUM mg/dL  --   --   --   --   --  2 3   AST U/L 11  --   --  14   < >  --    ALT U/L 19  --   --  14   < >  --    ALK PHOS U/L 48  --   --  45   < >  --    TOTAL BILIRUBIN mg/dL 0 41  --   --  0 20   < >  --    ALBUMIN g/dL 3 3*  --   --  3 7   < > 3 2*    < > = values in this interval not displayed  Results from Last 12 Months   Lab Units 03/03/23  0943 02/28/23  0715   WBC Thousand/uL 5 92 6 91   HEMOGLOBIN g/dL 13 8 13 7   HEMATOCRIT % 42 9 42 5   PLATELETS Thousands/uL 232 226     Results from Last 12 Months   Lab Units 02/19/23  1551   INR  0 91   PTT seconds 26       ANESTHESIA RISK-BENEFIT DISCUSSION  • BENEFITS INCLUDE ([facility] [id number], PMID [id number]): (1) The anesthesia team reduces mortality for major surgeries, (2) The team provides as much sedation/amnesia as is reasonably possible, and (3) The team strives to reduce pain and discomfort  • RISKS INCLUDE THE FOLLOWING (PMID [id number]):  Neurologic Risks: IntraOp awareness (Risk is ~1:1,000 - 1:14,000; PMID 16889650), Stroke (Risk ~<0 1-2% for most cases; PMID 95417609), and POCD  Airway and Pulmonary Risks: Dental or mouth injury, throat pain, critical hypoxia, pneumothorax, prolonged intubation, post-op respiratory compromise  • Airway/Intubation factors: No prior advanced airway notes in [facility]  • Risk of pulmonary complications based on a simplified ARISCAT score (Major RFs: none): Score 0-2= Low, 1 6%  Cardiovascular Risks: Jeanne-op cardiac injury (MACE)  If specialized vascular access is needed, risk of bleeding, infection, or injury to adjacent structures  If a bypass circuit is needed, risk of stroke, blood clot, vasoplegia    • EKG:   Encounter Date: 02/28/23   ECG 12 lead   Result Value    Ventricular Rate 54    Atrial Rate 54    MD Interval 142    QRSD Interval 86    QT Interval 404    QTC Interval 383    P Axis 57    QRS Axis 71    T Wave Axis 64    Narrative     Age and gender specific ECG analysis   Sinus bradycardia  Otherwise normal ECG  When compared with ECG of 2023 06:49,  Sinus rhythm has replaced Atrial fibrillation  Vent  rate has decreased BY  60 BPM  Confirmed by Efe Moore ([id number]) on 2023 1:08:45 PM     No results found for this or any previous visit  • Echo or other cardiac testin2023 NM Stress  Interpretation Summary       •  Stress Function: Left ventricular function post-stress is normal  Post-stress ejection fraction is 58 %  •  Perfusion: There is a left ventricular perfusion defect that is small in size with mild reduction in uptake present in the basal to mid inferoseptal location(s) that is fixed  The defect appears to be an artifact as there is normal wall motion seen in this region on gated imaging  •  Stress Combined Conclusion: There is image artifact, without diagnostic evidence for perfusion abnormality  •  Stress ECG: No ST deviation is noted  Arrhythmias during recovery: Sinus bradycardia, ectopic atrial rhythm  The ECG was not diagnostic due to pharmacological (vasodilator) stress  •  Stress ECG: The patient reported dizziness, syncope , diaphoresis,and hypotension during the stress test  Symptoms began during stress and ended during recovery  Aminophylline was given to reverse the effects of the vasodilator      There is no evidence of stress-induced ischemia  It appears that the patient had a vagal reaction after vasodilator administration, noted to be bradycardic and hypotensive -resolved with IV fluids and reversal with aminophylline  • Signs of severe cardiac instability: Not applicable given that this is a necessary cardiac procedure  • Maico's RCRI score and estimate risk of MACE (Major RFs: Surgical risk):  Not relevant given that this is a necessary cardiac procedure     • Are sheng-op or intra-op beta blockers indicated?: already taken Metoprolol today   FEN/GI Risks: Aspiration (Approximately 0 5% risk per the IRIS trial) and PONV (10-80% depending on Apfel criteria)  • Patient meets ASA NPO guidelines: yes   Adverse drug reaction risks: Allergic reaction, overdose,risk of injury or accident if using machinery or performing physically or mentally hazardous tasks for 24 hrs after anesthesia    • Recent notable medications (including AC medications): Metop, losartan  • Personal or family history of anesthesia complications: no  • Pregnancy Status: Not applicable   Mortality risks associated with anesthesia based on ASA-PS (PMID 71505065)  - ASA-PS III: Estimated risk 1:3,500

## 2023-03-03 NOTE — DISCHARGE SUMMARY
Discharge Summary - Shasta Coronado 60 y o  male MRN: [id number]    Unit/Bed#:  CATH LAB ROOM Encounter: [id number]      Admission Date: 3/3/2023   Discharge Date: 3/4/2023      Discharge Diagnosis:   1  Symptomatic paroxysmal atrial fibrillation with rapid ventricular response, status post cryoablation with pulmonary vein isolation and typical flutter line 3/3/2023              A )  Initially diagnosed 11/2020, but reported palpitations prior to this              B )  Started on diltiazem and Eliquis (CHADS2 Vasc = 1 - HTN) at that time; both eventually discontinued due to side effects and patient preference, respectively              C )  Recurrent episode prompting ER visit 2/2023, restarted on Eliquis and started on low-dose Lopressor 25 mg twice daily on discharge   D )  Will be continued on Eliquis, low-dose metoprolol, and as needed diltiazem in the post ablation setting  2  Normal LV systolic function with EF 58% per echo 2/2023  3  Nonischemic nuclear stress test 2/2023 - vagal reaction reported after vasodilator administration, improved with IV fluids/aminophylline  4  Hypertension, currently maintained on losartan              A )  Intolerant of many medications, including Bystolic, lisinopril (cough), candesartan              B )  Had not been taking antihypertensives as prescribed, started on losartan 50 mg daily during recent admission  5  Obstructive sleep apnea, noncompliant with CPAP      Procedures Performed:   Orders Placed This Encounter   Procedures   • Cardiac ep lab eps/ablations   1  Ablation 1 - Cryoablation of atrial fibrillation     2  Ablation 2 - Radiofrequency ablation of atrial flutter      3  comprehensive electrophysiologic evaluation with left atrial pacing and recording  4  3-D electro-anatomic mapping using NavX system  5  Intracardiac echocardiography     6  Transeptal  7   Access obtained under ultrasound guidance using Seldinger technique      Consultants: None      HPI: Please refer to the initial history and physical as well as procedure notes for full details  Briefly, Iliana Dean is a 60y o  year old male with symptomatic paroxysmal atrial fibrillation with periods of rapid ventricular response, multiple medication intolerances, normal LV systolic function and recent nonischemic nuclear stress test, hypertension, and untreated obstructive sleep apnea  He typically follows with Dr Bubba Weems as an outpatient  He initially reported palpitations in early 2020, but declined an outpatient event monitor  He was then seen in the office 11/2020 at which time he was in atrial fibrillation but converted spontaneously to normal sinus rhythm during that visit  He was prescribed Cardizem as well as Eliquis, however he discontinued his medications on his own as he felt unwell while taking them  He is also had issues with being able to tolerate antihypertensives, including Bystolic, candesartan, lisinopril, and hydrochlorothiazide  More recently, he was admitted to [place] [id number] 2/20/2023 with recurrent symptomatic rapid A-fib  He was started on IV Cardizem, and converted to normal sinus rhythm  He was restarted on Eliquis anticoagulation, and started on Lopressor for rate control  He was restarted on losartan for better blood pressure control  He was discharged with eventual outpatient EP follow-up  Unfortunately, he presented to the emergency room earlier this week with another episode of rapid A-fib  He was given a one-time dose of IV Lopressor with conversion to normal sinus rhythm  As his episodes were becoming more frequent with multiple ER visits for recurrent episodes, he was seen in EP consultation who recommended an ablation in the immediate future  He was discharged from the emergency room and asked to take short acting diltiazem as needed for recurrent episodes, which he reports needing once since discharge    He now presented to this hospital admission to undergo atrial fibrillation ablation  Hospital Course: Elodia Reed presented at his baseline state of health  After the procedure was explained in detail and consent was obtained, he underwent afib ablation without complications    Please see operative notes by Dr You Azar for full details  He tolerated the procedure well  He was then monitored overnight for further observation  There were no acute issues or events overnight  The following morning he denied palpitations, shortness of breath, orthopnea, lightheadedness, presyncope or syncope  He admitted to mild chest tightness, not necessarily exacerbated by deep breath or with laying flat  We discussed pericarditis, the possibility of taking an NSAID upon discharge  He feels that his symptoms are stable, and he will simply continue to monitor them moving forward  His vital signs were reviewed and labs are stable  Telemetry showed normal sinus rhythm without recurrent arrhythmias, no significant bradycardia  His groins were soft without significant hematoma or recurrent bleeding  Groin sutures were removed without incident  Review of Systems   Constitutional: Negative for fever and malaise/fatigue  Cardiovascular: Positive for chest pain ( mild chest tightness, well-tolerated)  Negative for dyspnea on exertion, irregular heartbeat, leg swelling, near-syncope, orthopnea, palpitations, paroxysmal nocturnal dyspnea and syncope  All other systems reviewed and are negative          Physical exam on the day of discharge was as follows:  Physical Exam   GEN: NAD, alert and oriented x 3, well appearing  SKIN: dry without significant lesions or rashes  HEENT: NCAT, PERRL, EOMs intact  NECK: No JVD appreciated  CARDIOVASCULAR: RRR, normal S1, S2 without murmurs, rubs, or gallops appreciated  LUNGS: Clear to auscultation bilaterally without wheezes, rhonchi, or rales  ABDOMEN: Soft, nontender, nondistended  EXTREMITIES/VASCULAR: perfused without clubbing, cyanosis, or LE edema b/l  PSYCH: Normal mood and affect  NEURO: CN ll-Xll grossly intact      He was given routine post ablation discharge instructions and restrictions, and these were explained in detail  He was given a follow up appointment with Dr Iman Caraballo, and he was instructed to follow up with his primary cardiologist as previously instructed  In terms of his medications, he will remain on Lopressor 25 mg twice daily, as needed diltiazem for recurrent palpitations, and Eliquis 5 mg twice daily as previously instructed  He was also asked to take pantoprazole 40 mg daily for 30 days, at which time it can be discontinued  He is stable for discharge at this time with all questions answered  He was discussed in detail with Dr Iman Caraballo who is in agreement with this discharge summary  Discharge Medications:  See after visit summary for reconciled discharge medications provided to patient and family      Medications Prior to Admission   Medication   • apixaban (ELIQUIS) 5 mg   • cholecalciferol (VITAMIN D3) 1,000 units tablet   • diltiazem (CARDIZEM) 30 mg tablet   • losartan (COZAAR) 25 mg tablet   • metoprolol tartrate (LOPRESSOR) 25 mg tablet         [facility] Labs/diagnostics:  CBC with diff:   Results from last 7 days   Lab Units 03/03/23  0943 02/28/23  0715   WBC Thousand/uL 5 92 6 91   HEMOGLOBIN g/dL 13 8 13 7   HEMATOCRIT % 42 9 42 5   MCV fL 98 97   PLATELETS Thousands/uL 232 226   MCH pg 31 4 31 1   MCHC g/dL 32 2 32 2   RDW % 12 6 12 6   MPV fL 11 1 11 1   NRBC AUTO /100 WBCs  --  0       BMP:   Results from last 7 days   Lab Units 03/03/23  0943 02/28/23  0715   POTASSIUM mmol/L 3 9 4 4   CHLORIDE mmol/L 108 110*   CO2 mmol/L 26 26   BUN mg/dL 16 21   CREATININE mg/dL 1 18 1 11   CALCIUM mg/dL 9 1 9 2       Magnesium:       Coags:   Results from last 7 days   Lab Units 03/03/23  0943   INR  7 05         Complications: none    Condition at Discharge: good     Discharge instructions/Information to patient and family:   See after visit summary for information provided to patient and family  Provisions for Follow-Up Care:  See after visit summary for information related to follow-up care and any pertinent home health orders  Disposition: Home    Planned Readmission: No    Discharge Statement   I spent 45 minutes minutes discharging the patient  This time was spent on the day of discharge  I had direct contact with the patient on the day of discharge  Additional documentation is required if more than 30 minutes were spent on discharge   Evaluating the incision, discussing discharge instructions and restrictions, arranging follow up appointments, discussing medications

## 2023-03-03 NOTE — INTERVAL H&P NOTE
H&P reviewed  After examining the patient I find no changes in the patients condition since the H&P had been written  Patient presents to [facility] EP lab today to undergo elective atrial fibrillation ablation with Dr Guy Seip      Vitals:    03/03/23 0950   BP: 160/77   Pulse: (!) 49   Resp: 16   Temp: 98 2 °F (36 8 °C)   SpO2: 100%

## 2023-03-04 VITALS
HEIGHT: 71 IN | RESPIRATION RATE: 18 BRPM | SYSTOLIC BLOOD PRESSURE: 119 MMHG | BODY MASS INDEX: 25.62 KG/M2 | TEMPERATURE: 99 F | HEART RATE: 79 BPM | WEIGHT: 183 LBS | DIASTOLIC BLOOD PRESSURE: 67 MMHG | OXYGEN SATURATION: 96 %

## 2023-03-04 LAB
ANION GAP SERPL CALCULATED.3IONS-SCNC: 4 MMOL/L (ref 4–13)
BUN SERPL-MCNC: 11 MG/DL (ref 5–25)
CALCIUM SERPL-MCNC: 8.5 MG/DL (ref 8.3–10.1)
CHLORIDE SERPL-SCNC: 112 MMOL/L (ref 96–108)
CO2 SERPL-SCNC: 23 MMOL/L (ref 21–32)
CREAT SERPL-MCNC: 1.17 MG/DL (ref 0.6–1.3)
ERYTHROCYTE [DISTWIDTH] IN BLOOD BY AUTOMATED COUNT: 13.2 % (ref 11.6–15.1)
GFR SERPL CREATININE-BSD FRML MDRD: 65 ML/MIN/1.73SQ M
GLUCOSE SERPL-MCNC: 113 MG/DL (ref 65–140)
HCT VFR BLD AUTO: 34.6 % (ref 36.5–49.3)
HGB BLD-MCNC: 11.2 G/DL (ref 12–17)
MCH RBC QN AUTO: 31.5 PG (ref 26.8–34.3)
MCHC RBC AUTO-ENTMCNC: 32.4 G/DL (ref 31.4–37.4)
MCV RBC AUTO: 98 FL (ref 82–98)
PLATELET # BLD AUTO: 162 THOUSANDS/UL (ref 149–390)
PMV BLD AUTO: 11 FL (ref 8.9–12.7)
POTASSIUM SERPL-SCNC: 3.6 MMOL/L (ref 3.5–5.3)
RBC # BLD AUTO: 3.55 MILLION/UL (ref 3.88–5.62)
SODIUM SERPL-SCNC: 139 MMOL/L (ref 135–147)
WBC # BLD AUTO: 8.82 THOUSAND/UL (ref 4.31–10.16)

## 2023-03-04 RX ORDER — ONDANSETRON 2 MG/ML
4 INJECTION INTRAMUSCULAR; INTRAVENOUS EVERY 6 HOURS PRN
Status: DISCONTINUED | OUTPATIENT
Start: 2023-03-04 | End: 2023-03-04 | Stop reason: HOSPADM

## 2023-03-04 RX ORDER — DOCUSATE SODIUM 100 MG/1
100 CAPSULE, LIQUID FILLED ORAL 2 TIMES DAILY
Status: DISCONTINUED | OUTPATIENT
Start: 2023-03-04 | End: 2023-03-04 | Stop reason: HOSPADM

## 2023-03-04 RX ORDER — ACETAMINOPHEN 325 MG/1
650 TABLET ORAL EVERY 4 HOURS PRN
Status: DISCONTINUED | OUTPATIENT
Start: 2023-03-04 | End: 2023-03-04 | Stop reason: HOSPADM

## 2023-03-04 RX ORDER — PANTOPRAZOLE SODIUM 40 MG/1
40 TABLET, DELAYED RELEASE ORAL
Status: DISCONTINUED | OUTPATIENT
Start: 2023-03-04 | End: 2023-03-04 | Stop reason: HOSPADM

## 2023-03-04 RX ORDER — PANTOPRAZOLE SODIUM 40 MG/1
40 TABLET, DELAYED RELEASE ORAL
Qty: 30 TABLET | Refills: 0 | Status: SHIPPED | OUTPATIENT
Start: 2023-03-05 | End: 2023-04-04

## 2023-03-04 RX ORDER — LOSARTAN POTASSIUM 25 MG/1
12.5 TABLET ORAL DAILY
Status: DISCONTINUED | OUTPATIENT
Start: 2023-03-04 | End: 2023-03-04 | Stop reason: HOSPADM

## 2023-03-04 RX ADMIN — PANTOPRAZOLE SODIUM 40 MG: 40 TABLET, DELAYED RELEASE ORAL at 06:27

## 2023-03-04 RX ADMIN — APIXABAN 5 MG: 5 TABLET, FILM COATED ORAL at 08:08

## 2023-03-04 RX ADMIN — DOCUSATE SODIUM 100 MG: 100 CAPSULE, LIQUID FILLED ORAL at 08:08

## 2023-03-04 RX ADMIN — METOPROLOL TARTRATE 25 MG: 25 TABLET, FILM COATED ORAL at 08:08

## 2023-03-04 RX ADMIN — LOSARTAN POTASSIUM 12.5 MG: 25 TABLET, FILM COATED ORAL at 08:08

## 2023-03-04 NOTE — PLAN OF CARE
Problem: PAIN - ADULT  Goal: Verbalizes/displays adequate comfort level or baseline comfort level  Description: Interventions:  - Encourage patient to monitor pain and request assistance  - Assess pain using appropriate pain scale  - Administer analgesics based on type and severity of pain and evaluate response  - Implement non-pharmacological measures as appropriate and evaluate response  - Consider cultural and social influences on pain and pain management  - Notify physician/advanced practitioner if interventions unsuccessful or patient reports new pain  Outcome: Progressing     Problem: INFECTION - ADULT  Goal: Absence or prevention of progression during hospitalization  Description: INTERVENTIONS:  - Assess and monitor for signs and symptoms of infection  - Monitor lab/diagnostic results  - Monitor all insertion sites, i e  indwelling lines, tubes, and drains  - Monitor endotracheal if appropriate and nasal secretions for changes in amount and color  - [place] appropriate cooling/warming therapies per order  - Administer medications as ordered  - Instruct and encourage patient and family to use good hand hygiene technique  - Identify and instruct in appropriate isolation precautions for identified infection/condition  Outcome: Progressing

## 2023-03-09 LAB — SL CV LV EF: 60

## 2023-03-16 DIAGNOSIS — I10 BENIGN ESSENTIAL HYPERTENSION: ICD-10-CM

## 2023-03-16 DIAGNOSIS — I48.91 ATRIAL FIBRILLATION WITH RAPID VENTRICULAR RESPONSE (HCC): ICD-10-CM

## 2023-03-16 RX ORDER — LOSARTAN POTASSIUM 25 MG/1
12.5 TABLET ORAL DAILY
Qty: 30 TABLET | Refills: 0 | Status: SHIPPED | OUTPATIENT
Start: 2023-03-16

## 2023-04-04 ENCOUNTER — OFFICE VISIT (OUTPATIENT)
Dept: FAMILY MEDICINE CLINIC | Facility: CLINIC | Age: 63
End: 2023-04-04

## 2023-04-04 VITALS
BODY MASS INDEX: 25.62 KG/M2 | HEIGHT: 71 IN | HEART RATE: 75 BPM | RESPIRATION RATE: 16 BRPM | TEMPERATURE: 99.3 F | DIASTOLIC BLOOD PRESSURE: 80 MMHG | WEIGHT: 183 LBS | SYSTOLIC BLOOD PRESSURE: 150 MMHG | OXYGEN SATURATION: 98 %

## 2023-04-04 DIAGNOSIS — E55.9 VITAMIN D DEFICIENCY: ICD-10-CM

## 2023-04-04 DIAGNOSIS — I48.91 ATRIAL FIBRILLATION WITH RAPID VENTRICULAR RESPONSE (HCC): Primary | ICD-10-CM

## 2023-04-04 DIAGNOSIS — I12.9 BENIGN HYPERTENSIVE KIDNEY DISEASE WITH CHRONIC KIDNEY DISEASE STAGE I THROUGH STAGE IV, OR UNSPECIFIED: ICD-10-CM

## 2023-04-04 DIAGNOSIS — C83.00 MALIGNANT LYMPHOMA, LYMPHOPLASMACYTIC (HCC): ICD-10-CM

## 2023-04-04 DIAGNOSIS — Z13.1 SCREENING FOR DIABETES MELLITUS: ICD-10-CM

## 2023-04-04 DIAGNOSIS — E78.5 DYSLIPIDEMIA: ICD-10-CM

## 2023-04-04 RX ORDER — DILTIAZEM HYDROCHLORIDE 300 MG/1
CAPSULE, COATED, EXTENDED RELEASE ORAL
COMMUNITY
Start: 2023-03-16

## 2023-04-04 NOTE — PROGRESS NOTES
Assessment/Plan:    1  Atrial fibrillation with rapid ventricular response (HCC)  Assessment & Plan:  S/p ablation  Has meds for prn in case he starts back into afib  On metoprolol twice daily and eliquis      2  Benign hypertensive kidney disease with chronic kidney disease stage I through stage IV, or unspecified  Assessment & Plan:  Lab Results   Component Value Date    EGFR 65 03/04/2023    EGFR 65 03/03/2023    EGFR 70 02/28/2023    CREATININE 1 17 03/04/2023    CREATININE 1 18 03/03/2023    CREATININE 1 11 02/28/2023   on losartan 25 mg but feels tired and no energy      3  Malignant lymphoma, lymphoplasmacytic ([facility] [address] )  Assessment & Plan:  Seeing heme/onc      4  Dyslipidemia  -     Lipid Panel with Direct LDL reflex; Future; Expected date: 04/04/2023    5  Screening for diabetes mellitus  -     Hemoglobin A1C; Future; Expected date: 04/04/2023    6  Vitamin D deficiency  -     Vitamin D 25 hydroxy; Future; Expected date: 04/04/2023      BMI Counseling: Body mass index is 25 45 kg/m²  The BMI is above normal  Nutrition recommendations include encouraging healthy choices of fruits and vegetables  Exercise recommendations include exercising 3-5 times per week  No pharmacotherapy was ordered  Rationale for BMI follow-up plan is due to patient being overweight or obese  Depression Screening and Follow-up Plan: Patient was screened for depression during today's encounter  They screened negative with a PHQ-2 score of 0  There are no Patient Instructions on file for this visit  Return in about 5 months (around 9/4/2023)  Subjective:      Patient ID: Sally Pritchett is a 60 y o  male      Chief Complaint   Patient presents with   • Follow-up     Patient here for hospital follow up A-Fib        Here for follow up  Feeling tired and with no energy  bp still elevated  Seeing cardiology next week  Hematologist left and has to see another physician  Has lost 10 pounds total      Hypertension  This is a chronic problem  The current episode started more than 1 year ago  The problem is uncontrolled  There are no associated agents to hypertension  Past treatments include beta blockers and angiotensin blockers  The current treatment provides mild improvement  There are no compliance problems  The following portions of the patient's history were reviewed and updated as appropriate: allergies, current medications, past family history, past medical history, past social history, past surgical history and problem list     Review of Systems   Constitutional: Positive for fatigue and unexpected weight change (10 pound)  HENT: Negative  Eyes: Negative  Respiratory: Negative  Cardiovascular: Negative  Gastrointestinal: Negative  Endocrine: Negative  Genitourinary: Negative  Musculoskeletal: Positive for arthralgias (shoulder)  Allergic/Immunologic: Negative  Neurological: Negative  Hematological: Negative  Psychiatric/Behavioral: Negative  Current Outpatient Medications   Medication Sig Dispense Refill   • apixaban (ELIQUIS) 5 mg Take 1 tablet (5 mg total) by mouth 2 (two) times a day 60 tablet 0   • cholecalciferol (VITAMIN D3) 1,000 units tablet Take 1,000 Units by mouth daily     • diltiazem (CARDIZEM) 30 mg tablet Take 1 tablet (30 mg total) by mouth every 8 (eight) hours as needed (palpitations, HR > 120s) 30 tablet 0   • losartan (COZAAR) 25 mg tablet Take 0 5 tablets (12 5 mg total) by mouth daily 30 tablet 0   • metoprolol tartrate (LOPRESSOR) 25 mg tablet Take 1 tablet (25 mg total) by mouth every 12 (twelve) hours 60 tablet 0   • diltiazem (CARDIZEM CD) 300 mg 24 hr capsule      • pantoprazole (PROTONIX) 40 mg tablet Take 1 tablet (40 mg total) by mouth daily in the early morning Do not start before March 5, 2023  (Patient not taking: Reported on 4/4/2023) 30 tablet 0     No current facility-administered medications for this visit         Objective:    /80 (BP Location: "Left arm, Patient Position: Sitting, Cuff Size: Standard)   Pulse 75   Temp 99 3 °F (37 4 °C) (Tympanic)   Resp 16   Ht 5' 11 1\" (1 806 m)   Wt 83 kg (183 lb)   SpO2 98%   BMI 25 45 kg/m²        Physical Exam  Vitals and nursing note reviewed  Constitutional:       Appearance: Normal appearance  He is well-developed  HENT:      Head: Normocephalic and atraumatic  Right Ear: External ear normal       Left Ear: External ear normal       Nose: Nose normal    Eyes:      General: Lids are normal       Extraocular Movements: Extraocular movements intact  Conjunctiva/sclera: Conjunctivae normal       Pupils: Pupils are equal, round, and reactive to light  Cardiovascular:      Rate and Rhythm: Normal rate and regular rhythm  Pulses: Normal pulses  Heart sounds: Normal heart sounds, S1 normal and S2 normal    Pulmonary:      Effort: Pulmonary effort is normal       Breath sounds: Normal breath sounds  Abdominal:      General: Abdomen is flat  Bowel sounds are normal       Palpations: Abdomen is soft  Musculoskeletal:         General: Normal range of motion  Cervical back: Normal range of motion and neck supple  Skin:     General: Skin is warm and dry  Capillary Refill: Capillary refill takes less than 2 seconds  Neurological:      General: No focal deficit present  Mental Status: He is alert and oriented to person, place, and time  Deep Tendon Reflexes: Reflexes are normal and symmetric  Psychiatric:         Mood and Affect: Mood normal          Speech: Speech normal          Behavior: Behavior normal          Thought Content:  Thought content normal          Judgment: Judgment normal                 Clabe Billet, DO  "

## 2023-04-04 NOTE — ASSESSMENT & PLAN NOTE
S/p ablation  Has meds for prn in case he starts back into afib  On metoprolol twice daily and eliquis

## 2023-04-04 NOTE — ASSESSMENT & PLAN NOTE
Lab Results   Component Value Date    EGFR 65 03/04/2023    EGFR 65 03/03/2023    EGFR 70 02/28/2023    CREATININE 1 17 03/04/2023    CREATININE 1 18 03/03/2023    CREATININE 1 11 02/28/2023   on losartan 25 mg but feels tired and no energy

## 2023-05-10 ENCOUNTER — TELEPHONE (OUTPATIENT)
Dept: NEPHROLOGY | Facility: HOSPITAL | Age: 63
End: 2023-05-10

## 2023-05-10 NOTE — TELEPHONE ENCOUNTER
Called and spoke with [employer] to complete their bloodwork prior to their appointment on 5/16   with Dr Liyah Tsang at the [facility] location

## 2023-05-11 ENCOUNTER — TELEPHONE (OUTPATIENT)
Dept: NEPHROLOGY | Facility: HOSPITAL | Age: 63
End: 2023-05-11

## 2023-05-11 NOTE — TELEPHONE ENCOUNTER
Called and spoke with [employer] to complete their bloodwork prior to their appointment on 5/16   with Dr Arron Crisostomo at the [facility]

## 2023-05-13 ENCOUNTER — LAB (OUTPATIENT)
Dept: LAB | Facility: MEDICAL CENTER | Age: 63
End: 2023-05-13

## 2023-05-13 DIAGNOSIS — N18.31 STAGE 3A CHRONIC KIDNEY DISEASE (HCC): ICD-10-CM

## 2023-05-13 LAB
ALBUMIN SERPL BCP-MCNC: 3.3 G/DL (ref 3.5–5)
ANION GAP SERPL CALCULATED.3IONS-SCNC: 1 MMOL/L (ref 4–13)
BACTERIA UR QL AUTO: NORMAL /HPF
BILIRUB UR QL STRIP: NEGATIVE
BUN SERPL-MCNC: 23 MG/DL (ref 5–25)
CALCIUM ALBUM COR SERPL-MCNC: 9.5 MG/DL (ref 8.3–10.1)
CALCIUM SERPL-MCNC: 8.9 MG/DL (ref 8.3–10.1)
CHLORIDE SERPL-SCNC: 109 MMOL/L (ref 96–108)
CLARITY UR: CLEAR
CO2 SERPL-SCNC: 26 MMOL/L (ref 21–32)
COLOR UR: COLORLESS
CREAT SERPL-MCNC: 1.22 MG/DL (ref 0.6–1.3)
CREAT UR-MCNC: 71.6 MG/DL
ERYTHROCYTE [DISTWIDTH] IN BLOOD BY AUTOMATED COUNT: 13.3 % (ref 11.6–15.1)
GFR SERPL CREATININE-BSD FRML MDRD: 62 ML/MIN/1.73SQ M
GLUCOSE P FAST SERPL-MCNC: 106 MG/DL (ref 65–99)
GLUCOSE UR STRIP-MCNC: NEGATIVE MG/DL
HCT VFR BLD AUTO: 42.5 % (ref 36.5–49.3)
HGB BLD-MCNC: 13.6 G/DL (ref 12–17)
HGB UR QL STRIP.AUTO: NEGATIVE
KETONES UR STRIP-MCNC: NEGATIVE MG/DL
LEUKOCYTE ESTERASE UR QL STRIP: NEGATIVE
MAGNESIUM SERPL-MCNC: 2.4 MG/DL (ref 1.6–2.6)
MCH RBC QN AUTO: 31.7 PG (ref 26.8–34.3)
MCHC RBC AUTO-ENTMCNC: 32 G/DL (ref 31.4–37.4)
MCV RBC AUTO: 99 FL (ref 82–98)
NITRITE UR QL STRIP: NEGATIVE
NON-SQ EPI CELLS URNS QL MICRO: NORMAL /HPF
PH UR STRIP.AUTO: 5.5 [PH]
PHOSPHATE SERPL-MCNC: 2.8 MG/DL (ref 2.3–4.1)
PLATELET # BLD AUTO: 234 THOUSANDS/UL (ref 149–390)
PMV BLD AUTO: 11.4 FL (ref 8.9–12.7)
POTASSIUM SERPL-SCNC: 4.1 MMOL/L (ref 3.5–5.3)
PROT UR STRIP-MCNC: NEGATIVE MG/DL
PROT UR-MCNC: <6 MG/DL
PTH-INTACT SERPL-MCNC: 54.7 PG/ML (ref 18.4–80.1)
RBC # BLD AUTO: 4.29 MILLION/UL (ref 3.88–5.62)
RBC #/AREA URNS AUTO: NORMAL /HPF
SODIUM SERPL-SCNC: 136 MMOL/L (ref 135–147)
SP GR UR STRIP.AUTO: 1.01 (ref 1–1.03)
UROBILINOGEN UR STRIP-ACNC: <2 MG/DL
WBC # BLD AUTO: 6.83 THOUSAND/UL (ref 4.31–10.16)
WBC #/AREA URNS AUTO: NORMAL /HPF

## 2023-05-15 NOTE — RESULT ENCOUNTER NOTE
Labs reviewed and stable    Results to be reviewed at scheduled follow-up appointment tomorrow with Dr Jessica Pardo

## 2023-05-16 ENCOUNTER — OFFICE VISIT (OUTPATIENT)
Dept: NEPHROLOGY | Facility: CLINIC | Age: 63
End: 2023-05-16

## 2023-05-16 VITALS
DIASTOLIC BLOOD PRESSURE: 84 MMHG | WEIGHT: 182 LBS | BODY MASS INDEX: 25.48 KG/M2 | SYSTOLIC BLOOD PRESSURE: 132 MMHG | HEIGHT: 71 IN

## 2023-05-16 DIAGNOSIS — E55.9 VITAMIN D DEFICIENCY: ICD-10-CM

## 2023-05-16 DIAGNOSIS — E78.5 DYSLIPIDEMIA: ICD-10-CM

## 2023-05-16 DIAGNOSIS — N18.2 CKD (CHRONIC KIDNEY DISEASE) STAGE 2, GFR 60-89 ML/MIN: Primary | ICD-10-CM

## 2023-05-16 DIAGNOSIS — N18.31 STAGE 3A CHRONIC KIDNEY DISEASE (HCC): ICD-10-CM

## 2023-05-16 DIAGNOSIS — I12.9 BENIGN HYPERTENSIVE KIDNEY DISEASE WITH CHRONIC KIDNEY DISEASE STAGE I THROUGH STAGE IV, OR UNSPECIFIED: ICD-10-CM

## 2023-05-16 DIAGNOSIS — I48.0 PAROXYSMAL ATRIAL FIBRILLATION (HCC): ICD-10-CM

## 2023-05-16 DIAGNOSIS — I10 BENIGN ESSENTIAL HYPERTENSION: ICD-10-CM

## 2023-05-16 NOTE — PROGRESS NOTES
Nephrology Follow up Consultation  Car Jordan 60 y o  male MRN: [id number]            BACKGROUND:  Car Jordan is a 60 y o male who was referred by Phong Delong DO for evaluation of Follow-up    ASSESSMENT / PLAN:   60 y o   male with pmh of multiple co-morbidities including hyperlipidemia, hypertension (x 3yrs) and CKD stage 3 presents to the office for routine follow-up  1  CKD stage II/IIIA:  - Patient has a baseline creatinine of 1 2-1 5 mg/dL  Most recent labs show a Creatinine of 1 22 mg/dL on 5/13/2023  Renal function remains stable at baseline  Blood work in 6 months  - likely has underlying CKD secondary to hypertensive nephrosclerosis plus age-related nephron loss plus NSAID nephropathy  SPEP within normal limits  - renal ultrasound from July 2, 2019 shows bilateral kidneys 11 cm  With no hydronephrosis no masses  - Proteinuria - most recent microalbumin to creatinine ratio of 3 milligrams/deciliter, UA with no blood  Most recent protein creatinine ratio too small to quantify as of May 2023  Most recent UA with no blood or protein  - Acid base and lytes stable  - Clinically the patient appears to be euvolemic  - Recommend to avoid use of NSAIDs, nephrotoxins  Caution advised with regards to exposure to IV contrast dye    - Discussed with the patient in depth his renal status, including the possible etiologies for CKD  - Advised the patient that when his GFR is close to 20mL/min then will start discussing about RRT(renal replacement therapy) options such as renal transplant, peritoneal dialysis and hemodialysis  - Informed the patient about the various options for Renal Replacement therapy  - Discussed with the patient how we need to work together to delay the progression of CKD with optimal BP control based on their age and co-morbidities and trying to reduce proteinuria by the use of anti-proteinuric agents  2  Hypertension:  -Patient is currently on losartan 25 mg p o  daily  -Discussed with patient with regards to increasing losartan to 50 mg once a day for optimal blood pressure control  At this time he wishes to hold off and he wishes to follow-up with cardiology for the blood pressure medication management  - Patient was on lisinopril 20mg po Q24 and was switched over to candesartan due to cough  Patient has had intolerability to multiple prior antihypertensive medications such as Bystolic, lisinopril, candesartan, metoprolol, Cardizem  -Renal artery Dopplers from December 2021 no significant stenosis  No renal atrophy  - advised patient about appropriate blood pressure management technique  - Goal BP of <  130/80 based on age and comorbidities  - Instructed to follow low sodium (2gm)diet  3  Hemoglobin:  - Goal Hb of 10-12 g/dL  - Most recent labs suggestive of 13 6 grams/deciliter  - no role for IV iron at this time    4  CKD-MBD(Mineral Bone Disease): - Based on patients CKD stage following is the goal of therapy  - Maintain calcium phosphorus product of < 55   - Stage 3 CKD - Goal Ca 8 5-10 mg/dL , goal Phos 2 7-4 6 mg/dL  , goal iPTH 30-70 pg/mL  - Patient is currently not at goal   - check vitamin-D and intact PTH next visit  - most recent intact PTH of 54 7 as of 5/13/2023 and vitamin-D level of 33 8, no recent vitamin D level  - continue vitamin-D 1000 units p o  Q day    5  Lipids:  - goal LDL less than 70  - Management as per PCP    6  Paroxysmal A-fib:  -Last seen by cardiology 4/11/2023 notes reviewed  -No longer on metoprolol  -Per cardiology was treated with diltiazem both were discontinued due to side effects and patient preference   -Status post cryoablation with pulmonary vein isolation    7  Nutrition:  - Encouraged patient to follow a renal diet comprising of moderate potassium, low phosphorus and protein restriction to 0 8gm/kg  - Will check serum albumin with next blood work       8  Followup:  - Patient is to follow-up in 12 months, with lab work to be performed in 6 months and then again in a few days prior to the next visit  Advised patient to call me in 10 days to review the results if they do not hear back from me, as I may have not received the results  Arlene Hernandez Boogie Hashimoto, 5/16/2023, 11:28 AM             SUBJECTIVE: 60 y o  male presents to the office for routine follow-up  Feels well has no complaints no recent hospitalization no issues with edema wishes to hold off on adjusting blood pressure medications at this time due to significant intolerability with other medications in the past wishes to hold off until he sees cardiology in the next few weeks  No issues with edema still occasionally taking NSAIDs advised patient with regards to limiting NSAID use due to risk for acute kidney injury especially in the presence of usage of losartan  Has an upcoming appointment with PCP  Review of Systems   Constitutional: Negative for chills and fever  HENT: Negative for congestion and sore throat  Respiratory: Negative for cough and wheezing  Cardiovascular: Negative for leg swelling  Gastrointestinal: Negative for diarrhea, nausea and vomiting  Genitourinary: Negative for difficulty urinating, dysuria and hematuria  Musculoskeletal: Negative for back pain  Skin: Negative for wound  Neurological: Negative for dizziness, light-headedness and headaches  Psychiatric/Behavioral: Negative for agitation and confusion  All other systems reviewed and are negative        PAST MEDICAL HISTORY:  Past Medical History:   Diagnosis Date   • Atrial fibrillation ([facility] [address] )    • Cancer ([facility] [address] )     lymphoma   • CKD (chronic kidney disease)    • Dilated aortic root (HCC)    • Erectile dysfunction    • Hyperlipidemia 6/28/2016   • Hypertension    • Hypertensive CKD (chronic kidney disease)    • Mild renal insufficiency 9/26/2017   • Vitamin D deficiency        PROBLEM LIST    Patient Active Problem List   Diagnosis   • Benign hypertensive CKD   • "Erectile dysfunction of non-organic origin   • Episodic tension-type headache, not intractable   • Screening for prostate cancer   • Vitamin D deficiency   • Well adult exam   • Need for influenza vaccination   • Screening for colon cancer   • Dyslipidemia   • Benign essential hypertension   • Stage 3 chronic kidney disease (HCC)   • Dilated aortic root (HCC)   • JODY (obstructive sleep apnea)   • Annual physical exam   • Malignant lymphoma, lymphoplasmacytic (HCC)   • Paroxysmal atrial fibrillation (HCC)   • Abnormal gastrointestinal PET scan   • Abnormal positron emission tomography (PET) scan   • Family history of prostate cancer in father   • Urine finding   • Encounter for immunization   • Atrial fibrillation with rapid ventricular response (HCC)   • CKD (chronic kidney disease) stage 2, GFR 60-89 ml/min       PAST SURGICAL HISTORY:  Past Surgical History:   Procedure Laterality Date   • CARDIAC ELECTROPHYSIOLOGY PROCEDURE N/A 3/3/2023    Procedure: Cardiac eps/afib ablation;  Surgeon: Juan Carlos Rodríguez MD;  Location: [facility]; Service: Cardiology   • CARDIAC ELECTROPHYSIOLOGY PROCEDURE N/A 3/3/2023    Procedure: Cardiac eps/aflutter ablation;  Surgeon: Juan Carlos Rodríguez MD;  Location: [facility]; Service: Cardiology   • COLONOSCOPY     • HIATAL HERNIA REPAIR         SOCIAL HISTORY :   reports that he has never smoked  He has never used smokeless tobacco  He reports current alcohol use  He reports that he does not use drugs      FAMILY HISTORY:  Family History   Problem Relation Age of Onset   • Lung cancer Mother    • Heart attack Father 76   • Prostate cancer Father    • Hyperlipidemia Father    • No Known Problems Sister    • Lymphoma Maternal Aunt    • Fainting Maternal Grandfather        ALLERGIES:  No Known Allergies        PHYSICAL EXAM:  Vitals:    05/16/23 1052   BP: 132/84   BP Location: Left arm   Patient Position: Sitting   Cuff Size: Adult   Weight: 82 6 kg (182 lb)   Height: 5' 11\" " (1 803 m)     Body mass index is 25 38 kg/m²  Physical Exam  Vitals reviewed  Constitutional:       General: He is not in acute distress  Appearance: Normal appearance  He is normal weight  He is not ill-appearing, toxic-appearing or diaphoretic  HENT:      Head: Normocephalic and atraumatic  Mouth/Throat:      Mouth: Mucous membranes are moist       Pharynx: Oropharynx is clear  No oropharyngeal exudate  Eyes:      General: No scleral icterus  Conjunctiva/sclera: Conjunctivae normal    Cardiovascular:      Rate and Rhythm: Normal rate  Heart sounds: Normal heart sounds  No friction rub  Pulmonary:      Effort: No respiratory distress  Breath sounds: Normal breath sounds  No stridor  No wheezing  Abdominal:      General: There is no distension  Palpations: Abdomen is soft  There is no mass  Tenderness: There is no abdominal tenderness  There is no right CVA tenderness or left CVA tenderness  Musculoskeletal:         General: No swelling  Cervical back: Normal range of motion  No rigidity  Skin:     General: Skin is warm  Coloration: Skin is not jaundiced  Neurological:      General: No focal deficit present  Mental Status: He is alert and oriented to person, place, and time  Mental status is at baseline     Psychiatric:         Mood and Affect: Mood normal          Behavior: Behavior normal          LABORATORY DATA:     Results from last 6 Months   Lab Units 05/13/23  0830 03/04/23  0631 03/03/23  0943   WBC Thousand/uL 6 83 8 82 5 92   HEMOGLOBIN g/dL 13 6 11 2* 13 8   HEMATOCRIT % 42 5 34 6* 42 9   PLATELETS Thousands/uL 234 162 232   POTASSIUM mmol/L 4 1 3 6 3 9   CHLORIDE mmol/L 109* 112* 108   CO2 mmol/L 26 23 26   BUN mg/dL 23 11 16   CREATININE mg/dL 1 22 1 17 1 18   CALCIUM mg/dL 8 9 8 5 9 1   MAGNESIUM mg/dL 2 4  --   --    PHOSPHORUS mg/dL 2 8  --   --         rest all reviewed    RADIOLOGY:  No orders to display     Rest all "reviewed        MEDICATIONS:    Current Outpatient Medications:   •  apixaban (ELIQUIS) 5 mg, Take 1 tablet (5 mg total) by mouth 2 (two) times a day, Disp: 180 tablet, Rfl: 3  •  cholecalciferol (VITAMIN D3) 1,000 units tablet, Take 1,000 Units by mouth daily, Disp: , Rfl:   •  losartan (COZAAR) 25 mg tablet, Take 1 tablet (25 mg total) by mouth daily, Disp: 90 tablet, Rfl: 3          Portions of the record may have been created with voice recognition software  Occasional wrong word or \"sound a like\" substitutions may have occurred due to the inherent limitations of voice recognition software  Read the chart carefully and recognize, using context, where substitutions have occurred  If you have any questions, please contact the dictating provider        "

## 2023-05-16 NOTE — PATIENT INSTRUCTIONS
- get blood work in 6months  - talk to cardiology about adjustment of blood pressure medications    - Please call me in 10 days after having your blood work done to review the results if you do not hear back from me or my office, as I may have not received the results  - please remember to perform blood work prior to the next visit  - Please call if the blood pressure top number is greater than 140 or less than 110 consistently  - Please call if you are gaining more than 2lbs in 2 days for adjustment of water pills   ~ Please AVOID the following pain medications  LIST OF NSAIDS (NONSTEROIDAL ANTI-INFLAMMATORY DRUGS) AND CHAMBERS-2 INHIBITORS    DIFLUNISAL (DOLOBID)  IBUPROFEN (MOTRIN, ADVIL)  FLURBIPROFEN (ANSAID)  KETOPROFEN (ORUDIS, ORUVAIL)  FENOPROFEN (NALFON)  NABUMETONE (RELAFEN)  PIROXICAM (FELDENE)  NAPROXEN (ALEVE, NAPROSYN, NAPRELAN, ANAPROX)  DICLOFENAC (VOLTAREN, CATAFLAM)  INDOMETHACIN (INDOCIN)  SULINDAC (CLINORIL)  TOLMETIN (TOLETIN)  ETODOLAC (LODINE)  MELOXICAM (MOBIC)  KETOROLAC (TORADOL)  OXAPROZIN (DAYPRO)  CELECOXIB (CELEBREX)    Things to do to reduce your blood pressure include working with all your physician to do the following:  ~ stop smoking if you smoke  ~ increase cardiovascular exercise like walking and swimming    ~ modify your diet to decrease fat and salt intake  ~ reduce your weight if you are overweight or obese   ~ increase the consumption of fruits, vegetables and whole grains  ~ decrease alcohol consumption if you consume alcohol    ~ try to minimize stress in your life with lifestyle modifications  ~ be compliant with your anti-hypertensive medications  ~ adjust your medications to help improve your vascular stiffness and decrease risks for heart attacks and strokes

## 2023-06-01 ENCOUNTER — APPOINTMENT (OUTPATIENT)
Dept: LAB | Facility: MEDICAL CENTER | Age: 63
End: 2023-06-01
Payer: COMMERCIAL

## 2023-06-01 ENCOUNTER — APPOINTMENT (OUTPATIENT)
Dept: LAB | Facility: MEDICAL CENTER | Age: 63
End: 2023-06-01

## 2023-06-01 DIAGNOSIS — Z00.8 HEALTH EXAMINATION IN POPULATION SURVEY: ICD-10-CM

## 2023-06-01 DIAGNOSIS — E78.5 DYSLIPIDEMIA: ICD-10-CM

## 2023-06-01 DIAGNOSIS — N18.31 STAGE 3A CHRONIC KIDNEY DISEASE (HCC): Primary | ICD-10-CM

## 2023-06-01 DIAGNOSIS — E55.9 VITAMIN D DEFICIENCY: ICD-10-CM

## 2023-06-01 DIAGNOSIS — C83.00 MALIGNANT LYMPHOMA, LYMPHOPLASMACYTIC (HCC): ICD-10-CM

## 2023-06-01 DIAGNOSIS — Z13.1 SCREENING FOR DIABETES MELLITUS: ICD-10-CM

## 2023-06-01 LAB
25(OH)D3 SERPL-MCNC: 54.4 NG/ML (ref 30–100)
ALBUMIN SERPL BCP-MCNC: 3.6 G/DL (ref 3.5–5)
ALP SERPL-CCNC: 56 U/L (ref 46–116)
ALT SERPL W P-5'-P-CCNC: 24 U/L (ref 12–78)
ANION GAP SERPL CALCULATED.3IONS-SCNC: 0 MMOL/L (ref 4–13)
AST SERPL W P-5'-P-CCNC: 14 U/L (ref 5–45)
BASOPHILS # BLD AUTO: 0.03 THOUSANDS/ÂΜL (ref 0–0.1)
BASOPHILS NFR BLD AUTO: 1 % (ref 0–1)
BILIRUB SERPL-MCNC: 0.49 MG/DL (ref 0.2–1)
BUN SERPL-MCNC: 20 MG/DL (ref 5–25)
CALCIUM SERPL-MCNC: 9.2 MG/DL (ref 8.3–10.1)
CHLORIDE SERPL-SCNC: 109 MMOL/L (ref 96–108)
CHOLEST SERPL-MCNC: 233 MG/DL
CO2 SERPL-SCNC: 26 MMOL/L (ref 21–32)
CREAT SERPL-MCNC: 1.24 MG/DL (ref 0.6–1.3)
CREAT UR-MCNC: 115 MG/DL
EOSINOPHIL # BLD AUTO: 0.08 THOUSAND/ÂΜL (ref 0–0.61)
EOSINOPHIL NFR BLD AUTO: 2 % (ref 0–6)
ERYTHROCYTE [DISTWIDTH] IN BLOOD BY AUTOMATED COUNT: 13.2 % (ref 11.6–15.1)
EST. AVERAGE GLUCOSE BLD GHB EST-MCNC: 114 MG/DL
GFR SERPL CREATININE-BSD FRML MDRD: 61 ML/MIN/1.73SQ M
GLUCOSE P FAST SERPL-MCNC: 114 MG/DL (ref 65–99)
HBA1C MFR BLD: 5.6 %
HCT VFR BLD AUTO: 44.3 % (ref 36.5–49.3)
HDLC SERPL-MCNC: 61 MG/DL
HGB BLD-MCNC: 14.6 G/DL (ref 12–17)
IGA SERPL-MCNC: 255 MG/DL (ref 70–400)
IGG SERPL-MCNC: 1050 MG/DL (ref 700–1600)
IGM SERPL-MCNC: 148 MG/DL (ref 40–230)
IMM GRANULOCYTES # BLD AUTO: 0.01 THOUSAND/UL (ref 0–0.2)
IMM GRANULOCYTES NFR BLD AUTO: 0 % (ref 0–2)
LDH SERPL-CCNC: 163 U/L (ref 81–234)
LDLC SERPL CALC-MCNC: 157 MG/DL (ref 0–100)
LYMPHOCYTES # BLD AUTO: 1.25 THOUSANDS/ÂΜL (ref 0.6–4.47)
LYMPHOCYTES NFR BLD AUTO: 23 % (ref 14–44)
MCH RBC QN AUTO: 32 PG (ref 26.8–34.3)
MCHC RBC AUTO-ENTMCNC: 33 G/DL (ref 31.4–37.4)
MCV RBC AUTO: 97 FL (ref 82–98)
MICROALBUMIN UR-MCNC: <5 MG/L (ref 0–20)
MICROALBUMIN/CREAT 24H UR: <4 MG/G CREATININE (ref 0–30)
MONOCYTES # BLD AUTO: 0.56 THOUSAND/ÂΜL (ref 0.17–1.22)
MONOCYTES NFR BLD AUTO: 10 % (ref 4–12)
NEUTROPHILS # BLD AUTO: 3.55 THOUSANDS/ÂΜL (ref 1.85–7.62)
NEUTS SEG NFR BLD AUTO: 64 % (ref 43–75)
NRBC BLD AUTO-RTO: 0 /100 WBCS
PLATELET # BLD AUTO: 226 THOUSANDS/UL (ref 149–390)
PMV BLD AUTO: 11.5 FL (ref 8.9–12.7)
POTASSIUM SERPL-SCNC: 3.7 MMOL/L (ref 3.5–5.3)
PROT SERPL-MCNC: 7.2 G/DL (ref 6.4–8.4)
RBC # BLD AUTO: 4.56 MILLION/UL (ref 3.88–5.62)
SODIUM SERPL-SCNC: 135 MMOL/L (ref 135–147)
TRIGL SERPL-MCNC: 77 MG/DL
WBC # BLD AUTO: 5.48 THOUSAND/UL (ref 4.31–10.16)

## 2023-06-01 PROCEDURE — 83036 HEMOGLOBIN GLYCOSYLATED A1C: CPT

## 2023-06-01 PROCEDURE — 85025 COMPLETE CBC W/AUTO DIFF WBC: CPT

## 2023-06-01 PROCEDURE — 80061 LIPID PANEL: CPT

## 2023-06-01 PROCEDURE — 84165 PROTEIN E-PHORESIS SERUM: CPT

## 2023-06-01 PROCEDURE — 82570 ASSAY OF URINE CREATININE: CPT

## 2023-06-01 PROCEDURE — 36415 COLL VENOUS BLD VENIPUNCTURE: CPT

## 2023-06-01 PROCEDURE — 83615 LACTATE (LD) (LDH) ENZYME: CPT

## 2023-06-01 PROCEDURE — 82043 UR ALBUMIN QUANTITATIVE: CPT

## 2023-06-01 PROCEDURE — 82784 ASSAY IGA/IGD/IGG/IGM EACH: CPT

## 2023-06-01 PROCEDURE — 83521 IG LIGHT CHAINS FREE EACH: CPT

## 2023-06-01 PROCEDURE — 82306 VITAMIN D 25 HYDROXY: CPT

## 2023-06-01 PROCEDURE — 80053 COMPREHEN METABOLIC PANEL: CPT

## 2023-06-02 LAB
KAPPA LC FREE SER-MCNC: 24.7 MG/L (ref 3.3–19.4)
KAPPA LC FREE/LAMBDA FREE SER: 2.09 {RATIO} (ref 0.26–1.65)
LAMBDA LC FREE SERPL-MCNC: 11.8 MG/L (ref 5.7–26.3)

## 2023-06-03 LAB
ALBUMIN SERPL ELPH-MCNC: 3.92 G/DL (ref 3.5–5)
ALBUMIN SERPL ELPH-MCNC: 58.5 % (ref 52–65)
ALPHA1 GLOB SERPL ELPH-MCNC: 0.26 G/DL (ref 0.1–0.4)
ALPHA1 GLOB SERPL ELPH-MCNC: 3.9 % (ref 2.5–5)
ALPHA2 GLOB SERPL ELPH-MCNC: 0.68 G/DL (ref 0.4–1.2)
ALPHA2 GLOB SERPL ELPH-MCNC: 10.2 % (ref 7–13)
BETA GLOB ABNORMAL SERPL ELPH-MCNC: 0.45 G/DL (ref 0.4–0.8)
BETA1 GLOB SERPL ELPH-MCNC: 6.7 % (ref 5–13)
BETA2 GLOB SERPL ELPH-MCNC: 5.1 % (ref 2–8)
BETA2+GAMMA GLOB SERPL ELPH-MCNC: 0.34 G/DL (ref 0.2–0.5)
GAMMA GLOB ABNORMAL SERPL ELPH-MCNC: 1.05 G/DL (ref 0.5–1.6)
GAMMA GLOB SERPL ELPH-MCNC: 15.6 % (ref 12–22)
IGG/ALB SER: 1.41 {RATIO} (ref 1.1–1.8)
PROT PATTERN SERPL ELPH-IMP: NORMAL
PROT SERPL-MCNC: 6.7 G/DL (ref 6.4–8.2)

## 2023-06-03 PROCEDURE — 84165 PROTEIN E-PHORESIS SERUM: CPT | Performed by: STUDENT IN AN ORGANIZED HEALTH CARE EDUCATION/TRAINING PROGRAM

## 2023-06-09 ENCOUNTER — OFFICE VISIT (OUTPATIENT)
Dept: HEMATOLOGY ONCOLOGY | Facility: CLINIC | Age: 63
End: 2023-06-09
Payer: COMMERCIAL

## 2023-06-09 VITALS
HEIGHT: 71 IN | OXYGEN SATURATION: 99 % | DIASTOLIC BLOOD PRESSURE: 80 MMHG | SYSTOLIC BLOOD PRESSURE: 128 MMHG | RESPIRATION RATE: 18 BRPM | BODY MASS INDEX: 25.76 KG/M2 | TEMPERATURE: 97.4 F | WEIGHT: 184 LBS | HEART RATE: 67 BPM

## 2023-06-09 DIAGNOSIS — C83.00 MALIGNANT LYMPHOMA, LYMPHOPLASMACYTIC (HCC): Primary | ICD-10-CM

## 2023-06-09 PROCEDURE — 99214 OFFICE O/P EST MOD 30 MIN: CPT | Performed by: INTERNAL MEDICINE

## 2023-06-09 RX ORDER — DIPHENOXYLATE HYDROCHLORIDE AND ATROPINE SULFATE 2.5; .025 MG/1; MG/1
1 TABLET ORAL DAILY
COMMUNITY

## 2023-06-09 NOTE — PROGRESS NOTES
[facility] - Hematology & Medical Oncology  Outpatient Visit Encounter Note    Antonio Johnston 60 y o  male [id number] Date:  6/9/2023    ONCOLOGY HISTORY      Oncology History   Malignant lymphoma, lymphoplasmacytic ([facility] [address] )   2/18/2021 Initial Diagnosis    Malignant lymphoma, lymphoplasmacytic ([facility] [address] )    This addendum is included to report the results of MYD88 testing which is positive, favoring a diagnosis of lymphoplasmacytic lymphoma, though is not definitively specific  Correlation with clinical impression is recommended  - MYD88 Mutation Analysis performed on the Colorectal Polyp @ [facility] (Specimen ID: [id number], evaluated by Annemarie Rodriguez MD, PhD) as follows:   Results: MYD88 Mutation(s):     Detected  Mutation(s) Detected:   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)   Addendum electronically signed by Carmel Isaac MD on 3/1/2021 at 11:56 AM   Final Diagnosis   A  Colon, descending polyp, biopsy:  -  Tubular adenoma, negative for high-grade dysplasia  B  Colon, sigmoid polyp, biopsy:  -  Small B cell neoplasm with kappa light chain restricted plasma cell proliferation, compatible with lymphoma (see note)     PETCT 3/19/21  1  Small focus of FDG uptake at the gastric antrum for which underlying lesion should be excluded  Correlate with endoscopy  2   Single FDG avid left axillary lymph node  This may be related to recent Covid vaccination but given the history of lymphoma, malignancy cannot be entirely excluded  This can be reassessed on short-term follow-up CT or PET/CT in 3 months  3  Small focus of FDG uptake at the inferior aspect of the prostate, underlying lesion not excluded  Correlate with PSA levels and urologic evaluation  4  Mild FDG uptake in a left upper cervical chain lymph node may be reactive but can be reassessed on follow-up  5   Mildly aneurysmal ascending thoracic aorta at 4 1 cm in diameter      BASELINE DISEASE CHARACTERISTICS 2/20/21  SPEP - normal  Quant Ig - normal including normal IgM  SLFC Ratio - normal  CBC - normal             SUBJECTIVE      ECOG Initial Visit 0   ECOG This Visit 0   Pain Score Initial Visit 0   Pain Score This Visit 0   Personal Cancer History None   Family Cancer History Mother (Lung), Dad (Prostate), Maternal Aunt (Gyn)   Tobacco Use History Denies   Alcohol Use History Denies   Thrombotic History Denies   Occupation Purchasing     INITIAL CONSULT    Charlotte Garcia is a 60 y o  here for new consultation with me today  The patient is referred by colorectal surgery and the reason for consultation is b-cell lymphoma diagnosed during screening colonoscopy  Patient routine colonoscopy on 2/5/21 with Dr Abigail Pathak and was found to have two sessile polyps - in the descending and sigmoid colon that were removed and send for biopsy  While the descending polyp resulted as tubular adenoma, the sigmoid polyp resulted as low-grade CD20+ B-cell lymphoma  He is here with his wife today  He offers no acute complaints  He says the results of his biopsy from the colonoscopy were very surprising  He denies any fevers chills nausea vomiting chest pain abdominal pain shortness of breath cough weight loss night sweats fevers chills  Denies any diarrhea constipation or bloody stools  He denies any genitourinary ambulatory dysfunction symptoms as well  He has been living his routine active life  He said that the colonoscopy he underwent was for routine screening  THIS VISIT    Patient presented to the office today with his wife for a routine follow-up visit for his LPL diagnosis from February 2021  Clinically, patient has been doing well with no B symptoms (i e , fevers, night sweats, weight loss) or lymphadenopathy  Patient hasn't required any treatment for his LPL as he did not have any clinical or lab indications for therapy    Patient's most recent CBC showed all parameters within normal limits (i e  "hemoglobin 14 6, platelets 505)  There were no monoclonal bands noted on most recent SPEP  Immunoglobulins were within normal limits  FLC ratio has been gradually rising (1 83 in May 2022 > 1 90 in November 2022 > 2 09 in June 2023)  Review of Systems  Review of Systems   Constitutional: Positive for malaise/fatigue (intermittent episodes of mild fatigue)  Negative for decreased appetite, fever, night sweats and weight loss  HENT: Negative for nosebleeds and sore throat  Eyes: Negative for blurred vision and double vision  Cardiovascular: Negative for chest pain, dyspnea on exertion, irregular heartbeat, leg swelling and palpitations  Respiratory: Negative for cough, shortness of breath and wheezing  Endocrine: Negative for polydipsia and polyphagia  Hematologic/Lymphatic: Negative for adenopathy and bleeding problem  Does not bruise/bleed easily  Skin: Negative for color change, flushing, rash and suspicious lesions  Musculoskeletal: Negative for arthritis, back pain, joint pain and myalgias  Gastrointestinal: Negative for abdominal pain, constipation, diarrhea, nausea and vomiting  Genitourinary: Negative for dysuria, hematuria and urgency  Neurological: Negative for difficulty with concentration, disturbances in coordination, focal weakness, numbness, seizures and tremors  Psychiatric/Behavioral: Negative for altered mental status, hallucinations and substance abuse  All other systems reviewed and are negative  OBJECTIVE     Physical Exam  Vitals:    06/09/23 0757   BP: 128/80   BP Location: Left arm   Patient Position: Sitting   Cuff Size: Adult   Pulse: 67   Resp: 18   Temp: (!) 97 4 °F (36 3 °C)   SpO2: 99%   Weight: 83 5 kg (184 lb)   Height: 5' 11\" (1 803 m)       Physical Exam  Vitals reviewed  Constitutional:       General: He is not in acute distress  Appearance: He is not ill-appearing, toxic-appearing or diaphoretic     HENT:      Head: Normocephalic and " atraumatic  Eyes:      General: No scleral icterus  Extraocular Movements: Extraocular movements intact  Conjunctiva/sclera: Conjunctivae normal    Cardiovascular:      Rate and Rhythm: Normal rate and regular rhythm  Heart sounds: No murmur heard  No gallop  Pulmonary:      Effort: Pulmonary effort is normal  No respiratory distress  Breath sounds: Normal breath sounds  No wheezing, rhonchi or rales  Abdominal:      General: Bowel sounds are normal  There is no distension  Palpations: Abdomen is soft  There is no mass  Tenderness: There is no abdominal tenderness  There is no guarding  Musculoskeletal:         General: No swelling, tenderness or deformity  Normal range of motion  Cervical back: Normal range of motion  Right lower leg: No edema  Left lower leg: No edema  Lymphadenopathy:      Cervical: No cervical adenopathy  Skin:     General: Skin is warm and dry  Findings: No erythema, lesion or rash  Neurological:      General: No focal deficit present  Mental Status: He is alert and oriented to person, place, and time  Mental status is at baseline  Psychiatric:         Mood and Affect: Mood normal          Judgment: Judgment normal         Imaging  Relevant imaging reviewed in chart    Labs  Relevant labs reviewed in chart   ASSESSMENT & PLAN       1  Lymphoplasmacytic lymphoma    60 y o  male diagnosed with sigmoid polyp upon a screening colonoscopy that is biopsy proven to be lymphoplasmacytic lymphoma in February 2021  Additional testing proved that he was  MYD88 positive and given his normal SPEP and quantitative immunoglobulin, his diagnosis of LPL stands      Patient does not have any indications to be started on therapy for LPL (i e , no recurrent fevers/night sweats/weight loss/fatigue, hyperviscosity, bulky or symptomatic lymphadenopathy, symptomatic hepatosplenomegaly or organomegaly, peripheral neuropathy, significant cytopenias, nephropathy or cold agglutinin anemia)     Patient was recommended to continue with surveillance on a clinical and diagnostic basis with labs every 6 months  Patient knows to call the office if he were to develop any concerning symptoms such as fevers, night sweats, weight loss, or lymphadenopathy  Follow Up  • Six months with repeat CMP, CBC, SPEP with immunofixation, immunoglobulin quantification, and free light chain levels

## 2023-06-21 NOTE — PROGRESS NOTES
Cardiology Follow Up    [facility] [place]  [address] [facility] [address]  [address] [facility] [address]  Phone#  [phone]  Fax#  [phone]      1  Paroxysmal atrial fibrillation (HCC)        2  Paroxysmal atrial flutter [facility])  Ambulatory Referral to Cardiac Electrophysiology      3  Benign essential hypertension  losartan (COZAAR) 50 mg tablet      4  Dyslipidemia  Lipid Panel With Direct LDL      5  Dilated aortic root (HCC)        6  Stage 3a chronic kidney disease ([address] )        7  JODY (obstructive sleep apnea)            Discussion/Summary:  Mr Codey Maharaj is a pleasant 60year-old gentleman who presents to the office today for routine follow-up  Since his last visit he feels well  He has not had any sensation of recurrent atrial fibrillation  His blood pressure is not well controlled in the office today  It was advised by his nephrologist when he was seen in the recent past that he increase his losartan to 50 mg daily  I again advised increase of the medication to which he is agreeable  A prescription was provided  A low-salt diet was reinforced  He has no signs or symptoms of recurrent atrial fibrillation  It was recommended that he continue Eliquis for three months post ablation which he will discontinue  He also has untreated obstructive sleep apnea which may be a contributor  He is unable to tolerate a CPAP mask  We talked about alternative strategies for treatment of his sleep apnea including Inspire  He wishes to talk to his dentist about a dental appliance      Otherwise he was noted to have a dilated ascending aorta on echocardiogram a few years ago  He underwent a CT scan prior to his ablation revealing a stable aneurysm at 4 1 cm  His lipids were reviewed  Based on his 10-year risk statin therapy is indicated  We did discuss this  He expresses concern due to multiple drug intolerances    I will reassess his lipids in a few months  I will see him back in the office in six months for further evaluation  History of Present Illness:  Mr Frankie Slade is a pleasant 58-year-old gentleman who presents to the office today for routine follow-up  Since his last visit with me he was admitted to [place] [address] with recurrent atrial fibrillation/flutter  He eventually was referred to electrophysiology and underwent a cryoablation with pulmonary vein isolation and cavotricuspid isthmus line  He was seen in follow-up and AV rina blocking agents were discontinued and Eliquis was to be continued for total of three months post ablation  He has been attempting to remain active  With the activity he performs he is asymptomatic without any chest pain or shortness of breath  He denies symptoms of congestive heart failure including increasing lower extremity edema, paroxysmal nocturnal dyspnea, orthopnea, acute weight gain or increasing abdominal girth  He denies syncope or presyncope  He denies symptoms of claudication  He denies any symptoms suggestive of recurrent atrial fibrillation  He has been on the following medications and intolerant due to various side effects:     Amlodipine  Atenolol  Bystolic  Lisinopril   Candesartan  Diltiazem   Chlorthalidone    He has been intolerant of CPAP as well      Problem List     Benign hypertensive CKD    Lab Results   Component Value Date    EGFR 61 06/01/2023    EGFR 62 05/13/2023    EGFR 65 03/04/2023    CREATININE 1 24 06/01/2023    CREATININE 1 22 05/13/2023    CREATININE 1 17 03/04/2023         Erectile dysfunction of non-organic origin    Episodic tension-type headache, not intractable    Screening for prostate cancer    Vitamin D deficiency    Well adult exam    Need for influenza vaccination    Screening for colon cancer    Dyslipidemia    Benign essential hypertension    Stage 3 chronic kidney disease    Lab Results   Component Value Date    EGFR 61 06/01/2023    EGFR 62 05/13/2023    EGFR 65 03/04/2023    CREATININE 1 24 06/01/2023    CREATININE 1 22 05/13/2023    CREATININE 1 17 03/04/2023         Dilated aortic root (HCC)    JODY (obstructive sleep apnea)    Annual physical exam        Past Medical History:   Diagnosis Date   • Atrial fibrillation (HCC)    • Cancer (HCC)     lymphoma   • CKD (chronic kidney disease)    • Dilated aortic root (HCC)    • Erectile dysfunction    • Hyperlipidemia 6/28/2016   • Hypertension    • Hypertensive CKD (chronic kidney disease)    • Mild renal insufficiency 9/26/2017   • Vitamin D deficiency      Social History     Socioeconomic History   • Marital status: /Civil Union     Spouse name: Not on file   • Number of children: Not on file   • Years of education: Not on file   • Highest education level: Not on file   Occupational History   • Not on file   Tobacco Use   • Smoking status: Never   • Smokeless tobacco: Never   Vaping Use   • Vaping Use: Never used   Substance and Sexual Activity   • Alcohol use: Yes     Comment: social   • Drug use: No   • Sexual activity: Yes     Partners: Female   Other Topics Concern   • Not on file   Social History Narrative   • Not on file     Social Determinants of Health     Financial Resource Strain: Not on file   Food Insecurity: Not on file   Transportation Needs: Not on file   Physical Activity: Not on file   Stress: Not on file   Social Connections: Not on file   Intimate Partner Violence: Not on file   Housing Stability: Not on file      Family History   Problem Relation Age of Onset   • Lung cancer Mother    • Heart attack Father 76   • Prostate cancer Father    • Hyperlipidemia Father    • No Known Problems Sister    • Lymphoma Maternal Aunt    • Fainting Maternal Grandfather      Past Surgical History:   Procedure Laterality Date   • CARDIAC ELECTROPHYSIOLOGY PROCEDURE N/A 3/3/2023    Procedure: Cardiac eps/afib ablation;  Surgeon: Lizzie Reynolds MD;  [facility];  Service: Cardiology   • CARDIAC ELECTROPHYSIOLOGY PROCEDURE N/A 3/3/2023    Procedure: Cardiac eps/aflutter ablation;  Surgeon: Sunitha Jones MD;  Location: [facility]; Service: Cardiology   • COLONOSCOPY     • HIATAL HERNIA REPAIR         Current Outpatient Medications:   •  apixaban (ELIQUIS) 5 mg, Take 1 tablet (5 mg total) by mouth 2 (two) times a day, Disp: 180 tablet, Rfl: 3  •  cholecalciferol (VITAMIN D3) 1,000 units tablet, Take 1,000 Units by mouth daily, Disp: , Rfl:   •  losartan (COZAAR) 50 mg tablet, Take 1 tablet (50 mg total) by mouth daily, Disp: 90 tablet, Rfl: 3  •  multivitamin (THERAGRAN) TABS, Take 1 tablet by mouth daily, Disp: , Rfl:   No Known Allergies    Labs:     Chemistry        Component Value Date/Time     10/07/2017 0740    K 3 7 06/01/2023 0738    K 3 7 02/20/2021 0747     (H) 06/01/2023 0738     02/20/2021 0747    CO2 26 06/01/2023 0738    CO2 28 02/20/2021 0747    BUN 20 06/01/2023 0738    BUN 22 02/20/2021 0747    CREATININE 1 24 06/01/2023 0738    CREATININE 1 31 10/07/2017 0740        Component Value Date/Time    CALCIUM 9 2 06/01/2023 0738    CALCIUM 9 5 02/20/2021 0747    ALKPHOS 56 06/01/2023 0738    ALKPHOS 46 02/20/2021 0747    AST 14 06/01/2023 0738    AST 18 02/20/2021 0747    ALT 24 06/01/2023 0738    ALT 21 02/20/2021 0747    BILITOT 0 6 10/07/2017 0740            Lab Results   Component Value Date    CHOL 174 09/09/2017    CHOL 224 (H) 06/18/2016    CHOL 186 03/21/2015     Lab Results   Component Value Date    HDL 61 06/01/2023    HDL 55 07/09/2022    HDL 52 11/14/2020     Lab Results   Component Value Date    LDLCALC 157 (H) 06/01/2023    LDLCALC 121 (H) 07/09/2022    LDLCALC 121 (H) 11/14/2020     Lab Results   Component Value Date    TRIG 77 06/01/2023    TRIG 84 07/09/2022    TRIG 133 11/14/2020     No results found for: \"CHOLHDL\"    Imaging: No results found      Review of Systems   Cardiovascular: Negative for chest pain, " "claudication, cyanosis, irregular heartbeat, leg swelling, orthopnea and palpitations  All other systems reviewed and are negative  Vitals:    06/22/23 1042   BP: 138/90   Pulse:    SpO2:      Vitals:    06/22/23 1006   Weight: 82 1 kg (181 lb)     Height: 5' 11\" (180 3 cm)   Body mass index is 25 24 kg/m²       Physical Exam:  General:  Alert and cooperative, appears stated age  HEENT:  PERRLA, EOMI, no scleral icterus, no conjunctival pallor  Neck:  No lymphadenopathy, no thyromegaly, no carotid bruits, no elevated JVP  Heart:  Regular rate and rhythm, normal S1/S2, no S3/S4, no murmur  Lungs:  Clear to auscultation bilaterally   Abdomen:  Soft, non-tender, positive bowel sounds, no rebound or guarding,   no organomegaly   Extremities:  No clubbing, cyanosis or edema   Vascular:  2+ pedal pulses  Skin:  No rashes or lesions on exposed skin  Neurologic:  Cranial nerves II-XII grossly intact without focal deficits  "

## 2023-06-22 ENCOUNTER — OFFICE VISIT (OUTPATIENT)
Dept: CARDIOLOGY CLINIC | Facility: CLINIC | Age: 63
End: 2023-06-22
Payer: COMMERCIAL

## 2023-06-22 VITALS
SYSTOLIC BLOOD PRESSURE: 138 MMHG | BODY MASS INDEX: 25.34 KG/M2 | HEIGHT: 71 IN | DIASTOLIC BLOOD PRESSURE: 90 MMHG | WEIGHT: 181 LBS | HEART RATE: 72 BPM | OXYGEN SATURATION: 99 %

## 2023-06-22 DIAGNOSIS — I48.0 PAROXYSMAL ATRIAL FIBRILLATION (HCC): Primary | ICD-10-CM

## 2023-06-22 DIAGNOSIS — G47.33 OSA (OBSTRUCTIVE SLEEP APNEA): ICD-10-CM

## 2023-06-22 DIAGNOSIS — I77.810 DILATED AORTIC ROOT (HCC): ICD-10-CM

## 2023-06-22 DIAGNOSIS — N18.31 STAGE 3A CHRONIC KIDNEY DISEASE (HCC): ICD-10-CM

## 2023-06-22 DIAGNOSIS — I48.92 PAROXYSMAL ATRIAL FLUTTER (HCC): ICD-10-CM

## 2023-06-22 DIAGNOSIS — I10 BENIGN ESSENTIAL HYPERTENSION: ICD-10-CM

## 2023-06-22 DIAGNOSIS — E78.5 DYSLIPIDEMIA: ICD-10-CM

## 2023-06-22 PROCEDURE — 99214 OFFICE O/P EST MOD 30 MIN: CPT | Performed by: INTERNAL MEDICINE

## 2023-06-22 RX ORDER — LOSARTAN POTASSIUM 50 MG/1
50 TABLET ORAL DAILY
Qty: 90 TABLET | Refills: 3 | Status: SHIPPED | OUTPATIENT
Start: 2023-06-22

## 2023-09-22 ENCOUNTER — OFFICE VISIT (OUTPATIENT)
Dept: GASTROENTEROLOGY | Facility: CLINIC | Age: 63
End: 2023-09-22
Payer: COMMERCIAL

## 2023-09-22 VITALS
DIASTOLIC BLOOD PRESSURE: 92 MMHG | WEIGHT: 190.2 LBS | HEIGHT: 71 IN | SYSTOLIC BLOOD PRESSURE: 160 MMHG | TEMPERATURE: 98.2 F | BODY MASS INDEX: 26.63 KG/M2

## 2023-09-22 DIAGNOSIS — C83.00 MALIGNANT LYMPHOMA, LYMPHOPLASMACYTIC (HCC): Primary | ICD-10-CM

## 2023-09-22 DIAGNOSIS — Z80.0 FAMILY HISTORY OF COLON CANCER: ICD-10-CM

## 2023-09-22 DIAGNOSIS — Z86.010 HISTORY OF COLON POLYPS: ICD-10-CM

## 2023-09-22 PROCEDURE — 99213 OFFICE O/P EST LOW 20 MIN: CPT | Performed by: PHYSICIAN ASSISTANT

## 2023-09-22 NOTE — PATIENT INSTRUCTIONS
High Fiber Diet   AMBULATORY CARE:   A high-fiber diet  includes foods that have a high amount of fiber. Fiber is the part of fruits, vegetables, and grains that is not broken down by your body. Fiber keeps your bowel movements regular. Fiber can also help lower your cholesterol level, control blood sugar in people with diabetes, and relieve constipation. Fiber can also help you control your weight because it helps you feel full faster. Most adults should eat 25 to 35 grams of fiber each day. Talk to your dietitian or healthcare provider about the amount of fiber you need. Good sources of fiber:       Foods with at least 4 grams of fiber per serving:      ? to ½ cup of high-fiber cereal (check the nutrition label on the box)    ½ cup of blackberries or raspberries    4 dried prunes    1 cooked artichoke    ½ cup of cooked legumes, such as lentils, or red, kidney, and romano beans    Foods with 1 to 3 grams of fiber per servin slice of whole-wheat, pumpernickel, or rye bread    ½ cup of cooked brown rice    4 whole-wheat crackers    1 cup of oatmeal    ½ cup of cereal with 1 to 3 grams of fiber per serving (check the nutrition label on the box)    1 small piece of fruit, such as an apple, banana, pear, kiwi, or orange    3 dates    ½ cup of canned apricots, fruit cocktail, peaches, or pears    ½ cup of raw or cooked vegetables, such as carrots, cauliflower, cabbage, spinach, squash, or corn  Ways that you can increase fiber in your diet:   Choose brown or wild rice instead of white rice. Use whole wheat flour in recipes instead of white or all-purpose flour. Add beans and peas to casseroles or soups. Choose fresh fruit and vegetables with peels or skins on instead of juices. Other diet guidelines to follow: Add fiber to your diet slowly. You may have abdominal discomfort, bloating, and gas if you add fiber to your diet too quickly. Drink plenty of liquids as you add fiber to your diet. You may have nausea or develop constipation if you do not drink enough water. Ask how much liquid to drink each day and which liquids are best for you. © Copyright [employer]urt[employer] 2023 Information is for End User's use only and may not be sold, redistributed or otherwise used for commercial purposes. The above information is an  only. It is not intended as medical advice for individual conditions or treatments. Talk to your doctor, nurse or pharmacist before following any medical regimen to see if it is safe and effective for you.

## 2023-09-22 NOTE — PROGRESS NOTES
[facility] - Outpatient Follow-up Note  Kiarra Turner 58 y.o. male MRN: [id number]  Encounter: [id number]  ASSESSMENT AND PLAN:    1. 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 Family history of colon cancer  3. History of colon polyps  Last colonoscopy done in 2/2021 which showed few scattered sigmoid diverticula and 2 colon polyps which were removed. One polyp was a tubular adenoma and one polyp as compatible with low-grade CD20+ B-cell lymphoma. He was referred to Heme/ Onc at that time. Patient does have a + FH of CRC in father diagnosed age ~66  Recent Hematology Oncology note reviewed. They recommend colonoscopy every 3-5  years. Per Hematology Oncology the patient does not fit any criteria for treatment of lymphoma at this time. Patient is high risk for CRC and colon polyps   Recommend repeat screening colonoscopy in 2/2024 in hospital setting - will order and schedule now     - Colonoscopy; Future    Patient was instructed to call the office with any questions, concerns, new/ worsening/ persisting GI symptoms. Advised patient go to the ER with any severe or worsening abdominal pain, fevers/ chills, intractable N/V, chest pain, SOB, dizziness, lightheadedness, feeling something stuck in esophagus that will not go down. Patient expressed understanding and is in agreement with treatment plan. Will plan to follow up as needed   __________________________________________________________    SUBJECTIVE:    Patient is new to me. Patient with a PMH of JODY, HTN, CKD stage 3, dilated aortic root, malignant lymphoma, lymphoplasmacytic presents to discuss colonoscopy. Last colonoscopy done in 2/2021 which showed few scattered sigmoid diverticula and 2 colon polyps which were removed. One polyp was a tubular adenoma and one polyp as compatible with low-grade CD20+ B-cell lymphoma. He was referred to Heme/ Onc at that time.    Patient does have a + FH of CRC in father diagnosed age ~75    Patient reports feeling well. No GI complaints or concerns today. Recent Hematology Oncology note reviewed. They recommend colonoscopy every 3-5 years. Per Hematology Oncology the patient does not fit any criteria for treatment of lymphoma at this time. BM's are regular and stool is brown and formed. Patient denies any fevers/ chills, unintentional weight loss, decreased appetite, abdominal pain, nausea, vomiting, change in bowel habits, diarrhea, constipation, black or bloody stools, heartburn, dysphagia, odynophagia. Denies chest pain, SOB, skin rashes, joint pain/ swelling. Review of Systems   Constitutional: Negative for chills and fever. HENT: Negative for ear pain and sore throat. Eyes: Negative for pain and visual disturbance. Respiratory: Negative for cough and shortness of breath. Cardiovascular: Negative for chest pain and palpitations. Gastrointestinal: Negative for constipation, diarrhea, nausea and vomiting. Genitourinary: Negative for dysuria, frequency and hematuria. Musculoskeletal: Negative for arthralgias, back pain and myalgias. Skin: Negative for color change and rash. Neurological: Negative for seizures, syncope and headaches. All other systems reviewed and are negative. Historical Information   Past Medical History:   Diagnosis Date   • Atrial fibrillation ([address])    • Cancer ([address])     lymphoma   • CKD (chronic kidney disease)    • Dilated aortic root (HCC)    • Erectile dysfunction    • Hyperlipidemia 6/28/2016   • Hypertension    • Hypertensive CKD (chronic kidney disease)    • Mild renal insufficiency 9/26/2017   • Vitamin D deficiency      Past Surgical History:   Procedure Laterality Date   • CARDIAC ELECTROPHYSIOLOGY PROCEDURE N/A 3/3/2023    Procedure: Cardiac eps/afib ablation;  Surgeon: Jesi Kapadia MD;  [facility];   Service: Cardiology   • CARDIAC ELECTROPHYSIOLOGY PROCEDURE N/A 3/3/2023    Procedure: Cardiac eps/aflutter ablation;  Surgeon: Neil Estrada MD;  Location:  [facility] CATH LAB; Service: Cardiology   • COLONOSCOPY     • HIATAL HERNIA REPAIR       Social History   Social History     Substance and Sexual Activity   Alcohol Use Yes    Comment: social     Social History     Substance and Sexual Activity   Drug Use No     Social History     Tobacco Use   Smoking Status Never   Smokeless Tobacco Never     Family History   Problem Relation Age of Onset   • Lung cancer Mother    • Heart attack Father 76   • Prostate cancer Father    • Hyperlipidemia Father    • No Known Problems Sister    • Lymphoma Maternal Aunt    • Fainting Maternal Grandfather        Meds/Allergies       Current Outpatient Medications:   •  cholecalciferol (VITAMIN D3) 1,000 units tablet  •  losartan (COZAAR) 50 mg tablet  •  multivitamin (THERAGRAN) TABS  •  apixaban (ELIQUIS) 5 mg    No Known Allergies        Objective     Wt Readings from Last 3 Encounters:   09/22/23 86.3 kg (190 lb 3.2 oz)   06/22/23 82.1 kg (181 lb)   06/09/23 83.5 kg (184 lb)     Temp Readings from Last 3 Encounters:   09/22/23 98.2 °F (36.8 °C) (Tympanic)   06/09/23 (!) 97.4 °F (36.3 °C)   04/04/23 99.3 °F (37.4 °C) (Tympanic)     BP Readings from Last 3 Encounters:   09/22/23 160/92   06/22/23 138/90   06/09/23 128/80     Pulse Readings from Last 3 Encounters:   06/22/23 72   06/09/23 67   04/11/23 59          PHYSICAL EXAM:      Physical Exam  Vitals reviewed. Constitutional:       General: He is not in acute distress. Appearance: He is well-developed. He is not ill-appearing. HENT:      Head: Normocephalic and atraumatic. Eyes:      General: No scleral icterus. Conjunctiva/sclera: Conjunctivae normal.   Cardiovascular:      Rate and Rhythm: Normal rate and regular rhythm. Heart sounds: No murmur heard. Pulmonary:      Effort: Pulmonary effort is normal. No respiratory distress. Breath sounds: Normal breath sounds.    Abdominal:      General: Bowel sounds are normal.      Palpations: Abdomen is soft. Tenderness: There is no abdominal tenderness. Musculoskeletal:         General: No swelling or tenderness. Cervical back: Neck supple. Skin:     General: Skin is warm and dry. Capillary Refill: Capillary refill takes less than 2 seconds. Neurological:      General: No focal deficit present. Mental Status: He is alert and oriented to person, place, and time. Mental status is at baseline. Psychiatric:         Mood and Affect: Mood normal.         Behavior: Behavior normal.          Lab Results:   No visits with results within 1 Day(s) from this visit.    Latest known visit with results is:   Appointment on 06/01/2023   Component Date Value   • Creatinine, Ur 06/01/2023 115.0    • Albumin,U,Random 06/01/2023 <5.0    • Albumin Creat Ratio 06/01/2023 <4        Lab Results   Component Value Date    WBC 5.48 06/01/2023    HGB 14.6 06/01/2023    HCT 44.3 06/01/2023    MCV 97 06/01/2023     06/01/2023       Lab Results   Component Value Date     10/07/2017    SODIUM 135 06/01/2023    K 3.7 06/01/2023     (H) 06/01/2023    CO2 26 06/01/2023    AGAP 0 (L) 06/01/2023    BUN 20 06/01/2023    CREATININE 1.24 06/01/2023    GLUC 113 03/04/2023    GLUF 114 (H) 06/01/2023    CALCIUM 9.2 06/01/2023    AST 14 06/01/2023    ALT 24 06/01/2023    ALKPHOS 56 06/01/2023    PROT 6.1 10/07/2017    TP 7.2 06/01/2023    TP 6.7 06/01/2023    BILITOT 0.6 10/07/2017    TBILI 0.49 06/01/2023    EGFR 61 06/01/2023     Cheli Acuna PA-C   Available on [employer]

## 2023-11-21 ENCOUNTER — TELEPHONE (OUTPATIENT)
Dept: HEMATOLOGY ONCOLOGY | Facility: CLINIC | Age: 63
End: 2023-11-21

## 2023-11-21 NOTE — TELEPHONE ENCOUNTER
Appointment Change  Cancel, Reschedule, Change to Virtual      Who are you speaking with? Patient   If it is not the patient, is the caller listed on the communication consent form? N/A   Which provider is the appointment scheduled with? Dr. Wilfrid Rockwell   When was the original appointment scheduled? Please list date and time 12/11/23 8AM   At which location is the appointment scheduled to take place? [place]   Was the appointment rescheduled? Was the appointment changed from an in person visit to a virtual visit? If so, please list the details of the change. 02/02/24 9:40AM   What is the reason for the appointment change?  Provider

## 2023-12-06 ENCOUNTER — APPOINTMENT (OUTPATIENT)
Dept: LAB | Facility: MEDICAL CENTER | Age: 63
End: 2023-12-06
Payer: COMMERCIAL

## 2023-12-06 DIAGNOSIS — C83.00 MALIGNANT LYMPHOMA, LYMPHOPLASMACYTIC (HCC): ICD-10-CM

## 2023-12-06 DIAGNOSIS — E78.5 DYSLIPIDEMIA: ICD-10-CM

## 2023-12-06 LAB
ALBUMIN SERPL BCP-MCNC: 4 G/DL (ref 3.5–5)
ALP SERPL-CCNC: 52 U/L (ref 34–104)
ALT SERPL W P-5'-P-CCNC: 14 U/L (ref 7–52)
ANION GAP SERPL CALCULATED.3IONS-SCNC: 8 MMOL/L
AST SERPL W P-5'-P-CCNC: 18 U/L (ref 13–39)
BASOPHILS # BLD AUTO: 0.04 THOUSANDS/ÂΜL (ref 0–0.1)
BASOPHILS NFR BLD AUTO: 1 % (ref 0–1)
BILIRUB SERPL-MCNC: 0.46 MG/DL (ref 0.2–1)
BUN SERPL-MCNC: 15 MG/DL (ref 5–25)
CALCIUM SERPL-MCNC: 9.5 MG/DL (ref 8.4–10.2)
CHLORIDE SERPL-SCNC: 103 MMOL/L (ref 96–108)
CHOLEST SERPL-MCNC: 197 MG/DL
CO2 SERPL-SCNC: 27 MMOL/L (ref 21–32)
CREAT SERPL-MCNC: 1.22 MG/DL (ref 0.6–1.3)
EOSINOPHIL # BLD AUTO: 0.12 THOUSAND/ÂΜL (ref 0–0.61)
EOSINOPHIL NFR BLD AUTO: 2 % (ref 0–6)
ERYTHROCYTE [DISTWIDTH] IN BLOOD BY AUTOMATED COUNT: 12.7 % (ref 11.6–15.1)
GFR SERPL CREATININE-BSD FRML MDRD: 62 ML/MIN/1.73SQ M
GLUCOSE P FAST SERPL-MCNC: 120 MG/DL (ref 65–99)
HCT VFR BLD AUTO: 43.8 % (ref 36.5–49.3)
HDLC SERPL-MCNC: 49 MG/DL
HGB BLD-MCNC: 14.6 G/DL (ref 12–17)
IGA SERPL-MCNC: 259 MG/DL (ref 66–433)
IGG SERPL-MCNC: 895 MG/DL (ref 635–1741)
IGM SERPL-MCNC: 148 MG/DL (ref 45–281)
IMM GRANULOCYTES # BLD AUTO: 0.03 THOUSAND/UL (ref 0–0.2)
IMM GRANULOCYTES NFR BLD AUTO: 0 % (ref 0–2)
LDH SERPL-CCNC: 151 U/L (ref 140–271)
LDLC SERPL CALC-MCNC: 129 MG/DL (ref 0–100)
LYMPHOCYTES # BLD AUTO: 1.5 THOUSANDS/ÂΜL (ref 0.6–4.47)
LYMPHOCYTES NFR BLD AUTO: 22 % (ref 14–44)
MCH RBC QN AUTO: 32.2 PG (ref 26.8–34.3)
MCHC RBC AUTO-ENTMCNC: 33.3 G/DL (ref 31.4–37.4)
MCV RBC AUTO: 97 FL (ref 82–98)
MONOCYTES # BLD AUTO: 0.66 THOUSAND/ÂΜL (ref 0.17–1.22)
MONOCYTES NFR BLD AUTO: 10 % (ref 4–12)
NEUTROPHILS # BLD AUTO: 4.63 THOUSANDS/ÂΜL (ref 1.85–7.62)
NEUTS SEG NFR BLD AUTO: 65 % (ref 43–75)
NRBC BLD AUTO-RTO: 0 /100 WBCS
PLATELET # BLD AUTO: 290 THOUSANDS/UL (ref 149–390)
PMV BLD AUTO: 10.9 FL (ref 8.9–12.7)
POTASSIUM SERPL-SCNC: 3.9 MMOL/L (ref 3.5–5.3)
PROT SERPL-MCNC: 7 G/DL (ref 6.4–8.4)
RBC # BLD AUTO: 4.53 MILLION/UL (ref 3.88–5.62)
SODIUM SERPL-SCNC: 138 MMOL/L (ref 135–147)
TRIGL SERPL-MCNC: 94 MG/DL
WBC # BLD AUTO: 6.98 THOUSAND/UL (ref 4.31–10.16)

## 2023-12-06 PROCEDURE — 83521 IG LIGHT CHAINS FREE EACH: CPT

## 2023-12-06 PROCEDURE — 36415 COLL VENOUS BLD VENIPUNCTURE: CPT

## 2023-12-06 PROCEDURE — 84165 PROTEIN E-PHORESIS SERUM: CPT

## 2023-12-06 PROCEDURE — 82784 ASSAY IGA/IGD/IGG/IGM EACH: CPT

## 2023-12-06 PROCEDURE — 85025 COMPLETE CBC W/AUTO DIFF WBC: CPT

## 2023-12-06 PROCEDURE — 80053 COMPREHEN METABOLIC PANEL: CPT

## 2023-12-06 PROCEDURE — 83615 LACTATE (LD) (LDH) ENZYME: CPT

## 2023-12-06 PROCEDURE — 80061 LIPID PANEL: CPT

## 2023-12-07 LAB
ALBUMIN SERPL ELPH-MCNC: 3.8 G/DL (ref 3.2–5.1)
ALBUMIN SERPL ELPH-MCNC: 57.6 % (ref 48–70)
ALPHA1 GLOB SERPL ELPH-MCNC: 0.31 G/DL (ref 0.15–0.47)
ALPHA1 GLOB SERPL ELPH-MCNC: 4.7 % (ref 1.8–7)
ALPHA2 GLOB SERPL ELPH-MCNC: 0.73 G/DL (ref 0.42–1.04)
ALPHA2 GLOB SERPL ELPH-MCNC: 11 % (ref 5.9–14.9)
BETA GLOB ABNORMAL SERPL ELPH-MCNC: 0.42 G/DL (ref 0.31–0.57)
BETA1 GLOB SERPL ELPH-MCNC: 6.3 % (ref 4.7–7.7)
BETA2 GLOB SERPL ELPH-MCNC: 5.9 % (ref 3.1–7.9)
BETA2+GAMMA GLOB SERPL ELPH-MCNC: 0.39 G/DL (ref 0.2–0.58)
GAMMA GLOB ABNORMAL SERPL ELPH-MCNC: 0.96 G/DL (ref 0.4–1.66)
GAMMA GLOB SERPL ELPH-MCNC: 14.5 % (ref 6.9–22.3)
IGG/ALB SER: 1.36 {RATIO} (ref 1.1–1.8)
KAPPA LC FREE SER-MCNC: 29.4 MG/L (ref 3.3–19.4)
KAPPA LC FREE/LAMBDA FREE SER: 2.39 {RATIO} (ref 0.26–1.65)
LAMBDA LC FREE SERPL-MCNC: 12.3 MG/L (ref 5.7–26.3)
PROT PATTERN SERPL ELPH-IMP: NORMAL
PROT SERPL-MCNC: 6.6 G/DL (ref 6.4–8.2)

## 2023-12-07 PROCEDURE — 84165 PROTEIN E-PHORESIS SERUM: CPT | Performed by: PATHOLOGY

## 2023-12-11 ENCOUNTER — OFFICE VISIT (OUTPATIENT)
Dept: FAMILY MEDICINE CLINIC | Facility: CLINIC | Age: 63
End: 2023-12-11
Payer: COMMERCIAL

## 2023-12-11 VITALS
BODY MASS INDEX: 26.52 KG/M2 | DIASTOLIC BLOOD PRESSURE: 88 MMHG | OXYGEN SATURATION: 99 % | TEMPERATURE: 98.8 F | RESPIRATION RATE: 14 BRPM | SYSTOLIC BLOOD PRESSURE: 140 MMHG | HEIGHT: 71 IN | HEART RATE: 77 BPM | WEIGHT: 189.4 LBS

## 2023-12-11 DIAGNOSIS — E78.5 DYSLIPIDEMIA: ICD-10-CM

## 2023-12-11 DIAGNOSIS — Z23 ENCOUNTER FOR IMMUNIZATION: ICD-10-CM

## 2023-12-11 DIAGNOSIS — Z00.00 ANNUAL PHYSICAL EXAM: Primary | ICD-10-CM

## 2023-12-11 DIAGNOSIS — I10 BENIGN ESSENTIAL HYPERTENSION: ICD-10-CM

## 2023-12-11 DIAGNOSIS — R73.01 IFG (IMPAIRED FASTING GLUCOSE): ICD-10-CM

## 2023-12-11 DIAGNOSIS — I12.9 BENIGN HYPERTENSIVE KIDNEY DISEASE WITH CHRONIC KIDNEY DISEASE STAGE I THROUGH STAGE IV, OR UNSPECIFIED: ICD-10-CM

## 2023-12-11 PROCEDURE — 90471 IMMUNIZATION ADMIN: CPT

## 2023-12-11 PROCEDURE — 99396 PREV VISIT EST AGE 40-64: CPT | Performed by: FAMILY MEDICINE

## 2023-12-11 PROCEDURE — 90677 PCV20 VACCINE IM: CPT

## 2023-12-11 RX ORDER — LOSARTAN POTASSIUM 50 MG/1
TABLET ORAL
Qty: 135 TABLET | Refills: 3 | Status: SHIPPED | OUTPATIENT
Start: 2023-12-11

## 2023-12-11 NOTE — ASSESSMENT & PLAN NOTE
Lab Results   Component Value Date    EGFR 62 12/06/2023    EGFR 61 06/01/2023    EGFR 62 05/13/2023    CREATININE 1.22 12/06/2023    CREATININE 1.24 06/01/2023    CREATININE 1.22 05/13/2023   Increase losartan to 75

## 2023-12-11 NOTE — PATIENT INSTRUCTIONS
Wellness Visit for Adults   AMBULATORY CARE:   A wellness visit  is when you see your healthcare provider to get screened for health problems. Your healthcare provider will also give you advice on how to stay healthy. Write down your questions so you remember to ask them. Ask your healthcare provider how often you should have a wellness visit. What happens at a wellness visit:  Your healthcare provider will ask about your health, and your family history of health problems. This includes high blood pressure, heart disease, and cancer. He or she will ask if you have symptoms that concern you, if you smoke, and about your mood. You may also be asked about your intake of medicines, supplements, food, and alcohol. Any of the following may be done: Your weight  will be checked. Your height may also be checked so your body mass index (BMI) can be calculated. Your BMI shows if you are at a healthy weight. Your blood pressure  and heart rate will be checked. Your temperature may also be checked. Blood and urine tests  may be done. Blood tests may be done to check your cholesterol levels. Abnormal cholesterol levels increase your risk for heart disease and stroke. You may also need a blood or urine test to check for diabetes if you are at increased risk. Urine tests may be done to look for signs of an infection or kidney disease. A physical exam  includes checking your heartbeat and lungs with a stethoscope. Your healthcare provider may also check your skin to look for sun damage. Screening tests  may be recommended. A screening test is done to check for diseases that may not cause symptoms. The screening tests you may need depend on your age, gender, family history, and lifestyle habits. For example, colorectal screening may be recommended if you are 48years old or older. Screening tests you need if you are a woman:   A Pap smear  is used to screen for cervical cancer.  Pap smears are usually done every 3 to 5 years depending on your age. You may need them more often if you have had abnormal Pap smear test results in the past. Ask your healthcare provider how often you should have a Pap smear. A mammogram  is an x-ray of your breasts to screen for breast cancer. Experts recommend mammograms every 2 years starting at age 48 years. You may need a mammogram at age 52 years or younger if you have an increased risk for breast cancer. Talk to your healthcare provider about when you should start having mammograms and how often you need them. Vaccines you may need:   Get an influenza vaccine  every year. The influenza vaccine protects you from the flu. Several types of viruses cause the flu. The viruses change over time, so new vaccines are made each year. Get a tetanus-diphtheria (Td) booster vaccine  every 10 years. This vaccine protects you against tetanus and diphtheria. Tetanus is a severe infection that may cause painful muscle spasms and lockjaw. Diphtheria is a severe bacterial infection that causes a thick covering in the back of your mouth and throat. Get a human papillomavirus (HPV) vaccine  if you are female and aged 23 to 32 or male 23 to 24 and never received it. This vaccine protects you from HPV infection. HPV is the most common infection spread by sexual contact. HPV may also cause vaginal, penile, and anal cancers. Get a pneumococcal vaccine  if you are aged 72 years or older. The pneumococcal vaccine is an injection given to protect you from pneumococcal disease. Pneumococcal disease is an infection caused by pneumococcal bacteria. The infection may cause pneumonia, meningitis, or an ear infection. Get a shingles vaccine  if you are 60 or older, even if you have had shingles before. The shingles vaccine is an injection to protect you from the varicella-zoster virus. This is the same virus that causes chickenpox.  Shingles is a painful rash that develops in people who had chickenpox or have been exposed to the virus. How to eat healthy:  My Plate is a model for planning healthy meals. It shows the types and amounts of foods that should go on your plate. Fruits and vegetables make up about half of your plate, and grains and protein make up the other half. A serving of dairy is included on the side of your plate. The amount of calories and serving sizes you need depends on your age, gender, weight, and height. Examples of healthy foods are listed below:  Eat a variety of vegetables  such as dark green, red, and orange vegetables. You can also include canned vegetables low in sodium (salt) and frozen vegetables without added butter or sauces. Eat a variety of fresh fruits , canned fruit in 100% juice, frozen fruit, and dried fruit. Include whole grains. At least half of the grains you eat should be whole grains. Examples include whole-wheat bread, wheat pasta, brown rice, and whole-grain cereals such as oatmeal.    Eat a variety of protein foods such as seafood (fish and shellfish), lean meat, and poultry without skin (turkey and chicken). Examples of lean meats include pork leg, shoulder, or tenderloin, and beef round, sirloin, tenderloin, and extra lean ground beef. Other protein foods include eggs and egg substitutes, beans, peas, soy products, nuts, and seeds. Choose low-fat dairy products such as skim or 1% milk or low-fat yogurt, cheese, and cottage cheese. Limit unhealthy fats  such as butter, hard margarine, and shortening. Exercise:  Exercise at least 30 minutes per day on most days of the week. Some examples of exercise include walking, biking, dancing, and swimming. You can also fit in more physical activity by taking the stairs instead of the elevator or parking farther away from stores. Include muscle strengthening activities 2 days each week. Regular exercise provides many health benefits.  It helps you manage your weight, and decreases your risk for type 2 diabetes, heart disease, stroke, and high blood pressure. Exercise can also help improve your mood. Ask your healthcare provider about the best exercise plan for you. General health and safety guidelines:   Do not smoke. Nicotine and other chemicals in cigarettes and cigars can cause lung damage. Ask your healthcare provider for information if you currently smoke and need help to quit. E-cigarettes or smokeless tobacco still contain nicotine. Talk to your healthcare provider before you use these products. Limit alcohol. A drink of alcohol is 12 ounces of beer, 5 ounces of wine, or 1½ ounces of liquor. Lose weight, if needed. Being overweight increases your risk of certain health conditions. These include heart disease, high blood pressure, type 2 diabetes, and certain types of cancer. Protect your skin. Do not sunbathe or use tanning beds. Use sunscreen with a SPF 15 or higher. Apply sunscreen at least 15 minutes before you go outside. Reapply sunscreen every 2 hours. Wear protective clothing, hats, and sunglasses when you are outside. Drive safely. Always wear your seatbelt. Make sure everyone in your car wears a seatbelt. A seatbelt can save your life if you are in an accident. Do not use your cell phone when you are driving. This could distract you and cause an accident. Pull over if you need to make a call or send a text message. Practice safe sex. Use latex condoms if are sexually active and have more than one partner. Your healthcare provider may recommend screening tests for sexually transmitted infections (STIs). Wear helmets, lifejackets, and protective gear. Always wear a helmet when you ride a bike or motorcycle, go skiing, or play sports that could cause a head injury. Wear protective equipment when you play sports. Wear a lifejacket when you are on a boat or doing water sports.     © Copyright [employer] 2023 Information is for End User's use only and may not be sold, redistributed or otherwise used for commercial purposes. The above information is an  only. It is not intended as medical advice for individual conditions or treatments. Talk to your doctor, nurse or pharmacist before following any medical regimen to see if it is safe and effective for you.

## 2023-12-11 NOTE — PROGRESS NOTES
[address] [facility]    NAME: Kailee Browne  AGE: 58 y.o. SEX: male  : 1960     DATE: 2023     Assessment and Plan:     Problem List Items Addressed This Visit     Benign hypertensive CKD     Lab Results   Component Value Date    EGFR 62 2023    EGFR 61 2023    EGFR 62 2023    CREATININE 1.22 2023    CREATININE 1.24 2023    CREATININE 1.22 2023   Increase losartan to 75         Relevant Medications    losartan (COZAAR) 50 mg tablet    Dyslipidemia    Relevant Orders    Lipid Panel with Direct LDL reflex    TSH, 3rd generation with Free T4 reflex    Benign essential hypertension    Relevant Medications    losartan (COZAAR) 50 mg tablet    Other Relevant Orders    Comprehensive metabolic panel    Annual physical exam - Primary     Prevnar today  Had flu shot         Encounter for immunization    Relevant Orders    Pneumococcal Conjugate Vaccine 20-valent (Pcv20) (Completed)   Other Visit Diagnoses     IFG (impaired fasting glucose)        Relevant Orders    Hemoglobin A1C          Immunizations and preventive care screenings were discussed with patient today. Appropriate education was printed on patient's after visit summary. Discussed risks and benefits of prostate cancer screening. We discussed the controversial history of PSA screening for prostate cancer in the [facility] as well as the risk of over detection and over treatment of prostate cancer by way of PSA screening. The patient understands that PSA blood testing is an imperfect way to screen for prostate cancer and that elevated PSA levels in the blood may also be caused by infection, inflammation, prostatic trauma or manipulation, urological procedures, or by benign prostatic enlargement. Counseling:  Dental Health: discussed importance of regular tooth brushing, flossing, and dental visits.       Depression Screening and Follow-up Plan: Patient was screened for depression during today's encounter. They screened negative with a PHQ-2 score of 0. Return in 6 months (on 6/11/2024) for Recheck. Chief Complaint:     Chief Complaint   Patient presents with   • Physical Exam     Patient being seen for physical       History of Present Illness:     Adult Annual Physical   Patient here for a comprehensive physical exam. The patient reports problems - cold symptoms for 3-4 weeks . Diet and Physical Activity  Diet/Nutrition: well balanced diet. Exercise: walking. Depression Screening  PHQ-2/9 Depression Screening    Little interest or pleasure in doing things: 0 - not at all  Feeling down, depressed, or hopeless: 0 - not at all  PHQ-2 Score: 0  PHQ-2 Interpretation: Negative depression screen       General Health  Sleep: sleeps well. Hearing: normal - bilateral.  Vision: wears glasses. Dental: regular dental visits.  Health  Symptoms include: none    Advanced Care Planning  Do you have an advanced directive? yes  Do you have a durable medical power of ? yes     Review of Systems:     Review of Systems   Constitutional: Negative. HENT: Negative. Eyes: Negative. Respiratory: Negative. Cardiovascular: Negative. Gastrointestinal: Negative. Endocrine: Negative. Genitourinary: Negative. Musculoskeletal: Negative. Skin: Negative. Allergic/Immunologic: Negative. Neurological: Negative. Hematological: Negative. Psychiatric/Behavioral: Negative.         Past Medical History:     Past Medical History:   Diagnosis Date   • Atrial fibrillation ([address])    • Cancer ([address])     lymphoma   • Chronic kidney disease    • CKD (chronic kidney disease)    • Dilated aortic root (HCC)    • Erectile dysfunction    • Hyperlipidemia 06/28/2016   • Hypertension    • Hypertensive CKD (chronic kidney disease)    • Mild renal insufficiency 09/26/2017   • Vitamin D deficiency       Past Surgical History: Past Surgical History:   Procedure Laterality Date   • CARDIAC ELECTROPHYSIOLOGY PROCEDURE N/A 03/03/2023    Procedure: Cardiac eps/afib ablation;  Surgeon: Stephan Napier MD;  Location: [facility]; Service: Cardiology   • CARDIAC ELECTROPHYSIOLOGY PROCEDURE N/A 03/03/2023    Procedure: Cardiac eps/aflutter ablation;  Surgeon: Stephan Napier MD;  Location: [facility]; Service: Cardiology   • COLONOSCOPY     • HERNIA REPAIR     • HIATAL HERNIA REPAIR        Family History:     Family History   Problem Relation Age of Onset   • Lung cancer Mother    • Cancer Mother    • Heart attack Father 76   • Prostate cancer Father    • Hyperlipidemia Father    • Hypertension Father    • No Known Problems Sister    • Lymphoma Maternal Aunt    • Fainting Maternal Grandfather       Social History:     Social History     Socioeconomic History   • Marital status: /Civil Union     Spouse name: None   • Number of children: None   • Years of education: None   • Highest education level: None   Occupational History   • None   Tobacco Use   • Smoking status: Never   • Smokeless tobacco: Never   Vaping Use   • Vaping Use: Never used   Substance and Sexual Activity   • Alcohol use:  Yes     Alcohol/week: 1.0 standard drink of alcohol     Types: 1 Cans of beer per week     Comment: social   • Drug use: No   • Sexual activity: Yes     Partners: Female     Birth control/protection: None   Other Topics Concern   • None   Social History Narrative   • None     Social Determinants of Health     Financial Resource Strain: Not on file   Food Insecurity: Not on file   Transportation Needs: Not on file   Physical Activity: Not on file   Stress: Not on file   Social Connections: Not on file   Intimate Partner Violence: Not on file   Housing Stability: Not on file      Current Medications:     Current Outpatient Medications   Medication Sig Dispense Refill   • cholecalciferol (VITAMIN D3) 1,000 units tablet Take 1,000 Units by mouth daily     • losartan (COZAAR) 50 mg tablet 1.5 tablets daily 135 tablet 3   • multivitamin (THERAGRAN) TABS Take 1 tablet by mouth daily       No current facility-administered medications for this visit. Allergies:     No Known Allergies   Physical Exam:     /88   Pulse 77   Temp 98.8 °F (37.1 °C) (Tympanic)   Resp 14   Ht 5' 10.95" (1.802 m)   Wt 85.9 kg (189 lb 6.4 oz)   SpO2 99%   BMI 26.46 kg/m²     Physical Exam  Vitals and nursing note reviewed. Constitutional:       Appearance: Normal appearance. He is well-developed. HENT:      Head: Normocephalic and atraumatic. Right Ear: External ear normal.      Left Ear: External ear normal.      Nose: Nose normal.   Eyes:      Extraocular Movements: Extraocular movements intact. Conjunctiva/sclera: Conjunctivae normal.      Pupils: Pupils are equal, round, and reactive to light. Cardiovascular:      Rate and Rhythm: Normal rate and regular rhythm. Heart sounds: Normal heart sounds. Pulmonary:      Effort: Pulmonary effort is normal.      Breath sounds: Normal breath sounds. Abdominal:      General: Abdomen is flat. Bowel sounds are normal.      Palpations: Abdomen is soft. Musculoskeletal:         General: Normal range of motion. Cervical back: Normal range of motion and neck supple. Skin:     General: Skin is warm and dry. Capillary Refill: Capillary refill takes less than 2 seconds. Neurological:      General: No focal deficit present. Mental Status: He is alert and oriented to person, place, and time. Psychiatric:         Mood and Affect: Mood normal.         Behavior: Behavior normal.         Thought Content:  Thought content normal.         Judgment: Judgment normal.          Marino Velásquez, DO  [address]

## 2024-01-15 DIAGNOSIS — Z00.6 ENCOUNTER FOR EXAMINATION FOR NORMAL COMPARISON OR CONTROL IN CLINICAL RESEARCH PROGRAM: ICD-10-CM

## 2024-01-20 ENCOUNTER — APPOINTMENT (OUTPATIENT)
Dept: LAB | Facility: MEDICAL CENTER | Age: 64
End: 2024-01-20

## 2024-01-20 DIAGNOSIS — Z00.6 ENCOUNTER FOR EXAMINATION FOR NORMAL COMPARISON OR CONTROL IN CLINICAL RESEARCH PROGRAM: ICD-10-CM

## 2024-01-20 PROCEDURE — 36415 COLL VENOUS BLD VENIPUNCTURE: CPT

## 2024-01-24 ENCOUNTER — NURSE TRIAGE (OUTPATIENT)
Age: 64
End: 2024-01-24

## 2024-01-24 DIAGNOSIS — R94.8 ABNORMAL POSITRON EMISSION TOMOGRAPHY (PET) SCAN: Primary | ICD-10-CM

## 2024-01-24 DIAGNOSIS — Z80.42 FAMILY HISTORY OF PROSTATE CANCER IN FATHER: ICD-10-CM

## 2024-01-26 ENCOUNTER — APPOINTMENT (OUTPATIENT)
Dept: LAB | Facility: MEDICAL CENTER | Age: 64
End: 2024-01-26
Payer: COMMERCIAL

## 2024-01-26 DIAGNOSIS — R94.8 ABNORMAL POSITRON EMISSION TOMOGRAPHY (PET) SCAN: ICD-10-CM

## 2024-01-26 DIAGNOSIS — Z80.42 FAMILY HISTORY OF PROSTATE CANCER IN FATHER: ICD-10-CM

## 2024-01-26 LAB — PSA SERPL-MCNC: 2.73 NG/ML (ref 0–4)

## 2024-01-26 PROCEDURE — G0103 PSA SCREENING: HCPCS

## 2024-01-26 PROCEDURE — 36415 COLL VENOUS BLD VENIPUNCTURE: CPT

## 2024-02-01 ENCOUNTER — TELEPHONE (OUTPATIENT)
Dept: GASTROENTEROLOGY | Facility: MEDICAL CENTER | Age: 64
End: 2024-02-01

## 2024-02-01 NOTE — TELEPHONE ENCOUNTER
Triny Rapp,    This is [facility] confirming your upcoming procedure:  Colonoscopy for 02/15/2024 with Dr. Herrera.    Please keep in mind you will receive a phone call the day prior with your exact arrival time.     Now is a good time to review your prep instructions.  You will find a copy of the procedure prep instructions and the colonoscopy info sheet attached to this message.   If you have any questions regarding the diet or prep instructions please call [phone] Option 1      Please reply Yes to confirm.   For rescheduling please call the office at [phone] Option 1    Dona LAINEZ   [facility]

## 2024-02-02 ENCOUNTER — OFFICE VISIT (OUTPATIENT)
Dept: HEMATOLOGY ONCOLOGY | Facility: CLINIC | Age: 64
End: 2024-02-02
Payer: COMMERCIAL

## 2024-02-02 VITALS
TEMPERATURE: 96.8 F | HEART RATE: 78 BPM | DIASTOLIC BLOOD PRESSURE: 86 MMHG | WEIGHT: 190 LBS | RESPIRATION RATE: 18 BRPM | BODY MASS INDEX: 26.6 KG/M2 | OXYGEN SATURATION: 98 % | HEIGHT: 71 IN | SYSTOLIC BLOOD PRESSURE: 146 MMHG

## 2024-02-02 DIAGNOSIS — C83.00 MALIGNANT LYMPHOMA, LYMPHOPLASMACYTIC (HCC): Primary | ICD-10-CM

## 2024-02-02 PROCEDURE — 99214 OFFICE O/P EST MOD 30 MIN: CPT | Performed by: INTERNAL MEDICINE

## 2024-02-02 NOTE — PROGRESS NOTES
Hematology/Oncology Outpatient Follow-up  Thompson Rapp 63 y.o. male 1960 [id number]    Date:  2/2/2024        Assessment and Plan:  1. Malignant lymphoma, lymphoplasmacytic (HCC)  62 y.o. male diagnosed with sigmoid polyp upon a screening colonoscopy that is biopsy proven to be lymphoplasmacytic lymphoma in February 2021.  Additional testing proved that he was  MYD88 positive and given his normal SPEP and quantitative immunoglobulin, his diagnosis of LPL stands.     The patient does not seem to have any obvious hint constitutional symptoms or indication to get him started on any given treatment for his LPL.  I did encourage him to follow-up with the GI team for colonoscopy which is planned to be done in the near future.    We discussed follow-up in about 6 months from now unless there is a reason to see him sooner.    - CBC and differential; Future  - Comprehensive metabolic panel; Future  - LD,Blood; Future  - C-reactive protein; Future  - Sedimentation rate, automated; Future  - IgG, IgA, IgM; Future  - Immunoglobulin free LT chains blood; Future  - Protein, Total and Protein Electrophoresis with Immunofixation; Future        HPI:  INITIAL CONSULT     Thompson Rapp is a 62 y.o. here for new consultation with me today. The patient is referred by colorectal surgery and the reason for consultation is b-cell lymphoma diagnosed during screening colonoscopy.     Patient routine colonoscopy on 2/5/21 with Dr. Montgomery and was found to have two sessile polyps - in the descending and sigmoid colon that were removed and send for biopsy. While the descending polyp resulted as tubular adenoma, the sigmoid polyp resulted as low-grade CD20+ B-cell lymphoma.     THIS VISIT   The patient came today for follow-up visit accompanied by his wife.  He denied any significant constitutional symptoms.  Blood work on 12/6/2023 showed normal CBC with normal CMP.  Creatinine was 1.2 with calcium of 9.5.  SPEP was negative for M protein.   Serum light chain concentration for kappa was 29.4 with kappa to lambda ratio of 2.3.  LDH was normal.  Oncology History   Malignant lymphoma, lymphoplasmacytic (HCC)   2/18/2021 Initial Diagnosis    Malignant lymphoma, lymphoplasmacytic (HCC)    This addendum is included to report the results of MYD88 testing which is positive, favoring a diagnosis of lymphoplasmacytic lymphoma, though is not definitively specific.  Correlation with clinical impression is recommended.        - MYD88 Mutation Analysis performed on the Colorectal Polyp @ [facility] (Specimen ID: [id number], evaluated by Trudy Chan MD, PhD) as follows:   Results:  MYD88 Mutation(s):     Detected  Mutation(s) Detected:   c.794T>C (p.L265P)   Addendum electronically signed by Bennie Myers MD on 3/1/2021 at 11:56 AM   Final Diagnosis   A. Colon, descending polyp, biopsy:  -  Tubular adenoma, negative for high-grade dysplasia.    B. Colon, sigmoid polyp, biopsy:  -  Small B cell neoplasm with kappa light chain restricted plasma cell proliferation, compatible with lymphoma (see note)     PETCT 3/19/21  1.  Small focus of FDG uptake at the gastric antrum for which underlying lesion should be excluded.  Correlate with endoscopy.  2.  Single FDG avid left axillary lymph node.  This may be related to recent Covid vaccination but given the history of lymphoma, malignancy cannot be entirely excluded.  This can be reassessed on short-term follow-up CT or PET/CT in 3 months.  3. Small focus of FDG uptake at the inferior aspect of the prostate, underlying lesion not excluded.  Correlate with PSA levels and urologic evaluation.  4. Mild FDG uptake in a left upper cervical chain lymph node may be reactive but can be reassessed on follow-up.  5.  Mildly aneurysmal ascending thoracic aorta at 4.1 cm in diameter.    BASELINE DISEASE CHARACTERISTICS 2/20/21  SPEP - normal  Quant Ig - normal including normal IgM  SLFC Ratio - normal  CBC - normal          Interval history:    ROS: Review of Systems   Constitutional:  Positive for fatigue. Negative for chills and fever.   HENT:  Negative for ear pain and sore throat.    Eyes:  Negative for pain and visual disturbance.   Respiratory:  Negative for cough and shortness of breath.    Cardiovascular:  Negative for chest pain and palpitations.   Gastrointestinal:  Negative for abdominal pain and vomiting.   Genitourinary:  Negative for dysuria and hematuria.   Musculoskeletal:  Negative for arthralgias and back pain.   Skin:  Negative for color change and rash.   Neurological:  Positive for headaches. Negative for seizures and syncope.   All other systems reviewed and are negative.      Past Medical History:   Diagnosis Date    Atrial fibrillation (HCC)     Cancer (HCC)     lymphoma    Chronic kidney disease     CKD (chronic kidney disease)     Dilated aortic root (HCC)     Erectile dysfunction     Hyperlipidemia 06/28/2016    Hypertension     Hypertensive CKD (chronic kidney disease)     Mild renal insufficiency 09/26/2017    Vitamin D deficiency        Past Surgical History:   Procedure Laterality Date    CARDIAC ELECTROPHYSIOLOGY PROCEDURE N/A 03/03/2023    Procedure: Cardiac eps/afib ablation;  Surgeon: Anmol Gonzales MD;  [facility];  Service: Cardiology    CARDIAC ELECTROPHYSIOLOGY PROCEDURE N/A 03/03/2023    Procedure: Cardiac eps/aflutter ablation;  Surgeon: Anmol Gonzales MD;  [facility];  Service: Cardiology    COLONOSCOPY      HERNIA REPAIR      HIATAL HERNIA REPAIR         Social History     Socioeconomic History    Marital status: /Civil Union     Spouse name: None    Number of children: None    Years of education: None    Highest education level: None   Occupational History    None   Tobacco Use    Smoking status: Never    Smokeless tobacco: Never   Vaping Use    Vaping status: Never Used   Substance and Sexual Activity    Alcohol use: Yes     Alcohol/week: 1.0  "standard drink of alcohol     Types: 1 Cans of beer per week     Comment: social    Drug use: No    Sexual activity: Yes     Partners: Female     Birth control/protection: None   Other Topics Concern    None   Social History Narrative    None     Social Determinants of Health     Financial Resource Strain: Not on file   Food Insecurity: Not on file   Transportation Needs: Not on file   Physical Activity: Not on file   Stress: Not on file   Social Connections: Not on file   Intimate Partner Violence: Not on file   Housing Stability: Not on file       Family History   Problem Relation Age of Onset    Lung cancer Mother     Cancer Mother     Heart attack Father 68    Prostate cancer Father     Hyperlipidemia Father     Hypertension Father     No Known Problems Sister     Lymphoma Maternal Aunt     Fainting Maternal Grandfather        No Known Allergies      Current Outpatient Medications:     cholecalciferol (VITAMIN D3) 1,000 units tablet, Take 1,000 Units by mouth daily, Disp: , Rfl:     losartan (COZAAR) 50 mg tablet, 1.5 tablets daily (Patient taking differently: 100 mg 1.5 tablets daily), Disp: 135 tablet, Rfl: 3    multivitamin (THERAGRAN) TABS, Take 1 tablet by mouth daily, Disp: , Rfl:       Physical Exam:  /86 (BP Location: Left arm, Patient Position: Sitting, Cuff Size: Adult)   Pulse 78   Temp (!) 96.8 °F (36 °C)   Resp 18   Ht 5' 11\" (1.803 m)   Wt 86.2 kg (190 lb)   SpO2 98%   BMI 26.50 kg/m²     Physical Exam  Constitutional:       Appearance: He is well-developed.   HENT:      Head: Normocephalic and atraumatic.      Nose: Nose normal.   Eyes:      General: No scleral icterus.        Right eye: No discharge.         Left eye: No discharge.      Conjunctiva/sclera: Conjunctivae normal.      Pupils: Pupils are equal, round, and reactive to light.   Neck:      Thyroid: No thyromegaly.      Trachea: No tracheal deviation.   Cardiovascular:      Rate and Rhythm: Normal rate and regular rhythm.    " "  Heart sounds: Normal heart sounds. No murmur heard.     No friction rub.   Pulmonary:      Effort: Pulmonary effort is normal. No respiratory distress.      Breath sounds: Normal breath sounds. No wheezing or rales.   Chest:      Chest wall: No tenderness.   Abdominal:      General: There is no distension.      Palpations: Abdomen is soft. There is no hepatomegaly or splenomegaly.      Tenderness: There is no abdominal tenderness. There is no guarding or rebound.   Musculoskeletal:         General: No tenderness or deformity. Normal range of motion.      Cervical back: Normal range of motion and neck supple.   Lymphadenopathy:      Cervical: No cervical adenopathy.   Skin:     General: Skin is warm and dry.      Coloration: Skin is not pale.      Findings: No erythema or rash.   Neurological:      Mental Status: He is alert and oriented to person, place, and time.      Cranial Nerves: No cranial nerve deficit.      Coordination: Coordination normal.      Deep Tendon Reflexes: Reflexes are normal and symmetric.   Psychiatric:         Behavior: Behavior normal.         Thought Content: Thought content normal.         Judgment: Judgment normal.           Labs:  Lab Results   Component Value Date    WBC 6.98 12/06/2023    HGB 14.6 12/06/2023    HCT 43.8 12/06/2023    MCV 97 12/06/2023     12/06/2023     Lab Results   Component Value Date     10/07/2017    K 3.9 12/06/2023     12/06/2023    CO2 27 12/06/2023    BUN 15 12/06/2023    CREATININE 1.22 12/06/2023    GLUF 120 (H) 12/06/2023    CALCIUM 9.5 12/06/2023    CORRECTEDCA 9.5 05/13/2023    AST 18 12/06/2023    ALT 14 12/06/2023    ALKPHOS 52 12/06/2023    PROT 6.1 10/07/2017    BILITOT 0.6 10/07/2017    EGFR 62 12/06/2023     No results found for: \"TSH\"    Patient voiced understanding and agreement in the above discussion. Aware to contact our office with questions/symptoms in the interim.   "

## 2024-02-13 RX ORDER — SODIUM CHLORIDE 9 MG/ML
125 INJECTION, SOLUTION INTRAVENOUS CONTINUOUS
Status: CANCELLED | OUTPATIENT
Start: 2024-02-13

## 2024-02-15 ENCOUNTER — HOSPITAL ENCOUNTER (OUTPATIENT)
Dept: GASTROENTEROLOGY | Facility: HOSPITAL | Age: 64
Setting detail: OUTPATIENT SURGERY
End: 2024-02-15
Attending: STUDENT IN AN ORGANIZED HEALTH CARE EDUCATION/TRAINING PROGRAM
Payer: COMMERCIAL

## 2024-02-15 ENCOUNTER — ANESTHESIA (OUTPATIENT)
Dept: GASTROENTEROLOGY | Facility: HOSPITAL | Age: 64
End: 2024-02-15

## 2024-02-15 ENCOUNTER — ANESTHESIA EVENT (OUTPATIENT)
Dept: GASTROENTEROLOGY | Facility: HOSPITAL | Age: 64
End: 2024-02-15

## 2024-02-15 VITALS
TEMPERATURE: 98.6 F | OXYGEN SATURATION: 98 % | DIASTOLIC BLOOD PRESSURE: 76 MMHG | SYSTOLIC BLOOD PRESSURE: 121 MMHG | RESPIRATION RATE: 18 BRPM | HEART RATE: 78 BPM

## 2024-02-15 DIAGNOSIS — Z86.010 HISTORY OF COLON POLYPS: ICD-10-CM

## 2024-02-15 DIAGNOSIS — I10 BENIGN ESSENTIAL HYPERTENSION: ICD-10-CM

## 2024-02-15 DIAGNOSIS — Z80.0 FAMILY HISTORY OF COLON CANCER: ICD-10-CM

## 2024-02-15 DIAGNOSIS — C83.00 MALIGNANT LYMPHOMA, LYMPHOPLASMACYTIC (HCC): ICD-10-CM

## 2024-02-15 LAB
APOB+LDLR+PCSK9 GENE MUT ANL BLD/T: NOT DETECTED
BRCA1+BRCA2 DEL+DUP + FULL MUT ANL BLD/T: NOT DETECTED
MLH1+MSH2+MSH6+PMS2 GN DEL+DUP+FUL M: NOT DETECTED

## 2024-02-15 PROCEDURE — 88341 IMHCHEM/IMCYTCHM EA ADD ANTB: CPT | Performed by: PATHOLOGY

## 2024-02-15 PROCEDURE — 45380 COLONOSCOPY AND BIOPSY: CPT | Performed by: STUDENT IN AN ORGANIZED HEALTH CARE EDUCATION/TRAINING PROGRAM

## 2024-02-15 PROCEDURE — 88342 IMHCHEM/IMCYTCHM 1ST ANTB: CPT | Performed by: PATHOLOGY

## 2024-02-15 PROCEDURE — 88307 TISSUE EXAM BY PATHOLOGIST: CPT | Performed by: PATHOLOGY

## 2024-02-15 PROCEDURE — 88364 INSITU HYBRIDIZATION (FISH): CPT | Performed by: PATHOLOGY

## 2024-02-15 PROCEDURE — 88365 INSITU HYBRIDIZATION (FISH): CPT | Performed by: PATHOLOGY

## 2024-02-15 RX ORDER — LOSARTAN POTASSIUM 100 MG/1
100 TABLET ORAL DAILY
Qty: 90 TABLET | Refills: 3 | Status: SHIPPED | OUTPATIENT
Start: 2024-02-15

## 2024-02-15 RX ORDER — SIMETHICONE 40MG/0.6ML
SUSPENSION, DROPS(FINAL DOSAGE FORM)(ML) ORAL AS NEEDED
Status: COMPLETED | OUTPATIENT
Start: 2024-02-15 | End: 2024-02-15

## 2024-02-15 RX ORDER — PROPOFOL 10 MG/ML
INJECTION, EMULSION INTRAVENOUS AS NEEDED
Status: DISCONTINUED | OUTPATIENT
Start: 2024-02-15 | End: 2024-02-15

## 2024-02-15 RX ORDER — PROPOFOL 10 MG/ML
INJECTION, EMULSION INTRAVENOUS CONTINUOUS PRN
Status: DISCONTINUED | OUTPATIENT
Start: 2024-02-15 | End: 2024-02-15

## 2024-02-15 RX ORDER — LOSARTAN POTASSIUM 100 MG/1
100 TABLET ORAL DAILY
Qty: 90 TABLET | Refills: 3 | Status: SHIPPED | OUTPATIENT
Start: 2024-02-15 | End: 2024-02-15

## 2024-02-15 RX ORDER — LIDOCAINE HYDROCHLORIDE 20 MG/ML
INJECTION, SOLUTION EPIDURAL; INFILTRATION; INTRACAUDAL; PERINEURAL AS NEEDED
Status: DISCONTINUED | OUTPATIENT
Start: 2024-02-15 | End: 2024-02-15

## 2024-02-15 RX ORDER — SODIUM CHLORIDE 9 MG/ML
125 INJECTION, SOLUTION INTRAVENOUS CONTINUOUS
Status: DISCONTINUED | OUTPATIENT
Start: 2024-02-15 | End: 2024-02-19 | Stop reason: HOSPADM

## 2024-02-15 RX ORDER — GLYCOPYRROLATE 0.2 MG/ML
INJECTION INTRAMUSCULAR; INTRAVENOUS AS NEEDED
Status: DISCONTINUED | OUTPATIENT
Start: 2024-02-15 | End: 2024-02-15

## 2024-02-15 RX ADMIN — LIDOCAINE HYDROCHLORIDE 100 MG: 20 INJECTION, SOLUTION EPIDURAL; INFILTRATION; INTRACAUDAL at 10:17

## 2024-02-15 RX ADMIN — Medication 40 MG: at 10:29

## 2024-02-15 RX ADMIN — PROPOFOL 100 MCG/KG/MIN: 10 INJECTION, EMULSION INTRAVENOUS at 10:18

## 2024-02-15 RX ADMIN — PROPOFOL 100 MG: 10 INJECTION, EMULSION INTRAVENOUS at 10:17

## 2024-02-15 RX ADMIN — SODIUM CHLORIDE 125 ML/HR: 0.9 INJECTION, SOLUTION INTRAVENOUS at 09:57

## 2024-02-15 RX ADMIN — GLYCOPYRROLATE 0.2 MG: 0.2 INJECTION, SOLUTION INTRAMUSCULAR; INTRAVENOUS at 10:20

## 2024-02-15 RX ADMIN — PROPOFOL 50 MG: 10 INJECTION, EMULSION INTRAVENOUS at 10:18

## 2024-02-15 RX ADMIN — PROPOFOL 100 MG: 10 INJECTION, EMULSION INTRAVENOUS at 10:45

## 2024-02-15 NOTE — TELEPHONE ENCOUNTER
Please Review Pharmacy Notes:  Pharmacy comment: SIG SAYS 1 TAB DAILY AND 1.2 TABS DAILY PLEASE CLARIFY WHICH DIRECTIONS ARE CORRECT THANK YOU.

## 2024-02-15 NOTE — ANESTHESIA PREPROCEDURE EVALUATION
Procedure:  COLONOSCOPY    Relevant Problems   CARDIO   (+) Benign essential hypertension   (+) Dilated aortic root (HCC)   (+) Paroxysmal atrial fibrillation (HCC)      /RENAL   (+) Benign hypertensive CKD   (+) CKD (chronic kidney disease) stage 2, GFR 60-89 ml/min   (+) Stage 3 chronic kidney disease (HCC)      HEMATOLOGY   (+) Malignant lymphoma, lymphoplasmacytic (HCC)      NEURO/PSYCH   (+) Episodic tension-type headache, not intractable      PULMONARY   (+) JODY (obstructive sleep apnea)      Other   (+) Dyslipidemia        Physical Exam    Airway    Mallampati score: II  TM Distance: >3 FB  Neck ROM: full     Dental   No notable dental hx     Cardiovascular  Rhythm: regular, Rate: normal, Cardiovascular exam normal    Pulmonary  Pulmonary exam normal Breath sounds clear to auscultation    Other Findings        Anesthesia Plan  ASA Score- 2     Anesthesia Type- IV sedation with anesthesia with ASA Monitors.         Additional Monitors:     Airway Plan:     Comment: GA prn.       Plan Factors-    Chart reviewed.    Patient summary reviewed.    Patient is not a current smoker. Patient not instructed to abstain from smoking on day of procedure. Patient did not smoke on day of surgery.            Induction- intravenous.    Postoperative Plan-     Informed Consent- Anesthetic plan and risks discussed with patient and spouse (Gabrielle).  I personally reviewed this patient with the CRNA. Discussed and agreed on the Anesthesia Plan with the CRNA..

## 2024-02-15 NOTE — ANESTHESIA POSTPROCEDURE EVALUATION
Post-Op Assessment Note    CV Status:  Stable    Pain management: adequate       Mental Status:  Alert and awake   Hydration Status:  Euvolemic   PONV Controlled:  Controlled   Airway Patency:  Patent     Post Op Vitals Reviewed: Yes    No anethesia notable event occurred.    Staff: Anesthesiologist, CRNA               BP      Temp      Pulse     Resp      SpO2      /76   Pulse 78   Temp 98.6 °F (37 °C) (Temporal)   Resp 18   SpO2 98%

## 2024-02-15 NOTE — H&P
History and Physical - [facility]  Thompson Rapp 63 y.o. male MRN: [id number]                  HPI: Thompson Rapp is a 63 y.o. year old male who presents for history of colon polyps, lymphoplasmacytic lymphoma      REVIEW OF SYSTEMS: Per the HPI, and otherwise unremarkable.    Historical Information   Past Medical History:   Diagnosis Date    Atrial fibrillation (HCC)     Cancer (HCC)     lymphoma    Chronic kidney disease     CKD (chronic kidney disease)     Dilated aortic root (HCC)     Erectile dysfunction     Hyperlipidemia 06/28/2016    Hypertension     Hypertensive CKD (chronic kidney disease)     Mild renal insufficiency 09/26/2017    Vitamin D deficiency      Past Surgical History:   Procedure Laterality Date    CARDIAC ELECTROPHYSIOLOGY PROCEDURE N/A 03/03/2023    Procedure: Cardiac eps/afib ablation;  Surgeon: Anmol Gonzales MD;  [facility]: [facility];  Service: Cardiology    CARDIAC ELECTROPHYSIOLOGY PROCEDURE N/A 03/03/2023    Procedure: Cardiac eps/aflutter ablation;  Surgeon: Anmol Gonzales MD;  [facility];  Service: Cardiology    COLONOSCOPY      HERNIA REPAIR      HIATAL HERNIA REPAIR       Social History   Social History     Substance and Sexual Activity   Alcohol Use Yes    Alcohol/week: 1.0 standard drink of alcohol    Types: 1 Cans of beer per week    Comment: social     Social History     Substance and Sexual Activity   Drug Use No     Social History     Tobacco Use   Smoking Status Never   Smokeless Tobacco Never     Family History   Problem Relation Age of Onset    Lung cancer Mother     Cancer Mother     Heart attack Father 68    Prostate cancer Father     Hyperlipidemia Father     Hypertension Father     No Known Problems Sister     Lymphoma Maternal Aunt     Fainting Maternal Grandfather        Meds/Allergies       Current Outpatient Medications:     losartan (COZAAR) 100 MG tablet    cholecalciferol (VITAMIN D3) 1,000 units tablet    multivitamin  (THERAGRAN) TABS    Current Facility-Administered Medications:     sodium chloride 0.9 % infusion, 125 mL/hr, Intravenous, Continuous, 125 mL/hr at 02/15/24 0957    No Known Allergies    Objective     /86   Pulse 84   Temp 98.6 °F (37 °C) (Temporal)   Resp 14   SpO2 98%       PHYSICAL EXAM    Gen: NAD  Head: NCAT  CV: RRR  CHEST: Clear  ABD: soft, NT/ND  EXT: no edema      ASSESSMENT/PLAN:  This is a 63 y.o. year old male here for colonoscopy, and he is stable and optimized for his procedure.

## 2024-02-21 DIAGNOSIS — C83.00 MALIGNANT LYMPHOMA, LYMPHOPLASMACYTIC (HCC): Primary | ICD-10-CM

## 2024-02-21 PROBLEM — Z12.5 SCREENING FOR PROSTATE CANCER: Status: RESOLVED | Noted: 2018-04-03 | Resolved: 2024-02-21

## 2024-02-21 PROBLEM — Z00.00 WELL ADULT EXAM: Status: RESOLVED | Noted: 2018-10-30 | Resolved: 2024-02-21

## 2024-02-21 PROBLEM — Z12.11 SCREENING FOR COLON CANCER: Status: RESOLVED | Noted: 2018-10-30 | Resolved: 2024-02-21

## 2024-02-21 PROCEDURE — 88365 INSITU HYBRIDIZATION (FISH): CPT | Performed by: PATHOLOGY

## 2024-02-21 PROCEDURE — 88364 INSITU HYBRIDIZATION (FISH): CPT | Performed by: PATHOLOGY

## 2024-02-21 PROCEDURE — 88342 IMHCHEM/IMCYTCHM 1ST ANTB: CPT | Performed by: PATHOLOGY

## 2024-02-21 PROCEDURE — 88307 TISSUE EXAM BY PATHOLOGIST: CPT | Performed by: PATHOLOGY

## 2024-02-21 PROCEDURE — 88341 IMHCHEM/IMCYTCHM EA ADD ANTB: CPT | Performed by: PATHOLOGY

## 2024-02-22 ENCOUNTER — TELEPHONE (OUTPATIENT)
Dept: HEMATOLOGY ONCOLOGY | Facility: CLINIC | Age: 64
End: 2024-02-22

## 2024-02-22 NOTE — TELEPHONE ENCOUNTER
Spoke with patient to let him know I scheduled his pet scan and follow up with dr perez, he agreed to dates and times

## 2024-02-22 NOTE — TELEPHONE ENCOUNTER
Call Transfer   Who are you speaking with?  Spouse   If it is not the patient, are they listed on an active communication consent form? N/A   Who is the patients HemOnc/SurgOnc provider? Dr. Gibson   What is the reason for this call? Reschedule PET   Person/Department that the call was transferred to?    Time that call was transferred?    central scheduling   Your call will be transferred now. If you receive a voicemail, please leave a detailed message and a member of the team will return your call as soon as possible.    Did you relay this information to the caller?  N/A

## 2024-03-01 ENCOUNTER — HOSPITAL ENCOUNTER (OUTPATIENT)
Dept: NUCLEAR MEDICINE | Facility: HOSPITAL | Age: 64
End: 2024-03-01
Attending: INTERNAL MEDICINE
Payer: COMMERCIAL

## 2024-03-01 DIAGNOSIS — C83.00 MALIGNANT LYMPHOMA, LYMPHOPLASMACYTIC (HCC): ICD-10-CM

## 2024-03-01 LAB — GLUCOSE SERPL-MCNC: 100 MG/DL (ref 65–140)

## 2024-03-01 PROCEDURE — 78815 PET IMAGE W/CT SKULL-THIGH: CPT

## 2024-03-01 PROCEDURE — G1004 CDSM NDSC: HCPCS

## 2024-03-01 PROCEDURE — 82948 REAGENT STRIP/BLOOD GLUCOSE: CPT

## 2024-03-01 PROCEDURE — A9552 F18 FDG: HCPCS

## 2024-03-15 ENCOUNTER — OFFICE VISIT (OUTPATIENT)
Dept: CARDIOLOGY CLINIC | Facility: CLINIC | Age: 64
End: 2024-03-15
Payer: COMMERCIAL

## 2024-03-15 VITALS
DIASTOLIC BLOOD PRESSURE: 102 MMHG | HEART RATE: 67 BPM | OXYGEN SATURATION: 97 % | BODY MASS INDEX: 26.43 KG/M2 | WEIGHT: 188.8 LBS | HEIGHT: 71 IN | SYSTOLIC BLOOD PRESSURE: 162 MMHG

## 2024-03-15 DIAGNOSIS — I48.0 PAROXYSMAL ATRIAL FIBRILLATION (HCC): Primary | ICD-10-CM

## 2024-03-15 DIAGNOSIS — N18.2 CKD (CHRONIC KIDNEY DISEASE) STAGE 2, GFR 60-89 ML/MIN: ICD-10-CM

## 2024-03-15 DIAGNOSIS — I77.810 DILATED AORTIC ROOT (HCC): ICD-10-CM

## 2024-03-15 DIAGNOSIS — I10 BENIGN ESSENTIAL HYPERTENSION: ICD-10-CM

## 2024-03-15 DIAGNOSIS — E78.5 DYSLIPIDEMIA: ICD-10-CM

## 2024-03-15 PROBLEM — N18.30 STAGE 3 CHRONIC KIDNEY DISEASE (HCC): Status: RESOLVED | Noted: 2019-08-25 | Resolved: 2024-03-15

## 2024-03-15 PROCEDURE — 93000 ELECTROCARDIOGRAM COMPLETE: CPT | Performed by: INTERNAL MEDICINE

## 2024-03-15 PROCEDURE — 99214 OFFICE O/P EST MOD 30 MIN: CPT | Performed by: INTERNAL MEDICINE

## 2024-03-15 RX ORDER — NIFEDIPINE 30 MG/1
30 TABLET, EXTENDED RELEASE ORAL DAILY
Qty: 90 TABLET | Refills: 3 | Status: SHIPPED | OUTPATIENT
Start: 2024-03-15

## 2024-03-15 NOTE — PROGRESS NOTES
Cardiology Follow Up    Thompson Rapp  [facility]  [address]  [address]  [facility] [address]  Phone#  [phone]  Fax#  [phone]      1. Paroxysmal atrial fibrillation (HCC)  POCT ECG      2. Benign essential hypertension  NIFEdipine (PROCARDIA XL) 30 mg 24 hr tablet      3. Dyslipidemia        4. Dilated aortic root (HCC)        5. CKD (chronic kidney disease) stage 2, GFR 60-89 ml/min              Discussion/Summary:  Mr. Rapp is a pleasant 63-year-old gentleman who presents to the office today for routine follow-up.  Since his last visit he feels well.  He has not had any sensation of recurrent atrial fibrillation.    His blood pressure is not well-controlled in the office today.  He has had various intolerances to antihypertensive medication as listed below.  He is agreeable to attempting nifedipine.  I have asked that he continue to monitor his blood pressure at home and notify the office of persistently elevated readings.  He is also due to see his nephrologist in the near future for re-evaluation who will reevaluate his blood pressure.  A low-salt diet was reinforced.  He has a known history of obstructive sleep apnea but has been intolerant of CPAP.    He has no signs or symptoms of recurrent atrial fibrillation.  Systemic anticoagulation has been discontinued by his electrophysiologist.      Otherwise he was noted to have a dilated ascending aorta on echocardiogram a few years ago.  He underwent a CT scan prior to his ablation revealing a stable aneurysm at 4.1 cm and also underwent a recent PET scan revealing stable findings.    His lipids were reviewed.  Based on his 10-year risk statin therapy is indicated.  We did again discuss this.  He expresses concern due to multiple drug intolerances hence declines.     I will see him back in the office in six months for further evaluation.    History of Present Illness:  Mr. Rapp is  a pleasant 63-year-old gentleman who presents to the office today for routine follow-up.    He expresses concern over elevated blood pressure readings.  His losartan dose was escalated since his last visit with me but his numbers remain suboptimal.     He has been attempting to remain active. He walks outdoors weather permitting with his wife and also states he walks five miles throughout the day on a regular basis as part of his job.  With the activity he performs he is asymptomatic without any chest pain or shortness of breath.  He denies symptoms of congestive heart failure including increasing lower extremity edema, paroxysmal nocturnal dyspnea, orthopnea, acute weight gain or increasing abdominal girth.  He denies syncope or presyncope.  He denies symptoms of claudication.  He denies any symptoms suggestive of recurrent atrial fibrillation.    He has been on the following medications and intolerant due to various side effects:     Amlodipine  Atenolol  Bystolic  Lisinopril   Candesartan  Diltiazem   Chlorthalidone    He has been intolerant of CPAP as well.    Problem List       Benign hypertensive CKD    Lab Results   Component Value Date    EGFR 62 12/06/2023    EGFR 61 06/01/2023    EGFR 62 05/13/2023    CREATININE 1.22 12/06/2023    CREATININE 1.24 06/01/2023    CREATININE 1.22 05/13/2023         Erectile dysfunction of non-organic origin    Episodic tension-type headache, not intractable    Screening for prostate cancer    Vitamin D deficiency    Well adult exam    Need for influenza vaccination    Screening for colon cancer    Dyslipidemia    Benign essential hypertension    Stage 3 chronic kidney disease    Lab Results   Component Value Date    EGFR 62 12/06/2023    EGFR 61 06/01/2023    EGFR 62 05/13/2023    CREATININE 1.22 12/06/2023    CREATININE 1.24 06/01/2023    CREATININE 1.22 05/13/2023         Dilated aortic root (HCC)    JODY (obstructive sleep apnea)    Annual physical exam          Past Medical  History:   Diagnosis Date    Atrial fibrillation (HCC)     Cancer (HCC)     lymphoma    Chronic kidney disease     CKD (chronic kidney disease)     Dilated aortic root (HCC)     Erectile dysfunction     Hyperlipidemia 06/28/2016    Hypertension     Hypertensive CKD (chronic kidney disease)     Mild renal insufficiency 09/26/2017    Vitamin D deficiency      Social History     Socioeconomic History    Marital status: /Civil Union     Spouse name: Not on file    Number of children: Not on file    Years of education: Not on file    Highest education level: Not on file   Occupational History    Not on file   Tobacco Use    Smoking status: Never    Smokeless tobacco: Never   Vaping Use    Vaping status: Never Used   Substance and Sexual Activity    Alcohol use: Yes     Alcohol/week: 1.0 standard drink of alcohol     Types: 1 Cans of beer per week     Comment: social    Drug use: No    Sexual activity: Yes     Partners: Female     Birth control/protection: None   Other Topics Concern    Not on file   Social History Narrative    Not on file     Social Determinants of Health     Financial Resource Strain: Not on file   Food Insecurity: Not on file   Transportation Needs: Not on file   Physical Activity: Not on file   Stress: Not on file   Social Connections: Not on file   Intimate Partner Violence: Not on file   Housing Stability: Not on file      Family History   Problem Relation Age of Onset    Lung cancer Mother     Cancer Mother     Heart attack Father 68    Prostate cancer Father     Hyperlipidemia Father     Hypertension Father     No Known Problems Sister     Lymphoma Maternal Aunt     Fainting Maternal Grandfather      Past Surgical History:   Procedure Laterality Date    CARDIAC ELECTROPHYSIOLOGY PROCEDURE N/A 03/03/2023    Procedure: Cardiac eps/afib ablation;  Surgeon: Anmol Gonzales MD;  [facility]: [facility];  Service: Cardiology    CARDIAC ELECTROPHYSIOLOGY PROCEDURE N/A 03/03/2023    Procedure:  "Cardiac eps/aflutter ablation;  Surgeon: Amnol Gonzales MD;  Location: [facility];  Service: Cardiology    COLONOSCOPY      HERNIA REPAIR      HIATAL HERNIA REPAIR         Current Outpatient Medications:     cholecalciferol (VITAMIN D3) 1,000 units tablet, Take 1,000 Units by mouth daily, Disp: , Rfl:     losartan (COZAAR) 100 MG tablet, Take 1 tablet (100 mg total) by mouth daily, Disp: 90 tablet, Rfl: 3    multivitamin (THERAGRAN) TABS, Take 1 tablet by mouth daily, Disp: , Rfl:     NIFEdipine (PROCARDIA XL) 30 mg 24 hr tablet, Take 1 tablet (30 mg total) by mouth daily, Disp: 90 tablet, Rfl: 3  No Known Allergies    Labs:     Chemistry        Component Value Date/Time     10/07/2017 0740    K 3.9 12/06/2023 0848    K 3.7 02/20/2021 0747     12/06/2023 0848     02/20/2021 0747    CO2 27 12/06/2023 0848    CO2 28 02/20/2021 0747    BUN 15 12/06/2023 0848    BUN 22 02/20/2021 0747    CREATININE 1.22 12/06/2023 0848    CREATININE 1.31 10/07/2017 0740        Component Value Date/Time    CALCIUM 9.5 12/06/2023 0848    CALCIUM 9.5 02/20/2021 0747    ALKPHOS 52 12/06/2023 0848    ALKPHOS 46 02/20/2021 0747    AST 18 12/06/2023 0848    AST 18 02/20/2021 0747    ALT 14 12/06/2023 0848    ALT 21 02/20/2021 0747    BILITOT 0.6 10/07/2017 0740            Lab Results   Component Value Date    CHOL 174 09/09/2017    CHOL 224 (H) 06/18/2016    CHOL 186 03/21/2015     Lab Results   Component Value Date    HDL 49 12/06/2023    HDL 61 06/01/2023    HDL 55 07/09/2022     Lab Results   Component Value Date    LDLCALC 129 (H) 12/06/2023    LDLCALC 157 (H) 06/01/2023    LDLCALC 121 (H) 07/09/2022     Lab Results   Component Value Date    TRIG 94 12/06/2023    TRIG 77 06/01/2023    TRIG 84 07/09/2022     No results found for: \"CHOLHDL\"    Imaging: No results found.    Review of Systems   Cardiovascular:  Negative for chest pain, dyspnea on exertion, irregular heartbeat, palpitations, paroxysmal nocturnal dyspnea " "and syncope.   All other systems reviewed and are negative.      Vitals:    03/15/24 1100   BP: (!) 162/102   Pulse: 67   SpO2: 97%       Vitals:    03/15/24 1100   Weight: 85.6 kg (188 lb 12.8 oz)       Height: 5' 11\" (180.3 cm)   Body mass index is 26.33 kg/m².     Physical Exam:  General:  Alert and cooperative, appears stated age  HEENT:  PERRLA, EOMI, no scleral icterus, no conjunctival pallor  Neck:  No lymphadenopathy, no thyromegaly, no carotid bruits, no elevated JVP  Heart:  Regular rate and rhythm, normal S1/S2, no S3/S4, no murmur  Lungs:  Clear to auscultation bilaterally   Abdomen:  Soft, non-tender, positive bowel sounds, no rebound or guarding,   no organomegaly   Extremities:  No clubbing, cyanosis or edema   Vascular:  2+ pedal pulses  Skin:  No rashes or lesions on exposed skin  Neurologic:  Cranial nerves II-XII grossly intact without focal deficits  "

## 2024-03-25 ENCOUNTER — OFFICE VISIT (OUTPATIENT)
Dept: HEMATOLOGY ONCOLOGY | Facility: CLINIC | Age: 64
End: 2024-03-25
Payer: COMMERCIAL

## 2024-03-25 VITALS
BODY MASS INDEX: 26.46 KG/M2 | OXYGEN SATURATION: 98 % | WEIGHT: 189 LBS | HEIGHT: 71 IN | TEMPERATURE: 97.6 F | DIASTOLIC BLOOD PRESSURE: 80 MMHG | HEART RATE: 74 BPM | RESPIRATION RATE: 18 BRPM | SYSTOLIC BLOOD PRESSURE: 138 MMHG

## 2024-03-25 DIAGNOSIS — C83.00 MALIGNANT LYMPHOMA, LYMPHOPLASMACYTIC (HCC): Primary | ICD-10-CM

## 2024-03-25 PROCEDURE — 99214 OFFICE O/P EST MOD 30 MIN: CPT | Performed by: INTERNAL MEDICINE

## 2024-03-25 NOTE — PROGRESS NOTES
Hematology/Oncology Outpatient Follow-up  Thompson Rapp 63 y.o. male 1960 [id number]    Date:  3/25/2024        Assessment and Plan:  1. Malignant lymphoma, lymphoplasmacytic (HCC)  62 y.o. male diagnosed with sigmoid polyp upon a screening colonoscopy that is biopsy proven to be lymphoplasmacytic lymphoma in February 2021.  Additional testing proved that he was  MYD88 positive.    I did discuss with the patient the result of the recent biopsy from the rectal fold which was done through screening colonoscopy.  This was compatible with lymphoplasmacytic lymphoma.  We did also discuss the result of the recent PET CT scan from 3/1/2024 which showed some activity in the proximal rectal area with decreased activity in the left axillary node.  The patient continues to be entirely asymptomatic.  We did discuss the usual indication for treatment according to the NCCN guidelines:  Symptomsl related to:  Hyperviscositym  Neuropathy  Organomegaly  Amyloidosis  Cold agglutinin diseasen  Cryoglobulinemian  Anemia and other cytopenias  Bulky adenopathy  B symptoms  Cytopenias.    The patient was told that we will continue to monitor him closely.  He does have an appointment around the beginning of August.  He was asked to get the blood work done prior for close monitoring.  We did discuss the potential benefit of pursuing a second opinion by Dr. Camacho from [facility] for peace of mind with any hint of progression to the point that treatment would be justifiable.  The patient never had a bone marrow biopsy.  Will continue to monitor his blood work and make a decision regarding bone marrow biopsy for staging accordingly.    We did discuss different treatment modalities with the BR regimen versus BTK inhibitors once treatment is indicated.        HPI:  The patient came there for a follow-up visit accompanied by his wife.  He apparently had colonoscopy on 2/15/2024.  A biopsy from the rectum abnormal fold showed lymphoplasmacytic  lymphoma.  Subsequently had a PET CT scan on 3/1/2024 which showed:  IMPRESSION:  1. There is somewhat greater FDG intensity in a short segment of the proximal rectum, though uncertain if this is just physiologic or correlates with recent biopsy findings of rectal lymphoproliferative disorder. There is otherwise physiologic FDG   activity throughout the small and large bowel, limiting evaluation for lymphoma involvement.  2. Interval decreased FDG uptake in a left axillary node. Additional mild FDG uptake in small bilateral axillary nodes are nonspecific, possibly just reactive. Attention on follow-up to exclude lymphoma involvement.  3. No additional hypermetabolic lesions that are concerning for malignancy/metastases.  4. Stable ectatic ascending thoracic aorta. Continued yearly CT follow-up recommended.    The patient continues to be entirely asymptomatic.    Oncology History   Malignant lymphoma, lymphoplasmacytic (HCC)   2/18/2021 Initial Diagnosis    Malignant lymphoma, lymphoplasmacytic (HCC)    This addendum is included to report the results of MYD88 testing which is positive, favoring a diagnosis of lymphoplasmacytic lymphoma, though is not definitively specific.  Correlation with clinical impression is recommended.        - MYD88 Mutation Analysis performed on the Colorectal Polyp @ [facility] (Specimen ID: [id number], evaluated by Trudy Chan MD, PhD) as follows:   Results:  MYD88 Mutation(s):     Detected  Mutation(s) Detected:   c.794T>C (p.L265P)   Addendum electronically signed by Bennie Myers MD on 3/1/2021 at 11:56 AM   Final Diagnosis   A. Colon, descending polyp, biopsy:  -  Tubular adenoma, negative for high-grade dysplasia.    B. Colon, sigmoid polyp, biopsy:  -  Small B cell neoplasm with kappa light chain restricted plasma cell proliferation, compatible with lymphoma (see note)     PETCT 3/19/21  1.  Small focus of FDG uptake at the gastric antrum for which underlying lesion  should be excluded.  Correlate with endoscopy.  2.  Single FDG avid left axillary lymph node.  This may be related to recent Covid vaccination but given the history of lymphoma, malignancy cannot be entirely excluded.  This can be reassessed on short-term follow-up CT or PET/CT in 3 months.  3. Small focus of FDG uptake at the inferior aspect of the prostate, underlying lesion not excluded.  Correlate with PSA levels and urologic evaluation.  4. Mild FDG uptake in a left upper cervical chain lymph node may be reactive but can be reassessed on follow-up.  5.  Mildly aneurysmal ascending thoracic aorta at 4.1 cm in diameter.    BASELINE DISEASE CHARACTERISTICS 2/20/21  SPEP - normal  Quant Ig - normal including normal IgM  SLFC Ratio - normal  CBC - normal         Interval history:    ROS: Review of Systems   Constitutional:  Negative for chills and fever.   HENT:  Negative for ear pain and sore throat.    Eyes:  Negative for pain and visual disturbance.   Respiratory:  Negative for cough and shortness of breath.    Cardiovascular:  Negative for chest pain and palpitations.   Gastrointestinal:  Negative for abdominal pain and vomiting.   Genitourinary:  Negative for dysuria and hematuria.   Musculoskeletal:  Negative for arthralgias and back pain.   Skin:  Negative for color change and rash.   Neurological:  Positive for headaches. Negative for seizures and syncope.   All other systems reviewed and are negative.      Past Medical History:   Diagnosis Date    Atrial fibrillation (HCC)     Cancer (HCC)     lymphoma    Chronic kidney disease     CKD (chronic kidney disease)     Dilated aortic root (HCC)     Erectile dysfunction     Hyperlipidemia 06/28/2016    Hypertension     Hypertensive CKD (chronic kidney disease)     Mild renal insufficiency 09/26/2017    Vitamin D deficiency        Past Surgical History:   Procedure Laterality Date    CARDIAC ELECTROPHYSIOLOGY PROCEDURE N/A 03/03/2023    Procedure: Cardiac eps/afib  ablation;  Surgeon: Anmol Gonzales MD;  Location: [facility];  Service: Cardiology    CARDIAC ELECTROPHYSIOLOGY PROCEDURE N/A 03/03/2023    Procedure: Cardiac eps/aflutter ablation;  Surgeon: Anmol Gonzales MD;  Location: [facility];  Service: Cardiology    COLONOSCOPY      HERNIA REPAIR      HIATAL HERNIA REPAIR         Social History     Socioeconomic History    Marital status: /Civil Union     Spouse name: None    Number of children: None    Years of education: None    Highest education level: None   Occupational History    None   Tobacco Use    Smoking status: Never    Smokeless tobacco: Never   Vaping Use    Vaping status: Never Used   Substance and Sexual Activity    Alcohol use: Yes     Alcohol/week: 1.0 standard drink of alcohol     Types: 1 Cans of beer per week     Comment: social    Drug use: No    Sexual activity: Yes     Partners: Female     Birth control/protection: None   Other Topics Concern    None   Social History Narrative    None     Social Determinants of Health     Financial Resource Strain: Not on file   Food Insecurity: Not on file   Transportation Needs: Not on file   Physical Activity: Not on file   Stress: Not on file   Social Connections: Not on file   Intimate Partner Violence: Not on file   Housing Stability: Not on file       Family History   Problem Relation Age of Onset    Lung cancer Mother     Cancer Mother     Heart attack Father 68    Prostate cancer Father     Hyperlipidemia Father     Hypertension Father     No Known Problems Sister     Lymphoma Maternal Aunt     Fainting Maternal Grandfather        No Known Allergies      Current Outpatient Medications:     cholecalciferol (VITAMIN D3) 1,000 units tablet, Take 1,000 Units by mouth daily, Disp: , Rfl:     losartan (COZAAR) 100 MG tablet, Take 1 tablet (100 mg total) by mouth daily, Disp: 90 tablet, Rfl: 3    multivitamin (THERAGRAN) TABS, Take 1 tablet by mouth daily, Disp: , Rfl:     NIFEdipine (PROCARDIA  "XL) 30 mg 24 hr tablet, Take 1 tablet (30 mg total) by mouth daily, Disp: 90 tablet, Rfl: 3      Physical Exam:  /80 (BP Location: Left arm, Patient Position: Sitting, Cuff Size: Adult)   Pulse 74   Temp 97.6 °F (36.4 °C)   Resp 18   Ht 5' 11\" (1.803 m)   Wt 85.7 kg (189 lb)   SpO2 98%   BMI 26.36 kg/m²     Physical Exam  Constitutional:       Appearance: He is well-developed.   HENT:      Head: Normocephalic and atraumatic.      Nose: Nose normal.   Eyes:      General: No scleral icterus.        Right eye: No discharge.         Left eye: No discharge.      Conjunctiva/sclera: Conjunctivae normal.      Pupils: Pupils are equal, round, and reactive to light.   Neck:      Thyroid: No thyromegaly.      Trachea: No tracheal deviation.   Cardiovascular:      Rate and Rhythm: Normal rate and regular rhythm.      Heart sounds: Normal heart sounds. No murmur heard.     No friction rub.   Pulmonary:      Effort: Pulmonary effort is normal. No respiratory distress.      Breath sounds: Normal breath sounds. No wheezing or rales.   Chest:      Chest wall: No tenderness.   Abdominal:      General: There is no distension.      Palpations: Abdomen is soft. There is no hepatomegaly or splenomegaly.      Tenderness: There is no abdominal tenderness. There is no guarding or rebound.   Musculoskeletal:         General: No tenderness or deformity. Normal range of motion.      Cervical back: Normal range of motion and neck supple.   Lymphadenopathy:      Cervical: No cervical adenopathy.   Skin:     General: Skin is warm and dry.      Coloration: Skin is not pale.      Findings: No erythema or rash.   Neurological:      Mental Status: He is alert and oriented to person, place, and time.      Cranial Nerves: No cranial nerve deficit.      Coordination: Coordination normal.      Deep Tendon Reflexes: Reflexes are normal and symmetric.   Psychiatric:         Behavior: Behavior normal.         Thought Content: Thought content " "normal.         Judgment: Judgment normal.           Labs:  Lab Results   Component Value Date    WBC 6.98 12/06/2023    HGB 14.6 12/06/2023    HCT 43.8 12/06/2023    MCV 97 12/06/2023     12/06/2023     Lab Results   Component Value Date     10/07/2017    K 3.9 12/06/2023     12/06/2023    CO2 27 12/06/2023    BUN 15 12/06/2023    CREATININE 1.22 12/06/2023    GLUF 120 (H) 12/06/2023    CALCIUM 9.5 12/06/2023    CORRECTEDCA 9.5 05/13/2023    AST 18 12/06/2023    ALT 14 12/06/2023    ALKPHOS 52 12/06/2023    PROT 6.1 10/07/2017    BILITOT 0.6 10/07/2017    EGFR 62 12/06/2023     No results found for: \"TSH\"    Patient voiced understanding and agreement in the above discussion. Aware to contact our office with questions/symptoms in the interim.   "

## 2024-05-01 ENCOUNTER — APPOINTMENT (OUTPATIENT)
Dept: LAB | Facility: MEDICAL CENTER | Age: 64
End: 2024-05-01
Payer: COMMERCIAL

## 2024-05-01 DIAGNOSIS — N18.2 CKD (CHRONIC KIDNEY DISEASE) STAGE 2, GFR 60-89 ML/MIN: ICD-10-CM

## 2024-05-01 DIAGNOSIS — E78.5 DYSLIPIDEMIA: ICD-10-CM

## 2024-05-01 DIAGNOSIS — I48.0 PAROXYSMAL ATRIAL FIBRILLATION (HCC): ICD-10-CM

## 2024-05-01 DIAGNOSIS — I12.9 BENIGN HYPERTENSIVE KIDNEY DISEASE WITH CHRONIC KIDNEY DISEASE STAGE I THROUGH STAGE IV, OR UNSPECIFIED: ICD-10-CM

## 2024-05-01 DIAGNOSIS — E55.9 VITAMIN D DEFICIENCY: ICD-10-CM

## 2024-05-01 DIAGNOSIS — N18.31 STAGE 3A CHRONIC KIDNEY DISEASE (HCC): ICD-10-CM

## 2024-05-01 DIAGNOSIS — I10 BENIGN ESSENTIAL HYPERTENSION: ICD-10-CM

## 2024-05-01 LAB
25(OH)D3 SERPL-MCNC: 35.2 NG/ML (ref 30–100)
ALBUMIN SERPL BCP-MCNC: 4.1 G/DL (ref 3.5–5)
ANION GAP SERPL CALCULATED.3IONS-SCNC: 7 MMOL/L (ref 4–13)
BUN SERPL-MCNC: 16 MG/DL (ref 5–25)
CALCIUM SERPL-MCNC: 9.4 MG/DL (ref 8.4–10.2)
CHLORIDE SERPL-SCNC: 104 MMOL/L (ref 96–108)
CO2 SERPL-SCNC: 27 MMOL/L (ref 21–32)
CREAT SERPL-MCNC: 1.19 MG/DL (ref 0.6–1.3)
CREAT UR-MCNC: 38.8 MG/DL
GFR SERPL CREATININE-BSD FRML MDRD: 64 ML/MIN/1.73SQ M
GLUCOSE P FAST SERPL-MCNC: 107 MG/DL (ref 65–99)
MICROALBUMIN UR-MCNC: <7 MG/L
MICROALBUMIN/CREAT 24H UR: <18 MG/G CREATININE (ref 0–30)
PHOSPHATE SERPL-MCNC: 2.5 MG/DL (ref 2.3–4.1)
POTASSIUM SERPL-SCNC: 4.1 MMOL/L (ref 3.5–5.3)
PTH-INTACT SERPL-MCNC: 48 PG/ML (ref 12–88)
SODIUM SERPL-SCNC: 138 MMOL/L (ref 135–147)

## 2024-05-01 PROCEDURE — 83970 ASSAY OF PARATHORMONE: CPT

## 2024-05-01 PROCEDURE — 36415 COLL VENOUS BLD VENIPUNCTURE: CPT

## 2024-05-01 PROCEDURE — 82043 UR ALBUMIN QUANTITATIVE: CPT

## 2024-05-01 PROCEDURE — 82570 ASSAY OF URINE CREATININE: CPT

## 2024-05-01 PROCEDURE — 80069 RENAL FUNCTION PANEL: CPT

## 2024-05-01 PROCEDURE — 82306 VITAMIN D 25 HYDROXY: CPT

## 2024-05-07 ENCOUNTER — OFFICE VISIT (OUTPATIENT)
Dept: NEPHROLOGY | Facility: CLINIC | Age: 64
End: 2024-05-07
Payer: COMMERCIAL

## 2024-05-07 VITALS
HEIGHT: 71 IN | SYSTOLIC BLOOD PRESSURE: 142 MMHG | OXYGEN SATURATION: 98 % | WEIGHT: 191 LBS | BODY MASS INDEX: 26.74 KG/M2 | HEART RATE: 79 BPM | DIASTOLIC BLOOD PRESSURE: 88 MMHG

## 2024-05-07 DIAGNOSIS — I12.9 BENIGN HYPERTENSIVE KIDNEY DISEASE WITH CHRONIC KIDNEY DISEASE STAGE I THROUGH STAGE IV, OR UNSPECIFIED: ICD-10-CM

## 2024-05-07 DIAGNOSIS — I10 BENIGN ESSENTIAL HYPERTENSION: ICD-10-CM

## 2024-05-07 DIAGNOSIS — N18.2 CKD (CHRONIC KIDNEY DISEASE) STAGE 2, GFR 60-89 ML/MIN: Primary | ICD-10-CM

## 2024-05-07 DIAGNOSIS — E55.9 VITAMIN D DEFICIENCY: ICD-10-CM

## 2024-05-07 DIAGNOSIS — I48.0 PAROXYSMAL ATRIAL FIBRILLATION (HCC): ICD-10-CM

## 2024-05-07 PROCEDURE — 99214 OFFICE O/P EST MOD 30 MIN: CPT | Performed by: INTERNAL MEDICINE

## 2024-05-07 RX ORDER — HYDRALAZINE HYDROCHLORIDE 10 MG/1
10 TABLET, FILM COATED ORAL 2 TIMES DAILY
Qty: 180 TABLET | Refills: 3 | Status: SHIPPED | OUTPATIENT
Start: 2024-05-07

## 2024-05-07 RX ORDER — LOSARTAN POTASSIUM 100 MG/1
100 TABLET ORAL DAILY
Qty: 90 TABLET | Refills: 3 | Status: SHIPPED | OUTPATIENT
Start: 2024-05-07

## 2024-05-07 RX ORDER — NIFEDIPINE 30 MG/1
30 TABLET, EXTENDED RELEASE ORAL DAILY
Qty: 90 TABLET | Refills: 3 | Status: SHIPPED | OUTPATIENT
Start: 2024-05-07

## 2024-05-07 NOTE — PATIENT INSTRUCTIONS
"- start hydralazine 10mg twice a day  - check blood pressure and call with updates in 2 weeks    - Please call me in 10 days after having your blood work done to review the results if you do not hear back from me or my office, as I may have not received the results.  - please remember to perform blood work prior to the next visit.  - Please call if the blood pressure top number is greater than 140 or less than 110 consistently.  - Please call if you are gaining more than 2lbs in 2 days for adjustment of water pills.  ~ Please AVOID the following pain medications.  LIST OF NSAIDS (NONSTEROIDAL ANTI-INFLAMMATORY DRUGS) AND CHAMBERS-2 INHIBITORS    DIFLUNISAL (DOLOBID)  IBUPROFEN (MOTRIN, ADVIL)  FLURBIPROFEN (ANSAID)  KETOPROFEN (ORUDIS, ORUVAIL)  FENOPROFEN (NALFON)  NABUMETONE (RELAFEN)  PIROXICAM (FELDENE)  NAPROXEN (ALEVE, NAPROSYN, NAPRELAN, ANAPROX)  DICLOFENAC (VOLTAREN, CATAFLAM)  INDOMETHACIN (INDOCIN)  SULINDAC (CLINORIL)  TOLMETIN (TOLETIN)  ETODOLAC (LODINE)  MELOXICAM (MOBIC)  KETOROLAC (TORADOL)  OXAPROZIN (DAYPRO)  CELECOXIB (CELEBREX)    Phosphorus diet  Follow a low phosphorus diet.    Avoid these higher phosphorus foods: Choose these lower phosphorus foods:   Milk, pudding or yogurt (from animals and from many soy varieties) Rice milk (unfortified), nondairy creamer (if it doesn't have terms in the ingredients list that contain the letters \"phos\")   Hard cheeses, ricotta or cottage cheese, fat-free cream cheese Regular and low-fat cream cheese   Ice cream or frozen yogurt Sherbet or frozen fruit pops   Soups made with higher phosphorus ingredients (milk, dried peas, beans, lentils) Soups made with lower phosphorus ingredients (broth- or water-based with other lower phosphorus ingredients)   Whole grains, including whole-grain breads, crackers, cereal, rice and pasta Refined grains, including white bread, crackers, cereals, rice and pasta   Quick breads, biscuits, cornbread, muffins, pancakes or waffles " "Homemade refined (white) dinner rolls, bagels or English muffins   Dried peas (split, black-eyed), beans (black, garbanzo, lima, kidney, navy, romano) or lentils Green peas (canned, frozen), green beans or wax beans   Organ meats, walleye, pollock or sardines Lean beef, pork, lamb, poultry or other fish   Nuts and seeds Popcorn   Peanut butter and other nut butters Jam, jelly or honey   Chocolate, including chocolate drinks Carob (chocolate-flavored) candy, hard candy or gumdrops   Pippa and pepper-type sodas, flavored coronado, bottled teas (if a term in the ingredients list contains the letters \"phos\") Lemon-lime soda, ginger ale or root beer, plain water   Follow a moderate potassium diet.          Things to do to reduce your blood pressure include working with all your physician to do the following:  ~ stop smoking if you smoke.  ~ increase cardiovascular exercise like walking and swimming.   ~ modify your diet to decrease fat and salt intake.  ~ reduce your weight if you are overweight or obese.  ~ increase the consumption of fruits, vegetables and whole grains.  ~ decrease alcohol consumption if you consume alcohol.   ~ try to minimize stress in your life with lifestyle modifications.   ~ be compliant with your anti-hypertensive medications.   ~ adjust your medications to help improve your vascular stiffness and decrease risks for heart attacks and strokes.   "

## 2024-05-07 NOTE — PROGRESS NOTES
Nephrology Follow up Consultation  Thompson Rapp 63 y.o. male MRN: [id number]            BACKGROUND:  Thompson Rapp is a 63 y.o.male who was referred by Kyra King DO for evaluation of Follow-up and Chronic Kidney Disease  .      ASSESSMENT / PLAN:   63 y.o.  male with pmh of multiple co-morbidities including hyperlipidemia, hypertension (x 3yrs) and CKD stage 3 presents to the office for routine follow-up.    1. CKD stage II/IIIA:  - Patient has a baseline creatinine of 1.2-1.5 mg/dL. Most recent labs show a Creatinine of 1.19 mg/dL on 5/1/24 Renal function remains stable at baseline.    - likely has underlying CKD secondary to hypertensive nephrosclerosis plus age-related nephron loss plus NSAID nephropathy.  SPEP within normal limits  - renal ultrasound from July 2, 2019 shows bilateral kidneys 11 cm.  With no hydronephrosis no masses.  - Proteinuria - most recent microalbumin to creatinine ratio of less than 18 mg/dL as of May 2024, UA with no blood.  Most recent protein creatinine ratio too small to quantify as of May 2023.  Most recent UA with no blood or protein  - Acid base and lytes stable.  - Clinically the patient appears to be euvolemic  - Recommend to avoid use of NSAIDs, nephrotoxins. Caution advised with regards to exposure to IV contrast dye.   - Discussed with the patient in depth his renal status, including the possible etiologies for CKD.   - Advised the patient that when his GFR is close to 20mL/min then will start discussing about RRT(renal replacement therapy) options such as renal transplant, peritoneal dialysis and hemodialysis.   - Informed the patient about the various options for Renal Replacement therapy.  - Discussed with the patient how we need to work together to delay the progression of CKD with optimal BP control based on their age and co-morbidities and trying to reduce proteinuria by the use of anti-proteinuric agents.     2. Hypertension:  -Patient is currently on losartan 100 mg  p.o. daily, procardia XL 30mg po Q24  -Pressure stable at this time will have patient try hydralazine 10 mg p.o. twice daily and call with blood pressure updates.  -In the past patient was on lisinopril 20mg po Q24 and was switched over to candesartan due to cough.  Patient has had intolerability to multiple prior antihypertensive medications such as Bystolic, lisinopril, candesartan, metoprolol, Cardizem.   -Discussed with patient if he does have adverse effects of the hydralazine then we will continue current regimen of the losartan and Procardia as long as his SBP stays in the 1 30-1 45 at the max due to limitations of medication usage based on adverse effects  -Renal artery Dopplers from December 2021 no significant stenosis.  No renal atrophy.  - advised patient about appropriate blood pressure management technique.  - Goal BP of <  130/80 based on age and comorbidities  - Instructed to follow low sodium (2gm)diet.    3. Hemoglobin:  - Goal Hb of 10-12 g/dL  - Most recent labs suggestive of 13.6 grams/deciliter.   - no role for IV iron at this time    4.CKD-MBD(Mineral Bone Disease):  - Based on patients CKD stage following is the goal of therapy.  - Maintain calcium phosphorus product of < 55.  - Stage 3 CKD - Goal Ca 8.5-10 mg/dL , goal Phos 2.7-4.6 mg/dL  , goal iPTH 30-70 pg/mL  - Patient is currently at goal.  - check vitamin-D and intact PTH next visit  - most recent intact PTH of 48 and vitamin-D level of 35.2 as of 5/1/2024.   - continue vitamin-D 1000 units p.o. Q.day    5. Lipids:  - goal LDL less than 70  - Management as per PCP    6.  Paroxysmal A-fib/ lymphoplasmacytic lymphoma malignant:  -Last seen by cardiology 4/11/2023 notes reviewed  -No longer on metoprolol  -Per cardiology was treated with diltiazem both were discontinued due to side effects and patient preference.  -Status post cryoablation with pulmonary vein isolation  -Seen by hematology 3/25/2024 notes reviewed    7. Nutrition:  -  Encouraged patient to follow a renal diet comprising of moderate potassium, low phosphorus and protein restriction to 0.8gm/kg.  - Will check serum albumin with next blood work.     8. Followup:  - Patient is to follow-up in 6 months, with lab work to be performed in  a few days prior to the next visit.  Advised patient to call me in 10 days to review the results if they do not hear back from me, as I may have not received the results.    Arlene Mary TIMOTHY Arnett, 5/7/2024, 2:54 PM             SUBJECTIVE: 63 y.o. male presents to the office for routine follow-up.  Feels well has no complaints no NSAID use no issues with edema thankful to care information that is gone today happy renal parameters are stable and slightly improved.  Since last visit losartan dose to had been increased and was started on Procardia XL.  Agreeable to giving hydralazine a try.  Has had issues with multiple blood pressure medications in the past.  Reports home blood pressures in the 130s to 140s usually.  Just feels overall tired.    Review of Systems   Constitutional:  Negative for chills and fever.   HENT:  Negative for congestion.    Respiratory:  Negative for cough and shortness of breath.    Cardiovascular:  Negative for leg swelling.   Gastrointestinal:  Negative for constipation and diarrhea.   Genitourinary:  Negative for dysuria.   Musculoskeletal:  Negative for back pain.   Neurological:  Negative for dizziness and headaches.   Psychiatric/Behavioral:  Negative for agitation and confusion.    All other systems reviewed and are negative.      PAST MEDICAL HISTORY:  Past Medical History:   Diagnosis Date   • Atrial fibrillation (HCC)    • Cancer (HCC)     lymphoma   • Chronic kidney disease    • CKD (chronic kidney disease)    • Dilated aortic root (HCC)    • Erectile dysfunction    • Hyperlipidemia 06/28/2016   • Hypertension    • Hypertensive CKD (chronic kidney disease)    • Mild renal insufficiency 09/26/2017   • Vitamin D  deficiency        PROBLEM LIST    Patient Active Problem List   Diagnosis   • Benign hypertensive CKD   • Erectile dysfunction of non-organic origin   • Episodic tension-type headache, not intractable   • Vitamin D deficiency   • Need for influenza vaccination   • Dyslipidemia   • Benign essential hypertension   • Dilated aortic root (HCC)   • JODY (obstructive sleep apnea)   • Annual physical exam   • Malignant lymphoma, lymphoplasmacytic (HCC)   • Paroxysmal atrial fibrillation (HCC)   • Abnormal gastrointestinal PET scan   • Abnormal positron emission tomography (PET) scan   • Family history of prostate cancer in father   • Urine finding   • Encounter for immunization   • CKD (chronic kidney disease) stage 2, GFR 60-89 ml/min       PAST SURGICAL HISTORY:  Past Surgical History:   Procedure Laterality Date   • CARDIAC ELECTROPHYSIOLOGY PROCEDURE N/A 03/03/2023    Procedure: Cardiac eps/afib ablation;  Surgeon: Anmol Gonzales MD;  Location: [facility];  Service: Cardiology   • CARDIAC ELECTROPHYSIOLOGY PROCEDURE N/A 03/03/2023    Procedure: Cardiac eps/aflutter ablation;  Surgeon: Anmol Gonzales MD;  Location: [facility];  Service: Cardiology   • COLONOSCOPY     • HERNIA REPAIR     • HIATAL HERNIA REPAIR         SOCIAL HISTORY :   reports that he has never smoked. He has never used smokeless tobacco. He reports current alcohol use of about 1.0 standard drink of alcohol per week. He reports that he does not use drugs.    FAMILY HISTORY:  Family History   Problem Relation Age of Onset   • Lung cancer Mother    • Cancer Mother    • Heart attack Father 68   • Prostate cancer Father    • Hyperlipidemia Father    • Hypertension Father    • No Known Problems Sister    • Lymphoma Maternal Aunt    • Fainting Maternal Grandfather        ALLERGIES:  No Known Allergies        PHYSICAL EXAM:  Vitals:    05/07/24 1427 05/07/24 1442   BP: 146/70 142/88   BP Location: Left arm    Patient Position: Sitting    Cuff  "Size: Adult    Pulse: 79    SpO2: 98%    Weight: 86.6 kg (191 lb)    Height: 5' 11\" (1.803 m)      Body mass index is 26.64 kg/m².    Physical Exam  Vitals reviewed.   Constitutional:       General: He is not in acute distress.     Appearance: Normal appearance. He is normal weight. He is not ill-appearing, toxic-appearing or diaphoretic.   HENT:      Head: Normocephalic and atraumatic.      Mouth/Throat:      Mouth: Mucous membranes are moist.      Pharynx: No oropharyngeal exudate.   Eyes:      General: No scleral icterus.  Cardiovascular:      Rate and Rhythm: Normal rate.   Pulmonary:      Effort: No respiratory distress.      Breath sounds: Normal breath sounds. No stridor. No wheezing.   Abdominal:      Palpations: There is no mass.      Tenderness: There is no abdominal tenderness. There is no right CVA tenderness or left CVA tenderness.   Musculoskeletal:         General: No swelling.      Cervical back: Normal range of motion. No rigidity.   Skin:     Coloration: Skin is not jaundiced.   Neurological:      General: No focal deficit present.      Mental Status: He is alert and oriented to person, place, and time. Mental status is at baseline.   Psychiatric:         Mood and Affect: Mood normal.         Behavior: Behavior normal.         LABORATORY DATA:     Results from last 6 Months   Lab Units 05/01/24  0941 12/06/23  0848   WBC Thousand/uL  --  6.98   HEMOGLOBIN g/dL  --  14.6   HEMATOCRIT %  --  43.8   PLATELETS Thousands/uL  --  290   POTASSIUM mmol/L 4.1 3.9   CHLORIDE mmol/L 104 103   CO2 mmol/L 27 27   BUN mg/dL 16 15   CREATININE mg/dL 1.19 1.22   CALCIUM mg/dL 9.4 9.5   PHOSPHORUS mg/dL 2.5  --           rest all reviewed    RADIOLOGY:  No orders to display     Rest all reviewed        MEDICATIONS:    Current Outpatient Medications:   •  cholecalciferol (VITAMIN D3) 1,000 units tablet, Take 1,000 Units by mouth daily, Disp: , Rfl:   •  hydrALAZINE (APRESOLINE) 10 mg tablet, Take 1 tablet (10 mg " "total) by mouth 2 (two) times a day, Disp: 180 tablet, Rfl: 3  •  losartan (COZAAR) 100 MG tablet, Take 1 tablet (100 mg total) by mouth daily, Disp: 90 tablet, Rfl: 3  •  multivitamin (THERAGRAN) TABS, Take 1 tablet by mouth daily, Disp: , Rfl:   •  NIFEdipine (PROCARDIA XL) 30 mg 24 hr tablet, Take 1 tablet (30 mg total) by mouth daily, Disp: 90 tablet, Rfl: 3          Portions of the record may have been created with voice recognition software. Occasional wrong word or \"sound a like\" substitutions may have occurred due to the inherent limitations of voice recognition software. Read the chart carefully and recognize, using context, where substitutions have occurred.If you have any questions, please contact the dictating provider.      "

## 2024-06-05 ENCOUNTER — APPOINTMENT (OUTPATIENT)
Dept: LAB | Facility: MEDICAL CENTER | Age: 64
End: 2024-06-05
Payer: COMMERCIAL

## 2024-06-05 ENCOUNTER — APPOINTMENT (OUTPATIENT)
Dept: LAB | Facility: MEDICAL CENTER | Age: 64
End: 2024-06-05

## 2024-06-05 DIAGNOSIS — R73.01 IFG (IMPAIRED FASTING GLUCOSE): ICD-10-CM

## 2024-06-05 DIAGNOSIS — I10 BENIGN ESSENTIAL HYPERTENSION: ICD-10-CM

## 2024-06-05 DIAGNOSIS — Z00.8 HEALTH EXAMINATION IN POPULATION SURVEY: ICD-10-CM

## 2024-06-05 DIAGNOSIS — E78.5 DYSLIPIDEMIA: ICD-10-CM

## 2024-06-05 LAB
ALBUMIN SERPL BCP-MCNC: 4 G/DL (ref 3.5–5)
ALP SERPL-CCNC: 43 U/L (ref 34–104)
ALT SERPL W P-5'-P-CCNC: 12 U/L (ref 7–52)
ANION GAP SERPL CALCULATED.3IONS-SCNC: 6 MMOL/L (ref 4–13)
AST SERPL W P-5'-P-CCNC: 15 U/L (ref 13–39)
BILIRUB SERPL-MCNC: 0.63 MG/DL (ref 0.2–1)
BUN SERPL-MCNC: 19 MG/DL (ref 5–25)
CALCIUM SERPL-MCNC: 9 MG/DL (ref 8.4–10.2)
CHLORIDE SERPL-SCNC: 106 MMOL/L (ref 96–108)
CHOLEST SERPL-MCNC: 203 MG/DL
CO2 SERPL-SCNC: 25 MMOL/L (ref 21–32)
CREAT SERPL-MCNC: 1.2 MG/DL (ref 0.6–1.3)
EST. AVERAGE GLUCOSE BLD GHB EST-MCNC: 123 MG/DL
GFR SERPL CREATININE-BSD FRML MDRD: 63 ML/MIN/1.73SQ M
GLUCOSE P FAST SERPL-MCNC: 103 MG/DL (ref 65–99)
HBA1C MFR BLD: 5.9 %
HDLC SERPL-MCNC: 57 MG/DL
LDLC SERPL CALC-MCNC: 133 MG/DL (ref 0–100)
POTASSIUM SERPL-SCNC: 3.9 MMOL/L (ref 3.5–5.3)
PROT SERPL-MCNC: 6.6 G/DL (ref 6.4–8.4)
SODIUM SERPL-SCNC: 137 MMOL/L (ref 135–147)
TRIGL SERPL-MCNC: 67 MG/DL
TSH SERPL DL<=0.05 MIU/L-ACNC: 1.62 UIU/ML (ref 0.45–4.5)

## 2024-06-05 PROCEDURE — 80053 COMPREHEN METABOLIC PANEL: CPT

## 2024-06-05 PROCEDURE — 80061 LIPID PANEL: CPT

## 2024-06-05 PROCEDURE — 84443 ASSAY THYROID STIM HORMONE: CPT

## 2024-06-05 PROCEDURE — 36415 COLL VENOUS BLD VENIPUNCTURE: CPT

## 2024-06-05 PROCEDURE — 83036 HEMOGLOBIN GLYCOSYLATED A1C: CPT

## 2024-06-11 ENCOUNTER — OFFICE VISIT (OUTPATIENT)
Dept: FAMILY MEDICINE CLINIC | Facility: CLINIC | Age: 64
End: 2024-06-11
Payer: COMMERCIAL

## 2024-06-11 VITALS
HEART RATE: 69 BPM | HEIGHT: 71 IN | DIASTOLIC BLOOD PRESSURE: 78 MMHG | RESPIRATION RATE: 14 BRPM | SYSTOLIC BLOOD PRESSURE: 135 MMHG | TEMPERATURE: 98.1 F | BODY MASS INDEX: 26.74 KG/M2 | OXYGEN SATURATION: 98 % | WEIGHT: 191 LBS

## 2024-06-11 DIAGNOSIS — N18.2 CKD (CHRONIC KIDNEY DISEASE) STAGE 2, GFR 60-89 ML/MIN: ICD-10-CM

## 2024-06-11 DIAGNOSIS — E78.5 DYSLIPIDEMIA: ICD-10-CM

## 2024-06-11 DIAGNOSIS — C83.00 MALIGNANT LYMPHOMA, LYMPHOPLASMACYTIC (HCC): ICD-10-CM

## 2024-06-11 DIAGNOSIS — I48.0 PAROXYSMAL ATRIAL FIBRILLATION (HCC): ICD-10-CM

## 2024-06-11 DIAGNOSIS — I10 BENIGN ESSENTIAL HYPERTENSION: Primary | ICD-10-CM

## 2024-06-11 DIAGNOSIS — R73.01 IFG (IMPAIRED FASTING GLUCOSE): ICD-10-CM

## 2024-06-11 PROBLEM — R82.90 URINE FINDING: Status: RESOLVED | Noted: 2022-07-21 | Resolved: 2024-06-11

## 2024-06-11 PROCEDURE — 99214 OFFICE O/P EST MOD 30 MIN: CPT | Performed by: FAMILY MEDICINE

## 2024-06-11 NOTE — PROGRESS NOTES
Ambulatory Visit  Name: Thompson Rapp      : 1960      MRN: [id number]  Encounter Provider: Kyra King DO  Encounter Date: 2024   Encounter department: [facility]    Assessment & Plan   1. Benign essential hypertension  Assessment & Plan:  Improved with med changes  Orders:  -     CBC; Future; Expected date: 10/01/2024  2. CKD (chronic kidney disease) stage 2, GFR 60-89 ml/min  Assessment & Plan:  Lab Results   Component Value Date    EGFR 63 2024    EGFR 64 2024    EGFR 62 2023    CREATININE 1.20 2024    CREATININE 1.19 2024    CREATININE 1.22 2023   Seeing nephrology  stable  Orders:  -     Comprehensive metabolic panel; Future; Expected date: 10/01/2024  3. Paroxysmal atrial fibrillation (HCC)  Assessment & Plan:  Stable  Seeing cardiology  4. Malignant lymphoma, lymphoplasmacytic (HCC)  Assessment & Plan:  Stable  Seeing heme  Second opinoin Dr. Camacho  5. Dyslipidemia  Assessment & Plan:  Stable  Watch diet  Increase exercise  Orders:  -     Lipid Panel with Direct LDL reflex; Future; Expected date: 10/01/2024  -     TSH, 3rd generation with Free T4 reflex; Future; Expected date: 10/01/2024  6. IFG (impaired fasting glucose)  -     Hemoglobin A1C; Future; Expected date: 10/01/2024      Depression Screening and Follow-up Plan: Patient was screened for depression during today's encounter. They screened negative with a PHQ-2 score of 0.      History of Present Illness     Here for follow up  Cancer-watch and wait- no triggers for treatment  B cell line lymphoma  Seen by nephrology- add hydralazine   Bp's ranging 120-140/70-80  Labs reviewed  A1c bumped a little bit      Hypertension  This is a chronic problem. The current episode started more than 1 year ago. The problem is unchanged. The problem is controlled. There are no associated agents to hypertension. Past treatments include angiotensin blockers. The current treatment provides significant  "improvement. There are no compliance problems.        Review of Systems   Constitutional: Negative.    HENT: Negative.     Eyes: Negative.    Respiratory: Negative.     Cardiovascular: Negative.    Gastrointestinal: Negative.    Endocrine: Negative.    Genitourinary: Negative.    Musculoskeletal: Negative.    Allergic/Immunologic: Negative.    Neurological: Negative.    Hematological: Negative.    Psychiatric/Behavioral: Negative.       Medical History Reviewed by provider this encounter:       Objective     /78   Pulse 69   Temp 98.1 °F (36.7 °C) (Tympanic)   Resp 14   Ht 5' 11\" (1.803 m)   Wt 86.6 kg (191 lb)   SpO2 98%   BMI 26.64 kg/m²     Physical Exam  Vitals and nursing note reviewed.   Constitutional:       Appearance: Normal appearance. He is well-developed.   HENT:      Head: Normocephalic and atraumatic.      Right Ear: External ear normal.      Left Ear: External ear normal.      Nose: Nose normal.   Eyes:      Extraocular Movements: Extraocular movements intact.      Conjunctiva/sclera: Conjunctivae normal.      Pupils: Pupils are equal, round, and reactive to light.   Cardiovascular:      Rate and Rhythm: Normal rate and regular rhythm.      Heart sounds: Normal heart sounds.   Pulmonary:      Effort: Pulmonary effort is normal.      Breath sounds: Normal breath sounds.   Abdominal:      General: Bowel sounds are normal.      Palpations: Abdomen is soft.   Musculoskeletal:         General: Normal range of motion.      Cervical back: Normal range of motion and neck supple.   Skin:     General: Skin is warm and dry.      Capillary Refill: Capillary refill takes less than 2 seconds.   Neurological:      Mental Status: He is alert and oriented to person, place, and time.   Psychiatric:         Behavior: Behavior normal.         Thought Content: Thought content normal.         Judgment: Judgment normal.       Administrative Statements   I have spent a total time of 15 minutes on 06/11/24 In " caring for this patient including Prognosis.

## 2024-06-11 NOTE — ASSESSMENT & PLAN NOTE
Lab Results   Component Value Date    EGFR 63 06/05/2024    EGFR 64 05/01/2024    EGFR 62 12/06/2023    CREATININE 1.20 06/05/2024    CREATININE 1.19 05/01/2024    CREATININE 1.22 12/06/2023   Seeing nephrology  stable

## 2024-07-22 ENCOUNTER — APPOINTMENT (OUTPATIENT)
Dept: LAB | Facility: MEDICAL CENTER | Age: 64
End: 2024-07-22
Payer: COMMERCIAL

## 2024-07-22 DIAGNOSIS — C83.00 MALIGNANT LYMPHOMA, LYMPHOPLASMACYTIC (HCC): ICD-10-CM

## 2024-07-22 LAB
ALBUMIN SERPL BCG-MCNC: 4 G/DL (ref 3.5–5)
ALP SERPL-CCNC: 40 U/L (ref 34–104)
ALT SERPL W P-5'-P-CCNC: 13 U/L (ref 7–52)
ANION GAP SERPL CALCULATED.3IONS-SCNC: 7 MMOL/L (ref 4–13)
AST SERPL W P-5'-P-CCNC: 15 U/L (ref 13–39)
BASOPHILS # BLD AUTO: 0.04 THOUSANDS/ÂΜL (ref 0–0.1)
BASOPHILS NFR BLD AUTO: 1 % (ref 0–1)
BILIRUB SERPL-MCNC: 0.53 MG/DL (ref 0.2–1)
BUN SERPL-MCNC: 14 MG/DL (ref 5–25)
CALCIUM SERPL-MCNC: 9.1 MG/DL (ref 8.4–10.2)
CHLORIDE SERPL-SCNC: 105 MMOL/L (ref 96–108)
CO2 SERPL-SCNC: 26 MMOL/L (ref 21–32)
CREAT SERPL-MCNC: 1.17 MG/DL (ref 0.6–1.3)
CRP SERPL QL: 1.7 MG/L
EOSINOPHIL # BLD AUTO: 0.1 THOUSAND/ÂΜL (ref 0–0.61)
EOSINOPHIL NFR BLD AUTO: 2 % (ref 0–6)
ERYTHROCYTE [DISTWIDTH] IN BLOOD BY AUTOMATED COUNT: 13.1 % (ref 11.6–15.1)
ERYTHROCYTE [SEDIMENTATION RATE] IN BLOOD: 25 MM/HOUR (ref 0–19)
GFR SERPL CREATININE-BSD FRML MDRD: 65 ML/MIN/1.73SQ M
GLUCOSE P FAST SERPL-MCNC: 104 MG/DL (ref 65–99)
HCT VFR BLD AUTO: 42.7 % (ref 36.5–49.3)
HGB BLD-MCNC: 13.9 G/DL (ref 12–17)
IGA SERPL-MCNC: 240 MG/DL (ref 66–433)
IGG SERPL-MCNC: 871 MG/DL (ref 635–1741)
IGM SERPL-MCNC: 142 MG/DL (ref 45–281)
IMM GRANULOCYTES # BLD AUTO: 0.03 THOUSAND/UL (ref 0–0.2)
IMM GRANULOCYTES NFR BLD AUTO: 1 % (ref 0–2)
LDH SERPL-CCNC: 142 U/L (ref 140–271)
LYMPHOCYTES # BLD AUTO: 1.32 THOUSANDS/ÂΜL (ref 0.6–4.47)
LYMPHOCYTES NFR BLD AUTO: 23 % (ref 14–44)
MCH RBC QN AUTO: 31.7 PG (ref 26.8–34.3)
MCHC RBC AUTO-ENTMCNC: 32.6 G/DL (ref 31.4–37.4)
MCV RBC AUTO: 98 FL (ref 82–98)
MONOCYTES # BLD AUTO: 0.61 THOUSAND/ÂΜL (ref 0.17–1.22)
MONOCYTES NFR BLD AUTO: 10 % (ref 4–12)
NEUTROPHILS # BLD AUTO: 3.77 THOUSANDS/ÂΜL (ref 1.85–7.62)
NEUTS SEG NFR BLD AUTO: 63 % (ref 43–75)
NRBC BLD AUTO-RTO: 0 /100 WBCS
PLATELET # BLD AUTO: 219 THOUSANDS/UL (ref 149–390)
PMV BLD AUTO: 11.3 FL (ref 8.9–12.7)
POTASSIUM SERPL-SCNC: 3.9 MMOL/L (ref 3.5–5.3)
PROT SERPL-MCNC: 6.6 G/DL (ref 6.4–8.4)
RBC # BLD AUTO: 4.38 MILLION/UL (ref 3.88–5.62)
SODIUM SERPL-SCNC: 138 MMOL/L (ref 135–147)
WBC # BLD AUTO: 5.87 THOUSAND/UL (ref 4.31–10.16)

## 2024-07-22 PROCEDURE — 82784 ASSAY IGA/IGD/IGG/IGM EACH: CPT

## 2024-07-22 PROCEDURE — 84165 PROTEIN E-PHORESIS SERUM: CPT

## 2024-07-22 PROCEDURE — 83521 IG LIGHT CHAINS FREE EACH: CPT

## 2024-07-22 PROCEDURE — 80053 COMPREHEN METABOLIC PANEL: CPT

## 2024-07-22 PROCEDURE — 83615 LACTATE (LD) (LDH) ENZYME: CPT

## 2024-07-22 PROCEDURE — 36415 COLL VENOUS BLD VENIPUNCTURE: CPT

## 2024-07-22 PROCEDURE — 85652 RBC SED RATE AUTOMATED: CPT

## 2024-07-22 PROCEDURE — 85025 COMPLETE CBC W/AUTO DIFF WBC: CPT

## 2024-07-22 PROCEDURE — 86140 C-REACTIVE PROTEIN: CPT

## 2024-07-23 LAB
ALBUMIN SERPL ELPH-MCNC: 3.92 G/DL (ref 3.2–5.1)
ALBUMIN SERPL ELPH-MCNC: 60.3 % (ref 48–70)
ALPHA1 GLOB SERPL ELPH-MCNC: 0.27 G/DL (ref 0.15–0.47)
ALPHA1 GLOB SERPL ELPH-MCNC: 4.1 % (ref 1.8–7)
ALPHA2 GLOB SERPL ELPH-MCNC: 0.61 G/DL (ref 0.42–1.04)
ALPHA2 GLOB SERPL ELPH-MCNC: 9.4 % (ref 5.9–14.9)
BETA GLOB ABNORMAL SERPL ELPH-MCNC: 0.45 G/DL (ref 0.31–0.57)
BETA1 GLOB SERPL ELPH-MCNC: 6.9 % (ref 4.7–7.7)
BETA2 GLOB SERPL ELPH-MCNC: 5.2 % (ref 3.1–7.9)
BETA2+GAMMA GLOB SERPL ELPH-MCNC: 0.34 G/DL (ref 0.2–0.58)
GAMMA GLOB ABNORMAL SERPL ELPH-MCNC: 0.92 G/DL (ref 0.4–1.66)
GAMMA GLOB SERPL ELPH-MCNC: 14.1 % (ref 6.9–22.3)
IGG/ALB SER: 1.52 {RATIO} (ref 1.1–1.8)
KAPPA LC FREE SER-MCNC: 23.9 MG/L (ref 3.3–19.4)
KAPPA LC FREE/LAMBDA FREE SER: 1.87 {RATIO} (ref 0.26–1.65)
LAMBDA LC FREE SERPL-MCNC: 12.8 MG/L (ref 5.7–26.3)
PROT PATTERN SERPL ELPH-IMP: NORMAL
PROT SERPL-MCNC: 6.5 G/DL (ref 6.4–8.2)

## 2024-07-23 PROCEDURE — 84165 PROTEIN E-PHORESIS SERUM: CPT | Performed by: STUDENT IN AN ORGANIZED HEALTH CARE EDUCATION/TRAINING PROGRAM

## 2024-07-29 NOTE — PROGRESS NOTES
7/31/2024    No chief complaint on file.    Assessment and Plan    63 y.o. male managed previously by Dr. Whitt     1. Screening for prostate cancer  Assessment & Plan:  Most recent PSA level stable   LALO- no firmess or nodules noted on exam   Family history of prostate cancer-father  Follow up in 1 year with PSA prior     Lab Results   Component Value Date    PSA 2.73 01/26/2024    PSA 2.1 06/02/2022    PSA 2.0 12/16/2021     Orders:  -     PSA, Total Screen; Future      History of Present Illness  Thompson Rapp is a 63 y.o. male here for evaluation of prostate cancer screening patient does have a positive family history of prostate cancer with his father..  Most recent PSA levels have been stable.  Most recent PSA level is 2.73.  Back in 2021 NM PET CT scan was completed for B-cell lymphoma found in a colon sigmoid polyp. He continues to follow with oncology- Dr. Gibson.  Small focus of FDG uptake at the inferior aspect of the prostate was identified.  This led to a multiparametric MRI.  This indicated a PI-RADS category 2.  No lesion identified.  A repeat NM PET CT scan did not show any uptake within the prostate.   The patient does follow with nephrology for CKD stage II/III A. He is currently content with urination and reports the sensation of complete bladder emptying. He denies dysuria or gross hematuria.     Review of Systems   Constitutional:  Negative for chills and fever.   HENT:  Negative for ear pain and sore throat.    Eyes:  Negative for pain and visual disturbance.   Respiratory:  Negative for cough and shortness of breath.    Cardiovascular:  Negative for chest pain and palpitations.   Gastrointestinal:  Negative for abdominal pain and vomiting.   Genitourinary:  Negative for decreased urine volume, difficulty urinating, dysuria, flank pain, frequency, hematuria and urgency.   Musculoskeletal:  Negative for arthralgias and back pain.   Skin:  Negative for color change and rash.   Neurological:   "Negative for seizures and syncope.   All other systems reviewed and are negative.    Vitals  Vitals:    07/31/24 0802   BP: 140/80   BP Location: Right arm   Patient Position: Sitting   Cuff Size: Adult   Pulse: 78   SpO2: 98%   Weight: 87.1 kg (192 lb)   Height: 5' 11\" (1.803 m)       Physical Exam  Vitals reviewed.   Constitutional:       Appearance: Normal appearance.   HENT:      Head: Normocephalic and atraumatic.   Eyes:      Conjunctiva/sclera: Conjunctivae normal.   Pulmonary:      Effort: Pulmonary effort is normal.   Abdominal:      General: Abdomen is flat. There is no distension.      Palpations: Abdomen is soft.      Tenderness: There is no abdominal tenderness.   Genitourinary:     Penis: Normal.       Testes: Normal.      Prostate: Normal. Not enlarged, not tender and no nodules present.   Musculoskeletal:         General: Normal range of motion.      Cervical back: Normal range of motion.   Skin:     General: Skin is warm and dry.   Neurological:      General: No focal deficit present.      Mental Status: He is alert and oriented to person, place, and time.   Psychiatric:         Mood and Affect: Mood normal.         Behavior: Behavior normal.         Thought Content: Thought content normal.         Judgment: Judgment normal.       Past History  Past Medical History:   Diagnosis Date   • Atrial fibrillation (HCC)    • Cancer (HCC)     lymphoma   • Chronic kidney disease    • CKD (chronic kidney disease)    • Dilated aortic root (HCC)    • Erectile dysfunction    • Hyperlipidemia 06/28/2016   • Hypertension    • Hypertensive CKD (chronic kidney disease)    • Mild renal insufficiency 09/26/2017   • Vitamin D deficiency      Social History     Socioeconomic History   • Marital status: /Civil Union     Spouse name: None   • Number of children: None   • Years of education: None   • Highest education level: None   Occupational History   • None   Tobacco Use   • Smoking status: Never     Passive " exposure: Never   • Smokeless tobacco: Never   Vaping Use   • Vaping status: Never Used   Substance and Sexual Activity   • Alcohol use: Yes     Alcohol/week: 1.0 standard drink of alcohol     Types: 1 Cans of beer per week     Comment: social   • Drug use: No   • Sexual activity: Yes     Partners: Female     Birth control/protection: None   Other Topics Concern   • None   Social History Narrative   • None     Social Determinants of Health     Financial Resource Strain: Not on file   Food Insecurity: Not on file   Transportation Needs: Not on file   Physical Activity: Not on file   Stress: Not on file   Social Connections: Not on file   Intimate Partner Violence: Not on file   Housing Stability: Not on file     Social History     Tobacco Use   Smoking Status Never   • Passive exposure: Never   Smokeless Tobacco Never     Family History   Problem Relation Age of Onset   • Lung cancer Mother    • Cancer Mother    • Heart attack Father 68   • Prostate cancer Father    • Hyperlipidemia Father    • Hypertension Father    • No Known Problems Sister    • Lymphoma Maternal Aunt    • Fainting Maternal Grandfather        The following portions of the patient's history were reviewed and updated as appropriate: allergies, current medications, past medical history, past social history, past surgical history and problem list.    Results  No results found for this or any previous visit (from the past 1 hour(s)).]  Lab Results   Component Value Date    PSA 2.73 01/26/2024    PSA 2.1 06/02/2022    PSA 2.0 12/16/2021    PSA 2.1 04/03/2021     Lab Results   Component Value Date    CALCIUM 9.1 07/22/2024     10/07/2017    K 3.9 07/22/2024    CO2 26 07/22/2024     07/22/2024    BUN 14 07/22/2024    CREATININE 1.17 07/22/2024     Lab Results   Component Value Date    WBC 5.87 07/22/2024    HGB 13.9 07/22/2024    HCT 42.7 07/22/2024    MCV 98 07/22/2024     07/22/2024

## 2024-07-31 ENCOUNTER — OFFICE VISIT (OUTPATIENT)
Dept: UROLOGY | Facility: CLINIC | Age: 64
End: 2024-07-31
Payer: COMMERCIAL

## 2024-07-31 VITALS
SYSTOLIC BLOOD PRESSURE: 140 MMHG | DIASTOLIC BLOOD PRESSURE: 80 MMHG | HEIGHT: 71 IN | HEART RATE: 78 BPM | OXYGEN SATURATION: 98 % | WEIGHT: 192 LBS | BODY MASS INDEX: 26.88 KG/M2

## 2024-07-31 DIAGNOSIS — Z12.5 SCREENING FOR PROSTATE CANCER: Primary | ICD-10-CM

## 2024-07-31 PROCEDURE — 99213 OFFICE O/P EST LOW 20 MIN: CPT

## 2024-07-31 NOTE — ASSESSMENT & PLAN NOTE
Most recent PSA level stable   LALO- no firmess or nodules noted on exam   Family history of prostate cancer-father  Follow up in 1 year with PSA prior     Lab Results   Component Value Date    PSA 2.73 01/26/2024    PSA 2.1 06/02/2022    PSA 2.0 12/16/2021

## 2024-08-06 ENCOUNTER — OFFICE VISIT (OUTPATIENT)
Dept: HEMATOLOGY ONCOLOGY | Facility: CLINIC | Age: 64
End: 2024-08-06
Payer: COMMERCIAL

## 2024-08-06 VITALS
SYSTOLIC BLOOD PRESSURE: 132 MMHG | TEMPERATURE: 97.4 F | HEIGHT: 71 IN | BODY MASS INDEX: 27.3 KG/M2 | DIASTOLIC BLOOD PRESSURE: 80 MMHG | OXYGEN SATURATION: 98 % | WEIGHT: 195 LBS | RESPIRATION RATE: 16 BRPM | HEART RATE: 69 BPM

## 2024-08-06 DIAGNOSIS — C83.00 MALIGNANT LYMPHOMA, LYMPHOPLASMACYTIC (HCC): Primary | ICD-10-CM

## 2024-08-06 PROCEDURE — 99215 OFFICE O/P EST HI 40 MIN: CPT | Performed by: PHYSICIAN ASSISTANT

## 2024-08-06 NOTE — PATIENT INSTRUCTIONS
[facility] Team  [place] - [phone]    Your Team Member:  Advanced Practitioner:  Mila Pratt PA-C    Please answer Private and Unavailable Calls - this may be your team(s) contacting you.  If you have medical questions/concerns/issues - contact us either by (1) My Chart (2) [facility]       [email or web address]

## 2024-08-08 ENCOUNTER — DOCUMENTATION (OUTPATIENT)
Dept: HEMATOLOGY ONCOLOGY | Facility: CLINIC | Age: 64
End: 2024-08-08

## 2024-08-16 NOTE — PROGRESS NOTES
[address]  [facility] [place]  [address]  [place] [address]  Oncology Progress Note  [facility], 1960, [id number]  8/6/2024    Assessment/Plan:   1. Malignant lymphoma, lymphoplasmacytic (HCC)  This is a 64-year-old male diagnosed with a sigmoid polyp that was proven to be lymphoplasmacytic lymphoma in February 2021.  2/2024: Polyp demonstrated lymphoplasmacytic lymphoma x1  3/1/2024: PET CT-proximal rectum with somewhat greater FDG activity.  Generally most activity physiologic notes    Patient's IgM remains within normal limits and recent kappa/lambda ratio is now below 2.  Considering this atypical presentation of lymphoplasmacytic lymphoma, recommendations regarding second opinion with transplant specialist was discussed today.  Previous evaluation with primary oncologist also discussed this.  This office will be in touch with [facility] for evaluation.    I reviewed NCCN guidelines, and found limited data regarding extranodal LPL.  As far as traditional assessment for LPL, patient does not have worsening anemia, organomegaly, thrombocytopenia or B symptoms.    Observation has been the approach previously and may still be the best option at this time,however I support follow up with a Transplant specialist regarding this atypical presentation.   I do no have good guidance on frequency of colonoscopies in this clinical situation.  This will be discussed in future and hopefully some additional guidelines will be given by second opinion.  Patient and his wife voiced understanding and agreement to the plan as outlined above.  I remain available if there is any questions or concerns in the future.  Follow-up in 6 months advised, however, this may change depending upon second opinion guidance.      - CBC and differential; Future  - Comprehensive metabolic panel; Future  - IgG, IgA, IgM; Future  - Immunoglobulin free LT chains blood; Future  - Protein electrophoresis,  serum; Future  - Ambulatory Referral to Hematology / Oncology; Future      The patient is scheduled for follow-up in approximately 6 months.     Patient voiced agreement and understanding to the above.   Patient knows to call the Hematology/Oncology office with any questions and concerns regarding the above.    Goals and Barriers:    Current Goal:   Prolong Survival from Cancer.     Barriers: None.      Patient's Capacity to Self Care:  Patient able to self care.    Mila Pratt PA-C  Medical Oncology/Hematology  [facility]  ______________________________________________________________________________________________________________    Subjective     Chief Complaint   Patient presents with    Follow-up       History of present illness/Cancer History:   Oncology History   Malignant lymphoma, lymphoplasmacytic (HCC)   2/5/2021 Initial Diagnosis    Routine Colonoscopy by Dr. Montgomery    IMPRESSION:  Few mild scattered diverticula in the sigmoid colon  One sessile polyp measuring smaller than 5 mm in the descending colon; performed complete en bloc removal by cold forceps biopsy  One sessile polyp measuring 5-9 mm in the sigmoid colon; completely removed en bloc by cold snare and retrieved specimen     2/18/2021 Initial Diagnosis    Malignant lymphoma, lymphoplasmacytic (HCC)    This addendum is included to report the results of MYD88 testing which is positive, favoring a diagnosis of lymphoplasmacytic lymphoma, though is not definitively specific.  Correlation with clinical impression is recommended.        - MYD88 Mutation Analysis performed on the Colorectal Polyp @ [facility] (Specimen ID: [id number], evaluated by Trudy Chan MD, PhD) as follows:   Results:  MYD88 Mutation(s):     Detected  Mutation(s) Detected:   c.794T>C (p.L265P)   Addendum electronically signed by Bennie Myers MD on 3/1/2021 at 11:56 AM   Final Diagnosis   A. Colon, descending polyp, biopsy:  -   Tubular adenoma, negative for high-grade dysplasia.    B. Colon, sigmoid polyp, biopsy:  -  Small B cell neoplasm with kappa light chain restricted plasma cell proliferation, compatible with lymphoma (see note)     PETCT 3/19/21  1.  Small focus of FDG uptake at the gastric antrum for which underlying lesion should be excluded.  Correlate with endoscopy.  2.  Single FDG avid left axillary lymph node.  This may be related to recent Covid vaccination but given the history of lymphoma, malignancy cannot be entirely excluded.  This can be reassessed on short-term follow-up CT or PET/CT in 3 months.  3. Small focus of FDG uptake at the inferior aspect of the prostate, underlying lesion not excluded.  Correlate with PSA levels and urologic evaluation.  4. Mild FDG uptake in a left upper cervical chain lymph node may be reactive but can be reassessed on follow-up.  5.  Mildly aneurysmal ascending thoracic aorta at 4.1 cm in diameter.    BASELINE DISEASE CHARACTERISTICS 2/20/21  SPEP - normal  Quant Ig - normal including normal IgM  SLFC Ratio - normal  CBC - normal     6/2021 Observation    6/12/2021 IgM 140, K/L ratio 1.4  10/29/2021 IgM 137, K/L ratio 1.49  5/5/2022 IgM 126, K/L ratio 1.83  11/26/2022 IgM 132, K/L ratio 1.9  6/1/2023 IgM 148, K/L ratio 2.09  12/6/2023: IgM 148, K/L ratio 2.39    7/22/2024 hemoglobin 13.9, MCV 98, WBC 5.8, differential WNL          Creatinine 1.17, BUN 14, calcium 9.1, albumin WNL, EGFR 65                    K/L ratio 1.87, SPEP = no monoclonal bands          IgM 142     2/15/2024 Biopsy    Final Diagnosis   A. Colon, descending polyp, biopsy:  -  Tubular adenoma, negative for high-grade dysplasia.    B. Colon, sigmoid polyp, biopsy:  -  Small B cell neoplasm with kappa light chain restricted plasma cell proliferation, compatible with lymphoma (see note)     This addendum is included to report the results of MYD88 testing which is positive, favoring a diagnosis of lymphoplasmacytic  lymphoma, though is not definitively specific.  Correlation with clinical impression is recommended.     - MYD88 Mutation Analysis performed on the Colorectal Polyp @ [facility] [employer] (Specimen ID: [id number], evaluated by Trudy Chan MD, PhD) as follows:   Results:  MYD88 Mutation(s):     Detected  Mutation(s) Detected:   c.794T>C (p.L265P)     3/1/2024 Observation    PET/CT scan:    1. There is somewhat greater FDG intensity in a short segment of the proximal rectum, though uncertain if this is just physiologic or correlates with recent biopsy findings of rectal lymphoproliferative disorder. There is otherwise physiologic FDG activity throughout the small and large bowel, limiting evaluation for lymphoma involvement.    2. Interval decreased FDG uptake in a left axillary node. Additional mild FDG uptake in small bilateral axillary nodes are nonspecific, possibly just reactive. Attention on follow-up to exclude lymphoma involvement.    3. No additional hypermetabolic lesions that are concerning for malignancy/metastases.    4. Stable ectatic ascending thoracic aorta. Continued yearly CT follow-up recommended.          Lab Results   Component Value Date    WBC 5.87 2024    HGB 13.9 2024    HCT 42.7 2024    MCV 98 2024     2024     Lab Results   Component Value Date     10/07/2017    SODIUM 138 2024    K 3.9 2024     2024    CO2 26 2024    AGAP 7 2024    BUN 14 2024    CREATININE 1.17 2024    GLUC 113 2023    GLUF 104 (H) 2024    CALCIUM 9.1 2024    AST 15 2024    ALT 13 2024    ALKPHOS 40 2024    PROT 6.1 10/07/2017    TP 6.6 2024    TP 6.5 2024    BILITOT 0.6 10/07/2017    TBILI 0.53 2024    EGFR 65 2024       Interval history:  feeling well. Concerned regarding direction - observation vs treatment.  Would like to go for second opinion     ECO -  "Asymptomatic    Review of Systems   Constitutional:  Positive for fatigue. Negative for activity change, appetite change and fever.   HENT:  Negative for nosebleeds.    Respiratory:  Negative for cough, choking and shortness of breath.    Cardiovascular:  Negative for chest pain, palpitations and leg swelling.   Gastrointestinal:  Negative for abdominal distention, abdominal pain, anal bleeding, blood in stool, constipation, diarrhea, nausea and vomiting.   Endocrine: Negative for cold intolerance.   Genitourinary:  Negative for hematuria.   Musculoskeletal:  Negative for myalgias.   Skin:  Negative for color change, pallor and rash.   Allergic/Immunologic: Negative for immunocompromised state.   Neurological:  Negative for headaches.   Hematological:  Negative for adenopathy. Does not bruise/bleed easily.   All other systems reviewed and are negative.      Current Outpatient Medications:     cholecalciferol (VITAMIN D3) 1,000 units tablet, Take 1,000 Units by mouth daily, Disp: , Rfl:     hydrALAZINE (APRESOLINE) 10 mg tablet, Take 1 tablet (10 mg total) by mouth 2 (two) times a day, Disp: 180 tablet, Rfl: 3    losartan (COZAAR) 100 MG tablet, Take 1 tablet (100 mg total) by mouth daily, Disp: 90 tablet, Rfl: 3    multivitamin (THERAGRAN) TABS, Take 1 tablet by mouth daily, Disp: , Rfl:     NIFEdipine (PROCARDIA XL) 30 mg 24 hr tablet, Take 1 tablet (30 mg total) by mouth daily, Disp: 90 tablet, Rfl: 3  No Known Allergies    Advance Directive and Living Will:            Objective   /80 (BP Location: Right arm, Patient Position: Sitting, Cuff Size: Adult)   Pulse 69   Temp (!) 97.4 °F (36.3 °C)   Resp 16   Ht 5' 11\" (1.803 m)   Wt 88.5 kg (195 lb)   SpO2 98%   BMI 27.20 kg/m²   Wt Readings from Last 6 Encounters:   08/06/24 88.5 kg (195 lb)   07/31/24 87.1 kg (192 lb)   06/11/24 86.6 kg (191 lb)   05/07/24 86.6 kg (191 lb)   03/25/24 85.7 kg (189 lb)   03/15/24 85.6 kg (188 lb 12.8 oz)     Physical " Exam  Constitutional:       General: He is not in acute distress.     Appearance: He is well-developed.   HENT:      Head: Normocephalic and atraumatic.      Right Ear: External ear normal.      Left Ear: External ear normal.      Nose: Nose normal.   Eyes:      General: No scleral icterus.     Conjunctiva/sclera: Conjunctivae normal.   Cardiovascular:      Rate and Rhythm: Normal rate.   Pulmonary:      Effort: No respiratory distress.   Abdominal:      General: There is no distension.      Palpations: Abdomen is soft.   Skin:     Findings: No rash (on exposed skin.).   Neurological:      Mental Status: He is alert and oriented to person, place, and time.   Psychiatric:         Thought Content: Thought content normal.         Pertinent Laboratory Results and Imaging Review:  Appointment on 07/22/2024   Component Date Value Ref Range Status    WBC 07/22/2024 5.87  4.31 - 10.16 Thousand/uL Final    RBC 07/22/2024 4.38  3.88 - 5.62 Million/uL Final    Hemoglobin 07/22/2024 13.9  12.0 - 17.0 g/dL Final    Hematocrit 07/22/2024 42.7  36.5 - 49.3 % Final    MCV 07/22/2024 98  82 - 98 fL Final    MCH 07/22/2024 31.7  26.8 - 34.3 pg Final    MCHC 07/22/2024 32.6  31.4 - 37.4 g/dL Final    RDW 07/22/2024 13.1  11.6 - 15.1 % Final    MPV 07/22/2024 11.3  8.9 - 12.7 fL Final    Platelets 07/22/2024 219  149 - 390 Thousands/uL Final    nRBC 07/22/2024 0  /100 WBCs Final    Segmented % 07/22/2024 63  43 - 75 % Final    Immature Grans % 07/22/2024 1  0 - 2 % Final    Lymphocytes % 07/22/2024 23  14 - 44 % Final    Monocytes % 07/22/2024 10  4 - 12 % Final    Eosinophils Relative 07/22/2024 2  0 - 6 % Final    Basophils Relative 07/22/2024 1  0 - 1 % Final    Absolute Neutrophils 07/22/2024 3.77  1.85 - 7.62 Thousands/µL Final    Absolute Immature Grans 07/22/2024 0.03  0.00 - 0.20 Thousand/uL Final    Absolute Lymphocytes 07/22/2024 1.32  0.60 - 4.47 Thousands/µL Final    Absolute Monocytes 07/22/2024 0.61  0.17 - 1.22  Thousand/µL Final    Eosinophils Absolute 07/22/2024 0.10  0.00 - 0.61 Thousand/µL Final    Basophils Absolute 07/22/2024 0.04  0.00 - 0.10 Thousands/µL Final    Sodium 07/22/2024 138  135 - 147 mmol/L Final    Potassium 07/22/2024 3.9  3.5 - 5.3 mmol/L Final    Chloride 07/22/2024 105  96 - 108 mmol/L Final    CO2 07/22/2024 26  21 - 32 mmol/L Final    ANION GAP 07/22/2024 7  4 - 13 mmol/L Final    BUN 07/22/2024 14  5 - 25 mg/dL Final    Creatinine 07/22/2024 1.17  0.60 - 1.30 mg/dL Final    Standardized to IDMS reference method    Glucose, Fasting 07/22/2024 104 (H)  65 - 99 mg/dL Final    Calcium 07/22/2024 9.1  8.4 - 10.2 mg/dL Final    AST 07/22/2024 15  13 - 39 U/L Final    ALT 07/22/2024 13  7 - 52 U/L Final    Specimen collection should occur prior to Sulfasalazine administration due to the potential for falsely depressed results.     Alkaline Phosphatase 07/22/2024 40  34 - 104 U/L Final    Total Protein 07/22/2024 6.6  6.4 - 8.4 g/dL Final    Albumin 07/22/2024 4.0  3.5 - 5.0 g/dL Final    Total Bilirubin 07/22/2024 0.53  0.20 - 1.00 mg/dL Final    Use of this assay is not recommended for patients undergoing treatment with eltrombopag due to the potential for falsely elevated results.  N-acetyl-p-benzoquinone imine (metabolite of Acetaminophen) will generate erroneously low results in samples for patients that have taken an overdose of Acetaminophen.    eGFR 07/22/2024 65  ml/min/1.73sq m Final    LD 07/22/2024 142  140 - 271 U/L Final    CRP 07/22/2024 1.7  <3.0 mg/L Final    Sed Rate 07/22/2024 25 (H)  0 - 19 mm/hour Final    IGA 07/22/2024 240  66 - 433 mg/dL Final    IGG 07/22/2024 871  635 - 1,741 mg/dL Final    IGM 07/22/2024 142  45 - 281 mg/dL Final    Ig Kappa Free Light Chain 07/22/2024 23.9 (H)  3.3 - 19.4 mg/L Final    Ig Lambda Free Light Chain 07/22/2024 12.8  5.7 - 26.3 mg/L Final    Kappa/Lambda FluidC Ratio 07/22/2024 1.87 (H)  0.26 - 1.65 Final    A/G Ratio 07/22/2024 1.52  1.10 - 1.80  Final    Albumin Electrophoresis 07/22/2024 60.3  48.0 - 70.0 % Final    Albumin CONC 07/22/2024 3.92  3.20 - 5.10 g/dl Final    Alpha 1 07/22/2024 4.1  1.8 - 7.0 % Final    ALPHA 1 CONC 07/22/2024 0.27  0.15 - 0.47 g/dL Final    Alpha 2 07/22/2024 9.4  5.9 - 14.9 % Final    ALPHA 2 CONC 07/22/2024 0.61  0.42 - 1.04 g/dL Final    Beta-1 07/22/2024 6.9  4.7 - 7.7 % Final    BETA 1 CONC 07/22/2024 0.45  0.31 - 0.57 g/dL Final    Beta-2 07/22/2024 5.2  3.1 - 7.9 % Final    BETA 2 CONC 07/22/2024 0.34  0.20 - 0.58 g/dL Final    Gamma Globulin 07/22/2024 14.1  6.9 - 22.3 % Final    GAMMA CONC 07/22/2024 0.92  0.40 - 1.66 g/dL Final    Total Protein 07/22/2024 6.5  6.4 - 8.2 g/dL Final    SPEP Interpretation 07/22/2024 See Comment   Final    No monoclonal bands noted. Reviewed by:  Kyaw Natarajan MD  **Electronic Signature**         The following historical data was reviewed:  Past Medical History:   Diagnosis Date    Atrial fibrillation (HCC)     Cancer (HCC)     lymphoma    Chronic kidney disease     CKD (chronic kidney disease)     Dilated aortic root (HCC)     Erectile dysfunction     Hyperlipidemia 06/28/2016    Hypertension     Hypertensive CKD (chronic kidney disease)     Mild renal insufficiency 09/26/2017    Vitamin D deficiency      Past Surgical History:   Procedure Laterality Date    CARDIAC ELECTROPHYSIOLOGY PROCEDURE N/A 03/03/2023    Procedure: Cardiac eps/afib ablation;  Surgeon: Anmol Gonzales MD;  Location: [facility];  Service: Cardiology    CARDIAC ELECTROPHYSIOLOGY PROCEDURE N/A 03/03/2023    Procedure: Cardiac eps/aflutter ablation;  Surgeon: Anmol Gonzales MD;  Location: [facility];  Service: Cardiology    COLONOSCOPY      HERNIA REPAIR      HIATAL HERNIA REPAIR       Social History     Socioeconomic History    Marital status: /Civil Union     Spouse name: None    Number of children: None    Years of education: None    Highest education level: None   Occupational History     None   Tobacco Use    Smoking status: Never     Passive exposure: Never    Smokeless tobacco: Never   Vaping Use    Vaping status: Never Used   Substance and Sexual Activity    Alcohol use: Yes     Alcohol/week: 1.0 standard drink of alcohol     Types: 1 Cans of beer per week     Comment: social    Drug use: No    Sexual activity: Yes     Partners: Female     Birth control/protection: None   Other Topics Concern    None   Social History Narrative    None     Social Determinants of Health     Financial Resource Strain: Not on file   Food Insecurity: Not on file   Transportation Needs: Not on file   Physical Activity: Not on file   Stress: Not on file   Social Connections: Not on file   Intimate Partner Violence: Not on file   Housing Stability: Not on file     Family History   Problem Relation Age of Onset    Lung cancer Mother     Cancer Mother     Heart attack Father 68    Prostate cancer Father     Hyperlipidemia Father     Hypertension Father     No Known Problems Sister     Lymphoma Maternal Aunt     Fainting Maternal Grandfather        Please note:  This report has been generated by a voice recognition software system. Therefore there may be syntax, spelling, and/or grammatical errors. Please call if you have any questions.

## 2024-08-30 PROBLEM — Z12.5 SCREENING FOR PROSTATE CANCER: Status: RESOLVED | Noted: 2018-04-03 | Resolved: 2024-08-30

## 2024-11-02 ENCOUNTER — APPOINTMENT (OUTPATIENT)
Age: 64
End: 2024-11-02
Payer: COMMERCIAL

## 2024-11-02 DIAGNOSIS — E55.9 VITAMIN D DEFICIENCY: ICD-10-CM

## 2024-11-02 DIAGNOSIS — E78.5 DYSLIPIDEMIA: ICD-10-CM

## 2024-11-02 DIAGNOSIS — C83.00 MALIGNANT LYMPHOMA, LYMPHOPLASMACYTIC (HCC): ICD-10-CM

## 2024-11-02 DIAGNOSIS — I48.0 PAROXYSMAL ATRIAL FIBRILLATION (HCC): ICD-10-CM

## 2024-11-02 DIAGNOSIS — I12.9 BENIGN HYPERTENSIVE KIDNEY DISEASE WITH CHRONIC KIDNEY DISEASE STAGE I THROUGH STAGE IV, OR UNSPECIFIED: ICD-10-CM

## 2024-11-02 DIAGNOSIS — Z12.5 SCREENING FOR PROSTATE CANCER: ICD-10-CM

## 2024-11-02 DIAGNOSIS — I10 BENIGN ESSENTIAL HYPERTENSION: ICD-10-CM

## 2024-11-02 DIAGNOSIS — N18.2 CKD (CHRONIC KIDNEY DISEASE) STAGE 2, GFR 60-89 ML/MIN: ICD-10-CM

## 2024-11-02 DIAGNOSIS — R73.01 IFG (IMPAIRED FASTING GLUCOSE): ICD-10-CM

## 2024-11-02 LAB
ALBUMIN SERPL BCG-MCNC: 4.1 G/DL (ref 3.5–5)
ALP SERPL-CCNC: 40 U/L (ref 34–104)
ALT SERPL W P-5'-P-CCNC: 12 U/L (ref 7–52)
ANION GAP SERPL CALCULATED.3IONS-SCNC: 10 MMOL/L (ref 4–13)
AST SERPL W P-5'-P-CCNC: 19 U/L (ref 13–39)
BASOPHILS # BLD AUTO: 0.03 THOUSANDS/ΜL (ref 0–0.1)
BASOPHILS NFR BLD AUTO: 1 % (ref 0–1)
BILIRUB SERPL-MCNC: 0.74 MG/DL (ref 0.2–1)
BUN SERPL-MCNC: 29 MG/DL (ref 5–25)
CALCIUM SERPL-MCNC: 9.3 MG/DL (ref 8.4–10.2)
CHLORIDE SERPL-SCNC: 102 MMOL/L (ref 96–108)
CO2 SERPL-SCNC: 24 MMOL/L (ref 21–32)
CREAT SERPL-MCNC: 1.22 MG/DL (ref 0.6–1.3)
EOSINOPHIL # BLD AUTO: 0.07 THOUSAND/ΜL (ref 0–0.61)
EOSINOPHIL NFR BLD AUTO: 1 % (ref 0–6)
ERYTHROCYTE [DISTWIDTH] IN BLOOD BY AUTOMATED COUNT: 12.7 % (ref 11.6–15.1)
GFR SERPL CREATININE-BSD FRML MDRD: 61 ML/MIN/1.73SQ M
GLUCOSE P FAST SERPL-MCNC: 101 MG/DL (ref 65–99)
HCT VFR BLD AUTO: 43.1 % (ref 36.5–49.3)
HGB BLD-MCNC: 14 G/DL (ref 12–17)
IGA SERPL-MCNC: 250 MG/DL (ref 66–433)
IGG SERPL-MCNC: 943 MG/DL (ref 635–1741)
IGM SERPL-MCNC: 147 MG/DL (ref 45–281)
IMM GRANULOCYTES # BLD AUTO: 0.01 THOUSAND/UL (ref 0–0.2)
IMM GRANULOCYTES NFR BLD AUTO: 0 % (ref 0–2)
LYMPHOCYTES # BLD AUTO: 1.32 THOUSANDS/ΜL (ref 0.6–4.47)
LYMPHOCYTES NFR BLD AUTO: 23 % (ref 14–44)
MCH RBC QN AUTO: 31.9 PG (ref 26.8–34.3)
MCHC RBC AUTO-ENTMCNC: 32.5 G/DL (ref 31.4–37.4)
MCV RBC AUTO: 98 FL (ref 82–98)
MONOCYTES # BLD AUTO: 0.7 THOUSAND/ΜL (ref 0.17–1.22)
MONOCYTES NFR BLD AUTO: 12 % (ref 4–12)
NEUTROPHILS # BLD AUTO: 3.66 THOUSANDS/ΜL (ref 1.85–7.62)
NEUTS SEG NFR BLD AUTO: 63 % (ref 43–75)
NRBC BLD AUTO-RTO: 0 /100 WBCS
PLATELET # BLD AUTO: 244 THOUSANDS/UL (ref 149–390)
PMV BLD AUTO: 11.5 FL (ref 8.9–12.7)
POTASSIUM SERPL-SCNC: 4 MMOL/L (ref 3.5–5.3)
PROT SERPL-MCNC: 7 G/DL (ref 6.4–8.4)
RBC # BLD AUTO: 4.39 MILLION/UL (ref 3.88–5.62)
SODIUM SERPL-SCNC: 136 MMOL/L (ref 135–147)
WBC # BLD AUTO: 5.79 THOUSAND/UL (ref 4.31–10.16)

## 2024-11-02 PROCEDURE — 86334 IMMUNOFIX E-PHORESIS SERUM: CPT

## 2024-11-02 PROCEDURE — 36415 COLL VENOUS BLD VENIPUNCTURE: CPT

## 2024-11-02 PROCEDURE — 82784 ASSAY IGA/IGD/IGG/IGM EACH: CPT

## 2024-11-02 PROCEDURE — 80053 COMPREHEN METABOLIC PANEL: CPT

## 2024-11-02 PROCEDURE — 83521 IG LIGHT CHAINS FREE EACH: CPT

## 2024-11-02 PROCEDURE — 85025 COMPLETE CBC W/AUTO DIFF WBC: CPT

## 2024-11-02 PROCEDURE — 84165 PROTEIN E-PHORESIS SERUM: CPT

## 2024-11-05 LAB
KAPPA LC FREE SER-MCNC: 23.1 MG/L (ref 3.3–19.4)
KAPPA LC FREE/LAMBDA FREE SER: 1.89 {RATIO} (ref 0.26–1.65)
LAMBDA LC FREE SERPL-MCNC: 12.2 MG/L (ref 5.7–26.3)

## 2024-11-06 LAB
ALBUMIN SERPL ELPH-MCNC: 3.97 G/DL (ref 3.2–5.1)
ALBUMIN SERPL ELPH-MCNC: 59.2 % (ref 48–70)
ALPHA1 GLOB SERPL ELPH-MCNC: 0.25 G/DL (ref 0.15–0.47)
ALPHA1 GLOB SERPL ELPH-MCNC: 3.8 % (ref 1.8–7)
ALPHA2 GLOB SERPL ELPH-MCNC: 0.62 G/DL (ref 0.42–1.04)
ALPHA2 GLOB SERPL ELPH-MCNC: 9.3 % (ref 5.9–14.9)
BETA GLOB ABNORMAL SERPL ELPH-MCNC: 0.47 G/DL (ref 0.31–0.57)
BETA1 GLOB SERPL ELPH-MCNC: 7 % (ref 4.7–7.7)
BETA2 GLOB SERPL ELPH-MCNC: 6 % (ref 3.1–7.9)
BETA2+GAMMA GLOB SERPL ELPH-MCNC: 0.4 G/DL (ref 0.2–0.58)
GAMMA GLOB ABNORMAL SERPL ELPH-MCNC: 0.98 G/DL (ref 0.4–1.66)
GAMMA GLOB SERPL ELPH-MCNC: 14.7 % (ref 6.9–22.3)
IGG/ALB SER: 1.45 {RATIO} (ref 1.1–1.8)
INTERPRETATION UR IFE-IMP: NORMAL
PROT PATTERN SERPL ELPH-IMP: NORMAL
PROT SERPL-MCNC: 6.7 G/DL (ref 6.4–8.2)

## 2024-11-06 PROCEDURE — 86334 IMMUNOFIX E-PHORESIS SERUM: CPT | Performed by: STUDENT IN AN ORGANIZED HEALTH CARE EDUCATION/TRAINING PROGRAM

## 2024-11-06 PROCEDURE — 84165 PROTEIN E-PHORESIS SERUM: CPT | Performed by: STUDENT IN AN ORGANIZED HEALTH CARE EDUCATION/TRAINING PROGRAM

## 2024-11-11 ENCOUNTER — OFFICE VISIT (OUTPATIENT)
Dept: HEMATOLOGY ONCOLOGY | Facility: CLINIC | Age: 64
End: 2024-11-11
Payer: COMMERCIAL

## 2024-11-11 VITALS
SYSTOLIC BLOOD PRESSURE: 140 MMHG | HEART RATE: 73 BPM | TEMPERATURE: 97.1 F | WEIGHT: 188 LBS | HEIGHT: 71 IN | DIASTOLIC BLOOD PRESSURE: 60 MMHG | OXYGEN SATURATION: 98 % | BODY MASS INDEX: 26.32 KG/M2

## 2024-11-11 DIAGNOSIS — C83.00 MALIGNANT LYMPHOMA, LYMPHOPLASMACYTIC (HCC): Primary | ICD-10-CM

## 2024-11-11 PROCEDURE — 99214 OFFICE O/P EST MOD 30 MIN: CPT | Performed by: INTERNAL MEDICINE

## 2024-11-11 NOTE — PROGRESS NOTES
Hematology/Oncology Outpatient Follow-up  Thompson Rapp 64 y.o. male 1960 [id number]    Date:  11/11/2024        Assessment and Plan:  1. Malignant lymphoma, lymphoplasmacytic (HCC)  Diagnosed on a screening colonoscopy in February 2021.  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    The patient does not seem to have any symptoms related to the LPL.  I think it would be appropriate to continue to monitor him closely as it was recommended by Dr. Camacho from [facility].  We did discuss pursuing a PET CT scan prior to his next visit in about 6 months from now.  Follow up colonoscopy at least every 3 years and earlier for new symptoms.    - CBC and differential; Future  - Comprehensive metabolic panel; Future  - C-reactive protein; Future  - LD,Blood; Future  - Sedimentation rate, automated; Future  - IgG, IgA, IgM; Future  - Immunoglobulin free LT chains blood; Future  - Protein, Total and Protein Electrophoresis with Immunofixation; Future  - Beta 2 microglobulin, serum; Future  - NM PET CT skull base to mid thigh; Future        HPI:  The patient came today for a follow-up visit.  He denied any significant symptoms.  He stated he was seen by Dr. Camacho from [place] around August 2024.  The recommendation was to continue to monitor for any obvious symptoms related to the lymphoplasmacytic lymphoma.  Recent blood work from 11/2/2024 showed hemoglobin of 14.0 with normal white cells and platelets.  Creatinine 1.2 with normal calcium and liver enzymes.  Hemoglobin's were within normal range.  Serum light chain concentration for kappa was 23.1 with kappa to lambda ratio of 1.8.  SPEP was negative for M protein serum immunofixation was negative for M protein.  Oncology History   Malignant lymphoma, lymphoplasmacytic (HCC)   2/5/2021 Initial Diagnosis    Routine Colonoscopy by Dr. Montgomery    IMPRESSION:  Few mild scattered diverticula in the sigmoid colon  One  sessile polyp measuring smaller than 5 mm in the descending colon; performed complete en bloc removal by cold forceps biopsy  One sessile polyp measuring 5-9 mm in the sigmoid colon; completely removed en bloc by cold snare and retrieved specimen     2/5/2021 -  Cancer Staged    Staging form: Hodgkin and Non-Hodgkin Lymphoma, AJCC 8th Edition  - Clinical stage from 2/5/2021: Stage IE - Signed by Rosina Gibson MD on 11/11/2024  Stage prefix: Initial diagnosis       2/18/2021 Initial Diagnosis    Malignant lymphoma, lymphoplasmacytic (HCC)    This addendum is included to report the results of MYD88 testing which is positive, favoring a diagnosis of lymphoplasmacytic lymphoma, though is not definitively specific.  Correlation with clinical impression is recommended.        - MYD88 Mutation Analysis performed on the Colorectal Polyp @ [employer] (Specimen ID: [id number], evaluated by Trudy Chan MD, PhD) as follows:   Results:  MYD88 Mutation(s):     Detected  Mutation(s) Detected:   c.794T>C (p.L265P)   Addendum electronically signed by Bennie Myers MD on 3/1/2021 at 11:56 AM   Final Diagnosis   A. Colon, descending polyp, biopsy:  -  Tubular adenoma, negative for high-grade dysplasia.    B. Colon, sigmoid polyp, biopsy:  -  Small B cell neoplasm with kappa light chain restricted plasma cell proliferation, compatible with lymphoma (see note)     PETCT 3/19/21  1.  Small focus of FDG uptake at the gastric antrum for which underlying lesion should be excluded.  Correlate with endoscopy.  2.  Single FDG avid left axillary lymph node.  This may be related to recent Covid vaccination but given the history of lymphoma, malignancy cannot be entirely excluded.  This can be reassessed on short-term follow-up CT or PET/CT in 3 months.  3. Small focus of FDG uptake at the inferior aspect of the prostate, underlying lesion not excluded.  Correlate with PSA levels and urologic evaluation.  4. Mild FDG uptake in a left  upper cervical chain lymph node may be reactive but can be reassessed on follow-up.  5.  Mildly aneurysmal ascending thoracic aorta at 4.1 cm in diameter.    BASELINE DISEASE CHARACTERISTICS 2/20/21  SPEP - normal  Quant Ig - normal including normal IgM  SLFC Ratio - normal  CBC - normal     6/2021 Observation    6/12/2021 IgM 140, K/L ratio 1.4  10/29/2021 IgM 137, K/L ratio 1.49  5/5/2022 IgM 126, K/L ratio 1.83  11/26/2022 IgM 132, K/L ratio 1.9  6/1/2023 IgM 148, K/L ratio 2.09  12/6/2023: IgM 148, K/L ratio 2.39    7/22/2024 hemoglobin 13.9, MCV 98, WBC 5.8, differential WNL          Creatinine 1.17, BUN 14, calcium 9.1, albumin WNL, EGFR 65                    K/L ratio 1.87, SPEP = no monoclonal bands          IgM 142     2/15/2024 Biopsy    Final Diagnosis   A. Colon, descending polyp, biopsy:  -  Tubular adenoma, negative for high-grade dysplasia.    B. Colon, sigmoid polyp, biopsy:  -  Small B cell neoplasm with kappa light chain restricted plasma cell proliferation, compatible with lymphoma (see note)     This addendum is included to report the results of MYD88 testing which is positive, favoring a diagnosis of lymphoplasmacytic lymphoma, though is not definitively specific.  Correlation with clinical impression is recommended.     - MYD88 Mutation Analysis performed on the Colorectal Polyp @ [employer] (Specimen ID: [id number], evaluated by Trudy Chan MD, PhD) as follows:   Results:  MYD88 Mutation(s):     Detected  Mutation(s) Detected:   c.794T>C (p.L265P)     3/1/2024 Observation    PET/CT scan:    1. There is somewhat greater FDG intensity in a short segment of the proximal rectum, though uncertain if this is just physiologic or correlates with recent biopsy findings of rectal lymphoproliferative disorder. There is otherwise physiologic FDG activity throughout the small and large bowel, limiting evaluation for lymphoma involvement.    2. Interval decreased FDG uptake in a left  axillary node. Additional mild FDG uptake in small bilateral axillary nodes are nonspecific, possibly just reactive. Attention on follow-up to exclude lymphoma involvement.    3. No additional hypermetabolic lesions that are concerning for malignancy/metastases.    4. Stable ectatic ascending thoracic aorta. Continued yearly CT follow-up recommended.         Interval history:    ROS: Review of Systems   Constitutional:  Positive for fatigue. Negative for chills and fever.   HENT:  Positive for hearing loss. Negative for ear pain and sore throat.    Eyes:  Negative for pain and visual disturbance.   Respiratory:  Negative for cough and shortness of breath.    Cardiovascular:  Negative for chest pain and palpitations.   Gastrointestinal:  Negative for abdominal pain and vomiting.   Genitourinary:  Negative for dysuria and hematuria.   Musculoskeletal:  Negative for arthralgias and back pain.   Skin:  Negative for color change and rash.   Neurological:  Negative for seizures and syncope.   All other systems reviewed and are negative.      Past Medical History:   Diagnosis Date    Atrial fibrillation (HCC)     Cancer (HCC)     lymphoma    Chronic kidney disease     CKD (chronic kidney disease)     Dilated aortic root (HCC)     Erectile dysfunction     Hyperlipidemia 06/28/2016    Hypertension     Hypertensive CKD (chronic kidney disease)     Mild renal insufficiency 09/26/2017    Vitamin D deficiency        Past Surgical History:   Procedure Laterality Date    CARDIAC ELECTROPHYSIOLOGY PROCEDURE N/A 03/03/2023    Procedure: Cardiac eps/afib ablation;  Surgeon: Anmol Gonzales MD;  Location: [facility];  Service: Cardiology    CARDIAC ELECTROPHYSIOLOGY PROCEDURE N/A 03/03/2023    Procedure: Cardiac eps/aflutter ablation;  Surgeon: Anmol Gonzales MD;  Location: [facility];  Service: Cardiology    COLONOSCOPY      HERNIA REPAIR      HIATAL HERNIA REPAIR         Social History     Socioeconomic History     Marital status: /Civil Union     Spouse name: None    Number of children: None    Years of education: None    Highest education level: None   Occupational History    None   Tobacco Use    Smoking status: Never     Passive exposure: Never    Smokeless tobacco: Never   Vaping Use    Vaping status: Never Used   Substance and Sexual Activity    Alcohol use: Yes     Alcohol/week: 1.0 standard drink of alcohol     Types: 1 Cans of beer per week     Comment: social    Drug use: No    Sexual activity: Yes     Partners: Female     Birth control/protection: None   Other Topics Concern    None   Social History Narrative    None     Social Determinants of Health     Financial Resource Strain: Not on file   Food Insecurity: Not on file   Transportation Needs: Not on file   Physical Activity: Not on file   Stress: Not on file   Social Connections: Not on file   Intimate Partner Violence: Not on file   Housing Stability: Not on file       Family History   Problem Relation Age of Onset    Lung cancer Mother     Cancer Mother     Heart attack Father 68    Prostate cancer Father     Hyperlipidemia Father     Hypertension Father     No Known Problems Sister     Lymphoma Maternal Aunt     Fainting Maternal Grandfather        No Known Allergies      Current Outpatient Medications:     cholecalciferol (VITAMIN D3) 1,000 units tablet, Take 1,000 Units by mouth daily, Disp: , Rfl:     hydrALAZINE (APRESOLINE) 10 mg tablet, Take 1 tablet (10 mg total) by mouth 2 (two) times a day, Disp: 180 tablet, Rfl: 3    losartan (COZAAR) 100 MG tablet, Take 1 tablet (100 mg total) by mouth daily, Disp: 90 tablet, Rfl: 3    multivitamin (THERAGRAN) TABS, Take 1 tablet by mouth daily, Disp: , Rfl:     NIFEdipine (PROCARDIA XL) 30 mg 24 hr tablet, Take 1 tablet (30 mg total) by mouth daily, Disp: 90 tablet, Rfl: 3      Physical Exam:  /60 (BP Location: Right arm, Patient Position: Sitting, Cuff Size: Standard)   Pulse 73   Temp (!) 97.1 °F  "(36.2 °C) (Temporal)   Ht 5' 11\" (1.803 m)   Wt 85.3 kg (188 lb)   SpO2 98%   BMI 26.22 kg/m²     Physical Exam  Constitutional:       Appearance: He is well-developed.   HENT:      Head: Normocephalic and atraumatic.      Nose: Nose normal.   Eyes:      General: No scleral icterus.        Right eye: No discharge.         Left eye: No discharge.      Conjunctiva/sclera: Conjunctivae normal.      Pupils: Pupils are equal, round, and reactive to light.   Neck:      Thyroid: No thyromegaly.      Trachea: No tracheal deviation.   Cardiovascular:      Rate and Rhythm: Normal rate and regular rhythm.      Heart sounds: Normal heart sounds. No murmur heard.     No friction rub.   Pulmonary:      Effort: Pulmonary effort is normal. No respiratory distress.      Breath sounds: Normal breath sounds. No wheezing or rales.   Chest:      Chest wall: No tenderness.   Abdominal:      General: There is no distension.      Palpations: Abdomen is soft. There is no hepatomegaly or splenomegaly.      Tenderness: There is no abdominal tenderness. There is no guarding or rebound.   Musculoskeletal:         General: No tenderness or deformity. Normal range of motion.      Cervical back: Normal range of motion and neck supple.   Lymphadenopathy:      Cervical: No cervical adenopathy.   Skin:     General: Skin is warm and dry.      Coloration: Skin is not pale.      Findings: No erythema or rash.   Neurological:      Mental Status: He is alert and oriented to person, place, and time.      Cranial Nerves: No cranial nerve deficit.      Coordination: Coordination normal.      Deep Tendon Reflexes: Reflexes are normal and symmetric.   Psychiatric:         Behavior: Behavior normal.         Thought Content: Thought content normal.         Judgment: Judgment normal.           Labs:  Lab Results   Component Value Date    WBC 5.79 11/02/2024    HGB 14.0 11/02/2024    HCT 43.1 11/02/2024    MCV 98 11/02/2024     11/02/2024     Lab " "Results   Component Value Date     10/07/2017    K 4.0 11/02/2024     11/02/2024    CO2 24 11/02/2024    BUN 29 (H) 11/02/2024    CREATININE 1.22 11/02/2024    GLUF 101 (H) 11/02/2024    CALCIUM 9.3 11/02/2024    CORRECTEDCA 9.5 05/13/2023    AST 19 11/02/2024    ALT 12 11/02/2024    ALKPHOS 40 11/02/2024    PROT 6.1 10/07/2017    BILITOT 0.6 10/07/2017    EGFR 61 11/02/2024     No results found for: \"TSH\"    Patient voiced understanding and agreement in the above discussion. Aware to contact our office with questions/symptoms in the interim.   "

## 2024-12-11 ENCOUNTER — APPOINTMENT (OUTPATIENT)
Age: 64
End: 2024-12-11
Payer: COMMERCIAL

## 2024-12-11 LAB
ALBUMIN SERPL BCG-MCNC: 4 G/DL (ref 3.5–5)
ALP SERPL-CCNC: 41 U/L (ref 34–104)
ALT SERPL W P-5'-P-CCNC: 13 U/L (ref 7–52)
ANION GAP SERPL CALCULATED.3IONS-SCNC: 7 MMOL/L (ref 4–13)
AST SERPL W P-5'-P-CCNC: 16 U/L (ref 13–39)
BILIRUB SERPL-MCNC: 0.49 MG/DL (ref 0.2–1)
BUN SERPL-MCNC: 21 MG/DL (ref 5–25)
CALCIUM SERPL-MCNC: 9.3 MG/DL (ref 8.4–10.2)
CHLORIDE SERPL-SCNC: 106 MMOL/L (ref 96–108)
CHOLEST SERPL-MCNC: 201 MG/DL (ref ?–200)
CO2 SERPL-SCNC: 27 MMOL/L (ref 21–32)
CREAT SERPL-MCNC: 1.11 MG/DL (ref 0.6–1.3)
ERYTHROCYTE [DISTWIDTH] IN BLOOD BY AUTOMATED COUNT: 13.2 % (ref 11.6–15.1)
EST. AVERAGE GLUCOSE BLD GHB EST-MCNC: 123 MG/DL
GFR SERPL CREATININE-BSD FRML MDRD: 69 ML/MIN/1.73SQ M
GLUCOSE P FAST SERPL-MCNC: 95 MG/DL (ref 65–99)
HBA1C MFR BLD: 5.9 %
HCT VFR BLD AUTO: 43.2 % (ref 36.5–49.3)
HDLC SERPL-MCNC: 52 MG/DL
HGB BLD-MCNC: 13.7 G/DL (ref 12–17)
LDLC SERPL CALC-MCNC: 135 MG/DL (ref 0–100)
MCH RBC QN AUTO: 31.6 PG (ref 26.8–34.3)
MCHC RBC AUTO-ENTMCNC: 31.7 G/DL (ref 31.4–37.4)
MCV RBC AUTO: 100 FL (ref 82–98)
PLATELET # BLD AUTO: 208 THOUSANDS/UL (ref 149–390)
PMV BLD AUTO: 11.4 FL (ref 8.9–12.7)
POTASSIUM SERPL-SCNC: 4.1 MMOL/L (ref 3.5–5.3)
PROT SERPL-MCNC: 6.4 G/DL (ref 6.4–8.4)
RBC # BLD AUTO: 4.34 MILLION/UL (ref 3.88–5.62)
SODIUM SERPL-SCNC: 140 MMOL/L (ref 135–147)
TRIGL SERPL-MCNC: 71 MG/DL (ref ?–150)
TSH SERPL DL<=0.05 MIU/L-ACNC: 1.9 UIU/ML (ref 0.45–4.5)
WBC # BLD AUTO: 4.97 THOUSAND/UL (ref 4.31–10.16)

## 2024-12-11 PROCEDURE — 84443 ASSAY THYROID STIM HORMONE: CPT

## 2024-12-11 PROCEDURE — 85027 COMPLETE CBC AUTOMATED: CPT

## 2024-12-11 PROCEDURE — 80061 LIPID PANEL: CPT

## 2024-12-11 PROCEDURE — 83036 HEMOGLOBIN GLYCOSYLATED A1C: CPT

## 2024-12-11 PROCEDURE — 80053 COMPREHEN METABOLIC PANEL: CPT

## 2024-12-12 ENCOUNTER — RESULTS FOLLOW-UP (OUTPATIENT)
Dept: FAMILY MEDICINE CLINIC | Facility: CLINIC | Age: 64
End: 2024-12-12

## 2024-12-17 ENCOUNTER — OFFICE VISIT (OUTPATIENT)
Dept: FAMILY MEDICINE CLINIC | Facility: CLINIC | Age: 64
End: 2024-12-17
Payer: COMMERCIAL

## 2024-12-17 VITALS
HEART RATE: 78 BPM | WEIGHT: 183.4 LBS | OXYGEN SATURATION: 99 % | BODY MASS INDEX: 25.68 KG/M2 | TEMPERATURE: 98.1 F | HEIGHT: 71 IN | RESPIRATION RATE: 16 BRPM | DIASTOLIC BLOOD PRESSURE: 73 MMHG | SYSTOLIC BLOOD PRESSURE: 126 MMHG

## 2024-12-17 DIAGNOSIS — R73.01 IFG (IMPAIRED FASTING GLUCOSE): ICD-10-CM

## 2024-12-17 DIAGNOSIS — E78.5 DYSLIPIDEMIA: ICD-10-CM

## 2024-12-17 DIAGNOSIS — Z23 ENCOUNTER FOR IMMUNIZATION: ICD-10-CM

## 2024-12-17 DIAGNOSIS — I10 BENIGN ESSENTIAL HYPERTENSION: ICD-10-CM

## 2024-12-17 DIAGNOSIS — Z00.00 ANNUAL PHYSICAL EXAM: Primary | ICD-10-CM

## 2024-12-17 DIAGNOSIS — I48.0 PAROXYSMAL ATRIAL FIBRILLATION (HCC): ICD-10-CM

## 2024-12-17 PROCEDURE — 90471 IMMUNIZATION ADMIN: CPT

## 2024-12-17 PROCEDURE — 99397 PER PM REEVAL EST PAT 65+ YR: CPT | Performed by: FAMILY MEDICINE

## 2024-12-17 PROCEDURE — 90750 HZV VACC RECOMBINANT IM: CPT

## 2024-12-17 NOTE — PATIENT INSTRUCTIONS
"Patient Education     Routine physical for adults   The Basics   Written by the doctors and editors at [facility]   What is a physical? -- A physical is a routine visit, or \"check-up,\" with your doctor. You might also hear it called a \"wellness visit\" or \"preventive visit.\"  During each visit, the doctor will:   Ask about your physical and mental health   Ask about your habits, behaviors, and lifestyle   Do an exam   Give you vaccines if needed   Talk to you about any medicines you take   Give advice about your health   Answer your questions  Getting regular check-ups is an important part of taking care of your health. It can help your doctor find and treat any problems you have. But it's also important for preventing health problems.  A routine physical is different from a \"sick visit.\" A sick visit is when you see a doctor because of a health concern or problem. Since physicals are scheduled ahead of time, you can think about what you want to ask the doctor.  How often should I get a physical? -- It depends on your age and health. In general, for people age 21 years and older:   If you are younger than 50 years, you might be able to get a physical every 3 years.   If you are 50 years or older, your doctor might recommend a physical every year.  If you have an ongoing health condition, like diabetes or high blood pressure, your doctor will probably want to see you more often.  What happens during a physical? -- In general, each visit will include:   Physical exam - The doctor or nurse will check your height, weight, heart rate, and blood pressure. They will also look at your eyes and ears. They will ask about how you are feeling and whether you have any symptoms that bother you.   Medicines - It's a good idea to bring a list of all the medicines you take to each doctor visit. Your doctor will talk to you about your medicines and answer any questions. Tell them if you are having any side effects that bother you. You " "should also tell them if you are having trouble paying for any of your medicines.   Habits and behaviors - This includes:   Your diet   Your exercise habits   Whether you smoke, drink alcohol, or use drugs   Whether you are sexually active   Whether you feel safe at home  Your doctor will talk to you about things you can do to improve your health and lower your risk of health problems. They will also offer help and support. For example, if you want to quit smoking, they can give you advice and might prescribe medicines. If you want to improve your diet or get more physical activity, they can help you with this, too.   Lab tests, if needed - The tests you get will depend on your age and situation. For example, your doctor might want to check your:   Cholesterol   Blood sugar   Iron level   Vaccines - The recommended vaccines will depend on your age, health, and what vaccines you already had. Vaccines are very important because they can prevent certain serious or deadly infections.   Discussion of screening - \"Screening\" means checking for diseases or other health problems before they cause symptoms. Your doctor can recommend screening based on your age, risk, and preferences. This might include tests to check for:   Cancer, such as breast, prostate, cervical, ovarian, colorectal, prostate, lung, or skin cancer   Sexually transmitted infections, such as chlamydia and gonorrhea   Mental health conditions like depression and anxiety  Your doctor will talk to you about the different types of screening tests. They can help you decide which screenings to have. They can also explain what the results might mean.   Answering questions - The physical is a good time to ask the doctor or nurse questions about your health. If needed, they can refer you to other doctors or specialists, too.  Adults older than 65 years often need other care, too. As you get older, your doctor will talk to you about:   How to prevent falling at " home   Hearing or vision tests   Memory testing   How to take your medicines safely   Making sure that you have the help and support you need at home  All topics are updated as new evidence becomes available and our peer review process is complete.  This topic retrieved from [employer] on: May 02, 2024.  Topic 228271 Version 1.0  Release: 32.4.3 - C32.122  © 2024 UpToDate, Inc. and/or its affiliates. All rights reserved.  Consumer Information Use and Disclaimer   Disclaimer: This generalized information is a limited summary of diagnosis, treatment, and/or medication information. It is not meant to be comprehensive and should be used as a tool to help the user understand and/or assess potential diagnostic and treatment options. It does NOT include all information about conditions, treatments, medications, side effects, or risks that may apply to a specific patient. It is not intended to be medical advice or a substitute for the medical advice, diagnosis, or treatment of a health care provider based on the health care provider's examination and assessment of a patient's specific and unique circumstances. Patients must speak with a health care provider for complete information about their health, medical questions, and treatment options, including any risks or benefits regarding use of medications. This information does not endorse any treatments or medications as safe, effective, or approved for treating a specific patient. UpToDate, Inc. and its affiliates disclaim any warranty or liability relating to this information or the use thereof.The use of this information is governed by the Terms of Use, available at https://www.[facility].com/en/know/clinical-effectiveness-terms. 2024© UpToDate, Inc. and its affiliates and/or licensors. All rights reserved.  Copyright   © 2024 UpToDate, Inc. and/or its affiliates. All rights reserved.

## 2024-12-17 NOTE — ASSESSMENT & PLAN NOTE
Discussed red yeast rice  Watching diet  Orders:  •  Lipid Panel with Direct LDL reflex; Future  •  TSH, 3rd generation with Free T4 reflex; Future

## 2024-12-17 NOTE — ASSESSMENT & PLAN NOTE
Bp's at rest are in good range  With activity increased to 's/80-90  Stopped losartan due to abdominal pain  Orders:  •  CBC; Future  •  Comprehensive metabolic panel; Future

## 2024-12-17 NOTE — PROGRESS NOTES
Adult Annual Physical  Name: Thompson Rapp      : 1960      MRN: [id number]  Encounter Provider: Kyra King DO  Encounter Date: 2024   Encounter department: [facility]    Assessment & Plan  Annual physical exam  Shingrix today       Benign essential hypertension  Bp's at rest are in good range  With activity increased to 's/80-90  Stopped losartan due to abdominal pain  Orders:  •  CBC; Future  •  Comprehensive metabolic panel; Future    Dyslipidemia  Discussed red yeast rice  Watching diet  Orders:  •  Lipid Panel with Direct LDL reflex; Future  •  TSH, 3rd generation with Free T4 reflex; Future    IFG (impaired fasting glucose)  Watching diet  Orders:  •  Hemoglobin A1C; Future    Encounter for immunization    Orders:  •  Zoster Vaccine Recombinant IM    Paroxysmal atrial fibrillation (HCC)  No issues since ablation       Immunizations and preventive care screenings were discussed with patient today. Appropriate education was printed on patient's after visit summary.    Discussed risks and benefits of prostate cancer screening. We discussed the controversial history of PSA screening for prostate cancer in the [place] as well as the risk of over detection and over treatment of prostate cancer by way of PSA screening.  The patient understands that PSA blood testing is an imperfect way to screen for prostate cancer and that elevated PSA levels in the blood may also be caused by infection, inflammation, prostatic trauma or manipulation, urological procedures, or by benign prostatic enlargement.    The role of the digital rectal examination in prostate cancer screening was also discussed and I discussed with him that there is large interobserver variability in the findings of digital rectal examination.    Counseling:  Dental Health: discussed importance of regular tooth brushing, flossing, and dental visits.         History of Present Illness     Adult Annual  "Physical:  Patient presents for annual physical. Here for wellness  Seen Dr. Camacho at [facility]  Has pet scan scheduled  Seeing urology.     Depression Screening:  - PHQ-2 Score: 0    Review of Systems  Medical History Reviewed by provider this encounter:  Tobacco  Allergies  Meds  Problems  Med Hx  Surg Hx  Fam Hx     .    Objective   /73   Pulse 78   Temp 98.1 °F (36.7 °C) (Tympanic)   Resp 16   Ht 5' 10.98\" (1.803 m)   Wt 83.2 kg (183 lb 6.4 oz)   SpO2 99%   BMI 25.59 kg/m²     Physical Exam  Vitals and nursing note reviewed.   Constitutional:       Appearance: Normal appearance. He is well-developed.   HENT:      Head: Normocephalic and atraumatic.      Right Ear: External ear normal.      Left Ear: External ear normal.      Nose: Nose normal.   Eyes:      Extraocular Movements: Extraocular movements intact.      Conjunctiva/sclera: Conjunctivae normal.      Pupils: Pupils are equal, round, and reactive to light.   Cardiovascular:      Rate and Rhythm: Normal rate and regular rhythm.      Heart sounds: Normal heart sounds.   Pulmonary:      Effort: Pulmonary effort is normal.      Breath sounds: Normal breath sounds.   Abdominal:      General: Abdomen is flat. Bowel sounds are normal.      Palpations: Abdomen is soft.   Musculoskeletal:         General: Normal range of motion.      Cervical back: Normal range of motion and neck supple.   Skin:     General: Skin is warm and dry.      Capillary Refill: Capillary refill takes less than 2 seconds.   Neurological:      General: No focal deficit present.      Mental Status: He is alert and oriented to person, place, and time.   Psychiatric:         Mood and Affect: Mood normal.         Behavior: Behavior normal.         Thought Content: Thought content normal.         Judgment: Judgment normal.         "

## 2025-01-24 ENCOUNTER — TELEPHONE (OUTPATIENT)
Dept: NEPHROLOGY | Facility: CLINIC | Age: 65
End: 2025-01-24

## 2025-01-24 DIAGNOSIS — N18.2 CKD (CHRONIC KIDNEY DISEASE) STAGE 2, GFR 60-89 ML/MIN: Primary | ICD-10-CM

## 2025-01-24 NOTE — TELEPHONE ENCOUNTER
Patient is on wait list for Dr. Arnett in [place],  Called to see if they wanted to reschedule the 3/12/25 appointment to 1/27. He declined but asked we make sure labs are put in his chart.

## 2025-01-24 NOTE — TELEPHONE ENCOUNTER
I spoke to Thompson livingston is aware labs in [facility] no changes to be made at this time.

## 2025-01-31 DIAGNOSIS — C83.00 MALIGNANT LYMPHOMA, LYMPHOPLASMACYTIC (HCC): Primary | ICD-10-CM

## 2025-02-24 ENCOUNTER — HOSPITAL ENCOUNTER (OUTPATIENT)
Dept: CT IMAGING | Facility: HOSPITAL | Age: 65
Discharge: HOME | End: 2025-02-24
Attending: INTERNAL MEDICINE
Payer: COMMERCIAL

## 2025-02-24 DIAGNOSIS — C83.00 MALIGNANT LYMPHOMA, LYMPHOPLASMACYTIC (HCC): ICD-10-CM

## 2025-02-24 PROCEDURE — 71260 CT THORAX DX C+: CPT

## 2025-02-24 PROCEDURE — 74177 CT ABD & PELVIS W/CONTRAST: CPT

## 2025-02-24 RX ADMIN — IOHEXOL 100 ML: 350 INJECTION, SOLUTION INTRAVENOUS at 08:43

## 2025-03-01 ENCOUNTER — APPOINTMENT (OUTPATIENT)
Age: 65
End: 2025-03-01
Payer: COMMERCIAL

## 2025-03-01 DIAGNOSIS — I10 BENIGN ESSENTIAL HYPERTENSION: ICD-10-CM

## 2025-03-01 DIAGNOSIS — R73.01 IFG (IMPAIRED FASTING GLUCOSE): ICD-10-CM

## 2025-03-01 DIAGNOSIS — C83.00 MALIGNANT LYMPHOMA, LYMPHOPLASMACYTIC (HCC): ICD-10-CM

## 2025-03-01 DIAGNOSIS — N18.2 CKD (CHRONIC KIDNEY DISEASE) STAGE 2, GFR 60-89 ML/MIN: ICD-10-CM

## 2025-03-01 DIAGNOSIS — E78.5 DYSLIPIDEMIA: ICD-10-CM

## 2025-03-01 LAB
25(OH)D3 SERPL-MCNC: 36.8 NG/ML (ref 30–100)
ALBUMIN SERPL BCG-MCNC: 3.9 G/DL (ref 3.5–5)
ALP SERPL-CCNC: 51 U/L (ref 34–104)
ALT SERPL W P-5'-P-CCNC: 16 U/L (ref 7–52)
ANION GAP SERPL CALCULATED.3IONS-SCNC: 10 MMOL/L (ref 4–13)
AST SERPL W P-5'-P-CCNC: 20 U/L (ref 13–39)
BASOPHILS # BLD AUTO: 0.05 THOUSANDS/ÂΜL (ref 0–0.1)
BASOPHILS NFR BLD AUTO: 1 % (ref 0–1)
BILIRUB SERPL-MCNC: 0.56 MG/DL (ref 0.2–1)
BUN SERPL-MCNC: 22 MG/DL (ref 5–25)
CALCIUM SERPL-MCNC: 9.2 MG/DL (ref 8.4–10.2)
CHLORIDE SERPL-SCNC: 104 MMOL/L (ref 96–108)
CO2 SERPL-SCNC: 24 MMOL/L (ref 21–32)
CREAT SERPL-MCNC: 1.21 MG/DL (ref 0.6–1.3)
CREAT UR-MCNC: 31.8 MG/DL
CRP SERPL QL: 1.3 MG/L
EOSINOPHIL # BLD AUTO: 0.06 THOUSAND/ÂΜL (ref 0–0.61)
EOSINOPHIL NFR BLD AUTO: 1 % (ref 0–6)
ERYTHROCYTE [DISTWIDTH] IN BLOOD BY AUTOMATED COUNT: 13.3 % (ref 11.6–15.1)
ERYTHROCYTE [SEDIMENTATION RATE] IN BLOOD: 28 MM/HOUR (ref 0–19)
GFR SERPL CREATININE-BSD FRML MDRD: 62 ML/MIN/1.73SQ M
GLUCOSE P FAST SERPL-MCNC: 101 MG/DL (ref 65–99)
HCT VFR BLD AUTO: 44.9 % (ref 36.5–49.3)
HGB BLD-MCNC: 14.3 G/DL (ref 12–17)
IGA SERPL-MCNC: 256 MG/DL (ref 66–433)
IGG SERPL-MCNC: 1011 MG/DL (ref 635–1741)
IGM SERPL-MCNC: 165 MG/DL (ref 45–281)
IMM GRANULOCYTES # BLD AUTO: 0.02 THOUSAND/UL (ref 0–0.2)
IMM GRANULOCYTES NFR BLD AUTO: 0 % (ref 0–2)
LDH SERPL-CCNC: 184 U/L (ref 140–271)
LYMPHOCYTES # BLD AUTO: 1.2 THOUSANDS/ÂΜL (ref 0.6–4.47)
LYMPHOCYTES NFR BLD AUTO: 18 % (ref 14–44)
MAGNESIUM SERPL-MCNC: 2.2 MG/DL (ref 1.9–2.7)
MCH RBC QN AUTO: 31.5 PG (ref 26.8–34.3)
MCHC RBC AUTO-ENTMCNC: 31.8 G/DL (ref 31.4–37.4)
MCV RBC AUTO: 99 FL (ref 82–98)
MICROALBUMIN UR-MCNC: <7 MG/L
MONOCYTES # BLD AUTO: 0.55 THOUSAND/ÂΜL (ref 0.17–1.22)
MONOCYTES NFR BLD AUTO: 8 % (ref 4–12)
NEUTROPHILS # BLD AUTO: 4.68 THOUSANDS/ÂΜL (ref 1.85–7.62)
NEUTS SEG NFR BLD AUTO: 72 % (ref 43–75)
NRBC BLD AUTO-RTO: 0 /100 WBCS
PHOSPHATE SERPL-MCNC: 2.5 MG/DL (ref 2.3–4.1)
PLATELET # BLD AUTO: 239 THOUSANDS/UL (ref 149–390)
PMV BLD AUTO: 11.1 FL (ref 8.9–12.7)
POTASSIUM SERPL-SCNC: 4 MMOL/L (ref 3.5–5.3)
PROT SERPL-MCNC: 6.9 G/DL (ref 6.4–8.4)
PSA SERPL-MCNC: 2.84 NG/ML (ref 0–4)
PTH-INTACT SERPL-MCNC: 77.9 PG/ML (ref 12–88)
RBC # BLD AUTO: 4.54 MILLION/UL (ref 3.88–5.62)
SODIUM SERPL-SCNC: 138 MMOL/L (ref 135–147)
WBC # BLD AUTO: 6.56 THOUSAND/UL (ref 4.31–10.16)

## 2025-03-01 PROCEDURE — 83615 LACTATE (LD) (LDH) ENZYME: CPT

## 2025-03-01 PROCEDURE — 82784 ASSAY IGA/IGD/IGG/IGM EACH: CPT

## 2025-03-01 PROCEDURE — 36415 COLL VENOUS BLD VENIPUNCTURE: CPT

## 2025-03-01 PROCEDURE — 80053 COMPREHEN METABOLIC PANEL: CPT

## 2025-03-01 PROCEDURE — 82232 ASSAY OF BETA-2 PROTEIN: CPT

## 2025-03-01 PROCEDURE — 85652 RBC SED RATE AUTOMATED: CPT

## 2025-03-01 PROCEDURE — 83521 IG LIGHT CHAINS FREE EACH: CPT

## 2025-03-01 PROCEDURE — 85025 COMPLETE CBC W/AUTO DIFF WBC: CPT

## 2025-03-01 PROCEDURE — 86140 C-REACTIVE PROTEIN: CPT

## 2025-03-03 ENCOUNTER — RESULTS FOLLOW-UP (OUTPATIENT)
Dept: UROLOGY | Facility: MEDICAL CENTER | Age: 65
End: 2025-03-03

## 2025-03-03 ENCOUNTER — RESULTS FOLLOW-UP (OUTPATIENT)
Dept: OTHER | Facility: HOSPITAL | Age: 65
End: 2025-03-03

## 2025-03-04 LAB
B2 MICROGLOB SERPL-MCNC: 1.6 MG/L (ref 0.6–2.4)
KAPPA LC FREE SER-MCNC: 24.9 MG/L (ref 3.3–19.4)
KAPPA LC FREE/LAMBDA FREE SER: 1.95 {RATIO} (ref 0.26–1.65)
LAMBDA LC FREE SERPL-MCNC: 12.8 MG/L (ref 5.7–26.3)

## 2025-03-10 ENCOUNTER — OFFICE VISIT (OUTPATIENT)
Dept: HEMATOLOGY ONCOLOGY | Facility: CLINIC | Age: 65
End: 2025-03-10
Payer: COMMERCIAL

## 2025-03-10 VITALS
OXYGEN SATURATION: 98 % | TEMPERATURE: 97.6 F | DIASTOLIC BLOOD PRESSURE: 80 MMHG | SYSTOLIC BLOOD PRESSURE: 126 MMHG | HEIGHT: 71 IN | WEIGHT: 186 LBS | RESPIRATION RATE: 18 BRPM | BODY MASS INDEX: 26.04 KG/M2 | HEART RATE: 69 BPM

## 2025-03-10 DIAGNOSIS — N40.0 ENLARGED PROSTATE: ICD-10-CM

## 2025-03-10 DIAGNOSIS — C83.00 MALIGNANT LYMPHOMA, LYMPHOPLASMACYTIC (HCC): Primary | ICD-10-CM

## 2025-03-10 PROCEDURE — 99214 OFFICE O/P EST MOD 30 MIN: CPT | Performed by: INTERNAL MEDICINE

## 2025-03-10 NOTE — ASSESSMENT & PLAN NOTE
Diagnosed on a screening colonoscopy in February 2021.  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     The patient does not seem to have any symptoms related to the LPL.  He was seen by Dr. Camacho at [facility] for second opinion who recommended close observation.    I did review with the patient the recent CT scan of the chest abdomen pelvis which was negative for any obvious hint of adenopathy or organomegaly.  He was found to have scattered calcified and noncalcified pulmonary nodules measuring up to 4 mm.  CT scan of the chest will be ordered prior to his next visit for close monitoring.  Mild enlargement of the prostate was also noted.  The patient is entirely asymptomatic.    Follow up colonoscopy at least every 3 years and earlier for new symptoms.       Orders:    CBC and differential; Future    Comprehensive metabolic panel; Future    Magnesium; Future    CT chest wo contrast; Future    IgG, IgA, IgM; Future    Immunoglobulin free LT chains blood; Future

## 2025-03-10 NOTE — PROGRESS NOTES
Name: Thompson Rapp      : 1960      MRN: [id number]  Encounter Provider: Rosina Gibson MD  Encounter Date: 3/10/2025   Encounter department: [facility] [place]  :  Assessment & Plan  Malignant lymphoma, lymphoplasmacytic (HCC)  Diagnosed on a screening colonoscopy in 2021.  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     The patient does not seem to have any symptoms related to the LPL.  He was seen by Dr. Camacho at [facility] for second opinion who recommended close observation.    I did review with the patient the recent CT scan of the chest abdomen pelvis which was negative for any obvious hint of adenopathy or organomegaly.  He was found to have scattered calcified and noncalcified pulmonary nodules measuring up to 4 mm.  CT scan of the chest will be ordered prior to his next visit for close monitoring.  Mild enlargement of the prostate was also noted.  The patient is entirely asymptomatic.    Follow up colonoscopy at least every 3 years and earlier for new symptoms.       Orders:    CBC and differential; Future    Comprehensive metabolic panel; Future    Magnesium; Future    CT chest wo contrast; Future    IgG, IgA, IgM; Future    Immunoglobulin free LT chains blood; Future    Enlarged prostate  Mild enlargement of the prostate was noted on the recent CT scan.  Recent PSA was normal.  Orders:    PSA Total, Diagnostic; Future        Return in about 6 months (around 2025) for Office Visit 20 min, Labs, Imaging.    History of Present Illness   Chief Complaint   Patient presents with    Follow-up   The patient came today for follow-up visit accompanied by his wife.  He had a CT scan of the chest abdomen pelvis on 2025 which showed:  IMPRESSION:  1. There are scattered calcified and noncalcified pulmonary nodules measuring up to 4 mm as described above. Follow-up is recommended per Seven  criteria based on patient's risk.     2. Mild dilatation of the ascending thoracic aorta measuring 3.9 cm in diameter. There are a few atherosclerotic coronary artery calcifications.     3. No significant adenopathy in the chest, abdomen or pelvis.     4. Mild enlargement of the prostate gland with mild intravesical protrusion. Correlate with physical exam and PSA.     5. Please see above text for additional findings and details.    Blood work from 3/1/2025 was reviewed with the patient.  He seems to have normal immunoglobulin levels.  CBC was normal.  CMP was within normal range.  Kappa light chain concentration was slightly elevated 24.9 with kappa to lambda ratio of 1.9.  PSA was 2.8.      Oncology History   Cancer Staging   Malignant lymphoma, lymphoplasmacytic (HCC)  Staging form: Hodgkin and Non-Hodgkin Lymphoma, AJCC 8th Edition  - Clinical stage from 2/5/2021: Stage IE - Signed by Rosina Gibson MD on 11/11/2024  Stage prefix: Initial diagnosis  Oncology History   Malignant lymphoma, lymphoplasmacytic (HCC)   2/5/2021 Initial Diagnosis    Routine Colonoscopy by Dr. Montgomery    IMPRESSION:  Few mild scattered diverticula in the sigmoid colon  One sessile polyp measuring smaller than 5 mm in the descending colon; performed complete en bloc removal by cold forceps biopsy  One sessile polyp measuring 5-9 mm in the sigmoid colon; completely removed en bloc by cold snare and retrieved specimen     2/5/2021 -  Cancer Staged    Staging form: Hodgkin and Non-Hodgkin Lymphoma, AJCC 8th Edition  - Clinical stage from 2/5/2021: Stage IE - Signed by Rosina Gibson MD on 11/11/2024  Stage prefix: Initial diagnosis       2/18/2021 Initial Diagnosis    Malignant lymphoma, lymphoplasmacytic (HCC)    This addendum is included to report the results of MYD88 testing which is positive, favoring a diagnosis of lymphoplasmacytic lymphoma, though is not definitively specific.  Correlation with clinical impression is recommended.         - MYD88 Mutation Analysis performed on the Colorectal Polyp @ [place] (Specimen ID: [id number], evaluated by Trudy Chan MD, PhD) as follows:   Results:  MYD88 Mutation(s):     Detected  Mutation(s) Detected:   c.794T>C (p.L265P)   Addendum electronically signed by Bennie Myers MD on 3/1/2021 at 11:56 AM   Final Diagnosis   A. Colon, descending polyp, biopsy:  -  Tubular adenoma, negative for high-grade dysplasia.    B. Colon, sigmoid polyp, biopsy:  -  Small B cell neoplasm with kappa light chain restricted plasma cell proliferation, compatible with lymphoma (see note)     PETCT 3/19/21  1.  Small focus of FDG uptake at the gastric antrum for which underlying lesion should be excluded.  Correlate with endoscopy.  2.  Single FDG avid left axillary lymph node.  This may be related to recent Covid vaccination but given the history of lymphoma, malignancy cannot be entirely excluded.  This can be reassessed on short-term follow-up CT or PET/CT in 3 months.  3. Small focus of FDG uptake at the inferior aspect of the prostate, underlying lesion not excluded.  Correlate with PSA levels and urologic evaluation.  4. Mild FDG uptake in a left upper cervical chain lymph node may be reactive but can be reassessed on follow-up.  5.  Mildly aneurysmal ascending thoracic aorta at 4.1 cm in diameter.    BASELINE DISEASE CHARACTERISTICS 2/20/21  SPEP - normal  Quant Ig - normal including normal IgM  SLFC Ratio - normal  CBC - normal     6/2021 Observation    6/12/2021 IgM 140, K/L ratio 1.4  10/29/2021 IgM 137, K/L ratio 1.49  5/5/2022 IgM 126, K/L ratio 1.83  11/26/2022 IgM 132, K/L ratio 1.9  6/1/2023 IgM 148, K/L ratio 2.09  12/6/2023: IgM 148, K/L ratio 2.39    7/22/2024 hemoglobin 13.9, MCV 98, WBC 5.8, differential WNL          Creatinine 1.17, BUN 14, calcium 9.1, albumin WNL, EGFR 65                    K/L ratio 1.87, SPEP = no monoclonal bands          IgM 142     2/15/2024 Biopsy    Final  Diagnosis   A. Colon, descending polyp, biopsy:  -  Tubular adenoma, negative for high-grade dysplasia.    B. Colon, sigmoid polyp, biopsy:  -  Small B cell neoplasm with kappa light chain restricted plasma cell proliferation, compatible with lymphoma (see note)     This addendum is included to report the results of MYD88 testing which is positive, favoring a diagnosis of lymphoplasmacytic lymphoma, though is not definitively specific.  Correlation with clinical impression is recommended.     - MYD88 Mutation Analysis performed on the Colorectal Polyp @ [place] (Specimen ID: [id number], evaluated by Trudy Chan MD, PhD) as follows:   Results:  MYD88 Mutation(s):     Detected  Mutation(s) Detected:   c.794T>C (p.L265P)     3/1/2024 Observation    PET/CT scan:    1. There is somewhat greater FDG intensity in a short segment of the proximal rectum, though uncertain if this is just physiologic or correlates with recent biopsy findings of rectal lymphoproliferative disorder. There is otherwise physiologic FDG activity throughout the small and large bowel, limiting evaluation for lymphoma involvement.    2. Interval decreased FDG uptake in a left axillary node. Additional mild FDG uptake in small bilateral axillary nodes are nonspecific, possibly just reactive. Attention on follow-up to exclude lymphoma involvement.    3. No additional hypermetabolic lesions that are concerning for malignancy/metastases.    4. Stable ectatic ascending thoracic aorta. Continued yearly CT follow-up recommended.             Review of Systems   Constitutional:  Negative for chills and fever.   HENT:  Negative for ear pain and sore throat.    Eyes:  Negative for pain and visual disturbance.   Respiratory:  Positive for cough. Negative for shortness of breath.    Cardiovascular:  Negative for chest pain and palpitations.   Gastrointestinal:  Negative for abdominal pain and vomiting.   Genitourinary:  Negative for dysuria and  "hematuria.   Musculoskeletal:  Negative for arthralgias and back pain.   Skin:  Negative for color change and rash.   Neurological:  Negative for seizures and syncope.   All other systems reviewed and are negative.    Medical History Reviewed by provider this encounter:  Tobacco  Allergies  Meds  Problems  Med Hx  Surg Hx  Fam Hx     .  Current Outpatient Medications on File Prior to Visit   Medication Sig Dispense Refill    cholecalciferol (VITAMIN D3) 1,000 units tablet Take 1,000 Units by mouth daily      hydrALAZINE (APRESOLINE) 10 mg tablet Take 1 tablet (10 mg total) by mouth 2 (two) times a day 180 tablet 3    multivitamin (THERAGRAN) TABS Take 1 tablet by mouth daily      NIFEdipine (PROCARDIA XL) 30 mg 24 hr tablet Take 1 tablet (30 mg total) by mouth daily 90 tablet 3     No current facility-administered medications on file prior to visit.      Social History     Tobacco Use    Smoking status: Never     Passive exposure: Never    Smokeless tobacco: Never   Vaping Use    Vaping status: Never Used   Substance and Sexual Activity    Alcohol use: Yes     Alcohol/week: 1.0 standard drink of alcohol     Types: 1 Cans of beer per week     Comment: social    Drug use: No    Sexual activity: Yes     Partners: Female     Birth control/protection: None         Objective   /80 (BP Location: Left arm, Patient Position: Sitting, Cuff Size: Adult)   Pulse 69   Temp 97.6 °F (36.4 °C)   Resp 18   Ht 5' 11\" (1.803 m)   Wt 84.4 kg (186 lb)   SpO2 98%   BMI 25.94 kg/m²     Pain Screening:  Pain Score: 0-No pain  ECOG   0  Physical Exam  Constitutional:       Appearance: He is well-developed.   HENT:      Head: Normocephalic and atraumatic.      Nose: Nose normal.   Eyes:      General: No scleral icterus.        Right eye: No discharge.         Left eye: No discharge.      Conjunctiva/sclera: Conjunctivae normal.      Pupils: Pupils are equal, round, and reactive to light.   Neck:      Thyroid: No " thyromegaly.      Trachea: No tracheal deviation.   Cardiovascular:      Rate and Rhythm: Normal rate and regular rhythm.      Heart sounds: Normal heart sounds. No murmur heard.     No friction rub.   Pulmonary:      Effort: Pulmonary effort is normal. No respiratory distress.      Breath sounds: Normal breath sounds. No wheezing or rales.   Chest:      Chest wall: No tenderness.   Abdominal:      General: Bowel sounds are normal. There is no distension.      Palpations: Abdomen is soft. There is no hepatomegaly, splenomegaly or mass.      Tenderness: There is no abdominal tenderness. There is no guarding or rebound.   Musculoskeletal:         General: No tenderness or deformity. Normal range of motion.      Cervical back: Normal range of motion and neck supple.   Lymphadenopathy:      Cervical: No cervical adenopathy.   Skin:     General: Skin is warm and dry.      Coloration: Skin is not pale.      Findings: No erythema or rash.   Neurological:      Mental Status: He is alert and oriented to person, place, and time.      Cranial Nerves: No cranial nerve deficit.      Coordination: Coordination normal.      Deep Tendon Reflexes: Reflexes are normal and symmetric. Reflexes normal.   Psychiatric:         Behavior: Behavior normal.         Thought Content: Thought content normal.         Judgment: Judgment normal.         Labs: I have reviewed the following labs:  Lab Results   Component Value Date/Time    WBC 6.56 03/01/2025 08:17 AM    RBC 4.54 03/01/2025 08:17 AM    Hemoglobin 14.3 03/01/2025 08:17 AM    Hematocrit 44.9 03/01/2025 08:17 AM    MCV 99 (H) 03/01/2025 08:17 AM    MCH 31.5 03/01/2025 08:17 AM    RDW 13.3 03/01/2025 08:17 AM    Platelets 239 03/01/2025 08:17 AM    Segmented % 72 03/01/2025 08:17 AM    Lymphocytes % 18 03/01/2025 08:17 AM    Monocytes % 8 03/01/2025 08:17 AM    Eosinophils Relative 1 03/01/2025 08:17 AM    Basophils Relative 1 03/01/2025 08:17 AM    Immature Grans % 0 03/01/2025 08:17 AM     Absolute Neutrophils 4.68 03/01/2025 08:17 AM     Lab Results   Component Value Date/Time    Potassium 4.0 03/01/2025 08:17 AM    Chloride 104 03/01/2025 08:17 AM    CO2 24 03/01/2025 08:17 AM    BUN 22 03/01/2025 08:17 AM    Creatinine 1.21 03/01/2025 08:17 AM    Glucose, Fasting 101 (H) 03/01/2025 08:17 AM    Calcium 9.2 03/01/2025 08:17 AM    AST 20 03/01/2025 08:17 AM    ALT 16 03/01/2025 08:17 AM    Alkaline Phosphatase 51 03/01/2025 08:17 AM    Total Protein 6.9 03/01/2025 08:17 AM    Albumin 3.9 03/01/2025 08:17 AM    Total Bilirubin 0.56 03/01/2025 08:17 AM    eGFR 62 03/01/2025 08:17 AM     Lab Results   Component Value Date/Time    WBC 6.56 03/01/2025 08:17 AM    RBC 4.54 03/01/2025 08:17 AM    Hemoglobin 14.3 03/01/2025 08:17 AM    Hematocrit 44.9 03/01/2025 08:17 AM    MCV 99 (H) 03/01/2025 08:17 AM    MCH 31.5 03/01/2025 08:17 AM    RDW 13.3 03/01/2025 08:17 AM    Platelets 239 03/01/2025 08:17 AM    Segmented % 72 03/01/2025 08:17 AM    Lymphocytes % 18 03/01/2025 08:17 AM    Monocytes % 8 03/01/2025 08:17 AM    Eosinophils Relative 1 03/01/2025 08:17 AM    Basophils Relative 1 03/01/2025 08:17 AM    Immature Grans % 0 03/01/2025 08:17 AM    Absolute Neutrophils 4.68 03/01/2025 08:17 AM      Lab Results   Component Value Date/Time    Sodium 138 03/01/2025 08:17 AM    Potassium 4.0 03/01/2025 08:17 AM    Chloride 104 03/01/2025 08:17 AM    CO2 24 03/01/2025 08:17 AM    ANION GAP 10 03/01/2025 08:17 AM    BUN 22 03/01/2025 08:17 AM    Creatinine 1.21 03/01/2025 08:17 AM    Glucose, Fasting 101 (H) 03/01/2025 08:17 AM    Calcium 9.2 03/01/2025 08:17 AM    AST 20 03/01/2025 08:17 AM    ALT 16 03/01/2025 08:17 AM    Alkaline Phosphatase 51 03/01/2025 08:17 AM    Total Protein 6.9 03/01/2025 08:17 AM    Albumin 3.9 03/01/2025 08:17 AM    Total Bilirubin 0.56 03/01/2025 08:17 AM    eGFR 62 03/01/2025 08:17 AM      Lab Results   Component Value Date/Time    SPEP Interpretation See Comment 11/02/2024 07:25  AM    Beta-2 6.0 11/02/2024 07:25 AM     03/01/2025 08:17 AM    Ig [place] Free Light Chain 24.9 (H) 03/01/2025 08:17 AM    Ig Lambda Free Light Chain 12.8 03/01/2025 08:17 AM    Gamma Globulin 14.7 11/02/2024 07:25 AM    IGG 1,011 03/01/2025 08:17 AM     03/01/2025 08:17 AM     03/01/2025 08:17 AM      Lab Results   Component Value Date/Time    TSH 3RD GENERATON 1.895 12/11/2024 07:19 AM     03/01/2025 08:17 AM      Lab Results   Component Value Date/Time    Sed Rate 28 (H) 03/01/2025 08:17 AM    CRP 1.3 03/01/2025 08:17 AM

## 2025-03-11 ENCOUNTER — TELEPHONE (OUTPATIENT)
Dept: HEMATOLOGY ONCOLOGY | Facility: CLINIC | Age: 65
End: 2025-03-11

## 2025-03-11 DIAGNOSIS — C83.00 MALIGNANT LYMPHOMA, LYMPHOPLASMACYTIC (HCC): Primary | ICD-10-CM

## 2025-03-12 ENCOUNTER — OFFICE VISIT (OUTPATIENT)
Dept: NEPHROLOGY | Facility: CLINIC | Age: 65
End: 2025-03-12
Payer: COMMERCIAL

## 2025-03-12 VITALS
BODY MASS INDEX: 25.9 KG/M2 | SYSTOLIC BLOOD PRESSURE: 138 MMHG | HEIGHT: 71 IN | DIASTOLIC BLOOD PRESSURE: 76 MMHG | WEIGHT: 185 LBS

## 2025-03-12 DIAGNOSIS — N18.9 CHRONIC KIDNEY DISEASE-MINERAL AND BONE DISORDER (CKD-MBD): ICD-10-CM

## 2025-03-12 DIAGNOSIS — N18.2 HYPERTENSIVE KIDNEY DISEASE WITH CHRONIC KIDNEY DISEASE STAGE II: Primary | ICD-10-CM

## 2025-03-12 DIAGNOSIS — M89.9 CHRONIC KIDNEY DISEASE-MINERAL AND BONE DISORDER (CKD-MBD): ICD-10-CM

## 2025-03-12 DIAGNOSIS — R80.9 PROTEINURIA, UNSPECIFIED TYPE: ICD-10-CM

## 2025-03-12 DIAGNOSIS — I12.9 HYPERTENSIVE KIDNEY DISEASE WITH CHRONIC KIDNEY DISEASE STAGE II: Primary | ICD-10-CM

## 2025-03-12 DIAGNOSIS — E83.9 CHRONIC KIDNEY DISEASE-MINERAL AND BONE DISORDER (CKD-MBD): ICD-10-CM

## 2025-03-12 DIAGNOSIS — E78.5 DYSLIPIDEMIA: ICD-10-CM

## 2025-03-12 PROBLEM — E55.9 VITAMIN D DEFICIENCY: Status: RESOLVED | Noted: 2018-04-03 | Resolved: 2025-03-12

## 2025-03-12 PROCEDURE — 99214 OFFICE O/P EST MOD 30 MIN: CPT | Performed by: INTERNAL MEDICINE

## 2025-03-12 RX ORDER — LOSARTAN POTASSIUM 100 MG/1
100 TABLET ORAL DAILY
Qty: 90 TABLET | Refills: 3 | Status: SHIPPED | OUTPATIENT
Start: 2025-03-12

## 2025-03-12 RX ORDER — LOSARTAN POTASSIUM 100 MG/1
100 TABLET ORAL DAILY
COMMUNITY
End: 2025-03-12 | Stop reason: SDUPTHER

## 2025-03-12 NOTE — PROGRESS NOTES
Name: Thompson Rapp      : 1960      MRN: [id number]  Encounter Provider: Arlene Arnett MD  Encounter Date: 3/12/2025   Encounter department: [facility] [place]  :64 y.o.  male with pmh of multiple co-morbidities including hyperlipidemia, hypertension (x 3yrs+) and CKD stage 2/3 presents to the office for routine follow-up.     Assessment & Plan  Hypertensive kidney disease with chronic kidney disease stage II  Lab Results   Component Value Date    EGFR 62 2025    EGFR 69 2024    EGFR 61 2024    CREATININE 1.21 2025    CREATININE 1.11 2024    CREATININE 1.22 2024     BP Readings from Last 3 Encounters:   25 138/76   03/10/25 126/80   24 126/73   Current medications: Procardia XL 30 mg p.o. daily, hydralazine 10 mg p.o. twice daily, losartan 100 mg p.o. daily  Changes made today: None.  We discussed techniques of checking blood pressures call with update if any issues.  He had stopped taking losartan however is back on it  In the past patient was on lisinopril however switched over to candesartan due to cough has been intolerable to multiple prior to his hypertensive medication such as Bystolic, lisinopril, candesartan, metoprolol, Cardizem.  Renal artery Dopplers from 2021 no significant stenosis no renal atrophy  Goal BP of < 130/80 based on age and comorbidities  Instructed to follow low sodium (2gm)diet.  Advised to hold ACEI/ARBs if patient suffers from dehydration due to gastrointestinal losses due to risk of ANDREW secondary to failure to autoregulate.  after review of records In [facility] as well as Care everywhere patient has a baseline creatinine of 1.2-1.5 mg/dL.   Most recent labs show a Creatinine of 1.21 mg/dL on 3/1/2025. Renal function remains stable.  Check blood work in 6 months and then again prior to next visit  Likely has underlying CKD due to hypertensive nephrosclerosis plus NSAID nephropathy plus age-related  nephron loss  Imaging:   Renal ultrasound July 2019 bilateral kidneys 11 cm no hydronephrosis  Recommend to avoid use of NSAIDs, nephrotoxins. Caution advised with regards to exposure to IV contrast dye.   Discussed with the patient in depth his renal status, including the possible etiologies for CKD.   Advised the patient that when his GFR is close to 20mL/min then will start discussing about RRT(renal replacement therapy) options such as renal transplant, peritoneal dialysis and hemodialysis.   Informed the patient about the various options for Renal Replacement therapy.  Discussed with the patient how we need to work together to delay the progression of CKD with optimal BP control based on their age and co-morbidities, optimal BS control with HbA1c of <7% and trying to reduce proteinuria by the use of anti-proteinuric agents.   CKD education/Kidney smart: Referral placed 3/12/2025  Follow-up: Patient is to follow-up in 12 months, with lab work to be performed in 6 months and then again in a few days prior to the next visit. Advised patient to call me in 10 days to review the results if they do not hear back from me, as I may have not received the results.      Orders:  •  Renal function panel; Future  •  Vitamin D 25 hydroxy; Future  •  PTH, intact; Future  •  Renal function panel; Future  •  PTH, intact; Future  •  Vitamin D 25 hydroxy; Future  •  Phosphorus; Future  •  Magnesium; Future  •  Albumin / creatinine urine ratio; Future  •  Ambulatory referral to ckd education program; Future  •  losartan (COZAAR) 100 MG tablet; Take 1 tablet (100 mg total) by mouth daily    Chronic kidney disease-mineral and bone disorder (CKD-MBD)  Lab Results   Component Value Date    EGFR 62 03/01/2025    EGFR 69 12/11/2024    EGFR 61 11/02/2024    CREATININE 1.21 03/01/2025    CREATININE 1.11 12/11/2024    CREATININE 1.22 11/02/2024     Based on patients CKD stage following is the goal of therapy.  Maintain calcium phosphorus  "product of < 55.  Currently on: Vitamin D 1000 units p.o. daily, multivitamin  Changes made today: None  most recent ipth 77.9 and vit D 36.8 on 3/1/2025  Check iPTH vitamin D prior to next visit and in 6 months    Proteinuria, unspecified type  likely has underlying proteinuria due to hypertensive nephrosclerosis  most recent MACR is too small to be quantified as of 3/1/2025  Recheck MACR prior to next visit  For proteinuria reduction continue on vitamin D, Procardia XL, losartan  Dyslipidemia  Goal LDL less than 70  Continue on no statins at this time  Consider statin use with PCP, d/w patient        History of Present Illness   HPI  Thompson Rapp is a 64 y.o. male who presents to the office for routine follow-up feels well has no complaints following up with hematology closely reports he was having some stomach issues with the losartan had stopped taking it and now when he takes it with meals he is able to tolerated better, his blood pressures were elevated when he was off of the losartan requesting a refill for the losartan since he is back on it now.  No NSAID use hepatorenal parameters stable agreeable to attending CKD education kidney smart.      Review of Systems   Constitutional:  Negative for chills and fever.   HENT:  Negative for congestion.    Respiratory:  Negative for cough, shortness of breath and wheezing.    Cardiovascular:  Negative for leg swelling.   Gastrointestinal:  Negative for constipation and diarrhea.   Genitourinary:  Negative for dysuria and hematuria.   Musculoskeletal:  Negative for back pain.   Neurological:  Negative for dizziness and headaches.   Psychiatric/Behavioral:  Negative for agitation and confusion.    All other systems reviewed and are negative.         Objective   /76 (BP Location: Left arm, Patient Position: Sitting, Cuff Size: Large)   Ht 5' 11\" (1.803 m)   Wt 83.9 kg (185 lb)   BMI 25.80 kg/m²      Physical Exam  Vitals reviewed.   Constitutional:       " General: He is not in acute distress.     Appearance: Normal appearance. He is normal weight. He is not ill-appearing, toxic-appearing or diaphoretic.   HENT:      Head: Normocephalic and atraumatic.      Mouth/Throat:      Mouth: Mucous membranes are moist.      Pharynx: No oropharyngeal exudate.   Eyes:      General: No scleral icterus.  Cardiovascular:      Rate and Rhythm: Normal rate.   Pulmonary:      Effort: No respiratory distress.      Breath sounds: Normal breath sounds. No stridor. No wheezing.   Abdominal:      General: There is no distension.      Palpations: There is no mass.      Tenderness: There is no right CVA tenderness or left CVA tenderness.   Musculoskeletal:         General: No swelling.      Cervical back: Normal range of motion. No rigidity.   Skin:     Coloration: Skin is not jaundiced.   Neurological:      General: No focal deficit present.      Mental Status: He is alert and oriented to person, place, and time. Mental status is at baseline.   Psychiatric:         Mood and Affect: Mood normal.         Behavior: Behavior normal.

## 2025-03-12 NOTE — PATIENT INSTRUCTIONS
"Attend ckd education session  Get blood work in 6months    Please call me in 10 days after having your blood work done to review the results if you do not hear back from me or my office, as I may have not received the results.  please remember to perform blood work prior to the next visit.  Please call if the blood pressure top number is greater than 140 or less than 110 consistently.  Please call if you are gaining more than 2lbs in 2 days for adjustment of water pills.  ~ Please AVOID the following pain medications.  LIST OF NSAIDS (NONSTEROIDAL ANTI-INFLAMMATORY DRUGS) AND CHAMBERS-2 INHIBITORS    DIFLUNISAL (DOLOBID)  IBUPROFEN (MOTRIN, ADVIL)  FLURBIPROFEN (ANSAID)  KETOPROFEN (ORUDIS, ORUVAIL)  FENOPROFEN (NALFON)  NABUMETONE (RELAFEN)  PIROXICAM (FELDENE)  NAPROXEN (ALEVE, NAPROSYN, NAPRELAN, ANAPROX)  DICLOFENAC (VOLTAREN, CATAFLAM)  INDOMETHACIN (INDOCIN)  SULINDAC (CLINORIL)  TOLMETIN (TOLETIN)  ETODOLAC (LODINE)  MELOXICAM (MOBIC)  KETOROLAC (TORADOL)  OXAPROZIN (DAYPRO)  CELECOXIB (CELEBREX)  Phosphorus diet  Follow a low phosphorus diet.    Avoid these higher phosphorus foods: Choose these lower phosphorus foods:   Milk, pudding or yogurt (from animals and from many soy varieties) Rice milk (unfortified), nondairy creamer (if it doesn't have terms in the ingredients list that contain the letters \"phos\")   Hard cheeses, ricotta or cottage cheese, fat-free cream cheese Regular and low-fat cream cheese   Ice cream or frozen yogurt Sherbet or frozen fruit pops   Soups made with higher phosphorus ingredients (milk, dried peas, beans, lentils) Soups made with lower phosphorus ingredients (broth- or water-based with other lower phosphorus ingredients)   Whole grains, including whole-grain breads, crackers, cereal, rice and pasta Refined grains, including white bread, crackers, cereals, rice and pasta   Quick breads, biscuits, cornbread, muffins, pancakes or waffles Homemade refined (white) dinner rolls, bagels or " "English muffins   Dried peas (split, black-eyed), beans (black, garbanzo, lima, kidney, navy, romano) or lentils Green peas (canned, frozen), green beans or wax beans   Organ meats, walleye, pollock or sardines Lean beef, pork, lamb, poultry or other fish   Nuts and seeds Popcorn   Peanut butter and other nut butters Jam, jelly or honey   Chocolate, including chocolate drinks Carob (chocolate-flavored) candy, hard candy or gumdrops   Pippa and pepper-type sodas, flavored coronado, bottled teas (if a term in the ingredients list contains the letters \"phos\") Lemon-lime soda, ginger ale or root beer, plain water   Follow a moderate potassium diet.      Things to do to reduce your blood pressure include working with all your physician to do the following:  stop smoking if you smoke.  increase cardiovascular exercise like walking and swimming.   modify your diet to decrease fat and salt intake.  reduce your weight if you are overweight or obese.  increase the consumption of fruits, vegetables and whole grains.  decrease alcohol consumption if you consume alcohol.   try to minimize stress in your life with lifestyle modifications.   be compliant with your anti-hypertensive medications.   adjust your medications to help improve your vascular stiffness and decrease risks for heart attacks and strokes.   "

## 2025-03-12 NOTE — ASSESSMENT & PLAN NOTE
Goal LDL less than 70  Continue on no statins at this time  Consider statin use with PCP, d/w patient

## 2025-03-12 NOTE — ASSESSMENT & PLAN NOTE
Lab Results   Component Value Date    EGFR 62 03/01/2025    EGFR 69 12/11/2024    EGFR 61 11/02/2024    CREATININE 1.21 03/01/2025    CREATININE 1.11 12/11/2024    CREATININE 1.22 11/02/2024     BP Readings from Last 3 Encounters:   03/12/25 138/76   03/10/25 126/80   12/17/24 126/73   Current medications: Procardia XL 30 mg p.o. daily, hydralazine 10 mg p.o. twice daily, losartan 100 mg p.o. daily  Changes made today: None.  We discussed techniques of checking blood pressures call with update if any issues.  He had stopped taking losartan however is back on it  In the past patient was on lisinopril however switched over to candesartan due to cough has been intolerable to multiple prior to his hypertensive medication such as Bystolic, lisinopril, candesartan, metoprolol, Cardizem.  Renal artery Dopplers from December 2021 no significant stenosis no renal atrophy  Goal BP of < 130/80 based on age and comorbidities  Instructed to follow low sodium (2gm)diet.  Advised to hold ACEI/ARBs if patient suffers from dehydration due to gastrointestinal losses due to risk of ANDREW secondary to failure to autoregulate.  after review of records In [facility] as well as Care everywhere patient has a baseline creatinine of 1.2-1.5 mg/dL.   Most recent labs show a Creatinine of 1.21 mg/dL on 3/1/2025. Renal function remains stable.  Check blood work in 6 months and then again prior to next visit  Likely has underlying CKD due to hypertensive nephrosclerosis plus NSAID nephropathy plus age-related nephron loss  Imaging:   Renal ultrasound July 2019 bilateral kidneys 11 cm no hydronephrosis  Recommend to avoid use of NSAIDs, nephrotoxins. Caution advised with regards to exposure to IV contrast dye.   Discussed with the patient in depth his renal status, including the possible etiologies for CKD.   Advised the patient that when his GFR is close to 20mL/min then will start discussing about RRT(renal replacement therapy) options such as renal  transplant, peritoneal dialysis and hemodialysis.   Informed the patient about the various options for Renal Replacement therapy.  Discussed with the patient how we need to work together to delay the progression of CKD with optimal BP control based on their age and co-morbidities, optimal BS control with HbA1c of <7% and trying to reduce proteinuria by the use of anti-proteinuric agents.   CKD education/Kidney smart: Referral placed 3/12/2025  Follow-up: Patient is to follow-up in 12 months, with lab work to be performed in 6 months and then again in a few days prior to the next visit. Advised patient to call me in 10 days to review the results if they do not hear back from me, as I may have not received the results.      Orders:  •  Renal function panel; Future  •  Vitamin D 25 hydroxy; Future  •  PTH, intact; Future  •  Renal function panel; Future  •  PTH, intact; Future  •  Vitamin D 25 hydroxy; Future  •  Phosphorus; Future  •  Magnesium; Future  •  Albumin / creatinine urine ratio; Future  •  Ambulatory referral to ckd education program; Future  •  losartan (COZAAR) 100 MG tablet; Take 1 tablet (100 mg total) by mouth daily

## 2025-03-12 NOTE — ASSESSMENT & PLAN NOTE
Lab Results   Component Value Date    EGFR 62 03/01/2025    EGFR 69 12/11/2024    EGFR 61 11/02/2024    CREATININE 1.21 03/01/2025    CREATININE 1.11 12/11/2024    CREATININE 1.22 11/02/2024     Based on patients CKD stage following is the goal of therapy.  Maintain calcium phosphorus product of < 55.  Currently on: Vitamin D 1000 units p.o. daily, multivitamin  Changes made today: None  most recent ipth 77.9 and vit D 36.8 on 3/1/2025  Check iPTH vitamin D prior to next visit and in 6 months

## 2025-03-12 NOTE — ASSESSMENT & PLAN NOTE
likely has underlying proteinuria due to hypertensive nephrosclerosis  most recent MACR is too small to be quantified as of 3/1/2025  Recheck MACR prior to next visit  For proteinuria reduction continue on vitamin D, Procardia XL, losartan

## 2025-04-22 PROCEDURE — 88342 IMHCHEM/IMCYTCHM 1ST ANTB: CPT | Performed by: STUDENT IN AN ORGANIZED HEALTH CARE EDUCATION/TRAINING PROGRAM

## 2025-04-22 PROCEDURE — 88305 TISSUE EXAM BY PATHOLOGIST: CPT | Performed by: STUDENT IN AN ORGANIZED HEALTH CARE EDUCATION/TRAINING PROGRAM

## 2025-04-23 ENCOUNTER — LAB REQUISITION (OUTPATIENT)
Dept: LAB | Facility: HOSPITAL | Age: 65
End: 2025-04-23
Payer: COMMERCIAL

## 2025-04-23 DIAGNOSIS — D48.5 NEOPLASM OF UNCERTAIN BEHAVIOR OF SKIN: ICD-10-CM

## 2025-04-28 PROCEDURE — 88305 TISSUE EXAM BY PATHOLOGIST: CPT | Performed by: STUDENT IN AN ORGANIZED HEALTH CARE EDUCATION/TRAINING PROGRAM

## 2025-04-28 PROCEDURE — 88342 IMHCHEM/IMCYTCHM 1ST ANTB: CPT | Performed by: STUDENT IN AN ORGANIZED HEALTH CARE EDUCATION/TRAINING PROGRAM

## 2025-05-09 ENCOUNTER — TELEPHONE (OUTPATIENT)
Age: 65
End: 2025-05-09

## 2025-05-09 NOTE — TELEPHONE ENCOUNTER
[facility] calling to request that we fax the pathology report on file to them at [phone]. 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 Advised that this patient has never been seen by us before - but there is a pathology report on file from 4/22/25.

## 2025-05-09 NOTE — TELEPHONE ENCOUNTER
After reviewing the patients chart I called and spoke with [facility] for more information, as we have the pathology report but no recollection of patient ever being seen.     She said Alton Esquivel is one of their PA's, he did the biopsy and communicated with Dr. Kidd who agreed to do the pathology and fax it back    Please fax pathology report from 4/22 to [phone]

## 2025-05-09 NOTE — TELEPHONE ENCOUNTER
I called and left a voicemail informing him that he needs to sign a release of information form at one of our offices or at [facility] to given permission to send his pathology report to [facility] as soon as possible.

## 2025-05-12 NOTE — TELEPHONE ENCOUNTER
The patient came in today to  his pathology report. He also signed the medical records release of information form - scanned in chart.

## 2025-05-12 NOTE — TELEPHONE ENCOUNTER
[facility] called office to review pathology reports. I informed her that this patient is not established with us he never had mohs procedure in our office.     This patient had a biopsy from another provider and I provided her with the doctors information.

## 2025-05-28 DIAGNOSIS — I10 BENIGN ESSENTIAL HYPERTENSION: ICD-10-CM

## 2025-05-29 RX ORDER — NIFEDIPINE 30 MG/1
30 TABLET, EXTENDED RELEASE ORAL DAILY
Qty: 90 TABLET | Refills: 1 | Status: SHIPPED | OUTPATIENT
Start: 2025-05-29

## 2025-06-24 ENCOUNTER — TELEPHONE (OUTPATIENT)
Age: 65
End: 2025-06-24

## 2025-06-24 PROCEDURE — 88305 TISSUE EXAM BY PATHOLOGIST: CPT | Performed by: STUDENT IN AN ORGANIZED HEALTH CARE EDUCATION/TRAINING PROGRAM

## 2025-06-24 NOTE — TELEPHONE ENCOUNTER
Patient was called and made aware all labs required for caring starts with you have already been placed.

## 2025-06-24 NOTE — TELEPHONE ENCOUNTER
Patient called in to inquire lab orders to be completed prior to 6-month fu OV scheduled for 7/1. Patient has active orders to complete including CBC, CMP, HgA1c, Lipid panel, and TSH. Patient inquiring if there are any additional labs required for employer's annual network wellness program, [employer] with you. If so, please add to he can get them done at lab Sat 6/28. Please follow up with call or [employer] message to confirm if any additional labs are ordered or not.

## 2025-06-25 ENCOUNTER — LAB REQUISITION (OUTPATIENT)
Dept: LAB | Facility: HOSPITAL | Age: 65
End: 2025-06-25
Payer: COMMERCIAL

## 2025-06-25 DIAGNOSIS — D48.5 NEOPLASM OF UNCERTAIN BEHAVIOR OF SKIN: ICD-10-CM

## 2025-06-28 ENCOUNTER — APPOINTMENT (OUTPATIENT)
Age: 65
End: 2025-06-28
Payer: COMMERCIAL

## 2025-06-28 LAB
ALBUMIN SERPL BCG-MCNC: 4 G/DL (ref 3.5–5)
ALP SERPL-CCNC: 49 U/L (ref 34–104)
ALT SERPL W P-5'-P-CCNC: 12 U/L (ref 7–52)
ANION GAP SERPL CALCULATED.3IONS-SCNC: 7 MMOL/L (ref 4–13)
AST SERPL W P-5'-P-CCNC: 17 U/L (ref 13–39)
BILIRUB SERPL-MCNC: 0.56 MG/DL (ref 0.2–1)
BUN SERPL-MCNC: 19 MG/DL (ref 5–25)
CALCIUM SERPL-MCNC: 9 MG/DL (ref 8.4–10.2)
CHLORIDE SERPL-SCNC: 105 MMOL/L (ref 96–108)
CHOLEST SERPL-MCNC: 200 MG/DL (ref ?–200)
CO2 SERPL-SCNC: 26 MMOL/L (ref 21–32)
CREAT SERPL-MCNC: 1.15 MG/DL (ref 0.6–1.3)
ERYTHROCYTE [DISTWIDTH] IN BLOOD BY AUTOMATED COUNT: 12.9 % (ref 11.6–15.1)
EST. AVERAGE GLUCOSE BLD GHB EST-MCNC: 128 MG/DL
GFR SERPL CREATININE-BSD FRML MDRD: 66 ML/MIN/1.73SQ M
GLUCOSE P FAST SERPL-MCNC: 100 MG/DL (ref 65–99)
HBA1C MFR BLD: 6.1 %
HCT VFR BLD AUTO: 42.7 % (ref 36.5–49.3)
HDLC SERPL-MCNC: 54 MG/DL
HGB BLD-MCNC: 13.8 G/DL (ref 12–17)
LDLC SERPL CALC-MCNC: 132 MG/DL (ref 0–100)
MCH RBC QN AUTO: 31.4 PG (ref 26.8–34.3)
MCHC RBC AUTO-ENTMCNC: 32.3 G/DL (ref 31.4–37.4)
MCV RBC AUTO: 97 FL (ref 82–98)
PLATELET # BLD AUTO: 205 THOUSANDS/UL (ref 149–390)
PMV BLD AUTO: 11.9 FL (ref 8.9–12.7)
POTASSIUM SERPL-SCNC: 3.9 MMOL/L (ref 3.5–5.3)
PROT SERPL-MCNC: 6.7 G/DL (ref 6.4–8.4)
RBC # BLD AUTO: 4.4 MILLION/UL (ref 3.88–5.62)
SODIUM SERPL-SCNC: 138 MMOL/L (ref 135–147)
TRIGL SERPL-MCNC: 71 MG/DL (ref ?–150)
TSH SERPL DL<=0.05 MIU/L-ACNC: 1.9 UIU/ML (ref 0.45–4.5)
WBC # BLD AUTO: 4.93 THOUSAND/UL (ref 4.31–10.16)

## 2025-06-30 PROCEDURE — 88305 TISSUE EXAM BY PATHOLOGIST: CPT | Performed by: STUDENT IN AN ORGANIZED HEALTH CARE EDUCATION/TRAINING PROGRAM

## 2025-07-01 ENCOUNTER — OFFICE VISIT (OUTPATIENT)
Dept: FAMILY MEDICINE CLINIC | Facility: CLINIC | Age: 65
End: 2025-07-01
Payer: COMMERCIAL

## 2025-07-01 VITALS
HEIGHT: 71 IN | TEMPERATURE: 98.4 F | OXYGEN SATURATION: 99 % | RESPIRATION RATE: 16 BRPM | SYSTOLIC BLOOD PRESSURE: 138 MMHG | BODY MASS INDEX: 26.18 KG/M2 | WEIGHT: 187 LBS | HEART RATE: 70 BPM | DIASTOLIC BLOOD PRESSURE: 80 MMHG

## 2025-07-01 DIAGNOSIS — N18.2 HYPERTENSIVE KIDNEY DISEASE WITH CHRONIC KIDNEY DISEASE STAGE II: ICD-10-CM

## 2025-07-01 DIAGNOSIS — E78.5 DYSLIPIDEMIA: ICD-10-CM

## 2025-07-01 DIAGNOSIS — I10 BENIGN ESSENTIAL HYPERTENSION: Primary | ICD-10-CM

## 2025-07-01 DIAGNOSIS — Z23 ENCOUNTER FOR IMMUNIZATION: ICD-10-CM

## 2025-07-01 DIAGNOSIS — I12.9 HYPERTENSIVE KIDNEY DISEASE WITH CHRONIC KIDNEY DISEASE STAGE II: ICD-10-CM

## 2025-07-01 DIAGNOSIS — R73.01 IFG (IMPAIRED FASTING GLUCOSE): ICD-10-CM

## 2025-07-01 DIAGNOSIS — C83.00 MALIGNANT LYMPHOMA, LYMPHOPLASMACYTIC (HCC): ICD-10-CM

## 2025-07-01 DIAGNOSIS — D04.9 BASAL CELL CARCINOMA (BCC) IN SITU OF SKIN: ICD-10-CM

## 2025-07-01 PROCEDURE — 90750 HZV VACC RECOMBINANT IM: CPT

## 2025-07-01 PROCEDURE — 90471 IMMUNIZATION ADMIN: CPT

## 2025-07-01 PROCEDURE — 99214 OFFICE O/P EST MOD 30 MIN: CPT | Performed by: FAMILY MEDICINE

## 2025-07-01 NOTE — PROGRESS NOTES
Name: Thompson Rapp      : 1960      MRN: [id number]  Encounter Provider: Kyra King DO  Encounter Date: 2025   Encounter department: [facility]    :  Assessment & Plan  Benign essential hypertension  Cont current meds  Orders:  •  CBC; Future  •  Comprehensive metabolic panel; Future    Hypertensive kidney disease with chronic kidney disease stage II  Lab Results   Component Value Date    EGFR 66 2025    EGFR 62 2025    EGFR 69 2024    CREATININE 1.15 2025    CREATININE 1.21 2025    CREATININE 1.11 2024            Malignant lymphoma, lymphoplasmacytic (HCC)  Stable  Had ct and labs  Seeing heme       Dyslipidemia  Watch diet  Orders:  •  Lipid Panel with Direct LDL reflex; Future  •  TSH, 3rd generation with Free T4 reflex; Future    Encounter for immunization    Orders:  •  Zoster Vaccine Recombinant IM    IFG (impaired fasting glucose)  Watch diet  Decrease carbs  Orders:  •  Hemoglobin A1C; Future    Basal cell carcinoma (BCC) in situ of skin  Seen by derm  Needs plastic to remove larger area  Face and shoulder         Assessment & Plan          Depression Screening and Follow-up Plan: Patient was screened for depression during today's encounter. They screened negative with a PHQ-2 score of 0.          History of Present Illness     History of Present Illness  Here for follow up  Feeling good  Has heme follow up next month  And also seeing lucinda marshall  Did CT scan also and another in the fall  Lung nodule  Increased carb intake  Second shingrix today         Review of Systems   Constitutional: Negative.    HENT: Negative.     Eyes: Negative.    Respiratory: Negative.     Cardiovascular: Negative.    Gastrointestinal: Negative.    Endocrine: Negative.    Genitourinary: Negative.    Musculoskeletal: Negative.    Allergic/Immunologic: Negative.    Neurological: Negative.    Hematological: Negative.    Psychiatric/Behavioral: Negative.    "    Objective   /80 (BP Location: Left arm, Patient Position: Sitting, Cuff Size: Standard)   Pulse 70   Temp 98.4 °F (36.9 °C) (Tympanic)   Resp 16   Ht 5' 10.98\" (1.803 m)   Wt 84.8 kg (187 lb)   SpO2 99%   BMI 26.10 kg/m²     Physical Exam    Physical Exam  Vitals and nursing note reviewed.   Constitutional:       Appearance: Normal appearance. He is well-developed.   HENT:      Head: Normocephalic and atraumatic.      Right Ear: External ear normal.      Left Ear: External ear normal.      Nose: Nose normal.     Eyes:      Conjunctiva/sclera: Conjunctivae normal.      Pupils: Pupils are equal, round, and reactive to light.       Cardiovascular:      Rate and Rhythm: Normal rate and regular rhythm.      Heart sounds: Normal heart sounds.   Pulmonary:      Effort: Pulmonary effort is normal.      Breath sounds: Normal breath sounds.   Abdominal:      General: Bowel sounds are normal.      Palpations: Abdomen is soft.     Musculoskeletal:         General: Normal range of motion.      Cervical back: Normal range of motion and neck supple.     Skin:     General: Skin is warm and dry.      Capillary Refill: Capillary refill takes less than 2 seconds.     Neurological:      Mental Status: He is alert and oriented to person, place, and time.     Psychiatric:         Behavior: Behavior normal.         Thought Content: Thought content normal.         Judgment: Judgment normal.         "

## 2025-07-01 NOTE — ASSESSMENT & PLAN NOTE
Watch diet  Orders:  •  Lipid Panel with Direct LDL reflex; Future  •  TSH, 3rd generation with Free T4 reflex; Future

## 2025-07-01 NOTE — ASSESSMENT & PLAN NOTE
Lab Results   Component Value Date    EGFR 66 06/28/2025    EGFR 62 03/01/2025    EGFR 69 12/11/2024    CREATININE 1.15 06/28/2025    CREATININE 1.21 03/01/2025    CREATININE 1.11 12/11/2024

## 2025-07-11 PROCEDURE — 88342 IMHCHEM/IMCYTCHM 1ST ANTB: CPT | Performed by: STUDENT IN AN ORGANIZED HEALTH CARE EDUCATION/TRAINING PROGRAM

## 2025-07-11 PROCEDURE — 88305 TISSUE EXAM BY PATHOLOGIST: CPT | Performed by: STUDENT IN AN ORGANIZED HEALTH CARE EDUCATION/TRAINING PROGRAM

## 2025-07-11 PROCEDURE — 88341 IMHCHEM/IMCYTCHM EA ADD ANTB: CPT | Performed by: STUDENT IN AN ORGANIZED HEALTH CARE EDUCATION/TRAINING PROGRAM

## 2025-07-14 ENCOUNTER — LAB REQUISITION (OUTPATIENT)
Dept: LAB | Facility: HOSPITAL | Age: 65
End: 2025-07-14
Payer: COMMERCIAL

## 2025-07-14 DIAGNOSIS — C44.320 SQUAMOUS CELL CARCINOMA OF SKIN OF UNSPECIFIED PARTS OF FACE: ICD-10-CM

## 2025-07-15 NOTE — PROGRESS NOTES
Cardiology Follow Up    Thompson Rapp  [facility]  [address]  [address]  [place] [address]  Phone#  [phone]  Fax#  [phone]      1. Paroxysmal atrial fibrillation (HCC)        2. Benign essential hypertension  POCT ECG      3. Dyslipidemia        4. Thoracic aortic ectasia (HCC)            Discussion/Summary:  Mr. Rapp is a pleasant 64-year-old gentleman who presents to the office today for routine follow-up.  Since his last visit he feels well.  He has not had any sensation of recurrent atrial fibrillation.    His blood pressure is well-controlled in the office today.  No changes were made to his medication regimen.  A low-salt diet was reinforced.  He has a known history of obstructive sleep apnea but has been intolerant of CPAP.    He has no signs or symptoms of recurrent atrial fibrillation.  Systemic anticoagulation has been discontinued by his electrophysiologist.      Otherwise he was noted to have a dilated ascending aorta on echocardiogram a few years ago.  He underwent a CT scan prior to his ablation in 2023 revealing a stable aneurysm at 4.1 cm and also underwent a recent CT scan for his oncologist revealing stable findings.  Lifting restrictions were reviewed with the patient.    His lipids were reviewed.  Based on his 10-year risk statin therapy is indicated.  He also was noted to have scattered coronary artery calcifications on recent CT scan.  We did again discuss this.  He expresses concern due to multiple drug intolerances hence declines.     I will see him back in the office in one year for follow-up or sooner if deemed necessary.    History of Present Illness:  Mr. Rapp is a pleasant 64-year-old gentleman who presents to the office today for routine follow-up.     He has been attempting to remain active. He walks outdoors weather permitting with his wife and also states he walks a few miles throughout the day  on a regular basis as part of his job.  With the activity he performs he is asymptomatic without any chest pain or shortness of breath.  He denies symptoms of congestive heart failure including increasing lower extremity edema, paroxysmal nocturnal dyspnea, orthopnea, acute weight gain or increasing abdominal girth.  He denies syncope or presyncope.  He denies symptoms of claudication.  He denies any symptoms suggestive of recurrent atrial fibrillation.    He was started on hydralazine since his last visit with me by his nephrologist.  He was also started on nifedipine during his last visit with me.  He is tolerating these without side effects.    He has been on the following medications and intolerant due to various side effects:     Amlodipine  Atenolol  Bystolic  Lisinopril   Candesartan  Diltiazem   Chlorthalidone    He has been intolerant of CPAP as well.    Problem List       Benign hypertensive CKD    Lab Results   Component Value Date    EGFR 66 06/28/2025    EGFR 62 03/01/2025    EGFR 69 12/11/2024    CREATININE 1.15 06/28/2025    CREATININE 1.21 03/01/2025    CREATININE 1.11 12/11/2024         Erectile dysfunction of non-organic origin    Episodic tension-type headache, not intractable    Screening for prostate cancer    Vitamin D deficiency    Well adult exam    Need for influenza vaccination    Screening for colon cancer    Dyslipidemia    Benign essential hypertension    Stage 3 chronic kidney disease    Lab Results   Component Value Date    EGFR 66 06/28/2025    EGFR 62 03/01/2025    EGFR 69 12/11/2024    CREATININE 1.15 06/28/2025    CREATININE 1.21 03/01/2025    CREATININE 1.11 12/11/2024         Dilated aortic root (HCC)    JODY (obstructive sleep apnea)    Annual physical exam          Past Medical History:   Diagnosis Date    Atrial fibrillation (HCC)     Cancer (HCC)     lymphoma    Chronic kidney disease     CKD (chronic kidney disease)     Dilated aortic root (HCC)     Erectile dysfunction      Hyperlipidemia 06/28/2016    Hypertension     Hypertensive CKD (chronic kidney disease)     Mild renal insufficiency 09/26/2017    Vitamin D deficiency      Social History     Socioeconomic History    Marital status: /Civil Union     Spouse name: Not on file    Number of children: Not on file    Years of education: Not on file    Highest education level: Not on file   Occupational History    Not on file   Tobacco Use    Smoking status: Never     Passive exposure: Never    Smokeless tobacco: Never   Vaping Use    Vaping status: Never Used   Substance and Sexual Activity    Alcohol use: Yes     Alcohol/week: 1.0 standard drink of alcohol     Types: 1 Cans of beer per week     Comment: social    Drug use: No    Sexual activity: Yes     Partners: Female     Birth control/protection: None   Other Topics Concern    Not on file   Social History Narrative    Not on file     Social Drivers of Health     Financial Resource Strain: Not on file   Food Insecurity: Not on file   Transportation Needs: Not on file   Physical Activity: Not on file   Stress: Not on file   Social Connections: Not on file   Intimate Partner Violence: Not on file   Housing Stability: Not on file      Family History   Problem Relation Name Age of Onset    Lung cancer Mother Anali     Cancer Mother Anali     Heart attack Father Sigifredo 68    Prostate cancer Father Sigifredo     Hyperlipidemia Father Sigifredo     Hypertension Father Sigifredo     No Known Problems Sister      Lymphoma Maternal Aunt      Fainting Maternal Grandfather       Past Surgical History:   Procedure Laterality Date    CARDIAC ELECTROPHYSIOLOGY PROCEDURE N/A 03/03/2023    Procedure: Cardiac eps/afib ablation;  Surgeon: Anmol Gonzales MD;  Location: [facility];  Service: Cardiology    CARDIAC ELECTROPHYSIOLOGY PROCEDURE N/A 03/03/2023    Procedure: Cardiac eps/aflutter ablation;  Surgeon: Anmol Gonzales MD;  Location: [facility];  Service: Cardiology    COLONOSCOPY       "HERNIA REPAIR      HIATAL HERNIA REPAIR         Current Outpatient Medications:     cholecalciferol (VITAMIN D3) 1,000 units tablet, Take 1,000 Units by mouth in the morning., Disp: , Rfl:     hydrALAZINE (APRESOLINE) 10 mg tablet, Take 1 tablet (10 mg total) by mouth 2 (two) times a day, Disp: 180 tablet, Rfl: 3    losartan (COZAAR) 100 MG tablet, Take 1 tablet (100 mg total) by mouth daily, Disp: 90 tablet, Rfl: 3    multivitamin (THERAGRAN) TABS, Take 1 tablet by mouth in the morning., Disp: , Rfl:     NIFEdipine (PROCARDIA XL) 30 mg 24 hr tablet, Take 1 tablet (30 mg total) by mouth daily, Disp: 90 tablet, Rfl: 1  No Known Allergies    Labs:     Chemistry        Component Value Date/Time     10/07/2017 0740    K 3.9 06/28/2025 1015    K 3.7 02/20/2021 0747     06/28/2025 1015     02/20/2021 0747    CO2 26 06/28/2025 1015    CO2 28 02/20/2021 0747    BUN 19 06/28/2025 1015    BUN 22 02/20/2021 0747    CREATININE 1.15 06/28/2025 1015    CREATININE 1.31 10/07/2017 0740        Component Value Date/Time    CALCIUM 9.0 06/28/2025 1015    CALCIUM 9.5 02/20/2021 0747    ALKPHOS 49 06/28/2025 1015    ALKPHOS 46 02/20/2021 0747    AST 17 06/28/2025 1015    AST 18 02/20/2021 0747    ALT 12 06/28/2025 1015    ALT 21 02/20/2021 0747    BILITOT 0.6 10/07/2017 0740            Lab Results   Component Value Date    CHOL 174 09/09/2017    CHOL 224 (H) 06/18/2016    CHOL 186 03/21/2015     Lab Results   Component Value Date    HDL 54 06/28/2025    HDL 52 12/11/2024    HDL 57 06/05/2024     Lab Results   Component Value Date    LDLCALC 132 (H) 06/28/2025    LDLCALC 135 (H) 12/11/2024    LDLCALC 133 (H) 06/05/2024     Lab Results   Component Value Date    TRIG 71 06/28/2025    TRIG 71 12/11/2024    TRIG 67 06/05/2024     No results found for: \"CHOLHDL\"    Imaging: No results found.    Review of Systems   Cardiovascular:  Negative for chest pain, cyanosis, leg swelling and near-syncope.   All other systems reviewed " and are negative.      Vitals:    07/16/25 0955   BP: 122/72   Pulse:    SpO2:          Vitals:    07/16/25 0928   Weight: 83.8 kg (184 lb 12.8 oz)             Body mass index is 25.79 kg/m².     Physical Exam:  General:  Alert and cooperative, appears stated age  HEENT:  PERRLA, EOMI, no scleral icterus, no conjunctival pallor  Neck:  No lymphadenopathy, no thyromegaly, no carotid bruits, no elevated JVP  Heart:  Regular rate and rhythm, normal S1/S2, no S3/S4, no murmur  Lungs:  Clear to auscultation bilaterally   Abdomen:  Soft, non-tender, positive bowel sounds, no rebound or guarding,   no organomegaly   Extremities:  No clubbing, cyanosis or edema   Vascular:  2+ pedal pulses  Skin:  No rashes or lesions on exposed skin  Neurologic:  Cranial nerves II-XII grossly intact without focal deficits

## 2025-07-16 ENCOUNTER — OFFICE VISIT (OUTPATIENT)
Dept: CARDIOLOGY CLINIC | Facility: CLINIC | Age: 65
End: 2025-07-16
Payer: COMMERCIAL

## 2025-07-16 VITALS
WEIGHT: 184.8 LBS | OXYGEN SATURATION: 98 % | SYSTOLIC BLOOD PRESSURE: 122 MMHG | DIASTOLIC BLOOD PRESSURE: 72 MMHG | HEART RATE: 66 BPM | BODY MASS INDEX: 25.79 KG/M2

## 2025-07-16 DIAGNOSIS — I77.810 THORACIC AORTIC ECTASIA (HCC): ICD-10-CM

## 2025-07-16 DIAGNOSIS — I48.0 PAROXYSMAL ATRIAL FIBRILLATION (HCC): Primary | ICD-10-CM

## 2025-07-16 DIAGNOSIS — I10 BENIGN ESSENTIAL HYPERTENSION: ICD-10-CM

## 2025-07-16 DIAGNOSIS — E78.5 DYSLIPIDEMIA: ICD-10-CM

## 2025-07-16 PROCEDURE — 93000 ELECTROCARDIOGRAM COMPLETE: CPT | Performed by: INTERNAL MEDICINE

## 2025-07-16 PROCEDURE — 99214 OFFICE O/P EST MOD 30 MIN: CPT | Performed by: INTERNAL MEDICINE

## 2025-07-18 PROCEDURE — 88305 TISSUE EXAM BY PATHOLOGIST: CPT | Performed by: STUDENT IN AN ORGANIZED HEALTH CARE EDUCATION/TRAINING PROGRAM

## 2025-07-18 PROCEDURE — 88341 IMHCHEM/IMCYTCHM EA ADD ANTB: CPT | Performed by: STUDENT IN AN ORGANIZED HEALTH CARE EDUCATION/TRAINING PROGRAM

## 2025-07-18 PROCEDURE — 88342 IMHCHEM/IMCYTCHM 1ST ANTB: CPT | Performed by: STUDENT IN AN ORGANIZED HEALTH CARE EDUCATION/TRAINING PROGRAM

## 2025-08-14 ENCOUNTER — OFFICE VISIT (OUTPATIENT)
Dept: UROLOGY | Facility: CLINIC | Age: 65
End: 2025-08-14
Payer: COMMERCIAL

## (undated) DEVICE — CATH EP 7FR DI-DIR 10-POLE DEFL CS 2-8-2 FJ

## (undated) DEVICE — CABLE CRYOCATH ELECTRICAL UMBILICAL

## (undated) DEVICE — CATH EP ACHIEVE 20 MM MAPPING LOOP

## (undated) DEVICE — CATH ULTRASOUND ACUNAV ICE 8FR 90CM GE VIVID-I

## (undated) DEVICE — PINNACLE INTRODUCER SHEATH: Brand: PINNACLE

## (undated) DEVICE — INTRO SHEATH 8FR 24CM ARROW-FLEX

## (undated) DEVICE — CATH ABLATION CRYOCATH ARCTIC FRONT ADV PRO 28MM

## (undated) DEVICE — CATH ABLATION FLEXCATH ADVANCE OD12 FR STEERABLE

## (undated) DEVICE — TUBING SET COOL POINT

## (undated) DEVICE — CATH EP 5FR QUAD SUPREME CRD

## (undated) DEVICE — CATH COAXIAL UMBILICAL

## (undated) DEVICE — CATH EP 5FR QUAD SUPREME JSN

## (undated) DEVICE — RADIFOCUS GLIDEWIRE: Brand: GLIDEWIRE

## (undated) DEVICE — GUIDE SHEATH SLO 8.5 FR

## (undated) DEVICE — CATH EP 5FR QUAD COURNAND-2

## (undated) DEVICE — DGW .035 FC J3MM 150CM TEF: Brand: EMERALD

## (undated) DEVICE — Device: Brand: PROTRACK PIGTAIL WIRE

## (undated) DEVICE — CATH ABLATION TACTICATH SE BI-DIR FJ CRV

## (undated) DEVICE — REF PATCH ENSITE X

## (undated) DEVICE — CABLE CATHETER ACHIEVE MAPPING

## (undated) DEVICE — CATH EP  INQUIRY 6F 2-5-2MM  MED CRV STERABLE  DECAPOLAR 110CM

## (undated) DEVICE — NEEDLE TRANSSEPTAL BRK-1 71CM